# Patient Record
Sex: MALE | Race: BLACK OR AFRICAN AMERICAN | NOT HISPANIC OR LATINO | Employment: FULL TIME | URBAN - METROPOLITAN AREA
[De-identification: names, ages, dates, MRNs, and addresses within clinical notes are randomized per-mention and may not be internally consistent; named-entity substitution may affect disease eponyms.]

---

## 2019-09-25 ENCOUNTER — HOSPITAL ENCOUNTER (EMERGENCY)
Facility: HOSPITAL | Age: 34
Discharge: HOME | End: 2019-09-25
Admitting: EMERGENCY MEDICINE

## 2019-09-25 VITALS
DIASTOLIC BLOOD PRESSURE: 83 MMHG | SYSTOLIC BLOOD PRESSURE: 181 MMHG | HEART RATE: 78 BPM | OXYGEN SATURATION: 99 % | HEIGHT: 73 IN | BODY MASS INDEX: 41.46 KG/M2 | WEIGHT: 312.83 LBS | RESPIRATION RATE: 16 BRPM | TEMPERATURE: 99.3 F

## 2019-09-25 DIAGNOSIS — H66.92 LEFT OTITIS MEDIA, UNSPECIFIED OTITIS MEDIA TYPE: Primary | ICD-10-CM

## 2019-09-25 PROCEDURE — 99283 EMERGENCY DEPT VISIT LOW MDM: CPT

## 2019-09-25 RX ORDER — AMOXICILLIN 875 MG/1
875 TABLET, COATED ORAL ONCE
Status: COMPLETED | OUTPATIENT
Start: 2019-09-25 | End: 2019-09-25

## 2019-09-25 RX ORDER — AMOXICILLIN 875 MG/1
875 TABLET, COATED ORAL 2 TIMES DAILY
Qty: 14 TABLET | Refills: 0 | Status: SHIPPED | OUTPATIENT
Start: 2019-09-25

## 2019-09-25 RX ADMIN — AMOXICILLIN 875 MG: 875 TABLET, FILM COATED ORAL at 02:28

## 2019-09-25 NOTE — ED PROVIDER NOTES
Subjective   Patient has had pain and some drainage from the left ear for the past 3 days.  No loss of hearing has been no fever            Review of Systems   Constitutional: Negative for fever.   HENT: Positive for ear pain.        No past medical history on file.    No Known Allergies    No past surgical history on file.    No family history on file.             Objective   Physical Exam  34-year-old gentleman in no acute distress HEENT right tympanic membranes clear left is erythematous bulging appears to be intact but there is some drainage in the canal tympanic membrane is not retracted pharynx is clear neck is supple nontender no lymphadenopathy  Procedures           ED Course    He will be started on amoxicillin and given ENT for follow-up              MDM    Final diagnoses:   Left otitis media, unspecified otitis media type              Amado Porter, NP  09/25/19 0159

## 2023-03-13 ENCOUNTER — APPOINTMENT (OUTPATIENT)
Dept: CT IMAGING | Facility: HOSPITAL | Age: 38
End: 2023-03-13
Payer: MEDICAID

## 2023-03-13 ENCOUNTER — HOSPITAL ENCOUNTER (EMERGENCY)
Facility: HOSPITAL | Age: 38
Discharge: HOME | End: 2023-03-13
Admitting: EMERGENCY MEDICINE
Payer: MEDICAID

## 2023-03-13 VITALS
BODY MASS INDEX: 37.19 KG/M2 | RESPIRATION RATE: 12 BRPM | WEIGHT: 315 LBS | OXYGEN SATURATION: 99 % | TEMPERATURE: 98.1 F | HEART RATE: 82 BPM | SYSTOLIC BLOOD PRESSURE: 156 MMHG | HEIGHT: 77 IN | DIASTOLIC BLOOD PRESSURE: 95 MMHG

## 2023-03-13 DIAGNOSIS — S00.93XA CONTUSION OF HEAD, UNSPECIFIED PART OF HEAD, INITIAL ENCOUNTER: Primary | ICD-10-CM

## 2023-03-13 DIAGNOSIS — R51.9 NONINTRACTABLE HEADACHE, UNSPECIFIED CHRONICITY PATTERN, UNSPECIFIED HEADACHE TYPE: ICD-10-CM
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
## 2023-03-13 PROCEDURE — 96372 THER/PROPH/DIAG INJ SC/IM: CPT

## 2023-03-13 PROCEDURE — 25010000002 KETOROLAC TROMETHAMINE PER 15 MG: Performed by: PHYSICIAN ASSISTANT

## 2023-03-13 PROCEDURE — 99282 EMERGENCY DEPT VISIT SF MDM: CPT

## 2023-03-13 PROCEDURE — 70450 CT HEAD/BRAIN W/O DYE: CPT

## 2023-03-13 RX ORDER — NAPROXEN 500 MG/1
500 TABLET ORAL 2 TIMES DAILY PRN
Qty: 14 TABLET | Refills: 0 | Status: SHIPPED | OUTPATIENT
Start: 2023-03-13

## 2023-03-13 RX ORDER — KETOROLAC TROMETHAMINE 15 MG/ML
15 INJECTION, SOLUTION INTRAMUSCULAR; INTRAVENOUS ONCE
Status: COMPLETED | OUTPATIENT
Start: 2023-03-13 | End: 2023-03-13

## 2023-03-13 RX ORDER — METHOCARBAMOL 500 MG/1
500 TABLET, FILM COATED ORAL 3 TIMES DAILY
Qty: 15 TABLET | Refills: 0 | Status: SHIPPED | OUTPATIENT
Start: 2023-03-13

## 2023-03-13 RX ADMIN — KETOROLAC TROMETHAMINE 15 MG: 15 INJECTION, SOLUTION INTRAMUSCULAR; INTRAVENOUS at 19:17
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
## 2023-03-14 NOTE — DISCHARGE INSTRUCTIONS
Take naproxen as needed for headache or pain.  May take up to 2 times per day.  Do not mix with ibuprofen, Advil, Aleve Mobic or diclofenac.    Take Robaxin as needed for muscle stiffness or spasms.    Follow-up with primary care for recheck  Return to the ER for new or worsening symptoms

## 2024-01-22 PROCEDURE — 99283 EMERGENCY DEPT VISIT LOW MDM: CPT

## 2024-01-23 ENCOUNTER — HOSPITAL ENCOUNTER (EMERGENCY)
Facility: HOSPITAL | Age: 39
Discharge: HOME | End: 2024-01-23
Attending: EMERGENCY MEDICINE | Admitting: EMERGENCY MEDICINE
Payer: MEDICAID

## 2024-01-23 ENCOUNTER — APPOINTMENT (OUTPATIENT)
Dept: GENERAL RADIOLOGY | Facility: HOSPITAL | Age: 39
End: 2024-01-23
Payer: MEDICAID

## 2024-01-23 VITALS
OXYGEN SATURATION: 94 % | RESPIRATION RATE: 15 BRPM | BODY MASS INDEX: 37.19 KG/M2 | HEIGHT: 77 IN | DIASTOLIC BLOOD PRESSURE: 91 MMHG | HEART RATE: 76 BPM | TEMPERATURE: 99 F | WEIGHT: 315 LBS | SYSTOLIC BLOOD PRESSURE: 148 MMHG

## 2024-01-23 DIAGNOSIS — M25.562 ACUTE PAIN OF LEFT KNEE: Primary | ICD-10-CM

## 2024-01-23 LAB
ANION GAP SERPL CALCULATED.3IONS-SCNC: 10 MMOL/L (ref 5–15)
BASOPHILS # BLD AUTO: 0.1 10*3/MM3 (ref 0–0.2)
BASOPHILS NFR BLD AUTO: 1.2 % (ref 0–1.5)
BUN SERPL-MCNC: 8 MG/DL (ref 6–20)
BUN/CREAT SERPL: 6.2 (ref 7–25)
CALCIUM SPEC-SCNC: 8.9 MG/DL (ref 8.6–10.5)
CHLORIDE SERPL-SCNC: 105 MMOL/L (ref 98–107)
CO2 SERPL-SCNC: 26 MMOL/L (ref 22–29)
CREAT SERPL-MCNC: 1.29 MG/DL (ref 0.76–1.27)
CRP SERPL-MCNC: 1.86 MG/DL (ref 0–0.5)
DEPRECATED RDW RBC AUTO: 42 FL (ref 37–54)
EGFRCR SERPLBLD CKD-EPI 2021: 73.2 ML/MIN/1.73
EOSINOPHIL # BLD AUTO: 0 10*3/MM3 (ref 0–0.4)
EOSINOPHIL NFR BLD AUTO: 0.3 % (ref 0.3–6.2)
ERYTHROCYTE [DISTWIDTH] IN BLOOD BY AUTOMATED COUNT: 13.5 % (ref 12.3–15.4)
ERYTHROCYTE [SEDIMENTATION RATE] IN BLOOD: 15 MM/HR (ref 0–15)
GLUCOSE SERPL-MCNC: 93 MG/DL (ref 65–99)
HCT VFR BLD AUTO: 43.7 % (ref 37.5–51)
HGB BLD-MCNC: 14.6 G/DL (ref 13–17.7)
LYMPHOCYTES # BLD AUTO: 2.1 10*3/MM3 (ref 0.7–3.1)
LYMPHOCYTES NFR BLD AUTO: 19.5 % (ref 19.6–45.3)
MCH RBC QN AUTO: 28.7 PG (ref 26.6–33)
MCHC RBC AUTO-ENTMCNC: 33.5 G/DL (ref 31.5–35.7)
MCV RBC AUTO: 85.8 FL (ref 79–97)
MONOCYTES # BLD AUTO: 1.1 10*3/MM3 (ref 0.1–0.9)
MONOCYTES NFR BLD AUTO: 10.1 % (ref 5–12)
NEUTROPHILS NFR BLD AUTO: 68.9 % (ref 42.7–76)
NEUTROPHILS NFR BLD AUTO: 7.3 10*3/MM3 (ref 1.7–7)
NRBC BLD AUTO-RTO: 0.2 /100 WBC (ref 0–0.2)
PLATELET # BLD AUTO: 291 10*3/MM3 (ref 140–450)
PMV BLD AUTO: 8.1 FL (ref 6–12)
POTASSIUM SERPL-SCNC: 3.8 MMOL/L (ref 3.5–5.2)
RBC # BLD AUTO: 5.1 10*6/MM3 (ref 4.14–5.8)
SODIUM SERPL-SCNC: 141 MMOL/L (ref 136–145)
WBC NRBC COR # BLD AUTO: 10.6 10*3/MM3 (ref 3.4–10.8)

## 2024-01-23 PROCEDURE — 73562 X-RAY EXAM OF KNEE 3: CPT

## 2024-01-23 PROCEDURE — 85025 COMPLETE CBC W/AUTO DIFF WBC: CPT | Performed by: EMERGENCY MEDICINE

## 2024-01-23 PROCEDURE — 25010000002 KETOROLAC TROMETHAMINE PER 15 MG: Performed by: EMERGENCY MEDICINE

## 2024-01-23 PROCEDURE — 80048 BASIC METABOLIC PNL TOTAL CA: CPT | Performed by: EMERGENCY MEDICINE

## 2024-01-23 PROCEDURE — 96374 THER/PROPH/DIAG INJ IV PUSH: CPT

## 2024-01-23 PROCEDURE — 86140 C-REACTIVE PROTEIN: CPT | Performed by: EMERGENCY MEDICINE

## 2024-01-23 PROCEDURE — 85652 RBC SED RATE AUTOMATED: CPT | Performed by: EMERGENCY MEDICINE

## 2024-01-23 RX ORDER — METHYLPREDNISOLONE SODIUM SUCCINATE 125 MG/2ML
125 INJECTION, POWDER, LYOPHILIZED, FOR SOLUTION INTRAMUSCULAR; INTRAVENOUS ONCE
Status: DISCONTINUED | OUTPATIENT
Start: 2024-01-23 | End: 2024-01-23 | Stop reason: HOSPADM

## 2024-01-23 RX ORDER — PREDNISONE 20 MG/1
60 TABLET ORAL DAILY
Qty: 15 TABLET | Refills: 0 | Status: SHIPPED | OUTPATIENT
Start: 2024-01-23 | End: 2024-01-28

## 2024-01-23 RX ORDER — KETOROLAC TROMETHAMINE 30 MG/ML
30 INJECTION, SOLUTION INTRAMUSCULAR; INTRAVENOUS ONCE
Status: COMPLETED | OUTPATIENT
Start: 2024-01-23 | End: 2024-01-23

## 2024-01-23 RX ADMIN — KETOROLAC TROMETHAMINE 30 MG: 30 INJECTION INTRAMUSCULAR; INTRAVENOUS at 01:53

## 2024-01-23 NOTE — ED PROVIDER NOTES
Subjective   History of Present Illness  Pleasant 38-year-old male with a history of lupus that has not caused any arthritic issues in the past to his knowledge complains of gradual onset of left knee pain and swelling, moderate to severe, no known injury, no fevers.      Review of Systems   Musculoskeletal:         As per HPI       No past medical history on file.    No Known Allergies    No past surgical history on file.    No family history on file.    Social History     Socioeconomic History    Marital status: Single           Objective   Physical Exam  Constitutional:       Appearance: Normal appearance.   HENT:      Head: Normocephalic and atraumatic.   Cardiovascular:      Pulses: Normal pulses.   Musculoskeletal:      Comments: Left knee with mild effusion, pain with decreased flexion, grossly stable.  Patient is able to fully weight-bear with mild pain, no overlying erythema, minimal warmth   Skin:     General: Skin is warm and dry.      Capillary Refill: Capillary refill takes less than 2 seconds.   Neurological:      General: No focal deficit present.      Mental Status: He is alert and oriented to person, place, and time.   Psychiatric:         Mood and Affect: Mood normal.         Behavior: Behavior normal.         Procedures           ED Course                                             Medical Decision Making  38-year-old male with history of lupus with acute left knee pain.  Laboratory evaluation unremarkable with exception of mildly elevated CRP, felt to be related to inflammation rather than infection.  Patient is able to fully weight-bear with mild pain.  I did  the patient extensively if he develops any significant increased pain, inability to weight-bear, worsening knee swelling or fever to return immediately to rule out a septic knee.  At this time, inflammatory etiology perhaps lupus versus gout versus occult injury is favored.  Will place on trial of NSAIDs and steroids, follow-up  with PCP and orthopedist via referral.    Problems Addressed:  Acute pain of left knee: complicated acute illness or injury    Amount and/or Complexity of Data Reviewed  Labs: ordered.  Radiology: ordered.    Risk  Prescription drug management.        Final diagnoses:   Acute pain of left knee       ED Disposition  ED Disposition       ED Disposition   Discharge    Condition   Stable    Comment   --               PATIENT CONNECTION - [facility] [address]  [phone]  Schedule an appointment as soon as possible for a visit       Arthur Angulo II, MD  [address] [place] [address]  [phone]    Schedule an appointment as soon as possible for a visit            Medication List        New Prescriptions      predniSONE 20 MG tablet  Commonly known as: DELTASONE  Take 3 tablets by mouth Daily for 5 days.            Stop      naproxen 500 MG EC tablet  Commonly known as: EC NAPROSYN               Where to Get Your Medications        These medications were sent to [facility] #[id number] [facility], [place] - [address] [place] [address] [phone]  - [phone] [address] [place] [address]      Hours: 24-hours Phone: [phone]   predniSONE 20 MG tablet            Phil Paez MD  01/23/24 8320

## 2024-03-27 ENCOUNTER — ANESTHESIA (OUTPATIENT)
Dept: PERIOP | Facility: HOSPITAL | Age: 39
End: 2024-03-27
Payer: MEDICAID

## 2024-03-27 ENCOUNTER — APPOINTMENT (OUTPATIENT)
Dept: CT IMAGING | Facility: HOSPITAL | Age: 39
End: 2024-03-27
Payer: MEDICAID

## 2024-03-27 ENCOUNTER — HOSPITAL ENCOUNTER (EMERGENCY)
Facility: HOSPITAL | Age: 39
Discharge: OTHER INSTITUTION | End: 2024-03-27
Attending: EMERGENCY MEDICINE
Payer: MEDICAID

## 2024-03-27 ENCOUNTER — ANESTHESIA EVENT (OUTPATIENT)
Dept: PERIOP | Facility: HOSPITAL | Age: 39
End: 2024-03-27
Payer: MEDICAID

## 2024-03-27 VITALS
OXYGEN SATURATION: 95 % | SYSTOLIC BLOOD PRESSURE: 156 MMHG | WEIGHT: 315 LBS | DIASTOLIC BLOOD PRESSURE: 90 MMHG | RESPIRATION RATE: 25 BRPM | TEMPERATURE: 97 F | BODY MASS INDEX: 38.36 KG/M2 | HEIGHT: 76 IN | HEART RATE: 74 BPM

## 2024-03-27 DIAGNOSIS — I71.03 DISSECTION OF THORACOABDOMINAL AORTA: Primary | ICD-10-CM

## 2024-03-27 LAB
ABO GROUP BLD: NORMAL
ALBUMIN SERPL-MCNC: 4.2 G/DL (ref 3.5–5.2)
ALBUMIN/GLOB SERPL: 1.6 G/DL
ALP SERPL-CCNC: 89 U/L (ref 39–117)
ALT SERPL W P-5'-P-CCNC: 26 U/L (ref 1–41)
ANION GAP SERPL CALCULATED.3IONS-SCNC: 10 MMOL/L (ref 5–15)
APTT PPP: 33 SECONDS (ref 61–76.5)
AST SERPL-CCNC: 21 U/L (ref 1–40)
BASOPHILS # BLD AUTO: 0.04 10*3/MM3 (ref 0–0.2)
BASOPHILS NFR BLD AUTO: 0.3 % (ref 0–1.5)
BILIRUB SERPL-MCNC: 1.5 MG/DL (ref 0–1.2)
BLD GP AB SCN SERPL QL: NEGATIVE
BUN SERPL-MCNC: 5 MG/DL (ref 6–20)
BUN/CREAT SERPL: 4.3 (ref 7–25)
CALCIUM SPEC-SCNC: 8.8 MG/DL (ref 8.6–10.5)
CHLORIDE SERPL-SCNC: 99 MMOL/L (ref 98–107)
CO2 SERPL-SCNC: 27 MMOL/L (ref 22–29)
CREAT SERPL-MCNC: 1.15 MG/DL (ref 0.76–1.27)
DEPRECATED RDW RBC AUTO: 43.6 FL (ref 37–54)
EGFRCR SERPLBLD CKD-EPI 2021: 84.1 ML/MIN/1.73
EOSINOPHIL # BLD AUTO: 0.05 10*3/MM3 (ref 0–0.4)
EOSINOPHIL NFR BLD AUTO: 0.4 % (ref 0.3–6.2)
ERYTHROCYTE [DISTWIDTH] IN BLOOD BY AUTOMATED COUNT: 14.1 % (ref 12.3–15.4)
GLOBULIN UR ELPH-MCNC: 2.7 GM/DL
GLUCOSE SERPL-MCNC: 100 MG/DL (ref 65–99)
HCT VFR BLD AUTO: 37.4 % (ref 37.5–51)
HGB BLD-MCNC: 12.6 G/DL (ref 13–17.7)
HOLD SPECIMEN: NORMAL
IMM GRANULOCYTES # BLD AUTO: 0.04 10*3/MM3 (ref 0–0.05)
IMM GRANULOCYTES NFR BLD AUTO: 0.3 % (ref 0–0.5)
INR PPP: 1.06 (ref 0.93–1.1)
LIPASE SERPL-CCNC: 11 U/L (ref 13–60)
LYMPHOCYTES # BLD AUTO: 2.18 10*3/MM3 (ref 0.7–3.1)
LYMPHOCYTES NFR BLD AUTO: 18.5 % (ref 19.6–45.3)
MCH RBC QN AUTO: 28.5 PG (ref 26.6–33)
MCHC RBC AUTO-ENTMCNC: 33.7 G/DL (ref 31.5–35.7)
MCV RBC AUTO: 84.6 FL (ref 79–97)
MONOCYTES # BLD AUTO: 1.63 10*3/MM3 (ref 0.1–0.9)
MONOCYTES NFR BLD AUTO: 13.9 % (ref 5–12)
NEUTROPHILS NFR BLD AUTO: 66.6 % (ref 42.7–76)
NEUTROPHILS NFR BLD AUTO: 7.82 10*3/MM3 (ref 1.7–7)
NRBC BLD AUTO-RTO: 0 /100 WBC (ref 0–0.2)
PLATELET # BLD AUTO: 209 10*3/MM3 (ref 140–450)
PMV BLD AUTO: 9.7 FL (ref 6–12)
POTASSIUM SERPL-SCNC: 3.8 MMOL/L (ref 3.5–5.2)
PROT SERPL-MCNC: 6.9 G/DL (ref 6–8.5)
PROTHROMBIN TIME: 11.5 SECONDS (ref 9.6–11.7)
RBC # BLD AUTO: 4.42 10*6/MM3 (ref 4.14–5.8)
RH BLD: POSITIVE
SODIUM SERPL-SCNC: 136 MMOL/L (ref 136–145)
T&S EXPIRATION DATE: NORMAL
TROPONIN T SERPL HS-MCNC: 12 NG/L
WBC NRBC COR # BLD AUTO: 11.76 10*3/MM3 (ref 3.4–10.8)
WHOLE BLOOD HOLD COAG: NORMAL

## 2024-03-27 PROCEDURE — 25010000002 NICARDIPINE 2.5 MG/ML SOLUTION 10 ML VIAL: Performed by: ANESTHESIOLOGY

## 2024-03-27 PROCEDURE — B24BZZ4 ULTRASONOGRAPHY OF HEART WITH AORTA, TRANSESOPHAGEAL: ICD-10-PCS | Performed by: THORACIC SURGERY (CARDIOTHORACIC VASCULAR SURGERY)

## 2024-03-27 PROCEDURE — 86900 BLOOD TYPING SEROLOGIC ABO: CPT | Performed by: THORACIC SURGERY (CARDIOTHORACIC VASCULAR SURGERY)

## 2024-03-27 PROCEDURE — 25010000002 MORPHINE PER 10 MG: Performed by: EMERGENCY MEDICINE

## 2024-03-27 PROCEDURE — 25010000002 MIDAZOLAM PER 1 MG: Performed by: ANESTHESIOLOGY

## 2024-03-27 PROCEDURE — C1769 GUIDE WIRE: HCPCS | Performed by: THORACIC SURGERY (CARDIOTHORACIC VASCULAR SURGERY)

## 2024-03-27 PROCEDURE — C1713 ANCHOR/SCREW BN/BN,TIS/BN: HCPCS | Performed by: THORACIC SURGERY (CARDIOTHORACIC VASCULAR SURGERY)

## 2024-03-27 PROCEDURE — 96376 TX/PRO/DX INJ SAME DRUG ADON: CPT

## 2024-03-27 PROCEDURE — 25010000002 ONDANSETRON PER 1 MG: Performed by: EMERGENCY MEDICINE

## 2024-03-27 PROCEDURE — 02RX0JZ REPLACEMENT OF THORACIC AORTA, ASCENDING/ARCH WITH SYNTHETIC SUBSTITUTE, OPEN APPROACH: ICD-10-PCS | Performed by: THORACIC SURGERY (CARDIOTHORACIC VASCULAR SURGERY)

## 2024-03-27 PROCEDURE — 93318 ECHO TRANSESOPHAGEAL INTRAOP: CPT | Performed by: ANESTHESIOLOGY

## 2024-03-27 PROCEDURE — C1729 CATH, DRAINAGE: HCPCS | Performed by: THORACIC SURGERY (CARDIOTHORACIC VASCULAR SURGERY)

## 2024-03-27 PROCEDURE — 25010000002 NICARDIPINE 2.5 MG/ML SOLUTION 10 ML VIAL: Performed by: EMERGENCY MEDICINE

## 2024-03-27 PROCEDURE — 86850 RBC ANTIBODY SCREEN: CPT | Performed by: EMERGENCY MEDICINE

## 2024-03-27 PROCEDURE — 86901 BLOOD TYPING SEROLOGIC RH(D): CPT

## 2024-03-27 PROCEDURE — 80053 COMPREHEN METABOLIC PANEL: CPT | Performed by: EMERGENCY MEDICINE

## 2024-03-27 PROCEDURE — 86900 BLOOD TYPING SEROLOGIC ABO: CPT

## 2024-03-27 PROCEDURE — 25810000003 SODIUM CHLORIDE 0.9 % SOLUTION: Performed by: EMERGENCY MEDICINE

## 2024-03-27 PROCEDURE — 4A10X4G MONITORING OF CENTRAL NERVOUS ELECTRICAL ACTIVITY, INTRAOPERATIVE, EXTERNAL APPROACH: ICD-10-PCS | Performed by: THORACIC SURGERY (CARDIOTHORACIC VASCULAR SURGERY)

## 2024-03-27 PROCEDURE — 96375 TX/PRO/DX INJ NEW DRUG ADDON: CPT

## 2024-03-27 PROCEDURE — 5A1221Z PERFORMANCE OF CARDIAC OUTPUT, CONTINUOUS: ICD-10-PCS | Performed by: THORACIC SURGERY (CARDIOTHORACIC VASCULAR SURGERY)

## 2024-03-27 PROCEDURE — P9047 ALBUMIN (HUMAN), 25%, 50ML: HCPCS

## 2024-03-27 PROCEDURE — 96365 THER/PROPH/DIAG IV INF INIT: CPT

## 2024-03-27 PROCEDURE — 83690 ASSAY OF LIPASE: CPT | Performed by: EMERGENCY MEDICINE

## 2024-03-27 PROCEDURE — 96368 THER/DIAG CONCURRENT INF: CPT

## 2024-03-27 PROCEDURE — 02QF0ZZ REPAIR AORTIC VALVE, OPEN APPROACH: ICD-10-PCS | Performed by: THORACIC SURGERY (CARDIOTHORACIC VASCULAR SURGERY)

## 2024-03-27 PROCEDURE — 86901 BLOOD TYPING SEROLOGIC RH(D): CPT | Performed by: THORACIC SURGERY (CARDIOTHORACIC VASCULAR SURGERY)

## 2024-03-27 PROCEDURE — 85730 THROMBOPLASTIN TIME PARTIAL: CPT | Performed by: EMERGENCY MEDICINE

## 2024-03-27 PROCEDURE — 25810000003 SODIUM CHLORIDE 0.9 % SOLUTION 250 ML FLEX CONT: Performed by: ANESTHESIOLOGY

## 2024-03-27 PROCEDURE — 74176 CT ABD & PELVIS W/O CONTRAST: CPT

## 2024-03-27 PROCEDURE — 86920 COMPATIBILITY TEST SPIN: CPT

## 2024-03-27 PROCEDURE — 25010000002 HEPARIN (PORCINE) PER 1000 UNITS

## 2024-03-27 PROCEDURE — 99285 EMERGENCY DEPT VISIT HI MDM: CPT

## 2024-03-27 PROCEDURE — 74174 CTA ABD&PLVS W/CONTRAST: CPT

## 2024-03-27 PROCEDURE — 71275 CT ANGIOGRAPHY CHEST: CPT

## 2024-03-27 PROCEDURE — 25810000003 SODIUM CHLORIDE 0.9 % SOLUTION

## 2024-03-27 PROCEDURE — 25010000002 LABETALOL 5 MG/ML SOLUTION: Performed by: EMERGENCY MEDICINE

## 2024-03-27 PROCEDURE — 85025 COMPLETE CBC W/AUTO DIFF WBC: CPT | Performed by: EMERGENCY MEDICINE

## 2024-03-27 PROCEDURE — 25510000001 IOPAMIDOL PER 1 ML: Performed by: EMERGENCY MEDICINE

## 2024-03-27 PROCEDURE — 3E080GC INTRODUCTION OF OTHER THERAPEUTIC SUBSTANCE INTO HEART, OPEN APPROACH: ICD-10-PCS | Performed by: THORACIC SURGERY (CARDIOTHORACIC VASCULAR SURGERY)

## 2024-03-27 PROCEDURE — C1760 CLOSURE DEV, VASC: HCPCS | Performed by: THORACIC SURGERY (CARDIOTHORACIC VASCULAR SURGERY)

## 2024-03-27 PROCEDURE — 25810000003 SODIUM CHLORIDE 0.9 % SOLUTION 250 ML FLEX CONT: Performed by: EMERGENCY MEDICINE

## 2024-03-27 PROCEDURE — 25010000002 VANCOMYCIN 1 G RECONSTITUTED SOLUTION

## 2024-03-27 PROCEDURE — 86901 BLOOD TYPING SEROLOGIC RH(D): CPT | Performed by: EMERGENCY MEDICINE

## 2024-03-27 PROCEDURE — 84484 ASSAY OF TROPONIN QUANT: CPT | Performed by: EMERGENCY MEDICINE

## 2024-03-27 PROCEDURE — 86850 RBC ANTIBODY SCREEN: CPT | Performed by: THORACIC SURGERY (CARDIOTHORACIC VASCULAR SURGERY)

## 2024-03-27 PROCEDURE — 85610 PROTHROMBIN TIME: CPT | Performed by: EMERGENCY MEDICINE

## 2024-03-27 PROCEDURE — 86900 BLOOD TYPING SEROLOGIC ABO: CPT | Performed by: EMERGENCY MEDICINE

## 2024-03-27 PROCEDURE — 25010000002 ALBUMIN HUMAN 25% PER 50 ML

## 2024-03-27 PROCEDURE — C1768 GRAFT, VASCULAR: HCPCS | Performed by: THORACIC SURGERY (CARDIOTHORACIC VASCULAR SURGERY)

## 2024-03-27 PROCEDURE — 03JY0ZZ INSPECTION OF UPPER ARTERY, OPEN APPROACH: ICD-10-PCS | Performed by: THORACIC SURGERY (CARDIOTHORACIC VASCULAR SURGERY)

## 2024-03-27 PROCEDURE — 25010000002 ESMOLOL 2500 MG/250ML SOLUTION: Performed by: EMERGENCY MEDICINE

## 2024-03-27 DEVICE — SS SUTURE, 4 PER SLEEVE
Type: IMPLANTABLE DEVICE | Site: CHEST WALL | Status: FUNCTIONAL
Brand: MYO/WIRE II

## 2024-03-27 DEVICE — SS SUTURE, 3 PER SLEEVE
Type: IMPLANTABLE DEVICE | Site: CHEST WALL | Status: FUNCTIONAL
Brand: MYO/WIRE II

## 2024-03-27 DEVICE — HEMOST ABS SURGIFOAM SZ100 8X12 10MM: Type: IMPLANTABLE DEVICE | Site: CHEST | Status: FUNCTIONAL

## 2024-03-27 RX ORDER — ONDANSETRON 2 MG/ML
4 INJECTION INTRAMUSCULAR; INTRAVENOUS ONCE
Status: COMPLETED | OUTPATIENT
Start: 2024-03-27 | End: 2024-03-27

## 2024-03-27 RX ORDER — MIDAZOLAM HYDROCHLORIDE 1 MG/ML
INJECTION INTRAMUSCULAR; INTRAVENOUS AS NEEDED
Status: DISCONTINUED | OUTPATIENT
Start: 2024-03-27 | End: 2024-03-28 | Stop reason: SURG

## 2024-03-27 RX ORDER — ESMOLOL HYDROCHLORIDE 10 MG/ML
50-300 INJECTION, SOLUTION INTRAVENOUS
Status: DISCONTINUED | OUTPATIENT
Start: 2024-03-27 | End: 2024-03-28 | Stop reason: HOSPADM

## 2024-03-27 RX ORDER — SODIUM CHLORIDE 0.9 % (FLUSH) 0.9 %
10 SYRINGE (ML) INJECTION AS NEEDED
Status: DISCONTINUED | OUTPATIENT
Start: 2024-03-27 | End: 2024-03-28 | Stop reason: HOSPADM

## 2024-03-27 RX ORDER — LABETALOL HYDROCHLORIDE 5 MG/ML
10 INJECTION, SOLUTION INTRAVENOUS ONCE
Status: COMPLETED | OUTPATIENT
Start: 2024-03-27 | End: 2024-03-27

## 2024-03-27 RX ADMIN — NICARDIPINE HYDROCHLORIDE 10 MG/HR: 2.5 INJECTION, SOLUTION INTRAVENOUS at 22:31

## 2024-03-27 RX ADMIN — ESMOLOL HYDROCHLORIDE 125 MCG/KG/MIN: 10 INJECTION INTRAVENOUS at 22:42

## 2024-03-27 RX ADMIN — SODIUM CHLORIDE: 9 INJECTION, SOLUTION INTRAVENOUS at 23:40

## 2024-03-27 RX ADMIN — ESMOLOL HYDROCHLORIDE 25 MCG/KG/MIN: 10 INJECTION INTRAVENOUS at 22:08

## 2024-03-27 RX ADMIN — SODIUM CHLORIDE 500 ML: 9 INJECTION, SOLUTION INTRAVENOUS at 19:02

## 2024-03-27 RX ADMIN — IOPAMIDOL 100 ML: 755 INJECTION, SOLUTION INTRAVENOUS at 21:30

## 2024-03-27 RX ADMIN — ONDANSETRON 4 MG: 2 INJECTION INTRAMUSCULAR; INTRAVENOUS at 19:02

## 2024-03-27 RX ADMIN — Medication 10 ML: at 19:02

## 2024-03-27 RX ADMIN — MIDAZOLAM 2 MG: 1 INJECTION INTRAMUSCULAR; INTRAVENOUS at 23:45

## 2024-03-27 RX ADMIN — Medication 10 MG: at 21:40

## 2024-03-27 RX ADMIN — MORPHINE SULFATE 4 MG: 4 INJECTION, SOLUTION INTRAMUSCULAR; INTRAVENOUS at 19:02

## 2024-03-27 RX ADMIN — NICARDIPINE HYDROCHLORIDE 5 MG/HR: 2.5 INJECTION, SOLUTION INTRAVENOUS at 22:04

## 2024-03-27 RX ADMIN — ESMOLOL HYDROCHLORIDE 75 MCG/KG/MIN: 10 INJECTION INTRAVENOUS at 22:28

## 2024-03-27 RX ADMIN — NICARDIPINE HYDROCHLORIDE 10 MG/HR: 25 INJECTION, SOLUTION INTRAVENOUS at 23:41

## 2024-03-27 RX ADMIN — ONDANSETRON 4 MG: 2 INJECTION INTRAMUSCULAR; INTRAVENOUS at 22:03

## 2024-03-27 RX ADMIN — NICARDIPINE HYDROCHLORIDE 7.5 MG/HR: 2.5 INJECTION, SOLUTION INTRAVENOUS at 22:16

## 2024-03-27 RX ADMIN — MORPHINE SULFATE 4 MG: 4 INJECTION, SOLUTION INTRAMUSCULAR; INTRAVENOUS at 22:03

## 2024-03-27 NOTE — ED PROVIDER NOTES
"Subjective   History of Present Illness  38-year-old male describes an episode of vomiting 3 days ago after eating some chili and has had some epigastric pain and radiation to his back off and on since that time.  States he does seem to have some increased discomfort after eating over the last few days.  He reports no diarrhea.  States his last bowel movement was yesterday.  He reports no melena or hematochezia or hematemesis.  He has had no fevers or chills or any other unusual ingestions or any known ill contacts.  Review of Systems    No past medical history on file.  Reportedly negative and on no medications  No Known Allergies    No past surgical history on file.    No family history on file.    Social History     Socioeconomic History    Marital status: Single     Prior to Admission medications    Medication Sig Start Date End Date Taking? Authorizing Provider   amoxicillin (AMOXIL) 875 MG tablet Take 1 tablet by mouth 2 (Two) Times a Day. 9/25/19   Amdao Porter APRN   methocarbamol (ROBAXIN) 500 MG tablet Take 1 tablet by mouth 3 (Three) Times a Day. 3/13/23   Fatoumata Mckeon PA     /91   Pulse 58   Temp 97 °F (36.1 °C) (Temporal)   Resp 17   Ht 193 cm (76\")   Wt (!) 151 kg (332 lb 7.3 oz)   SpO2 95%   BMI 40.47 kg/m²         Objective   Physical Exam  General: Obese male no acute distress, awake alert and pleasant  Eyes:  sclera nonicteric  HEENT: Mucous membranes moist, no mucosal swelling  Neck: Supple, no nuchal rigidity, no JVD  Respirations: Respirations nonlabored, equal breath sounds bilaterally, clear lungs  Heart regular rate and rhythm, no murmurs rubs or gallops,   Abdomen soft mildly tender palpation in all 4 quadrants, no rebound or guarding, no Maldonado sign, nondistended, no hepatosplenomegaly, no hernia, no mass, normal bowel sounds, no CVA tenderness  Extremities no clubbing cyanosis or edema,   Neuro cranial nerves grossly intact, no focal limb deficits  Psych oriented, " pleasant affect  Skin no rash, brisk cap refill  Procedures           ED Course    Results for orders placed or performed during the hospital encounter of 03/27/24   Comprehensive Metabolic Panel    Specimen: Blood   Result Value Ref Range    Glucose 100 (H) 65 - 99 mg/dL    BUN 5 (L) 6 - 20 mg/dL    Creatinine 1.15 0.76 - 1.27 mg/dL    Sodium 136 136 - 145 mmol/L    Potassium 3.8 3.5 - 5.2 mmol/L    Chloride 99 98 - 107 mmol/L    CO2 27.0 22.0 - 29.0 mmol/L    Calcium 8.8 8.6 - 10.5 mg/dL    Total Protein 6.9 6.0 - 8.5 g/dL    Albumin 4.2 3.5 - 5.2 g/dL    ALT (SGPT) 26 1 - 41 U/L    AST (SGOT) 21 1 - 40 U/L    Alkaline Phosphatase 89 39 - 117 U/L    Total Bilirubin 1.5 (H) 0.0 - 1.2 mg/dL    Globulin 2.7 gm/dL    A/G Ratio 1.6 g/dL    BUN/Creatinine Ratio 4.3 (L) 7.0 - 25.0    Anion Gap 10.0 5.0 - 15.0 mmol/L    eGFR 84.1 >60.0 mL/min/1.73   Lipase    Specimen: Blood   Result Value Ref Range    Lipase 11 (L) 13 - 60 U/L   CBC Auto Differential    Specimen: Blood   Result Value Ref Range    WBC 11.76 (H) 3.40 - 10.80 10*3/mm3    RBC 4.42 4.14 - 5.80 10*6/mm3    Hemoglobin 12.6 (L) 13.0 - 17.7 g/dL    Hematocrit 37.4 (L) 37.5 - 51.0 %    MCV 84.6 79.0 - 97.0 fL    MCH 28.5 26.6 - 33.0 pg    MCHC 33.7 31.5 - 35.7 g/dL    RDW 14.1 12.3 - 15.4 %    RDW-SD 43.6 37.0 - 54.0 fl    MPV 9.7 6.0 - 12.0 fL    Platelets 209 140 - 450 10*3/mm3    Neutrophil % 66.6 42.7 - 76.0 %    Lymphocyte % 18.5 (L) 19.6 - 45.3 %    Monocyte % 13.9 (H) 5.0 - 12.0 %    Eosinophil % 0.4 0.3 - 6.2 %    Basophil % 0.3 0.0 - 1.5 %    Immature Grans % 0.3 0.0 - 0.5 %    Neutrophils, Absolute 7.82 (H) 1.70 - 7.00 10*3/mm3    Lymphocytes, Absolute 2.18 0.70 - 3.10 10*3/mm3    Monocytes, Absolute 1.63 (H) 0.10 - 0.90 10*3/mm3    Eosinophils, Absolute 0.05 0.00 - 0.40 10*3/mm3    Basophils, Absolute 0.04 0.00 - 0.20 10*3/mm3    Immature Grans, Absolute 0.04 0.00 - 0.05 10*3/mm3    nRBC 0.0 0.0 - 0.2 /100 WBC   Single High Sensitivity Troponin T     Specimen: Blood   Result Value Ref Range    HS Troponin T 12 <22 ng/L   Protime-INR    Specimen: Blood   Result Value Ref Range    Protime 11.5 9.6 - 11.7 Seconds    INR 1.06 0.93 - 1.10   aPTT    Specimen: Blood   Result Value Ref Range    PTT 33.0 (L) 61.0 - 76.5 seconds   Gold Top - SST   Result Value Ref Range    Extra Tube Hold for add-ons.    Light Blue Top   Result Value Ref Range    Extra Tube Hold for add-ons.      CT Angiogram Abdomen Pelvis    Result Date: 3/27/2024  Impression: Type A aortic dissection as described above extending down to the aortic bifurcation. No evidence of occlusion of the branches of the aorta or evidence of end organ hypoperfusion. Aneurysmal dilatation of the distal infrarenal abdominal aorta measuring 4.5 cm. Mild aneurysmal dilatation of the right common iliac artery measuring 1.8 cm. Moderate aneurysmal dilatation of the left common iliac artery measuring 2.3 cm. Findings discussed with Dr. Gresham on March 27, 2024 at 9:50 p.m. by telephone. Electronically Signed: Checo Ibarra MD  3/27/2024 9:50 PM EDT  Workstation ID: [id number]    CT Angiogram Chest    Result Date: 3/27/2024  Impression: Type A aortic dissection as described above extending down to the aortic bifurcation. No evidence of occlusion of the branches of the aorta or evidence of end organ hypoperfusion. Aneurysmal dilatation of the distal infrarenal abdominal aorta measuring 4.5 cm. Mild aneurysmal dilatation of the right common iliac artery measuring 1.8 cm. Moderate aneurysmal dilatation of the left common iliac artery measuring 2.3 cm. Findings discussed with Dr. Gresham on March 27, 2024 at 9:50 p.m. by telephone. Electronically Signed: Checo Ibarra MD  3/27/2024 9:50 PM EDT  Workstation ID: [id number]    CT Abdomen Pelvis Without Contrast    Result Date: 3/27/2024  Impression: Aneurysmal dilatation of the infrarenal abdominal aorta measuring 4.2 cm and aneurysmal dilatation of the bilateral common iliac  arteries measuring 2.2 cm on the right and 2.4 cm on the left. Extensive periaortic/retroperitoneal fat stranding is visualized. No para-aortic fluid or hematoma visualized. Cannot exclude acute aortic syndrome. Recommend correlation with CTA. Other etiologies such as early changes of retroperitoneal fibrosis or passive congestion are also considerations. Mild hepatomegaly. Slightly nodular liver contour. Correlate for cirrhosis. Findings discussed with Dr. Gresham on March 27, 2024 at 7:52 p.m. by telephone. Electronically Signed: Checo Ibarra MD  3/27/2024 7:52 PM EDT  Workstation ID: [id number]     ED Course as of 03/27/24 2211   Wed Mar 27, 2024   2133 CT surgery paged [SH]   5936 In discussion with Dr. Parkinson, patient was started on esmolol and Cardene drips with target of systolic blood pressure below 120. [SH]      ED Course User Index  [SH] Bobo Gresham MD                                             Medical Decision Making  Patient presents with abdominal pain and an episode of vomiting 3 days ago.  Differential diagnosis included gastritis, peptic ulcer disease, abdominal aortic aneurysm, cholecystitis, appendicitis, pancreatitis    Patient was found to have some aortic abnormality on the noncontrast scan this was followed up with a CTA of the aorta which shows type a dissection.  Patient was moderately hypertensive on presentation and was started on IV antihypertensives following the dissection diagnosis.  CT surgery consulted.  After review of the scans Dr. Parkinson requests patient be transferred to [facility] for surgery.  Patient was advised of findings and is agreeable plan of transfer for definitive management and surgery.  On reexamination.  Patient has normal pulses in all 4 extremities.  His pain is minimal.  He is having no chest pain or dyspnea.    Problems Addressed:  Dissection of thoracoabdominal aorta: complicated acute illness or injury    Amount and/or Complexity of Data  Reviewed  Labs: ordered. Decision-making details documented in ED Course.     Details: CBC shows borderline leukocytosis and mild anemia, high-sensitivity troponin normal, lipase essentially normal, comprehensive metabolic panel mild hyperbilirubinemia  Radiology: ordered and independent interpretation performed.     Details: My independent interpretation of CT gram aorta type a dissection    Risk  Prescription drug management.    Critical care time 40 minutes    Final diagnoses:   Dissection of thoracoabdominal aorta       ED Disposition  ED Disposition       ED Disposition   Transfer to Another Facility     Condition   --    Comment   --               No follow-up provider specified.       Medication List      No changes were made to your prescriptions during this visit.            Bobo Gresham MD  03/27/24 6806

## 2024-03-28 ENCOUNTER — APPOINTMENT (OUTPATIENT)
Dept: GENERAL RADIOLOGY | Facility: HOSPITAL | Age: 39
DRG: 219 | End: 2024-03-28
Payer: MEDICAID

## 2024-03-28 ENCOUNTER — HOSPITAL ENCOUNTER (INPATIENT)
Facility: HOSPITAL | Age: 39
LOS: 6 days | Discharge: HOME HEALTH | DRG: 219 | End: 2024-04-03
Attending: THORACIC SURGERY (CARDIOTHORACIC VASCULAR SURGERY) | Admitting: THORACIC SURGERY (CARDIOTHORACIC VASCULAR SURGERY)
Payer: MEDICAID

## 2024-03-28 DIAGNOSIS — I72.9 ANEURYSM: ICD-10-CM

## 2024-03-28 DIAGNOSIS — I71.9 ANEURYSM OF AORTA: ICD-10-CM

## 2024-03-28 DIAGNOSIS — Z86.79 S/P ASCENDING AORTIC ANEURYSM REPAIR: ICD-10-CM

## 2024-03-28 DIAGNOSIS — Z98.890 S/P AORTIC DISSECTION REPAIR: ICD-10-CM

## 2024-03-28 DIAGNOSIS — Z98.890 S/P ASCENDING AORTIC ANEURYSM REPAIR: ICD-10-CM

## 2024-03-28 DIAGNOSIS — I71.03 DISSECTION OF THORACOABDOMINAL AORTA: ICD-10-CM

## 2024-03-28 DIAGNOSIS — G47.33 OSA (OBSTRUCTIVE SLEEP APNEA): Primary | ICD-10-CM

## 2024-03-28 PROBLEM — I71.00 AORTIC DISSECTION: Status: ACTIVE | Noted: 2024-03-28

## 2024-03-28 LAB
ABO GROUP BLD: NORMAL
ACT BLD: 120 SECONDS (ref 82–152)
ACT BLD: 130 SECONDS (ref 82–152)
ACT BLD: 282 SECONDS (ref 82–152)
ACT BLD: 309 SECONDS (ref 82–152)
ACT BLD: 358 SECONDS (ref 82–152)
ACT BLD: 412 SECONDS (ref 82–152)
ALBUMIN SERPL-MCNC: 3.7 G/DL (ref 3.5–5.2)
ALBUMIN SERPL-MCNC: 3.7 G/DL (ref 3.5–5.2)
AMPHET+METHAMPHET UR QL: POSITIVE
ANION GAP SERPL CALCULATED.3IONS-SCNC: 10 MMOL/L (ref 5–15)
ANION GAP SERPL CALCULATED.3IONS-SCNC: 15.6 MMOL/L (ref 5–15)
ANION GAP SERPL CALCULATED.3IONS-SCNC: 8.5 MMOL/L (ref 5–15)
ANION GAP SERPL CALCULATED.3IONS-SCNC: 9.4 MMOL/L (ref 5–15)
APTT PPP: 28.2 SECONDS (ref 22.7–35.4)
APTT PPP: 28.5 SECONDS (ref 22.7–35.4)
APTT PPP: 30.1 SECONDS (ref 22.7–35.4)
ARTERIAL PATENCY WRIST A: ABNORMAL
ATMOSPHERIC PRESS: 753.8 MMHG
ATMOSPHERIC PRESS: 754.9 MMHG
ATMOSPHERIC PRESS: 754.9 MMHG
ATMOSPHERIC PRESS: 755.4 MMHG
BARBITURATES UR QL SCN: NEGATIVE
BASE EXCESS BLDA CALC-SCNC: -0.3 MMOL/L (ref 0–2)
BASE EXCESS BLDA CALC-SCNC: -1 MMOL/L (ref -5–5)
BASE EXCESS BLDA CALC-SCNC: -1.3 MMOL/L (ref 0–2)
BASE EXCESS BLDA CALC-SCNC: -5 MMOL/L (ref -5–5)
BASE EXCESS BLDA CALC-SCNC: 0 MMOL/L (ref -5–5)
BASE EXCESS BLDA CALC-SCNC: 0.9 MMOL/L (ref 0–2)
BASE EXCESS BLDA CALC-SCNC: 1 MMOL/L (ref -5–5)
BASE EXCESS BLDA CALC-SCNC: 3 MMOL/L (ref -5–5)
BASE EXCESS BLDV CALC-SCNC: 1.8 MMOL/L (ref -2–2)
BASOPHILS # BLD AUTO: 0.04 10*3/MM3 (ref 0–0.2)
BASOPHILS NFR BLD AUTO: 0.2 % (ref 0–1.5)
BDY SITE: ABNORMAL
BENZODIAZ UR QL SCN: POSITIVE
BH BB BLOOD EXPIRATION DATE: NORMAL
BH BB BLOOD TYPE BARCODE: 5100
BH BB BLOOD TYPE BARCODE: 5100
BH BB BLOOD TYPE BARCODE: 6200
BH BB BLOOD TYPE BARCODE: 7300
BH BB BLOOD TYPE BARCODE: 9500
BH BB BLOOD TYPE BARCODE: NORMAL
BH BB DISPENSE STATUS: NORMAL
BH BB PRODUCT CODE: NORMAL
BH BB UNIT NUMBER: NORMAL
BLD GP AB SCN SERPL QL: NEGATIVE
BUN SERPL-MCNC: 12 MG/DL (ref 6–20)
BUN SERPL-MCNC: 12 MG/DL (ref 6–20)
BUN SERPL-MCNC: 5 MG/DL (ref 6–20)
BUN SERPL-MCNC: 9 MG/DL (ref 6–20)
BUN/CREAT SERPL: 3.2 (ref 7–25)
BUN/CREAT SERPL: 5.9 (ref 7–25)
BUN/CREAT SERPL: 7.3 (ref 7–25)
BUN/CREAT SERPL: 7.6 (ref 7–25)
CA-I BLD-MCNC: 4.5 MG/DL (ref 4.6–5.4)
CA-I BLDA-SCNC: ABNORMAL MMOL/L
CA-I SERPL ISE-MCNC: 1.13 MMOL/L (ref 1.15–1.35)
CALCIUM SPEC-SCNC: 8.1 MG/DL (ref 8.6–10.5)
CALCIUM SPEC-SCNC: 8.3 MG/DL (ref 8.6–10.5)
CALCIUM SPEC-SCNC: 8.3 MG/DL (ref 8.6–10.5)
CALCIUM SPEC-SCNC: 8.8 MG/DL (ref 8.6–10.5)
CANNABINOIDS SERPL QL: POSITIVE
CHLORIDE SERPL-SCNC: 102 MMOL/L (ref 98–107)
CHLORIDE SERPL-SCNC: 105 MMOL/L (ref 98–107)
CHLORIDE SERPL-SCNC: 106 MMOL/L (ref 98–107)
CHLORIDE SERPL-SCNC: 106 MMOL/L (ref 98–107)
CO2 BLDA-SCNC: 24.2 MMOL/L (ref 23–27)
CO2 BLDA-SCNC: 25.4 MMOL/L (ref 23–27)
CO2 BLDA-SCNC: 26 MMOL/L (ref 24–29)
CO2 BLDA-SCNC: 27 MMOL/L (ref 24–29)
CO2 BLDA-SCNC: 27 MMOL/L (ref 24–29)
CO2 BLDA-SCNC: 27.3 MMOL/L (ref 23–27)
CO2 BLDA-SCNC: 27.7 MMOL/L (ref 23–27)
CO2 BLDA-SCNC: 28 MMOL/L (ref 24–29)
CO2 BLDA-SCNC: 29 MMOL/L (ref 24–29)
CO2 BLDA-SCNC: 30 MMOL/L (ref 24–29)
CO2 BLDA-SCNC: 31 MMOL/L (ref 24–29)
CO2 SERPL-SCNC: 21.4 MMOL/L (ref 22–29)
CO2 SERPL-SCNC: 23 MMOL/L (ref 22–29)
CO2 SERPL-SCNC: 23.6 MMOL/L (ref 22–29)
CO2 SERPL-SCNC: 27.5 MMOL/L (ref 22–29)
COCAINE UR QL: NEGATIVE
CREAT SERPL-MCNC: 1.52 MG/DL (ref 0.76–1.27)
CREAT SERPL-MCNC: 1.54 MG/DL (ref 0.76–1.27)
CREAT SERPL-MCNC: 1.57 MG/DL (ref 0.76–1.27)
CREAT SERPL-MCNC: 1.64 MG/DL (ref 0.76–1.27)
DEPRECATED RDW RBC AUTO: 40.7 FL (ref 37–54)
DEPRECATED RDW RBC AUTO: 41.5 FL (ref 37–54)
DEPRECATED RDW RBC AUTO: 42.5 FL (ref 37–54)
DEPRECATED RDW RBC AUTO: 43.2 FL (ref 37–54)
DEPRECATED RDW RBC AUTO: 44.1 FL (ref 37–54)
DEVICE COMMENT: ABNORMAL
EGFRCR SERPLBLD CKD-EPI 2021: 54.9 ML/MIN/1.73
EGFRCR SERPLBLD CKD-EPI 2021: 57.9 ML/MIN/1.73
EGFRCR SERPLBLD CKD-EPI 2021: 59.2 ML/MIN/1.73
EGFRCR SERPLBLD CKD-EPI 2021: 60.1 ML/MIN/1.73
EOSINOPHIL # BLD AUTO: 0.07 10*3/MM3 (ref 0–0.4)
EOSINOPHIL NFR BLD AUTO: 0.4 % (ref 0.3–6.2)
ERYTHROCYTE [DISTWIDTH] IN BLOOD BY AUTOMATED COUNT: 13.4 % (ref 12.3–15.4)
ERYTHROCYTE [DISTWIDTH] IN BLOOD BY AUTOMATED COUNT: 13.4 % (ref 12.3–15.4)
ERYTHROCYTE [DISTWIDTH] IN BLOOD BY AUTOMATED COUNT: 13.6 % (ref 12.3–15.4)
ERYTHROCYTE [DISTWIDTH] IN BLOOD BY AUTOMATED COUNT: 13.9 % (ref 12.3–15.4)
ERYTHROCYTE [DISTWIDTH] IN BLOOD BY AUTOMATED COUNT: 14 % (ref 12.3–15.4)
FENTANYL UR-MCNC: POSITIVE NG/ML
FIBRINOGEN PPP-MCNC: 419 MG/DL (ref 219–464)
FIBRINOGEN PPP-MCNC: 483 MG/DL (ref 219–464)
FIBRINOGEN PPP-MCNC: 602 MG/DL (ref 219–464)
GAS FLOW AIRWAY: 10 LPM
GLUCOSE BLDC GLUCOMTR-MCNC: 105 MG/DL (ref 70–130)
GLUCOSE BLDC GLUCOMTR-MCNC: 109 MG/DL (ref 70–130)
GLUCOSE BLDC GLUCOMTR-MCNC: 112 MG/DL (ref 70–130)
GLUCOSE BLDC GLUCOMTR-MCNC: 115 MG/DL (ref 70–130)
GLUCOSE BLDC GLUCOMTR-MCNC: 119 MG/DL (ref 70–130)
GLUCOSE BLDC GLUCOMTR-MCNC: 128 MG/DL (ref 70–130)
GLUCOSE BLDC GLUCOMTR-MCNC: 128 MG/DL (ref 70–130)
GLUCOSE BLDC GLUCOMTR-MCNC: 131 MG/DL (ref 70–130)
GLUCOSE BLDC GLUCOMTR-MCNC: 135 MG/DL (ref 70–130)
GLUCOSE BLDC GLUCOMTR-MCNC: 137 MG/DL (ref 70–130)
GLUCOSE BLDC GLUCOMTR-MCNC: 143 MG/DL (ref 70–130)
GLUCOSE BLDC GLUCOMTR-MCNC: 143 MG/DL (ref 70–130)
GLUCOSE BLDC GLUCOMTR-MCNC: 147 MG/DL (ref 70–130)
GLUCOSE BLDC GLUCOMTR-MCNC: 157 MG/DL (ref 70–130)
GLUCOSE BLDC GLUCOMTR-MCNC: 186 MG/DL (ref 70–130)
GLUCOSE BLDC GLUCOMTR-MCNC: 196 MG/DL (ref 70–130)
GLUCOSE BLDC GLUCOMTR-MCNC: 215 MG/DL (ref 70–130)
GLUCOSE BLDC GLUCOMTR-MCNC: 238 MG/DL (ref 70–130)
GLUCOSE BLDC GLUCOMTR-MCNC: 243 MG/DL (ref 70–130)
GLUCOSE BLDC GLUCOMTR-MCNC: 93 MG/DL (ref 70–130)
GLUCOSE BLDC GLUCOMTR-MCNC: 96 MG/DL (ref 70–130)
GLUCOSE BLDC GLUCOMTR-MCNC: 97 MG/DL (ref 70–130)
GLUCOSE SERPL-MCNC: 144 MG/DL (ref 65–99)
GLUCOSE SERPL-MCNC: 145 MG/DL (ref 65–99)
GLUCOSE SERPL-MCNC: 147 MG/DL (ref 65–99)
GLUCOSE SERPL-MCNC: 191 MG/DL (ref 65–99)
HCO3 BLDA-SCNC: 23.1 MMOL/L (ref 22–28)
HCO3 BLDA-SCNC: 24.1 MMOL/L (ref 22–26)
HCO3 BLDA-SCNC: 24.1 MMOL/L (ref 22–28)
HCO3 BLDA-SCNC: 25.5 MMOL/L (ref 22–26)
HCO3 BLDA-SCNC: 25.6 MMOL/L (ref 22–26)
HCO3 BLDA-SCNC: 26 MMOL/L (ref 22–28)
HCO3 BLDA-SCNC: 26.2 MMOL/L (ref 22–26)
HCO3 BLDA-SCNC: 27.2 MMOL/L (ref 22–26)
HCO3 BLDA-SCNC: 28.5 MMOL/L (ref 22–26)
HCO3 BLDA-SCNC: 29.5 MMOL/L (ref 22–26)
HCO3 BLDV-SCNC: 26.5 MMOL/L (ref 22–28)
HCT VFR BLD AUTO: 25.5 % (ref 37.5–51)
HCT VFR BLD AUTO: 26 % (ref 37.5–51)
HCT VFR BLD AUTO: 31.2 % (ref 37.5–51)
HCT VFR BLD AUTO: 35.4 % (ref 37.5–51)
HCT VFR BLD AUTO: 36 % (ref 37.5–51)
HCT VFR BLDA CALC: 30 % (ref 38–51)
HCT VFR BLDA CALC: 31 % (ref 38–51)
HCT VFR BLDA CALC: 33 % (ref 38–51)
HCT VFR BLDA CALC: 35 % (ref 38–51)
HCT VFR BLDA CALC: 38 % (ref 38–51)
HEMODILUTION: NO
HGB BLD-MCNC: 10.3 G/DL (ref 13–17.7)
HGB BLD-MCNC: 11.7 G/DL (ref 13–17.7)
HGB BLD-MCNC: 12.4 G/DL (ref 13–17.7)
HGB BLD-MCNC: 8.4 G/DL (ref 13–17.7)
HGB BLD-MCNC: 8.6 G/DL (ref 13–17.7)
HGB BLDA-MCNC: 10.2 G/DL (ref 12–17)
HGB BLDA-MCNC: 10.5 G/DL (ref 12–17)
HGB BLDA-MCNC: 11.2 G/DL (ref 12–17)
HGB BLDA-MCNC: 11.9 G/DL (ref 12–17)
HGB BLDA-MCNC: 12.9 G/DL (ref 12–17)
IMM GRANULOCYTES # BLD AUTO: 0.18 10*3/MM3 (ref 0–0.05)
IMM GRANULOCYTES NFR BLD AUTO: 0.9 % (ref 0–0.5)
INHALED O2 CONCENTRATION: 100 %
INHALED O2 CONCENTRATION: 40 %
INHALED O2 CONCENTRATION: 70 %
INR PPP: 1.41 (ref 0.9–1.1)
INR PPP: 1.41 (ref 0.9–1.1)
INR PPP: 1.52 (ref 0.9–1.1)
INR PPP: 1.6 (ref 0.8–1.2)
LYMPHOCYTES # BLD AUTO: 0.94 10*3/MM3 (ref 0.7–3.1)
LYMPHOCYTES NFR BLD AUTO: 4.8 % (ref 19.6–45.3)
Lab: ABNORMAL
MAGNESIUM SERPL-MCNC: 2.5 MG/DL (ref 1.6–2.6)
MAGNESIUM SERPL-MCNC: 2.9 MG/DL (ref 1.6–2.6)
MCH RBC QN AUTO: 27.6 PG (ref 26.6–33)
MCH RBC QN AUTO: 28.4 PG (ref 26.6–33)
MCH RBC QN AUTO: 28.4 PG (ref 26.6–33)
MCH RBC QN AUTO: 28.6 PG (ref 26.6–33)
MCH RBC QN AUTO: 28.9 PG (ref 26.6–33)
MCHC RBC AUTO-ENTMCNC: 32.9 G/DL (ref 31.5–35.7)
MCHC RBC AUTO-ENTMCNC: 33 G/DL (ref 31.5–35.7)
MCHC RBC AUTO-ENTMCNC: 33.1 G/DL (ref 31.5–35.7)
MCHC RBC AUTO-ENTMCNC: 33.1 G/DL (ref 31.5–35.7)
MCHC RBC AUTO-ENTMCNC: 34.4 G/DL (ref 31.5–35.7)
MCV RBC AUTO: 83.9 FL (ref 79–97)
MCV RBC AUTO: 83.9 FL (ref 79–97)
MCV RBC AUTO: 85.9 FL (ref 79–97)
MCV RBC AUTO: 86 FL (ref 79–97)
MCV RBC AUTO: 86.4 FL (ref 79–97)
METHADONE UR QL SCN: NEGATIVE
MODALITY: ABNORMAL
MONOCYTES # BLD AUTO: 0.81 10*3/MM3 (ref 0.1–0.9)
MONOCYTES NFR BLD AUTO: 4.1 % (ref 5–12)
NEUTROPHILS NFR BLD AUTO: 17.56 10*3/MM3 (ref 1.7–7)
NEUTROPHILS NFR BLD AUTO: 89.6 % (ref 42.7–76)
NOTIFIED WHO: ABNORMAL
NRBC BLD AUTO-RTO: 0 /100 WBC (ref 0–0.2)
O2 A-A PPRESDIFF RESPIRATORY: 0.1 MMHG
O2 A-A PPRESDIFF RESPIRATORY: 0.2 MMHG
OPIATES UR QL: POSITIVE
OXYCODONE UR QL SCN: NEGATIVE
PCO2 BLDA: 33.1 MM HG (ref 35–45)
PCO2 BLDA: 42.6 MM HG (ref 35–45)
PCO2 BLDA: 42.7 MM HG (ref 35–45)
PCO2 BLDA: 45.2 MM HG (ref 35–45)
PCO2 BLDA: 48.5 MM HG (ref 35–45)
PCO2 BLDA: 54.2 MM HG (ref 35–45)
PCO2 BLDA: 58.2 MM HG (ref 35–45)
PCO2 BLDA: 64.2 MM HG (ref 35–45)
PCO2 BLDA: 65 MM HG (ref 35–45)
PCO2 BLDA: 75.1 MM HG (ref 35–45)
PCO2 BLDV: 40.8 MM HG (ref 41–51)
PEEP RESPIRATORY: 8 CM[H2O]
PH BLDA: 7.22 PH UNITS (ref 7.35–7.6)
PH BLDA: 7.3 PH UNITS (ref 7.35–7.6)
PH BLDA: 7.36 PH UNITS (ref 7.35–7.45)
PH BLDA: 7.36 PH UNITS (ref 7.35–7.6)
PH BLDA: 7.38 PH UNITS (ref 7.35–7.6)
PH BLDA: 7.39 PH UNITS (ref 7.35–7.45)
PH BLDA: 7.39 PH UNITS (ref 7.35–7.6)
PH BLDA: 7.44 PH UNITS (ref 7.35–7.6)
PH BLDA: 7.45 PH UNITS (ref 7.35–7.45)
PH BLDA: 7.46 PH UNITS (ref 7.35–7.6)
PH BLDV: 7.42 PH UNITS (ref 7.31–7.41)
PHOSPHATE SERPL-MCNC: 0.7 MG/DL (ref 2.5–4.5)
PHOSPHATE SERPL-MCNC: 2.5 MG/DL (ref 2.5–4.5)
PHOSPHATE SERPL-MCNC: 2.8 MG/DL (ref 2.5–4.5)
PLATELET # BLD AUTO: 135 10*3/MM3 (ref 140–450)
PLATELET # BLD AUTO: 141 10*3/MM3 (ref 140–450)
PLATELET # BLD AUTO: 156 10*3/MM3 (ref 140–450)
PLATELET # BLD AUTO: 160 10*3/MM3 (ref 140–450)
PLATELET # BLD AUTO: 181 10*3/MM3 (ref 140–450)
PMV BLD AUTO: 10 FL (ref 6–12)
PMV BLD AUTO: 10 FL (ref 6–12)
PMV BLD AUTO: 9.6 FL (ref 6–12)
PMV BLD AUTO: 9.6 FL (ref 6–12)
PMV BLD AUTO: 9.8 FL (ref 6–12)
PO2 BLDA: 111 MMHG (ref 80–105)
PO2 BLDA: 159 MMHG (ref 80–105)
PO2 BLDA: 325 MMHG (ref 80–105)
PO2 BLDA: 358 MMHG (ref 80–105)
PO2 BLDA: 406 MMHG (ref 80–105)
PO2 BLDA: 53.4 MM HG (ref 80–100)
PO2 BLDA: 590 MMHG (ref 80–105)
PO2 BLDA: 73.9 MM HG (ref 80–100)
PO2 BLDA: 77 MMHG (ref 80–105)
PO2 BLDA: 85.5 MM HG (ref 80–100)
PO2 BLDV: 26.7 MM HG (ref 35–45)
POTASSIUM BLDA-SCNC: 3.3 MMOL/L (ref 3.5–4.9)
POTASSIUM BLDA-SCNC: 3.6 MMOL/L (ref 3.5–4.9)
POTASSIUM BLDA-SCNC: 4.1 MMOL/L (ref 3.5–4.9)
POTASSIUM BLDA-SCNC: 4.3 MMOL/L (ref 3.5–4.9)
POTASSIUM BLDA-SCNC: 4.6 MMOL/L (ref 3.5–4.9)
POTASSIUM BLDA-SCNC: 4.9 MMOL/L (ref 3.5–4.9)
POTASSIUM BLDA-SCNC: 4.9 MMOL/L (ref 3.5–4.9)
POTASSIUM SERPL-SCNC: 4.3 MMOL/L (ref 3.5–5.2)
POTASSIUM SERPL-SCNC: 4.5 MMOL/L (ref 3.5–5.2)
POTASSIUM SERPL-SCNC: 4.6 MMOL/L (ref 3.5–5.2)
POTASSIUM SERPL-SCNC: 4.6 MMOL/L (ref 3.5–5.2)
PROTHROMBIN TIME: 17.5 SECONDS (ref 11.7–14.2)
PROTHROMBIN TIME: 17.5 SECONDS (ref 11.7–14.2)
PROTHROMBIN TIME: 18.3 SECONDS (ref 12.8–15.2)
PROTHROMBIN TIME: 18.5 SECONDS (ref 11.7–14.2)
QT INTERVAL: 434 MS
QTC INTERVAL: 448 MS
RBC # BLD AUTO: 3.01 10*6/MM3 (ref 4.14–5.8)
RBC # BLD AUTO: 3.04 10*6/MM3 (ref 4.14–5.8)
RBC # BLD AUTO: 3.63 10*6/MM3 (ref 4.14–5.8)
RBC # BLD AUTO: 4.12 10*6/MM3 (ref 4.14–5.8)
RBC # BLD AUTO: 4.29 10*6/MM3 (ref 4.14–5.8)
READ BACK: YES
RH BLD: POSITIVE
SAO2 % BLDA: 100 % (ref 95–98)
SAO2 % BLDA: 93 % (ref 95–98)
SAO2 % BLDA: 98 % (ref 95–98)
SAO2 % BLDA: 99 % (ref 95–98)
SAO2 % BLDCOA: 89.2 % (ref 92–98.5)
SAO2 % BLDCOA: 94 % (ref 92–98.5)
SAO2 % BLDCOA: 96.3 % (ref 92–98.5)
SAO2 % BLDCOV: 50.7 % (ref 45–75)
SET MECH RESP RATE: 16
SODIUM SERPL-SCNC: 138 MMOL/L (ref 136–145)
SODIUM SERPL-SCNC: 138 MMOL/L (ref 136–145)
SODIUM SERPL-SCNC: 139 MMOL/L (ref 136–145)
SODIUM SERPL-SCNC: 143 MMOL/L (ref 136–145)
T&S EXPIRATION DATE: NORMAL
TOTAL RATE: 16 BREATHS/MINUTE
UNIT  ABO: NORMAL
UNIT  RH: NORMAL
VENTILATOR MODE: AC
VT ON VENT VENT: 800 ML
VT ON VENT VENT: 850 ML
VT ON VENT VENT: 850 ML
WBC NRBC COR # BLD AUTO: 13.05 10*3/MM3 (ref 3.4–10.8)
WBC NRBC COR # BLD AUTO: 16.77 10*3/MM3 (ref 3.4–10.8)
WBC NRBC COR # BLD AUTO: 17.92 10*3/MM3 (ref 3.4–10.8)
WBC NRBC COR # BLD AUTO: 19.6 10*3/MM3 (ref 3.4–10.8)
WBC NRBC COR # BLD AUTO: 19.63 10*3/MM3 (ref 3.4–10.8)

## 2024-03-28 PROCEDURE — 80069 RENAL FUNCTION PANEL: CPT | Performed by: NURSE PRACTITIONER

## 2024-03-28 PROCEDURE — 25010000002 FOSPHENYTOIN 500 MG PE/10ML SOLUTION 10 ML VIAL: Performed by: ANESTHESIOLOGY

## 2024-03-28 PROCEDURE — 63710000001 INSULIN REGULAR HUMAN PER 5 UNITS: Performed by: ANESTHESIOLOGY

## 2024-03-28 PROCEDURE — 71045 X-RAY EXAM CHEST 1 VIEW: CPT

## 2024-03-28 PROCEDURE — 94002 VENT MGMT INPAT INIT DAY: CPT

## 2024-03-28 PROCEDURE — 94003 VENT MGMT INPAT SUBQ DAY: CPT

## 2024-03-28 PROCEDURE — 25010000002 MAGNESIUM SULFATE 2 GM/50ML SOLUTION: Performed by: NURSE PRACTITIONER

## 2024-03-28 PROCEDURE — 85018 HEMOGLOBIN: CPT

## 2024-03-28 PROCEDURE — 80307 DRUG TEST PRSMV CHEM ANLYZR: CPT | Performed by: NURSE PRACTITIONER

## 2024-03-28 PROCEDURE — 25010000002 CEFAZOLIN PER 500 MG: Performed by: ANESTHESIOLOGY

## 2024-03-28 PROCEDURE — P9035 PLATELET PHERES LEUKOREDUCED: HCPCS

## 2024-03-28 PROCEDURE — 33866 AORTIC HEMIARCH GRAFT: CPT | Performed by: SPECIALIST/TECHNOLOGIST, OTHER

## 2024-03-28 PROCEDURE — P9012 CRYOPRECIPITATE EACH UNIT: HCPCS

## 2024-03-28 PROCEDURE — 33390 VALVULOPLASTY AORTIC VALVE: CPT | Performed by: THORACIC SURGERY (CARDIOTHORACIC VASCULAR SURGERY)

## 2024-03-28 PROCEDURE — 36430 TRANSFUSION BLD/BLD COMPNT: CPT

## 2024-03-28 PROCEDURE — 99223 1ST HOSP IP/OBS HIGH 75: CPT | Performed by: THORACIC SURGERY (CARDIOTHORACIC VASCULAR SURGERY)

## 2024-03-28 PROCEDURE — 82803 BLOOD GASES ANY COMBINATION: CPT

## 2024-03-28 PROCEDURE — 25010000002 CEFAZOLIN 3 G RECONSTITUTED SOLUTION 1 EACH VIAL: Performed by: NURSE PRACTITIONER

## 2024-03-28 PROCEDURE — P9059 PLASMA, FRZ BETWEEN 8-24HOUR: HCPCS

## 2024-03-28 PROCEDURE — 25010000002 PROPOFOL 10 MG/ML EMULSION: Performed by: ANESTHESIOLOGY

## 2024-03-28 PROCEDURE — 82330 ASSAY OF CALCIUM: CPT | Performed by: NURSE PRACTITIONER

## 2024-03-28 PROCEDURE — 85027 COMPLETE CBC AUTOMATED: CPT | Performed by: THORACIC SURGERY (CARDIOTHORACIC VASCULAR SURGERY)

## 2024-03-28 PROCEDURE — 82947 ASSAY GLUCOSE BLOOD QUANT: CPT

## 2024-03-28 PROCEDURE — 85610 PROTHROMBIN TIME: CPT | Performed by: THORACIC SURGERY (CARDIOTHORACIC VASCULAR SURGERY)

## 2024-03-28 PROCEDURE — P9100 PATHOGEN TEST FOR PLATELETS: HCPCS

## 2024-03-28 PROCEDURE — 25010000002 METHYLPREDNISOLONE PER 125 MG: Performed by: ANESTHESIOLOGY

## 2024-03-28 PROCEDURE — 25010000002 HEPARIN (PORCINE) PER 1000 UNITS: Performed by: ANESTHESIOLOGY

## 2024-03-28 PROCEDURE — 85730 THROMBOPLASTIN TIME PARTIAL: CPT | Performed by: THORACIC SURGERY (CARDIOTHORACIC VASCULAR SURGERY)

## 2024-03-28 PROCEDURE — 85027 COMPLETE CBC AUTOMATED: CPT | Performed by: NURSE PRACTITIONER

## 2024-03-28 PROCEDURE — 85384 FIBRINOGEN ACTIVITY: CPT | Performed by: NURSE PRACTITIONER

## 2024-03-28 PROCEDURE — 85384 FIBRINOGEN ACTIVITY: CPT | Performed by: THORACIC SURGERY (CARDIOTHORACIC VASCULAR SURGERY)

## 2024-03-28 PROCEDURE — 25010000002 NITROGLYCERIN 200 MCG/ML SOLUTION: Performed by: ANESTHESIOLOGY

## 2024-03-28 PROCEDURE — 25010000002 FENTANYL CITRATE (PF) 250 MCG/5ML SOLUTION: Performed by: ANESTHESIOLOGY

## 2024-03-28 PROCEDURE — 33390 VALVULOPLASTY AORTIC VALVE: CPT | Performed by: SPECIALIST/TECHNOLOGIST, OTHER

## 2024-03-28 PROCEDURE — 94799 UNLISTED PULMONARY SVC/PX: CPT

## 2024-03-28 PROCEDURE — 25010000002 METOCLOPRAMIDE PER 10 MG: Performed by: NURSE PRACTITIONER

## 2024-03-28 PROCEDURE — 25010000002 PROTAMINE SULFATE PER 10 MG: Performed by: ANESTHESIOLOGY

## 2024-03-28 PROCEDURE — 33866 AORTIC HEMIARCH GRAFT: CPT | Performed by: THORACIC SURGERY (CARDIOTHORACIC VASCULAR SURGERY)

## 2024-03-28 PROCEDURE — 86927 PLASMA FRESH FROZEN: CPT

## 2024-03-28 PROCEDURE — 93005 ELECTROCARDIOGRAM TRACING: CPT | Performed by: NURSE PRACTITIONER

## 2024-03-28 PROCEDURE — C1751 CATH, INF, PER/CENT/MIDLINE: HCPCS | Performed by: ANESTHESIOLOGY

## 2024-03-28 PROCEDURE — 0 PROTAMINE SULFATE PER 10 MG: Performed by: THORACIC SURGERY (CARDIOTHORACIC VASCULAR SURGERY)

## 2024-03-28 PROCEDURE — 88304 TISSUE EXAM BY PATHOLOGIST: CPT | Performed by: THORACIC SURGERY (CARDIOTHORACIC VASCULAR SURGERY)

## 2024-03-28 PROCEDURE — 25010000002 HEPARIN (PORCINE) PER 1000 UNITS: Performed by: THORACIC SURGERY (CARDIOTHORACIC VASCULAR SURGERY)

## 2024-03-28 PROCEDURE — 82803 BLOOD GASES ANY COMBINATION: CPT | Performed by: NURSE PRACTITIONER

## 2024-03-28 PROCEDURE — 93010 ELECTROCARDIOGRAM REPORT: CPT | Performed by: INTERNAL MEDICINE

## 2024-03-28 PROCEDURE — 85610 PROTHROMBIN TIME: CPT

## 2024-03-28 PROCEDURE — 83735 ASSAY OF MAGNESIUM: CPT | Performed by: NURSE PRACTITIONER

## 2024-03-28 PROCEDURE — 84100 ASSAY OF PHOSPHORUS: CPT | Performed by: THORACIC SURGERY (CARDIOTHORACIC VASCULAR SURGERY)

## 2024-03-28 PROCEDURE — 86900 BLOOD TYPING SEROLOGIC ABO: CPT

## 2024-03-28 PROCEDURE — 33858 AS-AORT GRF F/AORTIC DSJ: CPT | Performed by: SPECIALIST/TECHNOLOGIST, OTHER

## 2024-03-28 PROCEDURE — 85025 COMPLETE CBC W/AUTO DIFF WBC: CPT | Performed by: NURSE PRACTITIONER

## 2024-03-28 PROCEDURE — 85347 COAGULATION TIME ACTIVATED: CPT

## 2024-03-28 PROCEDURE — 85610 PROTHROMBIN TIME: CPT | Performed by: NURSE PRACTITIONER

## 2024-03-28 PROCEDURE — 80048 BASIC METABOLIC PNL TOTAL CA: CPT | Performed by: THORACIC SURGERY (CARDIOTHORACIC VASCULAR SURGERY)

## 2024-03-28 PROCEDURE — 86901 BLOOD TYPING SEROLOGIC RH(D): CPT

## 2024-03-28 PROCEDURE — 82948 REAGENT STRIP/BLOOD GLUCOSE: CPT

## 2024-03-28 PROCEDURE — 85014 HEMATOCRIT: CPT

## 2024-03-28 PROCEDURE — 25010000002 ESMOLOL 100 MG/10ML SOLUTION: Performed by: ANESTHESIOLOGY

## 2024-03-28 PROCEDURE — 25010000002 NICARDIPINE 2.5 MG/ML SOLUTION: Performed by: NURSE PRACTITIONER

## 2024-03-28 PROCEDURE — 25010000002 ACETAMINOPHEN 10 MG/ML SOLUTION: Performed by: NURSE PRACTITIONER

## 2024-03-28 PROCEDURE — 33858 AS-AORT GRF F/AORTIC DSJ: CPT | Performed by: THORACIC SURGERY (CARDIOTHORACIC VASCULAR SURGERY)

## 2024-03-28 PROCEDURE — 85730 THROMBOPLASTIN TIME PARTIAL: CPT | Performed by: NURSE PRACTITIONER

## 2024-03-28 PROCEDURE — 25810000003 SODIUM CHLORIDE 0.9 % SOLUTION: Performed by: NURSE PRACTITIONER

## 2024-03-28 DEVICE — INCISIONLINE PLEDGET SFT 133X25.5MM: Type: IMPLANTABLE DEVICE | Site: HEART | Status: FUNCTIONAL

## 2024-03-28 DEVICE — ABSORBABLE HEMOSTAT (OXIDIZED REGENERATED CELLULOSE)
Type: IMPLANTABLE DEVICE | Site: CHEST | Status: FUNCTIONAL
Brand: SURGICEL

## 2024-03-28 DEVICE — GELWEAVE GELATIN IMPREGNATED WOVEN VASCULAR PROSTHESIS  ANTE-FLO
Type: IMPLANTABLE DEVICE | Site: HEART | Status: FUNCTIONAL
Brand: GELWEAVE™

## 2024-03-28 DEVICE — ADHS SURG BIOGLUE PREF STD/TP 10ML: Type: IMPLANTABLE DEVICE | Site: CHEST WALL | Status: FUNCTIONAL

## 2024-03-28 DEVICE — TEMP PACING WIRE
Type: IMPLANTABLE DEVICE | Site: CHEST WALL | Status: FUNCTIONAL
Brand: MYO/WIRE

## 2024-03-28 RX ORDER — NITROGLYCERIN 20 MG/100ML
5-200 INJECTION INTRAVENOUS
Status: DISCONTINUED | OUTPATIENT
Start: 2024-03-28 | End: 2024-03-29

## 2024-03-28 RX ORDER — BISACODYL 5 MG/1
10 TABLET, DELAYED RELEASE ORAL DAILY PRN
Status: DISCONTINUED | OUTPATIENT
Start: 2024-03-28 | End: 2024-04-03 | Stop reason: HOSPADM

## 2024-03-28 RX ORDER — DEXTROSE MONOHYDRATE 25 G/50ML
10-50 INJECTION, SOLUTION INTRAVENOUS
Status: DISCONTINUED | OUTPATIENT
Start: 2024-03-28 | End: 2024-03-29

## 2024-03-28 RX ORDER — AMINOCAPROIC ACID 250 MG/ML
INJECTION, SOLUTION INTRAVENOUS AS NEEDED
Status: DISCONTINUED | OUTPATIENT
Start: 2024-03-28 | End: 2024-03-28 | Stop reason: SURG

## 2024-03-28 RX ORDER — MIDAZOLAM HYDROCHLORIDE 1 MG/ML
2 INJECTION INTRAMUSCULAR; INTRAVENOUS
Status: DISCONTINUED | OUTPATIENT
Start: 2024-03-28 | End: 2024-03-29

## 2024-03-28 RX ORDER — ACETAMINOPHEN 325 MG/1
650 TABLET ORAL EVERY 4 HOURS
Status: ACTIVE | OUTPATIENT
Start: 2024-03-28 | End: 2024-03-28

## 2024-03-28 RX ORDER — CHLORHEXIDINE GLUCONATE ORAL RINSE 1.2 MG/ML
15 SOLUTION DENTAL EVERY 12 HOURS
Status: DISCONTINUED | OUTPATIENT
Start: 2024-03-28 | End: 2024-04-03 | Stop reason: HOSPADM

## 2024-03-28 RX ORDER — IBUPROFEN 600 MG/1
1 TABLET ORAL
Status: DISCONTINUED | OUTPATIENT
Start: 2024-03-28 | End: 2024-03-29

## 2024-03-28 RX ORDER — OXYCODONE HYDROCHLORIDE 5 MG/1
10 TABLET ORAL EVERY 4 HOURS PRN
Status: DISCONTINUED | OUTPATIENT
Start: 2024-03-28 | End: 2024-04-03 | Stop reason: HOSPADM

## 2024-03-28 RX ORDER — HEPARIN SODIUM 1000 [USP'U]/ML
INJECTION, SOLUTION INTRAVENOUS; SUBCUTANEOUS AS NEEDED
Status: DISCONTINUED | OUTPATIENT
Start: 2024-03-28 | End: 2024-03-28 | Stop reason: SURG

## 2024-03-28 RX ORDER — PHENYLEPHRINE HCL IN 0.9% NACL 0.5 MG/5ML
.2-2 SYRINGE (ML) INTRAVENOUS CONTINUOUS PRN
Status: DISCONTINUED | OUTPATIENT
Start: 2024-03-28 | End: 2024-03-29

## 2024-03-28 RX ORDER — MORPHINE SULFATE 2 MG/ML
1 INJECTION, SOLUTION INTRAMUSCULAR; INTRAVENOUS EVERY 4 HOURS PRN
Status: DISCONTINUED | OUTPATIENT
Start: 2024-03-28 | End: 2024-04-03 | Stop reason: HOSPADM

## 2024-03-28 RX ORDER — ACETAMINOPHEN 10 MG/ML
1000 INJECTION, SOLUTION INTRAVENOUS EVERY 8 HOURS
Status: COMPLETED | OUTPATIENT
Start: 2024-03-28 | End: 2024-03-29

## 2024-03-28 RX ORDER — KETOTIFEN FUMARATE 0.35 MG/ML
1 SOLUTION/ DROPS OPHTHALMIC 2 TIMES DAILY
Status: DISCONTINUED | OUTPATIENT
Start: 2024-03-28 | End: 2024-03-29

## 2024-03-28 RX ORDER — PANTOPRAZOLE SODIUM 40 MG/10ML
40 INJECTION, POWDER, LYOPHILIZED, FOR SOLUTION INTRAVENOUS ONCE
Status: COMPLETED | OUTPATIENT
Start: 2024-03-28 | End: 2024-03-28

## 2024-03-28 RX ORDER — AMOXICILLIN 250 MG
2 CAPSULE ORAL NIGHTLY
Status: DISCONTINUED | OUTPATIENT
Start: 2024-03-29 | End: 2024-04-03 | Stop reason: HOSPADM

## 2024-03-28 RX ORDER — ONDANSETRON 2 MG/ML
4 INJECTION INTRAMUSCULAR; INTRAVENOUS EVERY 6 HOURS PRN
Status: DISCONTINUED | OUTPATIENT
Start: 2024-03-28 | End: 2024-04-03 | Stop reason: HOSPADM

## 2024-03-28 RX ORDER — METHYLPREDNISOLONE SODIUM SUCCINATE 125 MG/2ML
INJECTION, POWDER, LYOPHILIZED, FOR SOLUTION INTRAMUSCULAR; INTRAVENOUS AS NEEDED
Status: DISCONTINUED | OUTPATIENT
Start: 2024-03-28 | End: 2024-03-28 | Stop reason: SURG

## 2024-03-28 RX ORDER — MAGNESIUM SULFATE HEPTAHYDRATE 40 MG/ML
2 INJECTION, SOLUTION INTRAVENOUS EVERY 8 HOURS
Status: DISCONTINUED | OUTPATIENT
Start: 2024-03-28 | End: 2024-03-29

## 2024-03-28 RX ORDER — DEXMEDETOMIDINE HYDROCHLORIDE 4 UG/ML
.2-1.5 INJECTION, SOLUTION INTRAVENOUS
Status: DISCONTINUED | OUTPATIENT
Start: 2024-03-28 | End: 2024-03-29

## 2024-03-28 RX ORDER — PROTAMINE SULFATE 10 MG/ML
INJECTION, SOLUTION INTRAVENOUS AS NEEDED
Status: DISCONTINUED | OUTPATIENT
Start: 2024-03-28 | End: 2024-03-28 | Stop reason: SURG

## 2024-03-28 RX ORDER — ACETAMINOPHEN 650 MG/1
650 SUPPOSITORY RECTAL EVERY 4 HOURS
Status: ACTIVE | OUTPATIENT
Start: 2024-03-28 | End: 2024-03-28

## 2024-03-28 RX ORDER — MORPHINE SULFATE 2 MG/ML
4 INJECTION, SOLUTION INTRAMUSCULAR; INTRAVENOUS
Status: DISCONTINUED | OUTPATIENT
Start: 2024-03-28 | End: 2024-03-29

## 2024-03-28 RX ORDER — SODIUM CHLORIDE 9 MG/ML
INJECTION, SOLUTION INTRAVENOUS CONTINUOUS PRN
Status: DISCONTINUED | OUTPATIENT
Start: 2024-03-28 | End: 2024-03-28 | Stop reason: SURG

## 2024-03-28 RX ORDER — SUCCINYLCHOLINE/SOD CL,ISO/PF 200MG/10ML
SYRINGE (ML) INTRAVENOUS AS NEEDED
Status: DISCONTINUED | OUTPATIENT
Start: 2024-03-28 | End: 2024-03-28 | Stop reason: SURG

## 2024-03-28 RX ORDER — ALBUMIN, HUMAN INJ 5% 5 %
1500 SOLUTION INTRAVENOUS AS NEEDED
Status: DISCONTINUED | OUTPATIENT
Start: 2024-03-28 | End: 2024-03-29

## 2024-03-28 RX ORDER — MAGNESIUM HYDROXIDE 1200 MG/15ML
LIQUID ORAL AS NEEDED
Status: DISCONTINUED | OUTPATIENT
Start: 2024-03-28 | End: 2024-03-28 | Stop reason: HOSPADM

## 2024-03-28 RX ORDER — NALOXONE HCL 0.4 MG/ML
0.4 VIAL (ML) INJECTION
Status: DISCONTINUED | OUTPATIENT
Start: 2024-03-28 | End: 2024-04-03 | Stop reason: HOSPADM

## 2024-03-28 RX ORDER — NITROGLYCERIN 0.4 MG/1
0.4 TABLET SUBLINGUAL
Status: DISCONTINUED | OUTPATIENT
Start: 2024-03-28 | End: 2024-04-03 | Stop reason: HOSPADM

## 2024-03-28 RX ORDER — NITROGLYCERIN 20 MG/100ML
INJECTION INTRAVENOUS AS NEEDED
Status: DISCONTINUED | OUTPATIENT
Start: 2024-03-28 | End: 2024-03-28 | Stop reason: SURG

## 2024-03-28 RX ORDER — ATORVASTATIN CALCIUM 20 MG/1
40 TABLET, FILM COATED ORAL NIGHTLY
Status: DISCONTINUED | OUTPATIENT
Start: 2024-03-28 | End: 2024-04-03 | Stop reason: HOSPADM

## 2024-03-28 RX ORDER — MILRINONE LACTATE 0.2 MG/ML
.25-.375 INJECTION, SOLUTION INTRAVENOUS CONTINUOUS PRN
Status: DISCONTINUED | OUTPATIENT
Start: 2024-03-28 | End: 2024-03-29

## 2024-03-28 RX ORDER — METOCLOPRAMIDE HYDROCHLORIDE 5 MG/ML
10 INJECTION INTRAMUSCULAR; INTRAVENOUS EVERY 6 HOURS
Status: DISCONTINUED | OUTPATIENT
Start: 2024-03-28 | End: 2024-03-29

## 2024-03-28 RX ORDER — MEPERIDINE HYDROCHLORIDE 25 MG/ML
25 INJECTION INTRAMUSCULAR; INTRAVENOUS; SUBCUTANEOUS EVERY 4 HOURS PRN
Status: ACTIVE | OUTPATIENT
Start: 2024-03-28 | End: 2024-03-28

## 2024-03-28 RX ORDER — CYCLOBENZAPRINE HCL 10 MG
10 TABLET ORAL EVERY 8 HOURS PRN
Status: DISCONTINUED | OUTPATIENT
Start: 2024-03-29 | End: 2024-04-03 | Stop reason: HOSPADM

## 2024-03-28 RX ORDER — ENOXAPARIN SODIUM 100 MG/ML
40 INJECTION SUBCUTANEOUS EVERY 24 HOURS
Status: DISCONTINUED | OUTPATIENT
Start: 2024-03-29 | End: 2024-04-03 | Stop reason: HOSPADM

## 2024-03-28 RX ORDER — BISACODYL 10 MG
10 SUPPOSITORY, RECTAL RECTAL DAILY PRN
Status: DISCONTINUED | OUTPATIENT
Start: 2024-03-29 | End: 2024-04-03 | Stop reason: HOSPADM

## 2024-03-28 RX ORDER — ASPIRIN 81 MG/1
81 TABLET ORAL DAILY
Status: DISCONTINUED | OUTPATIENT
Start: 2024-03-29 | End: 2024-04-03 | Stop reason: HOSPADM

## 2024-03-28 RX ORDER — PROTAMINE SULFATE 10 MG/ML
INJECTION, SOLUTION INTRAVENOUS AS NEEDED
Status: DISCONTINUED | OUTPATIENT
Start: 2024-03-28 | End: 2024-03-28 | Stop reason: HOSPADM

## 2024-03-28 RX ORDER — SODIUM CHLORIDE 9 MG/ML
INJECTION, SOLUTION INTRAVENOUS CONTINUOUS PRN
Status: DISCONTINUED | OUTPATIENT
Start: 2024-03-27 | End: 2024-03-28 | Stop reason: SURG

## 2024-03-28 RX ORDER — FENTANYL CITRATE 50 UG/ML
INJECTION, SOLUTION INTRAMUSCULAR; INTRAVENOUS AS NEEDED
Status: DISCONTINUED | OUTPATIENT
Start: 2024-03-28 | End: 2024-03-28 | Stop reason: SURG

## 2024-03-28 RX ORDER — SODIUM CHLORIDE 9 MG/ML
30 INJECTION, SOLUTION INTRAVENOUS CONTINUOUS
Status: DISCONTINUED | OUTPATIENT
Start: 2024-03-28 | End: 2024-04-03 | Stop reason: HOSPADM

## 2024-03-28 RX ORDER — ALPRAZOLAM 0.25 MG/1
0.25 TABLET ORAL EVERY 8 HOURS PRN
Status: DISCONTINUED | OUTPATIENT
Start: 2024-03-28 | End: 2024-04-03 | Stop reason: HOSPADM

## 2024-03-28 RX ORDER — ACETAMINOPHEN 650 MG/1
650 SUPPOSITORY RECTAL EVERY 4 HOURS PRN
Status: DISCONTINUED | OUTPATIENT
Start: 2024-03-29 | End: 2024-04-03 | Stop reason: HOSPADM

## 2024-03-28 RX ORDER — NOREPINEPHRINE BITARTRATE 0.03 MG/ML
.02-.3 INJECTION, SOLUTION INTRAVENOUS CONTINUOUS PRN
Status: DISCONTINUED | OUTPATIENT
Start: 2024-03-28 | End: 2024-03-29

## 2024-03-28 RX ORDER — DOPAMINE HYDROCHLORIDE 160 MG/100ML
2-20 INJECTION, SOLUTION INTRAVENOUS CONTINUOUS PRN
Status: DISCONTINUED | OUTPATIENT
Start: 2024-03-28 | End: 2024-03-29

## 2024-03-28 RX ORDER — PROPOFOL 10 MG/ML
VIAL (ML) INTRAVENOUS AS NEEDED
Status: DISCONTINUED | OUTPATIENT
Start: 2024-03-28 | End: 2024-03-28 | Stop reason: SURG

## 2024-03-28 RX ORDER — ACETAMINOPHEN 325 MG/1
650 TABLET ORAL EVERY 4 HOURS PRN
Status: DISCONTINUED | OUTPATIENT
Start: 2024-03-29 | End: 2024-04-03 | Stop reason: HOSPADM

## 2024-03-28 RX ORDER — ACETAMINOPHEN 160 MG/5ML
650 SOLUTION ORAL EVERY 4 HOURS PRN
Status: DISCONTINUED | OUTPATIENT
Start: 2024-03-29 | End: 2024-04-03 | Stop reason: HOSPADM

## 2024-03-28 RX ORDER — POLYETHYLENE GLYCOL 3350 17 G/17G
17 POWDER, FOR SOLUTION ORAL DAILY PRN
Status: DISCONTINUED | OUTPATIENT
Start: 2024-03-28 | End: 2024-04-03 | Stop reason: HOSPADM

## 2024-03-28 RX ORDER — ESMOLOL HYDROCHLORIDE 10 MG/ML
INJECTION INTRAVENOUS AS NEEDED
Status: DISCONTINUED | OUTPATIENT
Start: 2024-03-28 | End: 2024-03-28 | Stop reason: SURG

## 2024-03-28 RX ORDER — LIDOCAINE HYDROCHLORIDE 20 MG/ML
INJECTION, SOLUTION INFILTRATION; PERINEURAL AS NEEDED
Status: DISCONTINUED | OUTPATIENT
Start: 2024-03-28 | End: 2024-03-28 | Stop reason: SURG

## 2024-03-28 RX ORDER — HYDROCODONE BITARTRATE AND ACETAMINOPHEN 5; 325 MG/1; MG/1
2 TABLET ORAL EVERY 4 HOURS PRN
Status: DISCONTINUED | OUTPATIENT
Start: 2024-03-28 | End: 2024-04-03 | Stop reason: HOSPADM

## 2024-03-28 RX ORDER — ACETAMINOPHEN 160 MG/5ML
650 SOLUTION ORAL EVERY 4 HOURS
Status: ACTIVE | OUTPATIENT
Start: 2024-03-28 | End: 2024-03-28

## 2024-03-28 RX ORDER — CEFAZOLIN SODIUM 500 MG/2.2ML
INJECTION, POWDER, FOR SOLUTION INTRAMUSCULAR; INTRAVENOUS AS NEEDED
Status: DISCONTINUED | OUTPATIENT
Start: 2024-03-28 | End: 2024-03-28 | Stop reason: SURG

## 2024-03-28 RX ORDER — NICOTINE POLACRILEX 4 MG
15 LOZENGE BUCCAL
Status: DISCONTINUED | OUTPATIENT
Start: 2024-03-28 | End: 2024-03-29

## 2024-03-28 RX ORDER — PANTOPRAZOLE SODIUM 40 MG/1
40 TABLET, DELAYED RELEASE ORAL EVERY MORNING
Status: DISCONTINUED | OUTPATIENT
Start: 2024-03-29 | End: 2024-04-03 | Stop reason: HOSPADM

## 2024-03-28 RX ORDER — ROCURONIUM BROMIDE 10 MG/ML
INJECTION, SOLUTION INTRAVENOUS AS NEEDED
Status: DISCONTINUED | OUTPATIENT
Start: 2024-03-28 | End: 2024-03-28 | Stop reason: SURG

## 2024-03-28 RX ADMIN — NICARDIPINE HYDROCHLORIDE 10 MG/HR: 25 INJECTION, SOLUTION INTRAVENOUS at 08:30

## 2024-03-28 RX ADMIN — PROTAMINE SULFATE 50 MG: 10 INJECTION, SOLUTION INTRAVENOUS at 05:10

## 2024-03-28 RX ADMIN — LIDOCAINE HYDROCHLORIDE 60 MG: 20 INJECTION, SOLUTION INFILTRATION; PERINEURAL at 00:03

## 2024-03-28 RX ADMIN — ROCURONIUM BROMIDE 20 MG: 10 INJECTION, SOLUTION INTRAVENOUS at 02:43

## 2024-03-28 RX ADMIN — PROPOFOL 200 MG: 10 INJECTION, EMULSION INTRAVENOUS at 00:03

## 2024-03-28 RX ADMIN — NICARDIPINE HYDROCHLORIDE 3 MG/HR: 25 INJECTION, SOLUTION INTRAVENOUS at 21:28

## 2024-03-28 RX ADMIN — ATORVASTATIN CALCIUM 40 MG: 20 TABLET, FILM COATED ORAL at 20:30

## 2024-03-28 RX ADMIN — ROCURONIUM BROMIDE 50 MG: 10 INJECTION, SOLUTION INTRAVENOUS at 00:20

## 2024-03-28 RX ADMIN — SODIUM CHLORIDE: 9 INJECTION, SOLUTION INTRAVENOUS at 00:48

## 2024-03-28 RX ADMIN — CEFAZOLIN 3 G: 225 INJECTION, POWDER, FOR SOLUTION INTRAMUSCULAR; INTRAVENOUS at 00:48

## 2024-03-28 RX ADMIN — MUPIROCIN 1 APPLICATION: 20 OINTMENT TOPICAL at 20:30

## 2024-03-28 RX ADMIN — PROPOFOL 40 MCG/KG/MIN: 10 INJECTION, EMULSION INTRAVENOUS at 02:03

## 2024-03-28 RX ADMIN — ROCURONIUM BROMIDE 50 MG: 10 INJECTION, SOLUTION INTRAVENOUS at 06:34

## 2024-03-28 RX ADMIN — PROPOFOL 100 MG: 10 INJECTION, EMULSION INTRAVENOUS at 02:56

## 2024-03-28 RX ADMIN — METOCLOPRAMIDE 10 MG: 5 INJECTION, SOLUTION INTRAMUSCULAR; INTRAVENOUS at 10:49

## 2024-03-28 RX ADMIN — FENTANYL CITRATE 200 MCG: 50 INJECTION, SOLUTION INTRAMUSCULAR; INTRAVENOUS at 00:30

## 2024-03-28 RX ADMIN — SODIUM CHLORIDE 3000 MG: 900 INJECTION INTRAVENOUS at 20:31

## 2024-03-28 RX ADMIN — NITROGLYCERIN 100 MCG: 20 INJECTION INTRAVENOUS at 05:34

## 2024-03-28 RX ADMIN — OXYCODONE HYDROCHLORIDE 10 MG: 5 TABLET ORAL at 21:40

## 2024-03-28 RX ADMIN — SODIUM CHLORIDE 30 ML/HR: 9 INJECTION, SOLUTION INTRAVENOUS at 10:41

## 2024-03-28 RX ADMIN — SODIUM CHLORIDE 3000 MG: 900 INJECTION INTRAVENOUS at 12:44

## 2024-03-28 RX ADMIN — METHYLPREDNISOLONE SODIUM SUCCINATE 250 MG: 125 INJECTION, POWDER, FOR SOLUTION INTRAMUSCULAR; INTRAVENOUS at 01:22

## 2024-03-28 RX ADMIN — ACETAMINOPHEN 1000 MG: 1000 INJECTION INTRAVENOUS at 16:44

## 2024-03-28 RX ADMIN — ESMOLOL HYDROCHLORIDE 50 MG: 100 INJECTION, SOLUTION INTRAVENOUS at 00:02

## 2024-03-28 RX ADMIN — ACETAMINOPHEN 1000 MG: 1000 INJECTION INTRAVENOUS at 09:24

## 2024-03-28 RX ADMIN — CEFAZOLIN 3 G: 225 INJECTION, POWDER, FOR SOLUTION INTRAMUSCULAR; INTRAVENOUS at 04:48

## 2024-03-28 RX ADMIN — AMINOCAPROIC ACID 10 G: 250 INJECTION, SOLUTION INTRAVENOUS at 05:30

## 2024-03-28 RX ADMIN — ESMOLOL HYDROCHLORIDE 50 MG: 100 INJECTION, SOLUTION INTRAVENOUS at 00:33

## 2024-03-28 RX ADMIN — KETOTIFEN FUMARATE 1 DROP: 0.25 SOLUTION OPHTHALMIC at 21:41

## 2024-03-28 RX ADMIN — ROCURONIUM BROMIDE 50 MG: 10 INJECTION, SOLUTION INTRAVENOUS at 00:53

## 2024-03-28 RX ADMIN — FENTANYL CITRATE 250 MCG: 50 INJECTION, SOLUTION INTRAMUSCULAR; INTRAVENOUS at 02:04

## 2024-03-28 RX ADMIN — METOCLOPRAMIDE 10 MG: 5 INJECTION, SOLUTION INTRAMUSCULAR; INTRAVENOUS at 16:45

## 2024-03-28 RX ADMIN — 0.12% CHLORHEXIDINE GLUCONATE 15 ML: 1.2 RINSE ORAL at 20:30

## 2024-03-28 RX ADMIN — SODIUM CHLORIDE 500 MG PE: 900 INJECTION INTRAVENOUS at 01:06

## 2024-03-28 RX ADMIN — PANTOPRAZOLE SODIUM 40 MG: 40 INJECTION, POWDER, FOR SOLUTION INTRAVENOUS at 10:42

## 2024-03-28 RX ADMIN — HEPARIN SODIUM 20000 UNITS: 1000 INJECTION, SOLUTION INTRAVENOUS; SUBCUTANEOUS at 01:52

## 2024-03-28 RX ADMIN — FENTANYL CITRATE 250 MCG: 50 INJECTION, SOLUTION INTRAMUSCULAR; INTRAVENOUS at 00:56

## 2024-03-28 RX ADMIN — NITROGLYCERIN 100 MCG: 20 INJECTION INTRAVENOUS at 03:50

## 2024-03-28 RX ADMIN — HEPARIN SODIUM 5000 UNITS: 1000 INJECTION, SOLUTION INTRAVENOUS; SUBCUTANEOUS at 01:33

## 2024-03-28 RX ADMIN — HEPARIN SODIUM 5000 UNITS: 1000 INJECTION, SOLUTION INTRAVENOUS; SUBCUTANEOUS at 01:48

## 2024-03-28 RX ADMIN — NITROGLYCERIN 100 MCG: 20 INJECTION INTRAVENOUS at 03:57

## 2024-03-28 RX ADMIN — SODIUM CHLORIDE 0.5 MCG/KG/HR: 900 INJECTION INTRAVENOUS at 00:46

## 2024-03-28 RX ADMIN — FENTANYL CITRATE 50 MCG: 50 INJECTION, SOLUTION INTRAMUSCULAR; INTRAVENOUS at 00:03

## 2024-03-28 RX ADMIN — FENTANYL CITRATE 250 MCG: 50 INJECTION, SOLUTION INTRAMUSCULAR; INTRAVENOUS at 01:07

## 2024-03-28 RX ADMIN — SODIUM CHLORIDE 3.6 UNITS/HR: 900 INJECTION INTRAVENOUS at 04:06

## 2024-03-28 RX ADMIN — ROCURONIUM BROMIDE 30 MG: 10 INJECTION, SOLUTION INTRAVENOUS at 04:18

## 2024-03-28 RX ADMIN — SODIUM CHLORIDE: 9 INJECTION, SOLUTION INTRAVENOUS at 00:46

## 2024-03-28 RX ADMIN — Medication 160 MG: at 00:04

## 2024-03-28 RX ADMIN — PROTAMINE SULFATE 400 MG: 10 INJECTION, SOLUTION INTRAVENOUS at 04:54

## 2024-03-28 RX ADMIN — FENTANYL CITRATE 250 MCG: 50 INJECTION, SOLUTION INTRAMUSCULAR; INTRAVENOUS at 03:45

## 2024-03-28 RX ADMIN — MAGNESIUM SULFATE HEPTAHYDRATE 2 G: 40 INJECTION, SOLUTION INTRAVENOUS at 16:44

## 2024-03-28 NOTE — ANESTHESIA PROCEDURE NOTES
Airway  Date/Time: 3/28/2024 12:07 AM  Difficult airway    General Information and Staff    Patient location during procedure: OR  Anesthesiologist: Kai Porter MD    Indications and Patient Condition    Preoxygenated: yes  Mask difficulty assessment: 2 - vent by mask + OA or adjuvant +/- NMBA    Final Airway Details  Final airway type: endotracheal airway      Successful airway: ETT  Cuffed: yes   Successful intubation technique: video laryngoscopy  Facilitating devices/methods: intubating stylet  Endotracheal tube insertion site: oral  Blade: CMAC  Blade size: D  ETT size (mm): 8.0  Cormack-Lehane Classification: grade I - full view of glottis  Placement verified by: capnometry   Measured from: teeth  ETT/EBT  to teeth (cm): 25  Number of attempts at approach: 1  Assessment: lips, teeth, and gum same as pre-op

## 2024-03-28 NOTE — PLAN OF CARE
Goal Outcome Evaluation:  Plan of Care Reviewed With: patient        Progress: improving  Outcome Evaluation: Patient currently on 7L HFNC with respirations 13-15 min, sats mid 90's, all other VSS as well. Chest tube output minimal with 10 or less an hr.  Patient with no complaints of pain. Care continued.

## 2024-03-28 NOTE — CONSULTS
CONSULT NOTE    Patient Identification:  Tj Christopher  38 y.o.  male  1985  [id number]            Requesting physician: Cardiothoracic surgery    Reason for Consultation: Acute respiratory failure    CC:     History of Present Illness:  38-year-old -American male presented to the ER at [place] [facility] with complaints of vomiting and epigastric pain radiating to his back.  CTA performed showed type a aortic dissection extending down to the aortic bifurcation.  He was emergently transferred to [facility] for surgery.  Patient underwent type a dissection repair by Dr. Parkinson earlier today.  He was in open-heart recovery when he self extubated and apparently had 500 cc of bloody drainage from his chest tube.  At the time of my evaluation he is in no acute respite distress currently requiring 15 L high flow sats fluctuating between 92 to 97%.  Patient with no previous history of chronic lung disease such as asthma or COPD.  He is awake and able to manage secretions well at this time.  No known history of CHRISTIANO as such.  He is on multiple postop drips      Review of Systems  As above rest is negative  Past Medical History:  No past medical history on file.    Past Surgical History:  No past surgical history on file.     Home Meds:  Medications Prior to Admission   Medication Sig Dispense Refill Last Dose    amoxicillin (AMOXIL) 875 MG tablet Take 1 tablet by mouth 2 (Two) Times a Day. 14 tablet 0     methocarbamol (ROBAXIN) 500 MG tablet Take 1 tablet by mouth 3 (Three) Times a Day. 15 tablet 0        Allergies:  No Known Allergies    Social History:   Social History     Socioeconomic History    Marital status: Single       Family History:  No family history on file.    Physical Exam:  /55   Pulse 57   Temp 96.3 °F (35.7 °C)   SpO2 95%  There is no height or weight on file to calculate BMI. 95%    Physical Exam  Patient is examined using the personal protective equipment as per guidelines from  infection control for this particular patient as enacted.  Hand hygiene was performed before and after patient interaction.  Well-developed individual self extubated no acute respite distress at this time no use accessory muscles.  Eyes normal conjunctivae and pupils reactive to light  ENT Mallampati between 3 and 4 normal nasal exam  Neck midline trachea no thyromegaly  Chest diminished breath sounds bilaterally with multiple chest tubes and mediastinal tubes in place  CVS regular rate and rhythm no lower extremity edema  Abdomen soft nontender no hepatosplenomegaly  CNS intact normal sensory exam  Skin no rashes no nodules  Psych oriented to time place and person normal memory  Musculoskeletal no cyanosis no clubbing normal range of motion        LABS:  Lab Results   Component Value Date    CALCIUM 8.3 (L) 03/28/2024    PHOS 0.7 (C) 03/28/2024     Results from last 7 days   Lab Units 03/28/24  1132 03/28/24  0712 03/28/24  0541 03/28/24  0047 03/27/24  1901   MAGNESIUM mg/dL 2.5 2.9*  --   --   --    SODIUM mmol/L 139 138  --   --  136   POTASSIUM mmol/L 4.5 4.6  --   --  3.8   CHLORIDE mmol/L 106 102  --   --  99   CO2 mmol/L 23.0 27.5  --   --  27.0   BUN mg/dL 12 9  --   --  5*   CREATININE mg/dL 1.64* 1.52*  --   --  1.15   GLUCOSE mg/dL 144* 191*  --   --  100*   CALCIUM mg/dL 8.3* 8.1*  --   --  8.8   WBC 10*3/mm3 13.05* 17.92* 19.60*  19.63*  --  11.76*   HEMOGLOBIN g/dL 11.7* 10.3* 8.4*  8.6*  --  12.6*   HEMOGLOBIN, POC   --   --   --    < >  --    PLATELETS 10*3/mm3 160 156 141  135*  --  209   ALT (SGPT) U/L  --   --   --   --  26   AST (SGOT) U/L  --   --   --   --  21    < > = values in this interval not displayed.     Lab Results   Component Value Date    TROPONINT 12 03/27/2024     Results from last 7 days   Lab Units 03/27/24  1901   HSTROP T ng/L 12             Results from last 7 days   Lab Units 03/28/24  1046 03/28/24  0735 03/28/24  0505 03/28/24  0441 03/28/24  0402 03/28/24  0255  "03/28/24  0240 03/28/24  0220   PH, ARTERIAL pH units  --  7.393 7.3550 7.3020* 7.2180* 7.4550 7.4370 7.3880   PCO2, ARTERIAL mm Hg  --  42.7  --   --   --   --   --   --    PO2 ART mm Hg  --  85.5  --   --   --   --   --   --    O2 SATURATION ART %  --  96.3  --   --   --   --   --   --    MODALITY  Adult Vent Adult Vent  --   --   --   --   --   --          Results from last 7 days   Lab Units 03/28/24  0712 03/28/24  0541 03/28/24  0508 03/27/24  1901   INR  1.41* 1.52* 1.6* 1.06         No results found for: \"TSH\"  Estimated Creatinine Clearance: 97.7 mL/min (A) (by C-G formula based on SCr of 1.64 mg/dL (H)).         Imaging: I personally visualized the images of scans/x-rays performed within last 3 days.      Assessment:  Aortic dissection  Status post type a dissection repair postoperative day 0  Postop respiratory failure  Acute renal insufficiency  High output chest tube drainage  Postop anemia      Recommendations:  At this time we have a young male with status post type a aortic dissection repair self extubated currently requiring high flow oxygen.  Patient currently awake alert with no use of accessory muscles.  His sats actually have been fluctuating anywhere between 92 to 95%.  Do not feel he needs to be reintubated at this time.  Recommend weaning down oxygen maintain sats of 90%  Aggressive pulmonary toilet  May use heated high flow if needed to maintain sats above 90%  Patient currently able to handle secretions  Postop diuretics per cardiothoracic surgery  ICU core measures  Hemoglobin and coags are being monitored at this time for hide chest tube output per cardiothoracic surgery  Discussed plan of care in detail with the nursing staff    Critical care 35 minutes          Reinier Rodriguez MD  3/28/2024  13:34 EDT      Much of this encounter note is an electronic transcription/translation of spoken language to printed text using Dragon Software.  "

## 2024-03-28 NOTE — ANESTHESIA PROCEDURE NOTES
Diagnostic IntraOp Perez    Procedure Performed: Diagnostic IntraOp Perez       Start Time:  3/28/2024 1:00 AM       End Time:   3/28/2024 2:38 AM      General Procedure Information  Diagnostic Indications for Echo:  assessment of ascending aorta  Physician Requesting Echo: Sukhdeep Parkinson MD  Intubated  Bite block placed  Heart visualized  Probe Insertion:  Easy  Probe Type:  Multiplane  Modalities:  2D only, color flow mapping, continuous wave Doppler and pulse wave Doppler    Echocardiographic and Doppler Measurements    Ventricles    Left Ventricle:  Cavity size normal.  Global Function normal.  Ejection Fraction 60%.          Valves    Aortic Valve:  Annulus normal.  Stenosis not present.  Regurgitation absent.  Leaflets normal.  Leaflet motions normal.      Mitral Valve:  Annulus normal.  Stenosis not present.  Regurgitation trace.      Tricuspid Valve:  Annulus normal.  Stenosis not present.  Regurgitation trace.        Aorta    Ascending Aorta:  Dissection present.    Aortic Arch:  Size normal.  Dissection present.    Descending Aorta:  Size normal.  Dissection present.        Atria      Left Atrium:  Left atrial appendage normal.                  Anesthesia Information      Echocardiogram Comments:       Diagnosis: aortic dissection         Goal Outcome Evaluation: Patient has been very pleasant. Compliant with care. Patient remains on room air, saturations remaining above 90%. Patient currently resting in bed. Will continue with plan of care.

## 2024-03-28 NOTE — ANESTHESIA PROCEDURE NOTES
Central Line      Patient reassessed immediately prior to procedure    Patient location during procedure: OR  Start time: 3/28/2024 12:20 AM  Stop Time:3/28/2024 12:25 AM  Staff  Anesthesiologist: Kai Porter MD  Preanesthetic Checklist  Completed: patient identified, risks and benefits discussed, surgical consent, pre-op evaluation and timeout performed  Central Line Prep  Sterile Tech:cap, gloves, gown, mask and sterile barriers  Prep: chloraprep  Patient monitoring: blood pressure monitoring, continuous pulse oximetry and EKG  Central Line Procedure  Laterality:right  Location:internal jugular  Catheter Type:Hatfield-Lio  Assessment  Post procedure:biopatch applied, line sutured and occlusive dressing applied  Assessement:blood return through all ports and free fluid flow  Patient Tolerance:patient tolerated the procedure well with no apparent complications

## 2024-03-28 NOTE — ANESTHESIA PROCEDURE NOTES
Arterial Line      Patient location during procedure: OR  Start time: 3/28/2024 12:30 AM  Stop Time:3/28/2024 12:35 AM       Performed By   Anesthesiologist: Kai Porter MD   Preanesthetic Checklist  Completed: patient identified, risks and benefits discussed, surgical consent, pre-op evaluation and timeout performed  Arterial Line Prep    Sterile Tech: cap, gloves and mask  Prep: ChloraPrep  Patient monitoring: blood pressure monitoring, continuous pulse oximetry and EKG  Arterial Line Procedure   Laterality:left  Location:  radial artery  Catheter size: 20 G   Guidance: ultrasound guided  PROCEDURE NOTE/ULTRASOUND INTERPRETATION.  Using ultrasound guidance the potential vascular sites for insertion of the catheter were visualized to determine the patency of the vessel to be used for vascular access.  After selecting the appropriate site for insertion, the needle was visualized under ultrasound being inserted into the radial artery, followed by ultrasound confirmation of wire and catheter placement. There were no abnormalities seen on ultrasound; an image was taken; and the patient tolerated the procedure with no complications.   Number of attempts: 1  Successful placement: yes Images: still images not obtained  Post Assessment   Dressing Type: occlusive dressing applied, secured with tape and wrist guard applied.   Complications no   Patient Tolerance: patient tolerated the procedure well with no apparent complications  Additional Notes  Ultrasound Interpretation:  Using ultrasound guidance the potential vascular sites for insertion of the catheter were visualized to determine the patency of the vessel to be used for vascular access.  After selecting the appropriate site for insertion, the needle was visualized under ultrasound being inserted into the vessel, followed by ultrasound confirmation of wire and catheter placement.  There were no abnormalities seen on ultrasound; an image was taken/ and the  patient tolerated the procedure with no complications.

## 2024-03-28 NOTE — NURSING NOTE
Patient became agitated upon awakening.  Patient self extubated despite RN and additional staff trying to calm and redirect patient. NP Delmy to bedside , Pulmonary consulted. Patient placed on high flow NC 15 L, respirations 18, sats 95%. Care continued.

## 2024-03-28 NOTE — ED NOTES
Attempted to call report to [facility], they were unable to take report that this time

## 2024-03-28 NOTE — SIGNIFICANT NOTE
03/28/24 1430   OTHER   Discipline physical therapist   Rehab Time/Intention   Session Not Performed   (Patient on ventilator throughout the day and not appropriate. When checked on this afternoon, patient had self extubated. PT will plan to hold and follow up tomorrow.)   Recommendation   PT - Next Appointment 03/29/24

## 2024-03-28 NOTE — ANESTHESIA PROCEDURE NOTES
Arterial Line      Patient reassessed immediately prior to procedure    Start time: 3/28/2024 12:00 AM  Stop Time:3/28/2024 12:02 AM       Performed By   Anesthesiologist: Kai Porter MD   Preanesthetic Checklist  Completed: patient identified, risks and benefits discussed, surgical consent, pre-op evaluation and timeout performed  Arterial Line Prep    Sterile Tech: cap, gloves and mask  Prep: alcohol swabs and ChloraPrep  Patient monitoring: blood pressure monitoring, continuous pulse oximetry and EKG  Arterial Line Procedure   Laterality:right  Location:  radial artery  Catheter size: 20 G   Guidance: ultrasound guided  PROCEDURE NOTE/ULTRASOUND INTERPRETATION.  Using ultrasound guidance the potential vascular sites for insertion of the catheter were visualized to determine the patency of the vessel to be used for vascular access.  After selecting the appropriate site for insertion, the needle was visualized under ultrasound being inserted into the radial artery, followed by ultrasound confirmation of wire and catheter placement. There were no abnormalities seen on ultrasound; an image was taken; and the patient tolerated the procedure with no complications.   Number of attempts: 1  Successful placement: yes Images: still images not obtained  Post Assessment   Dressing Type: occlusive dressing applied, secured with tape and wrist guard applied.   Complications no   Patient Tolerance: patient tolerated the procedure well with no apparent complications  Additional Notes  Ultrasound Interpretation:  Using ultrasound guidance the potential vascular sites for insertion of the catheter were visualized to determine the patency of the vessel to be used for vascular access.  After selecting the appropriate site for insertion, the needle was visualized under ultrasound being inserted into the vessel, followed by ultrasound confirmation of wire and catheter placement.  There were no abnormalities seen on  ultrasound; an image was taken/ and the patient tolerated the procedure with no complications.

## 2024-03-28 NOTE — H&P
Name: Tj Christopher ADMIT: 3/28/2024   : 1985  PCP: Provider, No Known    MRN: [id number] LOS: 0 days   AGE/SEX: 38 y.o. male  ROOM: [facility]/CV[facility]     Chief Complaint: Aortic dissection      Subjective     History of Present Illness  Patient is a 38 y.o. male with no medical history who presented to the ER at [facility] with complaints of vomiting and epigastric pain with radiation to his back, that started approximately three days prior. CTA was performed and showed a Type A aortic dissection extending down to the aortic bifurcation. He was emergently transferred to [facility] for surgery.  CT scan also showed possible malperfusion with extension of the dissection to both common femoral arteries and below.  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  He admitted to the use of methamphetamines and of having hypertension not well-controlled.  He has no family history of aneurysms, dissections or connective tissue disorders.  There was a question of lupus in the record but never documented full workup.    No past medical history on file.  No past surgical history on file.  No family history on file.     Medications Prior to Admission   Medication Sig Dispense Refill Last Dose    amoxicillin (AMOXIL) 875 MG tablet Take 1 tablet by mouth 2 (Two) Times a Day. 14 tablet 0     methocarbamol (ROBAXIN) 500 MG tablet Take 1 tablet by mouth 3 (Three) Times a Day. 15 tablet 0      Allergies:  Patient has no known allergies.    Review of Systems   Cardiovascular:  Positive for chest pain.   Gastrointestinal:  Positive for vomiting.   Musculoskeletal:  Positive for back pain.        Objective    Vital Signs  Temp:  [97 °F (36.1 °C)] 97 °F (36.1 °C)  Heart Rate:  [58-82] 74  Resp:  [16-25] 25  BP: (145-177)/(76-91) 156/90  SpO2:  [90 %-99 %] 95 %  on   ;   Device (Oxygen Therapy): room air  There is no height or weight on file to calculate BMI.    Physical  Exam  Constitutional:       Appearance: He is obese.   HENT:      Head: Normocephalic and atraumatic.      Nose: No congestion or rhinorrhea.   Cardiovascular:      Rate and Rhythm: Normal rate and regular rhythm.      Pulses: Normal pulses.      Heart sounds: Normal heart sounds.   Pulmonary:      Effort: Pulmonary effort is normal.      Breath sounds: Normal breath sounds.   Abdominal:      General: Bowel sounds are normal.      Palpations: Abdomen is soft.   Musculoskeletal:      Right lower leg: No edema.      Left lower leg: No edema.   Skin:     General: Skin is warm and dry.       Results Review:  I reviewed the patient's new clinical results.    Assessment & Plan  - Type A aortic dissection  - hypertension    Patient take emergently to the operating room with Dr. Parkinson for dissection repair    MAYRA Masterson  03/28/24  07:03 EDT  Addendum  Patient was seen and examined by me, agree with the findings above.  I have reviewed the CTA myself and interpreted the results and discussed the case with the ER team at [facility] and recommended the transferred for further evaluation and treatment and possible surgery emergently.  The CT scan was concerning for type a acute aortic dissection versus type B acute aortic dissection with retrograde extension versus intramural hematoma.  There was no murmur therefore the concern about aortic valve replacement was less.  I discussed with the patient my recommendation of emergent surgery to prolong survival and prevent rupture with death with a high risk in the first 72 hours.  I quoted an operative mortality of over 15 to 20% given the malperfusion and risk complications as high as 30 to 40%.  He verbalized understanding he wants to proceed with surgery.  Sukhdeep Parkinson MD

## 2024-03-28 NOTE — ANESTHESIA PROCEDURE NOTES
Central Line      Patient reassessed immediately prior to procedure    Patient location during procedure: OR  Start time: 3/28/2024 12:15 AM  Stop Time:3/28/2024 12:20 AM  Indications: vascular access and central pressure monitoring  Staff  Anesthesiologist: Kai Porter MD  Preanesthetic Checklist  Completed: patient identified and risks and benefits discussed  Central Line Prep  Sterile Tech:cap, gloves, gown, mask and sterile barriers  Prep: chloraprep  Patient monitoring: blood pressure monitoring, continuous pulse oximetry and EKG  Central Line Procedure  Laterality:right  Location:internal jugular  Catheter Type:double lumen and MAC  Catheter Size:9 Fr  Guidance:ultrasound guided  PROCEDURE NOTE/ULTRASOUND INTERPRETATION.  Using ultrasound guidance the potential vascular sites for insertion of the catheter were visualized to determine the patency of the vessel to be used for vascular access.  After selecting the appropriate site for insertion, the needle was visualized under ultrasound being inserted into the internal jugular vein, followed by ultrasound confirmation of wire and catheter placement. There were no abnormalities seen on ultrasound; an image was taken; and the patient tolerated the procedure with no complications. Images: still images not obtained  Assessment  Post procedure:biopatch applied, line sutured, occlusive dressing applied and secured with tape  Assessement:blood return through all ports and Richardson Test  Complications:no  Patient Tolerance:patient tolerated the procedure well with no apparent complications  Additional Notes  Ultrasound Interpretation:  Using ultrasound guidance the potential vascular sites for insertion of the catheter were visualized to determine the patency of the vessel to be used for vascular access.  After selecting the appropriate site for insertion, the needle was visualized under ultrasound being inserted into the vessel, followed by ultrasound  confirmation of wire and catheter placement.  There were no abnormalities seen on ultrasound; an image was taken/ and the patient tolerated the procedure with no complications.

## 2024-03-28 NOTE — ED NOTES
"Physicians Statement of Medical Necessity for  Ambulance Transportation    GENERAL INFORMATION     Name: Tj Christopher  YOB: 1985  Medicare #: N/A  Transport Date: 3/27/2024 (Valid for round trips this date, or for scheduled repetitive trips for 60 days from the date signed below.)  Origin: [facility] ER #[id number]  Destination: [facility] ICU  Is the Patient's stay covered under Medicare Part A (PPS/DRG?)No   Closest appropriate facility? Yes  If this a hosp-hosp transfer? Yes, describe services needed at 2nd facility not available at 1st facility VASCULAR SURGERY  Is this a hospice patient? No    MEDICAL NECESSITY QUESTIONAIRE    Ambulance Transportation is medically necessary only if other means of transportation are contraindicated or would be potentially harmful to the patient.  To meet this requirement, the patient must be either \"bed confined\" or suffer from a condition such that transport by means other than an ambulance is contraindicated by the patient's condition.  The following questions must be answered by the healthcare professional signing below for this form to be valid:     1) Describe the MEDICAL CONDITION (physical and/or mental) of this patient AT THE TIME OF AMBULANCE TRANSPORT that requires the patient to be transported in an ambulance, and why transport by other means is contraindicated by the patient's condition: DISSECTING AORTIC ANEURYSM NEEDING SURGICAL INTERVENTION AND HIGHER LEVEL OF CARE  No past medical history on file.   No past surgical history on file.   2) Is this patient \"bed confined\" as defined below?Yes   To be \"bed confined\" the patient must satisfy all three of the following criteria:  (1) unable to get up from bed without assistance; AND (2) unable to ambulate;  AND (3) unable to sit in a chair or wheelchair.  3) Can this patient safely be transported by car or wheelchair van (I.e., may safely sit during transport, without an attendant or monitoring?)No   4. In " addition to completing questions 1-3 above, please check any of the following conditions that apply*:          *Note: supporting documentation for any boxes checked must be maintained in the patient's medical records IV meds/fluids required, Medical attendant required, Unable to tolerate seated position for time needed to transport, Hemodynamic monitoring required en route, and Cardiac monitoring required en route      SIGNATURE OF PHYSICIAN OR OTHER AUTHORIZED HEALTHCARE PROFESSIONAL    I certify that the above information is true and correct based on my evaluation of this patient, and represent that the patient requires transport by ambulance and that other forms of transport are contraindicated.  I understand that this information will be used by the Centers for Medicare and Medicaid Services (CMS) to support the determiniation of medical necessity for ambulance services, and I represent that I have personal knowledge of the patient's condition at the time of transport.    NW   If this box is checked, I also certify that the patient is physically or mentally incapable of signing the ambulance service's claim form and that the institution with which I am affiliated has furnished care, services or assistance to the patient.  My signature below is made on behalf of the patient pursuant to 42 .36(b)(4). In accordance with 42 .37, the specific reason(s) that the patient is physically or mentally incapable of signing the claim for is as follows: N/A    Signature of Physician or Healthcare Professional Neelam Glez RN Date/Time:   3/27/2024 22:11     (For Scheduled repetitive transport, this form is not valid for transports performed more than 60 days after this date).                                                                                                                                            --------------------------------------------------------------------------------------------  Printed  Name and Credentials of Physician or Authorized Healthcare Professional     *Form must be signed by patient's attending physician for scheduled, repetitive transports,.  For non-repetitive ambulance transports, if unable to obtain the signature of the attending physician, any of the following may sign (please select below):     Physician  Clinical Nurse Specialist X Registered Nurse     Physician Assistant  Discharge Planner  Licensed Practical Nurse     Nurse Practitioner

## 2024-03-28 NOTE — ANESTHESIA PREPROCEDURE EVALUATION
Anesthesia Evaluation     NPO Solid Status: Waived due to emergency  NPO Liquid Status: Waived due to emergency           Airway   Mallampati: III  TM distance: >3 FB  Neck ROM: full  Possible difficult intubation  Dental    (+) implants    Pulmonary - normal exam   Cardiovascular - normal exam      ROS comment: Aortic dissection    Neuro/Psych  GI/Hepatic/Renal/Endo    (+) morbid obesity    Musculoskeletal     Abdominal    Substance History      OB/GYN          Other                    Anesthesia Plan    ASA 4 - emergent     general, Jovana, CVL and PAC       Anesthetic plan, risks, benefits, and alternatives have been provided, discussed and informed consent has been obtained with: patient.    CODE STATUS:

## 2024-03-29 ENCOUNTER — APPOINTMENT (OUTPATIENT)
Dept: GENERAL RADIOLOGY | Facility: HOSPITAL | Age: 39
DRG: 219 | End: 2024-03-29
Payer: MEDICAID

## 2024-03-29 LAB
ALBUMIN SERPL-MCNC: 3.4 G/DL (ref 3.5–5.2)
ANION GAP SERPL CALCULATED.3IONS-SCNC: 12 MMOL/L (ref 5–15)
BASOPHILS # BLD AUTO: 0.02 10*3/MM3 (ref 0–0.2)
BASOPHILS NFR BLD AUTO: 0.1 % (ref 0–1.5)
BH BB BLOOD EXPIRATION DATE: NORMAL
BH BB BLOOD EXPIRATION DATE: NORMAL
BH BB BLOOD TYPE BARCODE: 5100
BH BB BLOOD TYPE BARCODE: 5100
BH BB DISPENSE STATUS: NORMAL
BH BB DISPENSE STATUS: NORMAL
BH BB PRODUCT CODE: NORMAL
BH BB PRODUCT CODE: NORMAL
BH BB UNIT NUMBER: NORMAL
BH BB UNIT NUMBER: NORMAL
BUN SERPL-MCNC: 13 MG/DL (ref 6–20)
BUN/CREAT SERPL: 9.4 (ref 7–25)
CA-I BLD-MCNC: 4.8 MG/DL (ref 4.6–5.4)
CA-I SERPL ISE-MCNC: 1.19 MMOL/L (ref 1.15–1.35)
CALCIUM SPEC-SCNC: 8 MG/DL (ref 8.6–10.5)
CHLORIDE SERPL-SCNC: 113 MMOL/L (ref 98–107)
CO2 SERPL-SCNC: 24 MMOL/L (ref 22–29)
CREAT SERPL-MCNC: 1.39 MG/DL (ref 0.76–1.27)
CROSSMATCH INTERPRETATION: NORMAL
CROSSMATCH INTERPRETATION: NORMAL
DEPRECATED RDW RBC AUTO: 41.3 FL (ref 37–54)
EGFRCR SERPLBLD CKD-EPI 2021: 67 ML/MIN/1.73
EOSINOPHIL # BLD AUTO: 0 10*3/MM3 (ref 0–0.4)
EOSINOPHIL NFR BLD AUTO: 0 % (ref 0.3–6.2)
ERYTHROCYTE [DISTWIDTH] IN BLOOD BY AUTOMATED COUNT: 13.6 % (ref 12.3–15.4)
GLUCOSE BLDC GLUCOMTR-MCNC: 132 MG/DL (ref 70–130)
GLUCOSE BLDC GLUCOMTR-MCNC: 138 MG/DL (ref 70–130)
GLUCOSE BLDC GLUCOMTR-MCNC: 142 MG/DL (ref 70–130)
GLUCOSE BLDC GLUCOMTR-MCNC: 147 MG/DL (ref 70–130)
GLUCOSE BLDC GLUCOMTR-MCNC: 180 MG/DL (ref 70–130)
GLUCOSE SERPL-MCNC: 129 MG/DL (ref 65–99)
HCT VFR BLD AUTO: 32.9 % (ref 37.5–51)
HGB BLD-MCNC: 10.8 G/DL (ref 13–17.7)
IMM GRANULOCYTES # BLD AUTO: 0.13 10*3/MM3 (ref 0–0.05)
IMM GRANULOCYTES NFR BLD AUTO: 0.6 % (ref 0–0.5)
INR PPP: 1.28 (ref 0.9–1.1)
LAB AP CASE REPORT: NORMAL
LYMPHOCYTES # BLD AUTO: 0.86 10*3/MM3 (ref 0.7–3.1)
LYMPHOCYTES NFR BLD AUTO: 4 % (ref 19.6–45.3)
MAGNESIUM SERPL-MCNC: 2.4 MG/DL (ref 1.6–2.6)
MCH RBC QN AUTO: 27.3 PG (ref 26.6–33)
MCHC RBC AUTO-ENTMCNC: 32.8 G/DL (ref 31.5–35.7)
MCV RBC AUTO: 83.3 FL (ref 79–97)
MONOCYTES # BLD AUTO: 1.55 10*3/MM3 (ref 0.1–0.9)
MONOCYTES NFR BLD AUTO: 7.1 % (ref 5–12)
NEUTROPHILS NFR BLD AUTO: 19.15 10*3/MM3 (ref 1.7–7)
NEUTROPHILS NFR BLD AUTO: 88.2 % (ref 42.7–76)
NRBC BLD AUTO-RTO: 0 /100 WBC (ref 0–0.2)
PATH REPORT.FINAL DX SPEC: NORMAL
PATH REPORT.GROSS SPEC: NORMAL
PHOSPHATE SERPL-MCNC: 4.3 MG/DL (ref 2.5–4.5)
PLATELET # BLD AUTO: 211 10*3/MM3 (ref 140–450)
PMV BLD AUTO: 10.7 FL (ref 6–12)
POTASSIUM SERPL-SCNC: 4.5 MMOL/L (ref 3.5–5.2)
PROTHROMBIN TIME: 16.2 SECONDS (ref 11.7–14.2)
QT INTERVAL: 425 MS
QTC INTERVAL: 439 MS
RBC # BLD AUTO: 3.95 10*6/MM3 (ref 4.14–5.8)
SODIUM SERPL-SCNC: 149 MMOL/L (ref 136–145)
UNIT  ABO: NORMAL
UNIT  ABO: NORMAL
UNIT  RH: NORMAL
UNIT  RH: NORMAL
WBC NRBC COR # BLD AUTO: 21.71 10*3/MM3 (ref 3.4–10.8)

## 2024-03-29 PROCEDURE — 93010 ELECTROCARDIOGRAM REPORT: CPT | Performed by: INTERNAL MEDICINE

## 2024-03-29 PROCEDURE — 25810000003 SODIUM CHLORIDE 0.9 % SOLUTION: Performed by: NURSE PRACTITIONER

## 2024-03-29 PROCEDURE — 25010000002 CEFAZOLIN 3 G RECONSTITUTED SOLUTION 1 EACH VIAL: Performed by: NURSE PRACTITIONER

## 2024-03-29 PROCEDURE — 99222 1ST HOSP IP/OBS MODERATE 55: CPT | Performed by: INTERNAL MEDICINE

## 2024-03-29 PROCEDURE — 97530 THERAPEUTIC ACTIVITIES: CPT

## 2024-03-29 PROCEDURE — 83735 ASSAY OF MAGNESIUM: CPT | Performed by: NURSE PRACTITIONER

## 2024-03-29 PROCEDURE — 25010000002 ENOXAPARIN PER 10 MG: Performed by: NURSE PRACTITIONER

## 2024-03-29 PROCEDURE — 82330 ASSAY OF CALCIUM: CPT | Performed by: NURSE PRACTITIONER

## 2024-03-29 PROCEDURE — 25010000002 NICARDIPINE 2.5 MG/ML SOLUTION: Performed by: NURSE PRACTITIONER

## 2024-03-29 PROCEDURE — 82948 REAGENT STRIP/BLOOD GLUCOSE: CPT

## 2024-03-29 PROCEDURE — 99024 POSTOP FOLLOW-UP VISIT: CPT | Performed by: NURSE PRACTITIONER

## 2024-03-29 PROCEDURE — 97162 PT EVAL MOD COMPLEX 30 MIN: CPT

## 2024-03-29 PROCEDURE — 25010000002 FUROSEMIDE PER 20 MG: Performed by: THORACIC SURGERY (CARDIOTHORACIC VASCULAR SURGERY)

## 2024-03-29 PROCEDURE — 25010000002 HYDRALAZINE PER 20 MG: Performed by: NURSE PRACTITIONER

## 2024-03-29 PROCEDURE — 71045 X-RAY EXAM CHEST 1 VIEW: CPT

## 2024-03-29 PROCEDURE — 93005 ELECTROCARDIOGRAM TRACING: CPT | Performed by: NURSE PRACTITIONER

## 2024-03-29 PROCEDURE — 25010000002 MAGNESIUM SULFATE 2 GM/50ML SOLUTION: Performed by: NURSE PRACTITIONER

## 2024-03-29 PROCEDURE — 25010000002 METOCLOPRAMIDE PER 10 MG: Performed by: NURSE PRACTITIONER

## 2024-03-29 PROCEDURE — 80069 RENAL FUNCTION PANEL: CPT | Performed by: NURSE PRACTITIONER

## 2024-03-29 PROCEDURE — 85610 PROTHROMBIN TIME: CPT | Performed by: NURSE PRACTITIONER

## 2024-03-29 PROCEDURE — 63710000001 INSULIN LISPRO (HUMAN) PER 5 UNITS: Performed by: NURSE PRACTITIONER

## 2024-03-29 PROCEDURE — 85025 COMPLETE CBC W/AUTO DIFF WBC: CPT | Performed by: NURSE PRACTITIONER

## 2024-03-29 PROCEDURE — 25010000002 ACETAMINOPHEN 10 MG/ML SOLUTION: Performed by: NURSE PRACTITIONER

## 2024-03-29 RX ORDER — POLYMYXIN B SULFATE AND TRIMETHOPRIM 1; 10000 MG/ML; [USP'U]/ML
1 SOLUTION OPHTHALMIC EVERY 6 HOURS SCHEDULED
Status: DISCONTINUED | OUTPATIENT
Start: 2024-03-29 | End: 2024-04-03 | Stop reason: HOSPADM

## 2024-03-29 RX ORDER — GABAPENTIN 300 MG/1
300 CAPSULE ORAL EVERY 12 HOURS SCHEDULED
Status: DISCONTINUED | OUTPATIENT
Start: 2024-03-29 | End: 2024-04-03 | Stop reason: HOSPADM

## 2024-03-29 RX ORDER — CARVEDILOL 12.5 MG/1
12.5 TABLET ORAL EVERY 12 HOURS
Status: DISCONTINUED | OUTPATIENT
Start: 2024-03-29 | End: 2024-03-30

## 2024-03-29 RX ORDER — CARVEDILOL 6.25 MG/1
6.25 TABLET ORAL EVERY 12 HOURS
Status: DISCONTINUED | OUTPATIENT
Start: 2024-03-29 | End: 2024-03-29

## 2024-03-29 RX ORDER — AMLODIPINE BESYLATE 5 MG/1
5 TABLET ORAL
Status: DISCONTINUED | OUTPATIENT
Start: 2024-03-29 | End: 2024-04-01

## 2024-03-29 RX ORDER — GUAIFENESIN 600 MG/1
1200 TABLET, EXTENDED RELEASE ORAL EVERY 12 HOURS SCHEDULED
Status: DISCONTINUED | OUTPATIENT
Start: 2024-03-29 | End: 2024-04-03 | Stop reason: HOSPADM

## 2024-03-29 RX ORDER — DEXTROSE MONOHYDRATE 25 G/50ML
25 INJECTION, SOLUTION INTRAVENOUS
Status: DISCONTINUED | OUTPATIENT
Start: 2024-03-29 | End: 2024-04-01

## 2024-03-29 RX ORDER — LABETALOL HYDROCHLORIDE 5 MG/ML
10 INJECTION, SOLUTION INTRAVENOUS
Status: DISCONTINUED | OUTPATIENT
Start: 2024-03-29 | End: 2024-04-03 | Stop reason: HOSPADM

## 2024-03-29 RX ORDER — FUROSEMIDE 10 MG/ML
20 INJECTION INTRAMUSCULAR; INTRAVENOUS ONCE
Status: COMPLETED | OUTPATIENT
Start: 2024-03-29 | End: 2024-03-29

## 2024-03-29 RX ORDER — NICOTINE POLACRILEX 4 MG
15 LOZENGE BUCCAL
Status: DISCONTINUED | OUTPATIENT
Start: 2024-03-29 | End: 2024-04-01

## 2024-03-29 RX ORDER — HYDRALAZINE HYDROCHLORIDE 20 MG/ML
10 INJECTION INTRAMUSCULAR; INTRAVENOUS EVERY 6 HOURS PRN
Status: DISCONTINUED | OUTPATIENT
Start: 2024-03-29 | End: 2024-04-03 | Stop reason: HOSPADM

## 2024-03-29 RX ORDER — INSULIN LISPRO 100 [IU]/ML
2-9 INJECTION, SOLUTION INTRAVENOUS; SUBCUTANEOUS
Status: DISCONTINUED | OUTPATIENT
Start: 2024-03-29 | End: 2024-04-01

## 2024-03-29 RX ORDER — HYDRALAZINE HYDROCHLORIDE 20 MG/ML
20 INJECTION INTRAMUSCULAR; INTRAVENOUS EVERY 6 HOURS PRN
Status: DISCONTINUED | OUTPATIENT
Start: 2024-03-29 | End: 2024-03-29

## 2024-03-29 RX ORDER — IBUPROFEN 600 MG/1
1 TABLET ORAL
Status: DISCONTINUED | OUTPATIENT
Start: 2024-03-29 | End: 2024-04-01

## 2024-03-29 RX ADMIN — GABAPENTIN 300 MG: 300 CAPSULE ORAL at 08:00

## 2024-03-29 RX ADMIN — METOCLOPRAMIDE 10 MG: 5 INJECTION, SOLUTION INTRAMUSCULAR; INTRAVENOUS at 04:19

## 2024-03-29 RX ADMIN — NICARDIPINE HYDROCHLORIDE 8 MG/HR: 25 INJECTION, SOLUTION INTRAVENOUS at 01:37

## 2024-03-29 RX ADMIN — ATORVASTATIN CALCIUM 40 MG: 20 TABLET, FILM COATED ORAL at 20:02

## 2024-03-29 RX ADMIN — INSULIN LISPRO 2 UNITS: 100 INJECTION, SOLUTION INTRAVENOUS; SUBCUTANEOUS at 08:00

## 2024-03-29 RX ADMIN — OXYCODONE HYDROCHLORIDE 10 MG: 5 TABLET ORAL at 11:01

## 2024-03-29 RX ADMIN — POLYMYXIN B SULFATE AND TRIMETHOPRIM 1 DROP: 10000; 1 SOLUTION OPHTHALMIC at 17:47

## 2024-03-29 RX ADMIN — OXYCODONE HYDROCHLORIDE 10 MG: 5 TABLET ORAL at 15:30

## 2024-03-29 RX ADMIN — MUPIROCIN 1 APPLICATION: 20 OINTMENT TOPICAL at 08:00

## 2024-03-29 RX ADMIN — CYCLOBENZAPRINE 10 MG: 10 TABLET, FILM COATED ORAL at 10:09

## 2024-03-29 RX ADMIN — AMLODIPINE BESYLATE 5 MG: 5 TABLET ORAL at 15:30

## 2024-03-29 RX ADMIN — NICARDIPINE HYDROCHLORIDE 9 MG/HR: 25 INJECTION, SOLUTION INTRAVENOUS at 04:54

## 2024-03-29 RX ADMIN — ASPIRIN 81 MG: 81 TABLET, COATED ORAL at 08:00

## 2024-03-29 RX ADMIN — CARVEDILOL 6.25 MG: 6.25 TABLET, FILM COATED ORAL at 07:59

## 2024-03-29 RX ADMIN — OXYCODONE HYDROCHLORIDE 10 MG: 5 TABLET ORAL at 02:44

## 2024-03-29 RX ADMIN — GUAIFENESIN 1200 MG: 600 TABLET, EXTENDED RELEASE ORAL at 08:00

## 2024-03-29 RX ADMIN — SODIUM CHLORIDE 3000 MG: 900 INJECTION INTRAVENOUS at 20:02

## 2024-03-29 RX ADMIN — NICARDIPINE HYDROCHLORIDE 12 MG/HR: 25 INJECTION, SOLUTION INTRAVENOUS at 06:52

## 2024-03-29 RX ADMIN — PANTOPRAZOLE SODIUM 40 MG: 40 TABLET, DELAYED RELEASE ORAL at 06:38

## 2024-03-29 RX ADMIN — DOCUSATE SODIUM 50MG AND SENNOSIDES 8.6MG 2 TABLET: 8.6; 5 TABLET, FILM COATED ORAL at 20:02

## 2024-03-29 RX ADMIN — FUROSEMIDE 20 MG: 10 INJECTION, SOLUTION INTRAMUSCULAR; INTRAVENOUS at 10:09

## 2024-03-29 RX ADMIN — CARVEDILOL 12.5 MG: 12.5 TABLET, FILM COATED ORAL at 20:02

## 2024-03-29 RX ADMIN — GABAPENTIN 300 MG: 300 CAPSULE ORAL at 20:02

## 2024-03-29 RX ADMIN — ACETAMINOPHEN 1000 MG: 1000 INJECTION INTRAVENOUS at 00:08

## 2024-03-29 RX ADMIN — 0.12% CHLORHEXIDINE GLUCONATE 15 ML: 1.2 RINSE ORAL at 08:00

## 2024-03-29 RX ADMIN — POLYMYXIN B SULFATE AND TRIMETHOPRIM 1 DROP: 10000; 1 SOLUTION OPHTHALMIC at 12:22

## 2024-03-29 RX ADMIN — GUAIFENESIN 1200 MG: 600 TABLET, EXTENDED RELEASE ORAL at 20:02

## 2024-03-29 RX ADMIN — MUPIROCIN 1 APPLICATION: 20 OINTMENT TOPICAL at 20:03

## 2024-03-29 RX ADMIN — HYDROCODONE BITARTRATE AND ACETAMINOPHEN 2 TABLET: 5; 325 TABLET ORAL at 20:02

## 2024-03-29 RX ADMIN — SODIUM CHLORIDE 3000 MG: 900 INJECTION INTRAVENOUS at 12:22

## 2024-03-29 RX ADMIN — SODIUM CHLORIDE 3000 MG: 900 INJECTION INTRAVENOUS at 03:59

## 2024-03-29 RX ADMIN — POLYMYXIN B SULFATE AND TRIMETHOPRIM 1 DROP: 10000; 1 SOLUTION OPHTHALMIC at 23:51

## 2024-03-29 RX ADMIN — OXYCODONE HYDROCHLORIDE 10 MG: 5 TABLET ORAL at 06:41

## 2024-03-29 RX ADMIN — MAGNESIUM SULFATE HEPTAHYDRATE 2 G: 40 INJECTION, SOLUTION INTRAVENOUS at 00:08

## 2024-03-29 RX ADMIN — 0.12% CHLORHEXIDINE GLUCONATE 15 ML: 1.2 RINSE ORAL at 20:03

## 2024-03-29 RX ADMIN — HYDRALAZINE HYDROCHLORIDE 20 MG: 20 INJECTION INTRAMUSCULAR; INTRAVENOUS at 09:19

## 2024-03-29 RX ADMIN — KETOTIFEN FUMARATE 1 DROP: 0.25 SOLUTION OPHTHALMIC at 08:05

## 2024-03-29 RX ADMIN — ENOXAPARIN SODIUM 40 MG: 100 INJECTION SUBCUTANEOUS at 17:47

## 2024-03-29 NOTE — CONSULTS
Date of Consultation: 24    Referral Provider: Dr. Parkinson    Reason for Consultation: Aortic dissection, hypertension management.    Encounter Provider: Saman Ruiz MD    Group of Service: [facility]     Patient Name: Tj Christopher    :1985    Chief complaint: Chest pain.    History of Present Illness:      This is a very pleasant 38-year-old male with a history of hypertension.  He presented to the ER at [place] with chest pain, epigastric pain, and radiation into his back.  He was also vomiting.  This had evidently started approximately 3 days prior.  A CT angiogram was performed, and showed a type A dissection extending from the ascending aorta throughout the arch and into the descending thoracic aorta into the aortic bifurcation.  There was also aneurysmal dilatation of the distal infrarenal abdominal aorta up to 4.5 cm and moderate aneurysmal dilatation of the left common iliac artery at 2.3 cm.    He was transferred to [facility] and underwent emergent surgery with Dr. Parkinson on 3/28/2024.  He had repair of the ascending aorta and hemiarch, as well as aortic valve resuspension and root repair.  He is currently in sinus rhythm.  He is having postoperative pain, but otherwise is doing well.  His blood pressure has remained elevated on Cardene, although this was evidently weaned off earlier today.    History reviewed. No pertinent past medical history.      Past Surgical History:   Procedure Laterality Date    ASCENDING ARCH/HEMIARCH REPLACEMENT N/A 3/27/2024    Procedure: WILLIAM, STERNOTOMY, REPAIR OF A  TYPE A AORTIC DISSECTION, HEMIARCH REPAIR, DEEP HYPOTHERMIC CIRCULATORY ARREST, ANTEGRADE SELECTIVE CEREBRAL PERFUSION, AORTIC VALVE RESUSPENTION/ROOT REPAIR, PRP;  Surgeon: Sukhdeep Parkinson MD;  Location: [facility];  Service: Cardiothoracic;  Laterality: N/A;         No Known Allergies      No current facility-administered medications on file prior to encounter.      Current Outpatient Medications on File Prior to Encounter   Medication Sig Dispense Refill    amoxicillin (AMOXIL) 875 MG tablet Take 1 tablet by mouth 2 (Two) Times a Day. 14 tablet 0    methocarbamol (ROBAXIN) 500 MG tablet Take 1 tablet by mouth 3 (Three) Times a Day. 15 tablet 0         Social History     Socioeconomic History    Marital status: Single         History reviewed. No pertinent family history.    REVIEW OF SYSTEMS:   Pertinent positives are noted in the HPI above.  Otherwise, all other systems were reviewed, and are negative.     Objective:     Vitals:    03/29/24 1200 03/29/24 1300 03/29/24 1400 03/29/24 1430   BP: 135/75 142/78 122/73 133/75   BP Location: Right arm      Patient Position: Sitting      Pulse: 67 74 70 70   Resp: 18      Temp: 99.5 °F (37.5 °C)      TempSrc: Bladder      SpO2: 96% 94% 91% 90%   Weight:         Body mass index is 40.66 kg/m².  Flowsheet Rows      Flowsheet Row First Filed Value   Admission Height --   Admission Weight 151 kg (334 lb) Documented at 03/29/2024 0600             General:    No acute distress, alert and oriented x4, pleasant                   Head:    Normocephalic, atraumatic.   Eyes:          Conjunctivae and sclerae normal, no icterus.   Throat:   No oral lesions, no thrush, oral mucosa moist.    Neck:   Supple, trachea midline.   Lungs:     Clear to auscultation bilaterally     Heart:    Regular rhythm and normal rate.  II/VI SM LUSB.   Abdomen:     Soft, non-tender, non-distended, positive bowel sounds.    Extremities:   No clubbing, cyanosis, or edema.     Pulses:   Pulses palpable and equal bilaterally.    Skin:   No bleeding or rash.   Neuro:   Non-focal.  Moves all extremities well.    Psychiatric:   Normal mood and affect.         Lab Review:                Results from last 7 days   Lab Units 03/29/24  0147   SODIUM mmol/L 149*   POTASSIUM mmol/L 4.5   CHLORIDE mmol/L 113*   CO2 mmol/L 24.0   BUN mg/dL 13   CREATININE mg/dL 1.39*   GLUCOSE  mg/dL 129*   CALCIUM mg/dL 8.0*     Results from last 7 days   Lab Units 03/27/24  1901   HSTROP T ng/L 12     Results from last 7 days   Lab Units 03/29/24  0147   WBC 10*3/mm3 21.71*   HEMOGLOBIN g/dL 10.8*   HEMATOCRIT % 32.9*   PLATELETS 10*3/mm3 211     Results from last 7 days   Lab Units 03/29/24  0147 03/28/24  1323 03/28/24  0712 03/28/24  0541   INR  1.28* 1.41* 1.41* 1.52*   APTT seconds  --  30.1 28.2 28.5         Results from last 7 days   Lab Units 03/29/24  0147   MAGNESIUM mg/dL 2.4           EKG (reviewed by me personally): Normal sinus rhythm, early repolarization.      Assessment:   1.  Type A aortic dissection extending from the ascending aorta throughout the arch and into the descending thoracic aorta into the aortic bifurcation  2.  Aneurysmal dilatation of the distal infrarenal abdominal aorta up to 4.5 cm and moderate aneurysmal dilatation of the left common iliac artery at 2.3 cm.  3.  Status post emergent ascending aorta and hemiarch repair, as well as aortic root repair and aortic valve resuspension by Dr. Parkinson on 3/28/2024  4.  Hypertension  5.  Leukocytosis, likely reactive  6.  Expected postoperative anemia    Plan:       Again, he had a fairly significant dissection as noted above.  He also had aneurysmal dilatation of the distal infrarenal abdominal aorta and left common iliac artery.  This suggests that he may have a connective tissue disorder, and he may benefit from genetic testing as an outpatient.    However, in the meantime, he is going to need good blood pressure control.  The Cardene was weaned off by the time I saw him this afternoon.  However, he may have to have this again periodically if his blood pressure spikes.  I increased his carvedilol to 12.5 mg twice daily.  We will see if his heart rate can tolerate this.  Obviously, I would like to push his beta-blocker as high as possible.  Continue amlodipine 5 mg/day for now.  He does have as needed hydralazine ordered,  although I am going to decrease this to 10 mg from 20 in order to avoid excessive tachycardia.  I will also write for as needed labetalol.    As an outpatient, I would recommend an echocardiogram to reevaluate the aortic valve after resuspension.    Cardiology will continue to follow.    Thank you very much for this consult.    Alejandro Ruiz MD

## 2024-03-29 NOTE — PROGRESS NOTES
LOS: 1 day   Patient Care Team:  Provider, No Known as PCP - General    Chief Complaint: post op    Subjective:  Symptoms:  He reports chest pain.  No shortness of breath or cough.    Diet:  Poor intake.  No nausea or vomiting.    Activity level: Impaired due to pain.    Pain:  He complains of pain that is moderate.  Pain is partially controlled.      Vital Signs  Temp:  [95 °F (35 °C)-100.4 °F (38 °C)] 99.5 °F (37.5 °C)  Heart Rate:  [51-87] 77  Resp:  [13-20] 18  BP: (110-158)/(49-73) 128/73  FiO2 (%):  [70 %] 70 %  Body mass index is 40.66 kg/m².    Intake/Output Summary (Last 24 hours) at 3/29/2024 0721  Last data filed at 3/29/2024 0645  Gross per 24 hour   Intake 3211.3 ml   Output 3870 ml   Net -658.7 ml     No intake/output data recorded.    Chest tube drainage last 8 hours: 60/60        03/29/24  0600   Weight: (!) 151 kg (334 lb)       Objective:  General Appearance:  Comfortable and in no acute distress.    Vital signs: (most recent): Blood pressure 128/73, pulse 77, temperature 99.5 °F (37.5 °C), resp. rate 18, weight (!) 151 kg (334 lb), SpO2 97%.  Vital signs are normal.  No fever.    Output: Producing urine and no stool output.    Lungs:  Normal effort and normal respiratory rate.  There are decreased breath sounds.    Heart: Normal rate.  Regular rhythm.    Abdomen: Abdomen is soft.  Bowel sounds are normal.  Hypoactive bowel sounds.     Extremities: There is no dependent edema.    Pulses: Distal pulses are intact.    Neurological: Patient is alert and oriented to person, place and time.    Skin:  Warm and dry.  (Sternal dressing clean,dry, and intact)          Results Review:        WBC WBC   Date Value Ref Range Status   03/29/2024 21.71 (H) 3.40 - 10.80 10*3/mm3 Final   03/28/2024 16.77 (H) 3.40 - 10.80 10*3/mm3 Final   03/28/2024 13.05 (H) 3.40 - 10.80 10*3/mm3 Final   03/28/2024 17.92 (H) 3.40 - 10.80 10*3/mm3 Final   03/28/2024 19.63 (H) 3.40 - 10.80 10*3/mm3 Final   03/28/2024 19.60 (H) 3.40  - 10.80 10*3/mm3 Final   03/27/2024 11.76 (H) 3.40 - 10.80 10*3/mm3 Final      HGB Hemoglobin   Date Value Ref Range Status   03/29/2024 10.8 (L) 13.0 - 17.7 g/dL Final   03/28/2024 12.4 (L) 13.0 - 17.7 g/dL Final   03/28/2024 11.7 (L) 13.0 - 17.7 g/dL Final   03/28/2024 10.3 (L) 13.0 - 17.7 g/dL Final   03/28/2024 8.6 (L) 13.0 - 17.7 g/dL Final   03/28/2024 8.4 (L) 13.0 - 17.7 g/dL Final   03/28/2024 10.5 (L) 12.0 - 17.0 g/dL Final   03/28/2024 11.9 (L) 12.0 - 17.0 g/dL Final   03/28/2024 11.2 (L) 12.0 - 17.0 g/dL Final   03/28/2024 10.5 (L) 12.0 - 17.0 g/dL Final   03/28/2024 10.2 (L) 12.0 - 17.0 g/dL Final   03/28/2024 10.5 (L) 12.0 - 17.0 g/dL Final   03/28/2024 12.9 12.0 - 17.0 g/dL Final   03/27/2024 12.6 (L) 13.0 - 17.7 g/dL Final      HCT Hematocrit   Date Value Ref Range Status   03/29/2024 32.9 (L) 37.5 - 51.0 % Final   03/28/2024 36.0 (L) 37.5 - 51.0 % Final   03/28/2024 35.4 (L) 37.5 - 51.0 % Final   03/28/2024 31.2 (L) 37.5 - 51.0 % Final   03/28/2024 26.0 (L) 37.5 - 51.0 % Final   03/28/2024 25.5 (L) 37.5 - 51.0 % Final   03/28/2024 31 (L) 38 - 51 % Final   03/28/2024 35 (L) 38 - 51 % Final   03/28/2024 33 (L) 38 - 51 % Final   03/28/2024 31 (L) 38 - 51 % Final   03/28/2024 30 (L) 38 - 51 % Final   03/28/2024 31 (L) 38 - 51 % Final   03/28/2024 38 38 - 51 % Final   03/27/2024 37.4 (L) 37.5 - 51.0 % Final      Platelets Platelets   Date Value Ref Range Status   03/29/2024 211 140 - 450 10*3/mm3 Final   03/28/2024 181 140 - 450 10*3/mm3 Final   03/28/2024 160 140 - 450 10*3/mm3 Final   03/28/2024 156 140 - 450 10*3/mm3 Final   03/28/2024 135 (L) 140 - 450 10*3/mm3 Final   03/28/2024 141 140 - 450 10*3/mm3 Final   03/27/2024 209 140 - 450 10*3/mm3 Final        PT/INR:    Protime   Date Value Ref Range Status   03/29/2024 16.2 (H) 11.7 - 14.2 Seconds Final   03/28/2024 17.5 (H) 11.7 - 14.2 Seconds Final   03/28/2024 17.5 (H) 11.7 - 14.2 Seconds Final   03/28/2024 18.5 (H) 11.7 - 14.2 Seconds Final    03/28/2024 18.3 (H) 12.8 - 15.2 seconds Final     Comment:     Serial Number: [id number]:  [id number]   03/27/2024 11.5 9.6 - 11.7 Seconds Final   /  INR   Date Value Ref Range Status   03/29/2024 1.28 (H) 0.90 - 1.10 Final   03/28/2024 1.41 (H) 0.90 - 1.10 Final   03/28/2024 1.41 (H) 0.90 - 1.10 Final   03/28/2024 1.52 (H) 0.90 - 1.10 Final   03/28/2024 1.6 (H) 0.8 - 1.2 Final   03/27/2024 1.06 0.93 - 1.10 Final       Sodium Sodium   Date Value Ref Range Status   03/29/2024 149 (H) 136 - 145 mmol/L Final   03/28/2024 138 136 - 145 mmol/L Final   03/28/2024 139 136 - 145 mmol/L Final   03/28/2024 143 136 - 145 mmol/L Final   03/28/2024 138 136 - 145 mmol/L Final   03/27/2024 136 136 - 145 mmol/L Final      Potassium Potassium   Date Value Ref Range Status   03/29/2024 4.5 3.5 - 5.2 mmol/L Final   03/28/2024 4.3 3.5 - 5.2 mmol/L Final   03/28/2024 4.5 3.5 - 5.2 mmol/L Final   03/28/2024 4.6 3.5 - 5.2 mmol/L Final   03/28/2024 4.6 3.5 - 5.2 mmol/L Final   03/27/2024 3.8 3.5 - 5.2 mmol/L Final      Chloride Chloride   Date Value Ref Range Status   03/29/2024 113 (H) 98 - 107 mmol/L Final   03/28/2024 105 98 - 107 mmol/L Final   03/28/2024 106 98 - 107 mmol/L Final   03/28/2024 106 98 - 107 mmol/L Final   03/28/2024 102 98 - 107 mmol/L Final   03/27/2024 99 98 - 107 mmol/L Final      Bicarbonate CO2   Date Value Ref Range Status   03/29/2024 24.0 22.0 - 29.0 mmol/L Final   03/28/2024 23.6 22.0 - 29.0 mmol/L Final   03/28/2024 23.0 22.0 - 29.0 mmol/L Final   03/28/2024 21.4 (L) 22.0 - 29.0 mmol/L Final   03/28/2024 27.5 22.0 - 29.0 mmol/L Final   03/27/2024 27.0 22.0 - 29.0 mmol/L Final      BUN BUN   Date Value Ref Range Status   03/29/2024 13 6 - 20 mg/dL Final   03/28/2024 5 (L) 6 - 20 mg/dL Final   03/28/2024 12 6 - 20 mg/dL Final   03/28/2024 12 6 - 20 mg/dL Final   03/28/2024 9 6 - 20 mg/dL Final   03/27/2024 5 (L) 6 - 20 mg/dL Final      Creatinine Creatinine   Date Value Ref Range Status   03/29/2024 1.39 (H)  0.76 - 1.27 mg/dL Final   03/28/2024 1.54 (H) 0.76 - 1.27 mg/dL Final   03/28/2024 1.64 (H) 0.76 - 1.27 mg/dL Final   03/28/2024 1.57 (H) 0.76 - 1.27 mg/dL Final   03/28/2024 1.52 (H) 0.76 - 1.27 mg/dL Final   03/27/2024 1.15 0.76 - 1.27 mg/dL Final      Calcium Calcium   Date Value Ref Range Status   03/29/2024 8.0 (L) 8.6 - 10.5 mg/dL Final   03/28/2024 8.3 (L) 8.6 - 10.5 mg/dL Final   03/28/2024 8.3 (L) 8.6 - 10.5 mg/dL Final   03/28/2024 8.8 8.6 - 10.5 mg/dL Final   03/28/2024 8.1 (L) 8.6 - 10.5 mg/dL Final   03/27/2024 8.8 8.6 - 10.5 mg/dL Final      Magnesium Magnesium   Date Value Ref Range Status   03/29/2024 2.4 1.6 - 2.6 mg/dL Final   03/28/2024 2.5 1.6 - 2.6 mg/dL Final   03/28/2024 2.9 (H) 1.6 - 2.6 mg/dL Final          aspirin, 81 mg, Oral, Daily  atorvastatin, 40 mg, Oral, Nightly  carvedilol, 6.25 mg, Oral, Q12H  ceFAZolin, 3,000 mg, Intravenous, Q8H  chlorhexidine, 15 mL, Mouth/Throat, Q12H  enoxaparin, 40 mg, Subcutaneous, Q24H  gabapentin, 300 mg, Oral, Q12H  Ketotifen Fumarate, 1 drop, Left Eye, BID  magnesium sulfate, 2 g, Intravenous, Q8H  metoclopramide, 10 mg, Intravenous, Q6H  mupirocin, , Each Nare, BID  pantoprazole, 40 mg, Oral, QAM  senna-docusate sodium, 2 tablet, Oral, Nightly      clevidipine, 2-32 mg/hr  dexmedetomidine, 0.2-1.5 mcg/kg/hr, Last Rate: Stopped (03/28/24 1320)  DOPamine, 2-20 mcg/kg/min  EPINEPHrine, 0.02-0.2 mcg/kg/min  insulin, 0-100 Units/hr, Last Rate: 0.3 Units/hr (03/28/24 2346)  milrinone, 0.25-0.375 mcg/kg/min  niCARdipine, 5-15 mg/hr, Last Rate: 12 mg/hr (03/29/24 0652)  nitroglycerin, 5-200 mcg/min  norepinephrine, 0.02-0.3 mcg/kg/min  phenylephrine, 0.2-2 mcg/kg/min  propofol, 5-50 mcg/kg/min, Last Rate: Stopped (03/28/24 0900)  sodium chloride, 30 mL/hr, Last Rate: 30 mL/hr (03/28/24 1041)        Assessment & Plan  - Type A aortic dissection s/p dissection repair POD#1 Pagni  - hypertension  - leukocytosis--reactive/intaoperative steroid administration  -  post op anemia--expected acute blood loss    Looks good this morning, sitting up in the chair. Pain is an issue, will add gabapentin  Complains of eye irritation, eye drops added  Self extubated yesterday. Oxygen requirements improving, now on 6L [place]. Continue to wean as able  BP remains elevated. Continued on Cardene. Add Coreg and PRN hydralazine and attempt to wean gtt as able. A.line is dampened, okay to d/c   , 3L of UOP overnight. Creatinine trending down. Hold diuretics at this time  Mobilize/encourage pulmonary toilet--add mucinex/flutter  Evaluate possible transfer to stepdown later today  Continue routine care    Delmy Thapa, MAYRA  03/29/24  07:21 EDT

## 2024-03-29 NOTE — THERAPY EVALUATION
Patient Name: Tj Christopher  : 1985    MRN: [id number]                              Today's Date: 3/29/2024       Admit Date: 3/28/2024    Visit Dx:     ICD-10-CM ICD-9-CM   1. CHRISTIANO (obstructive sleep apnea)  G47.33 327.23   2. Aneurysm  I72.9 442.9   3. Aneurysm of aorta  I71.9 441.9     Patient Active Problem List   Diagnosis    Aortic dissection     History reviewed. No pertinent past medical history.  Past Surgical History:   Procedure Laterality Date    ASCENDING ARCH/HEMIARCH REPLACEMENT N/A 3/27/2024    Procedure: WILLIAM, STERNOTOMY, REPAIR OF A  TYPE A AORTIC DISSECTION, HEMIARCH REPAIR, DEEP HYPOTHERMIC CIRCULATORY ARREST, ANTEGRADE SELECTIVE CEREBRAL PERFUSION, AORTIC VALVE RESUSPENTION/ROOT REPAIR, PRP;  Surgeon: Sukhdeep Parkinson MD;  Location: [facility];  Service: Cardiothoracic;  Laterality: N/A;      General Information       Row Name 24 0858          Physical Therapy Time and Intention    Document Type evaluation (P)   -     Mode of Treatment individual therapy;physical therapy (P)   -       Row Name 24 0858          General Information    Patient Profile Reviewed yes (P)   -     Prior Level of Function independent:;ADL's;all household mobility;gait (P)   -     Existing Precautions/Restrictions fall;cardiac;oxygen therapy device and L/min (P)   -     Barriers to Rehab medically complex (P)   -       Row Name 24 0858          Living Environment    People in Home other (see comments) (P)   aunt  -       Row Name 24 0858          Home Main Entrance    Number of Stairs, Main Entrance two (P)   -     Stair Railings, Main Entrance none (P)   -       Row Name 24 0858          Stairs Within Home, Primary    Number of Stairs, Within Home, Primary six (P)   -     Stairs Comment, Within Home, Primary Pt states that he lives in a two-story house with 2 ANGELIQUE. Pt states that there are 6 steps within house going from first level down to basement. Pt states that  his bedroom is down in basement. (P)   -       Row Name 03/29/24 0858          Cognition    Orientation Status (Cognition) oriented x 4 (P)   -       Row Name 03/29/24 0858          Safety Issues, Functional Mobility    Safety Issues Affecting Function (Mobility) safety precaution awareness;safety precautions follow-through/compliance;sequencing abilities;problem-solving (P)   -     Impairments Affecting Function (Mobility) balance;endurance/activity tolerance;postural/trunk control;strength (P)   -     Comment, Safety Issues/Impairments (Mobility) Nonskid socks donned. TS needed for line management of CT, pacer wires, and lines present this date. (P)   -               User Key  (r) = Recorded By, (t) = Taken By, (c) = Cosigned By      Initials Name Provider Type    Agustin Malik, PT Student PT Student                   Mobility       Row Name 03/29/24 0918          Bed Mobility    Comment, (Bed Mobility) NT. Pt UIC. (P)   -       Row Name 03/29/24 0918          Bed-Chair Transfer    Bed-Chair Gamaliel (Transfers) not tested (P)   -     Comment, (Bed-Chair Transfer) NT this date. Pt UIC. (P)   -       Row Name 03/29/24 0918          Sit-Stand Transfer    Sit-Stand Gamaliel (Transfers) verbal cues;nonverbal cues (demo/gesture);contact guard;2 person assist (P)   -     Assistive Device (Sit-Stand Transfers) other (see comments) (P)   HHA x 2  -     Comment, (Sit-Stand Transfer) No LOB or veering noted. (P)   -       Row Name 03/29/24 0918          Gait/Stairs (Locomotion)    Gamaliel Level (Gait) verbal cues;nonverbal cues (demo/gesture);contact guard;2 person assist (P)   -     Assistive Device (Gait) other (see comments) (P)   HHA  -     Patient was able to Ambulate yes (P)   -     Distance in Feet (Gait) 40 (P)   -     Deviations/Abnormal Patterns (Gait) base of support, narrow;stride length decreased;maryanne decreased (P)   -     Gamaliel Level (Stairs) not  tested (P)   -     Comment, (Gait/Stairs) No LOB or veering noted. However, pt mildly unsteady. Pt's balance improved with HHA. (P)   -               User Key  (r) = Recorded By, (t) = Taken By, (c) = Cosigned By      Initials Name Provider Type     Agustin Huerta, PT Student PT Student                   Obj/Interventions       Row Name 03/29/24 0920          Range of Motion Comprehensive    General Range of Motion no range of motion deficits identified (P)   -     Comment, General Range of Motion B LE WNL (P)   -       Row Name 03/29/24 0920          Strength Comprehensive (MMT)    Comment, General Manual Muscle Testing (MMT) Assessment B LE 4/5 MMT (P)   -       Row Name 03/29/24 0920          Motor Skills    Therapeutic Exercise -- (P)   cardiac protocol x 10, 1 STS  -       Row Name 03/29/24 0920          Balance    Balance Assessment sitting static balance;sit to stand dynamic balance;standing static balance;standing dynamic balance (P)   -     Static Sitting Balance verbal cues;non-verbal cues (demo/gesture);standby assist (P)   -     Position, Sitting Balance unsupported;sitting in chair (P)   -     Sit to Stand Dynamic Balance verbal cues;non-verbal cues (demo/gesture);contact guard;2-person assist (P)   -     Static Standing Balance verbal cues;non-verbal cues (demo/gesture);contact guard;2-person assist (P)   -     Dynamic Standing Balance verbal cues;non-verbal cues (demo/gesture);contact guard;2-person assist (P)   -     Position/Device Used, Standing Balance supported;other (see comments) (P)   HHA x 2  -MH     Balance Interventions sitting;standing;sit to stand;supported;static;dynamic (P)   -     Comment, Balance No LOB or veering noted this date. Pt mildly unsteady with standing balance activities. (P)   -               User Key  (r) = Recorded By, (t) = Taken By, (c) = Cosigned By      Initials Name Provider Type     Agustin Huerta, PT Student PT Student                    Goals/Plan       Row Name 03/29/24 0938          Bed Mobility Goal 1 (PT)    Activity/Assistive Device (Bed Mobility Goal 1, PT) sit to supine (P)   -     Marin Level/Cues Needed (Bed Mobility Goal 1, PT) modified independence (P)   -     Time Frame (Bed Mobility Goal 1, PT) long term goal (LTG);2 weeks (P)   -       Row Name 03/29/24 0938          Transfer Goal 1 (PT)    Activity/Assistive Device (Transfer Goal 1, PT) sit-to-stand/stand-to-sit (P)   -     Marin Level/Cues Needed (Transfer Goal 1, PT) modified independence (P)   -     Time Frame (Transfer Goal 1, PT) long term goal (LTG);2 weeks (P)   -       Row Name 03/29/24 0938          Gait Training Goal 1 (PT)    Activity/Assistive Device (Gait Training Goal 1, PT) gait (walking locomotion) (P)   -     Marin Level (Gait Training Goal 1, PT) standby assist;modified independence (P)   -     Distance (Gait Training Goal 1, PT) 250 ft (P)   -     Time Frame (Gait Training Goal 1, PT) 2 weeks;long term goal (LTG) (P)   -               User Key  (r) = Recorded By, (t) = Taken By, (c) = Cosigned By      Initials Name Provider Type     Agustin Huerta, PT Student PT Student                   Clinical Impression       Row Name 03/29/24 0921          Pain    Pretreatment Pain Rating 5/10 (P)   -     Posttreatment Pain Rating 5/10 (P)   -     Pain Location - chest (P)   -     Pre/Posttreatment Pain Comment Pt states that he is in a 5/10 pain around chest area. (P)   -     Pain Intervention(s) Repositioned;Nursing Notified;Rest (P)   -       Row Name 03/29/24 0921          Plan of Care Review    Plan of Care Reviewed With patient (P)   -     Progress improving (P)   -     Outcome Evaluation Pt agreeable to PT session this date. Pt was transported from ER at [facility] d/t vomiting and epigastric pain with radiation to his back. Follow-up reveals: Type A aortic dissection extending down to the aortic  bifurcation. Pt is now s/p dissection repair POD#1. Pt was independent at baseline with I/ADLs and did not use any DME prior to admission. Pt lives with his aunt in a two-story house with 2 ANGELIQUE. Pt states that there are 6 steps within the house. TS suggested to manage CTs, pacer wires, and lines attached this date. Pt sat statically on EOB with SBA while performing cardiac protocol exercises. Pt then performed STS transfer with CGA x 2 and HHA. Pt was able to ambulate around 40 ft this date with CGA x 2 and HHA, required mod VCs with mod VCS when navigating turns. Pt had no LOB or veering, however was moderately unsteady. Pt's balance improved with HHA. PT will continue to follow along as pt progresses. Pt was left [facility] with call light in reach and nsg present upon end of session. Vitals stayed WNL during the entirety of session. (P)   -       Row Name 03/29/24 0921          Therapy Assessment/Plan (PT)    Rehab Potential (PT) good, to achieve stated therapy goals (P)   -     Criteria for Skilled Interventions Met (PT) yes (P)   -     Therapy Frequency (PT) daily (P)   -       Row Name 03/29/24 0921          Vital Signs    Pre Systolic BP Rehab 139 (P)   -MH     Pre Treatment Diastolic BP 76 (P)   -     Pretreatment Heart Rate (beats/min) 76 (P)   -MH     Pre SpO2 (%) 95 (P)   -     O2 Delivery Pre Treatment hi-flow (P)   6 L  -MH     O2 Delivery Intra Treatment hi-flow (P)   6 L  -MH     O2 Delivery Post Treatment hi-flow (P)   6 L  -MH     Pre Patient Position Sitting (P)   -MH     Intra Patient Position Standing (P)   -MH     Post Patient Position Sitting (P)   -     Rest Breaks  1 (P)   -       Row Name 03/29/24 0921          Positioning and Restraints    Pre-Treatment Position sitting in chair/recliner (P)   -MH     Post Treatment Position chair (P)   -MH     In Chair notified nsg;call light within reach;sitting;encouraged to call for assist;with nsg;exit alarm on;LUE elevated;RUE elevated (P)    -               User Key  (r) = Recorded By, (t) = Taken By, (c) = Cosigned By      Initials Name Provider Type     Agustin Huerta, PT Student PT Student                   Outcome Measures       Row Name 03/29/24 0938          How much help from another person do you currently need...    Turning from your back to your side while in flat bed without using bedrails? 3 (P)   -MH     Moving from lying on back to sitting on the side of a flat bed without bedrails? 3 (P)   -MH     Moving to and from a bed to a chair (including a wheelchair)? 3 (P)   -MH     Standing up from a chair using your arms (e.g., wheelchair, bedside chair)? 3 (P)   -MH     Climbing 3-5 steps with a railing? 2 (P)   -MH     To walk in hospital room? 3 (P)   -     AM-PAC 6 Clicks Score (PT) 17 (P)   -     Highest Level of Mobility Goal 5 --> Static standing (P)   -       Row Name 03/29/24 0938          Functional Assessment    Outcome Measure Options AM-PAC 6 Clicks Basic Mobility (PT) (P)   -               User Key  (r) = Recorded By, (t) = Taken By, (c) = Cosigned By      Initials Name Provider Type    Agustin Malik, PT Student PT Student                                 Physical Therapy Education       Title: PT OT SLP Therapies (Done)       Topic: Physical Therapy (Done)       Point: Mobility training (Done)       Learning Progress Summary             Patient Acceptance, E,TB, VU by  at 3/29/2024 0939                         Point: Home exercise program (Done)       Learning Progress Summary             Patient Acceptance, E,TB, VU by  at 3/29/2024 0939                         Point: Body mechanics (Done)       Learning Progress Summary             Patient Acceptance, E,TB, VU by  at 3/29/2024 0939                         Point: Precautions (Done)       Learning Progress Summary             Patient Acceptance, E,TB, VU by  at 3/29/2024 0939                                         User Key       Initials Effective  Dates Name Provider Type Discipline     01/24/24 -  Agustin Huerta, PT Student PT Student PT                  PT Recommendation and Plan     Plan of Care Reviewed With: (P) patient  Progress: (P) improving  Outcome Evaluation: (P) Pt agreeable to PT session this date. Pt was transported from ER at [facility] d/t vomiting and epigastric pain with radiation to his back. Follow-up reveals: Type A aortic dissection extending down to the aortic bifurcation. Pt is now s/p dissection repair POD#1. Pt was independent at baseline with I/ADLs and did not use any DME prior to admission. Pt lives with his aunt in a two-story house with 2 ANGELIQUE. Pt states that there are 6 steps within the house. TS suggested to manage CTs, pacer wires, and lines attached this date. Pt sat statically on EOB with SBA while performing cardiac protocol exercises. Pt then performed STS transfer with CGA x 2 and HHA. Pt was able to ambulate around 40 ft this date with CGA x 2 and HHA, required mod VCs with mod VCS when navigating turns. Pt had no LOB or veering, however was moderately unsteady. Pt's balance improved with HHA. PT will continue to follow along as pt progresses. Pt was left [facility] with call light in reach and nsg present upon end of session. Vitals stayed WNL during the entirety of session.     Time Calculation:         PT Charges       Row Name 03/29/24 0939             Time Calculation    Start Time 0809 (P)   -      Stop Time 0827 (P)   -      Time Calculation (min) 18 min (P)   -      PT Received On 03/29/24 (P)   -      PT - Next Appointment 03/30/24 (P)   -      PT Goal Re-Cert Due Date 04/12/24 (P)   -         Time Calculation- PT    Total Timed Code Minutes- PT 18 minute(s) (P)   -                User Key  (r) = Recorded By, (t) = Taken By, (c) = Cosigned By      Initials Name Provider Type     Agustin Huerta, PT Student PT Student                  Therapy Charges for Today       Code Description Service Date  Service Provider Modifiers Qty    [id number]  PT EVAL MOD COMPLEXITY 3 3/29/2024 Agustin Huerta, PT Student GP 1    [id number] HC PT THERAPEUTIC ACT EA 15 MIN 3/29/2024 Agustin Huerta, PT Student GP 1    [id number]  PT THER SUPP EA 15 MIN 3/29/2024 Agustin Huerta, PT Student GP 1            PT G-Codes  Outcome Measure Options: (P) AM-PAC 6 Clicks Basic Mobility (PT)  AM-PAC 6 Clicks Score (PT): (P) 17  PT Discharge Summary  Anticipated Discharge Disposition (PT): (P) home with assist    Agustin Huerta, PT Student  3/29/2024

## 2024-03-29 NOTE — PLAN OF CARE
Goal Outcome Evaluation:  Plan of Care Reviewed With: (P) patient        Progress: (P) improving  Outcome Evaluation: (P) Pt agreeable to PT session this date. Pt was transported from ER at [facility] d/t vomiting and epigastric pain with radiation to his back. Follow-up reveals: Type A aortic dissection extending down to the aortic bifurcation. Pt is now s/p dissection repair POD#1. Pt was independent at baseline with I/ADLs and did not use any DME prior to admission. Pt lives with his aunt in a two-story house with 2 ANGELIQUE. Pt states that there are 6 steps within the house. TS suggested to manage CTs, pacer wires, and lines attached this date. Pt sat statically on EOB with SBA while performing cardiac protocol exercises. Pt then performed STS transfer with CGA x 2 and HHA. Pt was able to ambulate around 40 ft this date with CGA x 2 and HHA, required mod VCs with mod VCS when navigating turns. Pt had no LOB or veering, however was moderately unsteady. Pt's balance improved with HHA. PT will continue to follow along as pt progresses. Pt was left [facility] with call light in reach and nsg present upon end of session. Vitals stayed WNL during the entirety of session.      Anticipated Discharge Disposition (PT): (P) home with assist

## 2024-03-29 NOTE — OP NOTE
Operative Note    Date of Dictation: 03/29/24    Date of Procedure: 03/28/2024    [facility]    Preoperative diagnosis:  1.  Type A acute aortic dissection  2.  New onset of chest pain  3.  Acute aortic syndrome  4.  Hypertensive crisis  5.  Methamphetamine use  6.  Obesity with BMI of 40.6 kg/m2  7.  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  Emergent ascending aortic dissection repair with a 26 mm Gelwave Dacron interposition graft (CPT code 88541)  2.  Proximal aortic arch repair with the same graft in a hemiarch anastomotic configuration and BioGlue reconstruction of the aortic arch layers (CPT code 89889)  3.  Aortic root reconstruction with BioGlue and commissural resuspension (CPT code    )  4.  Right infraclavicular subclavian artery exploration possible cannulation  5.  Left common femoral artery cutdown with attempted cannulation that was aborted due to malperfusion (CPT code     )  6.  Deep hypothermic circulatory arrest and antegrade selective cerebral perfusion  7.  Comprehensive neuro monitoring    Surgeon: Sukhdeep Parkinson MD     Assistants: Assistant: Charmaine No CSA; Estefania Jade PA was responsible for performing the following activities: Retraction, Suction, Irrigation, Suturing, Closing, Placing Dressing, and all aspects of the complex cardiac case  and their skilled assistance was necessary for the success of this case.    Anesthesia: General endotracheal anesthesia and WILLIAM    Findings:  Proximal aortic hematoma into the aortic arch and descending aorta, bovine arch branch configuration, malperfusion of both lower extremities with inability to cannulate the true lumen of the left common femoral artery, attempted exposure of the right subclavian artery difficult due to large muscle mass and location of the artery.  No  proximal aortic tear suggesting either a type B aortic dissection with retrograde extension versus intramural hematoma    Estimated Blood Loss: Approximately 600 cc, most of it recovered with a Cell Saver device and cardiotomy suckers and was retransfused to the patient    STS Data:  The patient was explained the risks and benefits and alternatives of surgery and agreed to proceed.  Antibiotics were given within the STS required window.  The patient came to the room hypertensive with normal neurologic status and Cardene and beta-blockers on board        Description of the procedure:     The patient was placed supine on the operative table. Anesthesia was given and lines placed. The patient was prepped and draped using the usual sterile technique. A median sternotomy was performed with a scalpel and the layers carried down to the sternum using the electrocautery. The sternum was split in the midline using a vertical oscillating saw. Hemostasis was achieved.  There was minimal amount of pericardial effusion and there was hematoma in the proximal thoracic aorta suggesting dissection.  A sternal retractor was placed and anterior mediastinal was exposed the pericardium was opened and the edges were tacked to the wound.  A 4 cm transverse incision was performed over the left common femoral vessels and all the layers carried down to the fascia Noemi using electrocautery.  The common femoral artery was exposed and it was weakly pulsatile.  A 5-0 Kennard-Yuval pursestring was placed in the area believed to be the true lumen. 300 units of IV heparin was given to maintain the ACT over 400.  Cannulation sutures were placed in the right atrium for the venous drainage cannula and the retrograde cardioplegia cannula. Cardiopulmonary bypass was started after placing a cannula in the left common femoral artery.  Unfortunately, the wire was located in the false lumen and after manipulation of the wire we decided that it was not feasible  and no bypass was commenced.  Therefore, I attempted a right axillary cannulation by performing a 5 cm incision below the midportion of the clavicle.  Muscle layers were transected and there were unusually thick and then I realized that the neurovascular bundle was very deep and very difficult.  I change the strategy to direct cannulation in the proximal ascending aorta in an area believed to be the true lumen as assessed by visualization and the CT scan.  I placed a 4-0 Prolene pursestrings on the left lateral sides of the aorta and using a needle and the Seldinger technique I placed a wire into the true lumen under WILLIAM guidance.  A 20 Mongolian cannula was placed in the true lumen of the aorta and then cardiotomy bypass was commenced. systemic cooling was started to a lowest nasopharyngeal temperature of 21.5 °C and bladder temp of 25 °C.  Once the EEG was flat and the decided temperature was achieved, the patient was placed in steep Trendelenburg and circulatory arrest was instituted.  I transected the aorta and gave direct cardioplegia into the right and left coronary arteries and then switched to retrograde.  Doses were given every 15 minutes thereafter.  I transected the aorta at the base of the aortic arch right at the takeoff of the bovine trunk.  There was dissection and half of the circumference of the arch and I placed a thin layer of BioGlue within the layers and place bulldog clamps to have additions between the walls.  After 2 minutes I removed the bulldogs and trim the undersurface of the aortic arch to be able to complete a hemiarch anastomotic configuration with 26 mm dacron graft.  Of note, the aortic cannula was removed when I transected the aorta.  I placed perfusion catheters in the innominate artery and the left carotid with good saturations bilaterally in the brain and flat EEG during the circulatory arrest.  I trimmed the graft to configuration the undersurface of the arch.  I tightened the  Vesseloops around the arch vessels to secure the perfusion catheters.  I completed anastomosis between the tailored distal graft and the arch cuff using a 4-0 Prolene continuous sutures and a thin strip of Kemar felt for buttressing.  I removed the perfusion catheters and then I intussuscepted the rest of the graft and tensed the suture line and tied down.  The graft had a side graft of 8 mm which was connected to the arterial perfusing circuit.  I resumed cardiopulmonary bypass through the sidebranch graft and filled the graft to remove air and debris.  Then I placed a clamp distally and restart cold perfusion for 5 minutes and then progressive systemic rewarming.  I placed a few repair sutures in the graft and in the areas where the aortic arch tissue was so weak.  Once hemostasis was achieved, I focus on the aortic root.  There was dissection below the sinotubular junction and one third of the aortic root at the level of the right coronary into the noncoronary sinus.  I removed clot from the false lumen and a placed a thin layer of BioGlue as well as resuspension of the commissures with a 4-0 Prolene sutures.  There was no AI in the WILLIAM but the sinotubular junction was somewhat effaced.  The reconstruction of the root and resuspension of the commissures allow preservation of the valve.  I measured the graft to the root cuff and then completed the proximal anastomosis using a 4-0 Prolene continuous suture and a thin layer of Kemar felt for buttressing.  I tensed the suture line and tied down.  I placed an antegrade cardioplegia cannula and vent in the midportion of the aortic graft, gave the 1 dose of cardioplegia in an antegrade and retrograde fashion and then removed the graft clamp with steep suction on the graft vent.  More time was given for rewarming and then I placed more sutures in leaking areas.    The left pleural space was suctioned and the lungs ventilated. The heart was paced till regular atrial  rhythm resumed. I allowed the heart to eject  and I de-aired the left heart chambers under WILLIAM guidance and once hemodynamics were acceptable, then the CPB was discontinued and the venous and cardioplegia cannulas removed. The matching dose of protamine was given and the aortic cannula removed as well.  Blood products were given in the form of platelets, FFP and cryoprecipitate to reverse the hypothermia related coagulopathy.  AV temporary wires and 3 mediastinal chest tubes were placed and the wound sprayed with platelet rich plasma.  The perioperative WILLIAM showed the aortic valve without prolapse and good anatomy of the aortic root and no AI.  It was good contractility of LV and RV. The sternum was closed with single and double wires and soft tissue in layers of reabsorbable material. The wounds were covered with sterile dressings.  At the end of the case, pedal pulses were faint but palpable which was a big difference from the barely palpable femoral pulses.    Specimen removed: Proximal thoracic aorta tissue    CPB time: 171 minutes    Aortic clamp time: 77 minutes    Lowest body temperature: 21.5 °C nasopharyngeal    Circulatory arrest: 41 minutes with 18 minutes of ischemic time       Complications:  none           Disposition: Cardiovascular recovery room           Condition: Critical but stable.    Sukhdeep Parkinson M.D.

## 2024-03-29 NOTE — PROGRESS NOTES
[facility]         Dr Reinier Rodriguez   LOS: 1 day   Patient Care Team:  Provider, No Known as PCP - General    Chief Complaint: Postop respiratory failure status post aortic dissection with repair    Interval History: Sitting up in a chair on 2 L oxygen nasal cannula.  Overall feels better.    REVIEW OF SYSTEMS:   Postop chest pain    Ventilator/Non-Invasive Ventilation Settings (From admission, onward)      None              Vital Signs  Temp:  [95 °F (35 °C)-100.4 °F (38 °C)] 99.3 °F (37.4 °C)  Heart Rate:  [51-87] 70  Resp:  [13-20] 18  BP: (110-158)/(55-76) 126/71    Intake/Output Summary (Last 24 hours) at 3/29/2024 1018  Last data filed at 3/29/2024 0900  Gross per 24 hour   Intake 3941.3 ml   Output 4045 ml   Net -103.7 ml     Flowsheet Rows      Flowsheet Row First Filed Value   Admission Height --   Admission Weight 151 kg (334 lb) Documented at 03/29/2024 0600            Physical Exam:  Patient is examined using the personal protective equipment as per guidelines from infection control for this particular patient as enacted.  Hand hygiene was performed before and after patient interaction.   General Appearance:    Alert, cooperative, in no acute distress.  Following simple commands  ENT Mallampati between 3 and 4 no nasal congestion  Neck midline trachea, no thyromegaly   Lungs:      diminished breath sounds bilaterally.  Multiple chest tubes in place    Heart:    Regular rhythm and normal rate, normal S1 and S2, no            murmur, no gallop, no rub, no click   Chest Wall:    No abnormalities observed   Abdomen:     Normal bowel sounds, no masses, no organomegaly, soft        nontender, nondistended, no guarding, no rebound                tenderness   Extremities:   Moves all extremities well, no edema, no cyanosis, no             redness  CNS no focal neurological deficits normal sensory exam  Skin no rashes no nodules  Musculoskeletal no cyanosis no clubbing normal range of motion      Results Review:        Results from last 7 days   Lab Units 03/29/24  0147 03/28/24  1423 03/28/24  1132   SODIUM mmol/L 149* 138 143  139   POTASSIUM mmol/L 4.5 4.3 4.6  4.5   CHLORIDE mmol/L 113* 105 106  106   CO2 mmol/L 24.0 23.6 21.4*  23.0   BUN mg/dL 13 5* 12  12   CREATININE mg/dL 1.39* 1.54* 1.57*  1.64*   GLUCOSE mg/dL 129* 147* 145*  144*   CALCIUM mg/dL 8.0* 8.3* 8.8  8.3*     Results from last 7 days   Lab Units 03/27/24  1901   HSTROP T ng/L 12     Results from last 7 days   Lab Units 03/29/24  0147 03/28/24  1323 03/28/24  1132   WBC 10*3/mm3 21.71* 16.77* 13.05*   HEMOGLOBIN g/dL 10.8* 12.4* 11.7*   HEMATOCRIT % 32.9* 36.0* 35.4*   PLATELETS 10*3/mm3 211 181 160     Results from last 7 days   Lab Units 03/29/24  0147 03/28/24  1323 03/28/24  0712 03/28/24  0541   INR  1.28* 1.41* 1.41* 1.52*   APTT seconds  --  30.1 28.2 28.5         Results from last 7 days   Lab Units 03/29/24  0147   MAGNESIUM mg/dL 2.4         Results from last 7 days   Lab Units 03/28/24  1416   PH, ARTERIAL pH units 7.361   PO2 ART mm Hg 73.9*   PCO2, ARTERIAL mm Hg 42.6   HCO3 ART mmol/L 24.1       I reviewed the patient's new clinical results.  I personally viewed and interpreted the patient's chest x-ray.        Medication Review:   aspirin, 81 mg, Oral, Daily  atorvastatin, 40 mg, Oral, Nightly  carvedilol, 6.25 mg, Oral, Q12H  ceFAZolin, 3,000 mg, Intravenous, Q8H  chlorhexidine, 15 mL, Mouth/Throat, Q12H  enoxaparin, 40 mg, Subcutaneous, Q24H  gabapentin, 300 mg, Oral, Q12H  guaiFENesin, 1,200 mg, Oral, Q12H  insulin lispro, 2-9 Units, Subcutaneous, 4x Daily AC & at Bedtime  mupirocin, , Each Nare, BID  pantoprazole, 40 mg, Oral, QAM  senna-docusate sodium, 2 tablet, Oral, Nightly  trimethoprim-polymyxin b, 1 drop, Left Eye, Q6H        clevidipine, 2-32 mg/hr  DOPamine, 2-20 mcg/kg/min  EPINEPHrine, 0.02-0.2 mcg/kg/min  milrinone, 0.25-0.375 mcg/kg/min  niCARdipine, 5-15 mg/hr, Last Rate: Stopped (03/29/24  0930)  nitroglycerin, 5-200 mcg/min  norepinephrine, 0.02-0.3 mcg/kg/min  phenylephrine, 0.2-2 mcg/kg/min  propofol, 5-50 mcg/kg/min, Last Rate: Stopped (03/28/24 0900)  sodium chloride, 30 mL/hr, Last Rate: 30 mL/hr (03/28/24 1041)        ASSESSMENT:   Aortic dissection  Status post type a dissection repair postoperative day 0  Postop respiratory failure  Acute renal insufficiency  High output chest tube drainage  Postop anemia    PLAN:  Respiratory status stable and improving on nasal cannula oxygen continue to wean as tolerated  Hopefully should be able to come off oxygen wean as tolerated  Mobilize ambulate  Outpatient sleep study  Nothing further to add we will sign off      Reinier Rodriguez MD  03/29/24  10:18 EDT

## 2024-03-30 ENCOUNTER — APPOINTMENT (OUTPATIENT)
Dept: GENERAL RADIOLOGY | Facility: HOSPITAL | Age: 39
DRG: 219 | End: 2024-03-30
Payer: MEDICAID

## 2024-03-30 LAB
ANION GAP SERPL CALCULATED.3IONS-SCNC: 7.5 MMOL/L (ref 5–15)
BUN SERPL-MCNC: 15 MG/DL (ref 6–20)
BUN/CREAT SERPL: 14.2 (ref 7–25)
CALCIUM SPEC-SCNC: 8.3 MG/DL (ref 8.6–10.5)
CHLORIDE SERPL-SCNC: 105 MMOL/L (ref 98–107)
CO2 SERPL-SCNC: 24.5 MMOL/L (ref 22–29)
CREAT SERPL-MCNC: 1.06 MG/DL (ref 0.76–1.27)
DEPRECATED RDW RBC AUTO: 42.9 FL (ref 37–54)
EGFRCR SERPLBLD CKD-EPI 2021: 92.7 ML/MIN/1.73
ERYTHROCYTE [DISTWIDTH] IN BLOOD BY AUTOMATED COUNT: 13.9 % (ref 12.3–15.4)
GLUCOSE BLDC GLUCOMTR-MCNC: 100 MG/DL (ref 70–130)
GLUCOSE BLDC GLUCOMTR-MCNC: 101 MG/DL (ref 70–130)
GLUCOSE BLDC GLUCOMTR-MCNC: 121 MG/DL (ref 70–130)
GLUCOSE BLDC GLUCOMTR-MCNC: 124 MG/DL (ref 70–130)
GLUCOSE SERPL-MCNC: 101 MG/DL (ref 65–99)
HCT VFR BLD AUTO: 31.8 % (ref 37.5–51)
HGB BLD-MCNC: 10.7 G/DL (ref 13–17.7)
MCH RBC QN AUTO: 28.5 PG (ref 26.6–33)
MCHC RBC AUTO-ENTMCNC: 33.6 G/DL (ref 31.5–35.7)
MCV RBC AUTO: 84.6 FL (ref 79–97)
PLATELET # BLD AUTO: 236 10*3/MM3 (ref 140–450)
PMV BLD AUTO: 10.2 FL (ref 6–12)
POTASSIUM SERPL-SCNC: 4.2 MMOL/L (ref 3.5–5.2)
QT INTERVAL: 410 MS
QTC INTERVAL: 440 MS
RBC # BLD AUTO: 3.76 10*6/MM3 (ref 4.14–5.8)
SODIUM SERPL-SCNC: 137 MMOL/L (ref 136–145)
WBC NRBC COR # BLD AUTO: 20.74 10*3/MM3 (ref 3.4–10.8)

## 2024-03-30 PROCEDURE — 97530 THERAPEUTIC ACTIVITIES: CPT

## 2024-03-30 PROCEDURE — 71045 X-RAY EXAM CHEST 1 VIEW: CPT

## 2024-03-30 PROCEDURE — 93005 ELECTROCARDIOGRAM TRACING: CPT | Performed by: NURSE PRACTITIONER

## 2024-03-30 PROCEDURE — 25010000002 HYDRALAZINE PER 20 MG: Performed by: INTERNAL MEDICINE

## 2024-03-30 PROCEDURE — 99232 SBSQ HOSP IP/OBS MODERATE 35: CPT | Performed by: INTERNAL MEDICINE

## 2024-03-30 PROCEDURE — 25010000002 ENOXAPARIN PER 10 MG: Performed by: NURSE PRACTITIONER

## 2024-03-30 PROCEDURE — 99024 POSTOP FOLLOW-UP VISIT: CPT | Performed by: PHYSICIAN ASSISTANT

## 2024-03-30 PROCEDURE — 85027 COMPLETE CBC AUTOMATED: CPT | Performed by: NURSE PRACTITIONER

## 2024-03-30 PROCEDURE — 93010 ELECTROCARDIOGRAM REPORT: CPT | Performed by: INTERNAL MEDICINE

## 2024-03-30 PROCEDURE — 80048 BASIC METABOLIC PNL TOTAL CA: CPT | Performed by: NURSE PRACTITIONER

## 2024-03-30 PROCEDURE — 82948 REAGENT STRIP/BLOOD GLUCOSE: CPT

## 2024-03-30 RX ORDER — POTASSIUM CHLORIDE 750 MG/1
20 TABLET, FILM COATED, EXTENDED RELEASE ORAL DAILY
Status: DISCONTINUED | OUTPATIENT
Start: 2024-03-30 | End: 2024-04-03 | Stop reason: HOSPADM

## 2024-03-30 RX ORDER — POVIDONE-IODINE 10 MG/ML
1 SOLUTION TOPICAL 2 TIMES DAILY
Status: DISCONTINUED | OUTPATIENT
Start: 2024-03-31 | End: 2024-04-03 | Stop reason: HOSPADM

## 2024-03-30 RX ORDER — CARVEDILOL 25 MG/1
25 TABLET ORAL EVERY 12 HOURS
Status: DISCONTINUED | OUTPATIENT
Start: 2024-03-30 | End: 2024-03-31

## 2024-03-30 RX ORDER — FUROSEMIDE 40 MG/1
40 TABLET ORAL DAILY
Status: DISCONTINUED | OUTPATIENT
Start: 2024-03-30 | End: 2024-04-03 | Stop reason: HOSPADM

## 2024-03-30 RX ADMIN — MUPIROCIN 1 APPLICATION: 20 OINTMENT TOPICAL at 20:21

## 2024-03-30 RX ADMIN — DOCUSATE SODIUM 50MG AND SENNOSIDES 8.6MG 2 TABLET: 8.6; 5 TABLET, FILM COATED ORAL at 20:21

## 2024-03-30 RX ADMIN — POLYMYXIN B SULFATE AND TRIMETHOPRIM 1 DROP: 10000; 1 SOLUTION OPHTHALMIC at 06:23

## 2024-03-30 RX ADMIN — HYDROCODONE BITARTRATE AND ACETAMINOPHEN 2 TABLET: 5; 325 TABLET ORAL at 11:28

## 2024-03-30 RX ADMIN — PANTOPRAZOLE SODIUM 40 MG: 40 TABLET, DELAYED RELEASE ORAL at 06:23

## 2024-03-30 RX ADMIN — CARVEDILOL 12.5 MG: 12.5 TABLET, FILM COATED ORAL at 09:03

## 2024-03-30 RX ADMIN — GUAIFENESIN 1200 MG: 600 TABLET, EXTENDED RELEASE ORAL at 09:03

## 2024-03-30 RX ADMIN — 0.12% CHLORHEXIDINE GLUCONATE 15 ML: 1.2 RINSE ORAL at 20:21

## 2024-03-30 RX ADMIN — 0.12% CHLORHEXIDINE GLUCONATE 15 ML: 1.2 RINSE ORAL at 09:03

## 2024-03-30 RX ADMIN — GABAPENTIN 300 MG: 300 CAPSULE ORAL at 09:03

## 2024-03-30 RX ADMIN — FUROSEMIDE 40 MG: 40 TABLET ORAL at 11:27

## 2024-03-30 RX ADMIN — MUPIROCIN 1 APPLICATION: 20 OINTMENT TOPICAL at 09:03

## 2024-03-30 RX ADMIN — AMLODIPINE BESYLATE 5 MG: 5 TABLET ORAL at 09:03

## 2024-03-30 RX ADMIN — ASPIRIN 81 MG: 81 TABLET, COATED ORAL at 09:03

## 2024-03-30 RX ADMIN — HYDROCODONE BITARTRATE AND ACETAMINOPHEN 2 TABLET: 5; 325 TABLET ORAL at 17:22

## 2024-03-30 RX ADMIN — GUAIFENESIN 1200 MG: 600 TABLET, EXTENDED RELEASE ORAL at 20:22

## 2024-03-30 RX ADMIN — HYDROCODONE BITARTRATE AND ACETAMINOPHEN 2 TABLET: 5; 325 TABLET ORAL at 20:37

## 2024-03-30 RX ADMIN — POLYMYXIN B SULFATE AND TRIMETHOPRIM 1 DROP: 10000; 1 SOLUTION OPHTHALMIC at 11:30

## 2024-03-30 RX ADMIN — POTASSIUM CHLORIDE 20 MEQ: 750 TABLET, EXTENDED RELEASE ORAL at 11:28

## 2024-03-30 RX ADMIN — HYDRALAZINE HYDROCHLORIDE 10 MG: 20 INJECTION INTRAMUSCULAR; INTRAVENOUS at 00:01

## 2024-03-30 RX ADMIN — ENOXAPARIN SODIUM 40 MG: 100 INJECTION SUBCUTANEOUS at 17:22

## 2024-03-30 RX ADMIN — GABAPENTIN 300 MG: 300 CAPSULE ORAL at 20:20

## 2024-03-30 RX ADMIN — HYDROCODONE BITARTRATE AND ACETAMINOPHEN 2 TABLET: 5; 325 TABLET ORAL at 06:31

## 2024-03-30 RX ADMIN — ATORVASTATIN CALCIUM 40 MG: 20 TABLET, FILM COATED ORAL at 20:20

## 2024-03-30 RX ADMIN — CARVEDILOL 25 MG: 25 TABLET, FILM COATED ORAL at 20:20

## 2024-03-30 RX ADMIN — HYDROCODONE BITARTRATE AND ACETAMINOPHEN 2 TABLET: 5; 325 TABLET ORAL at 01:43

## 2024-03-30 RX ADMIN — LABETALOL HYDROCHLORIDE 10 MG: 5 INJECTION, SOLUTION INTRAVENOUS at 02:56

## 2024-03-30 NOTE — PLAN OF CARE
Goal Outcome Evaluation:  Plan of Care Reviewed With: patient           Outcome Evaluation: Upon entering room, pt. sitting up in chair, awake/alert, and agreeable to work with P.T. this date despite c/o fatigue and pain.  Pt. able to ambulate 50 feet, CGA x 2, with no use of AD or HHA.  Pt. requires CGA x 1 for bed mobility and Min. assist x 1 for sit <-> stand transfers.  Cardiac ther. ex. program x 10 reps completed for general strengthening/stretching. Verbal/tactile cues given for posture correction during ambulation.  Will continue to progress functional mobility as tolerated.

## 2024-03-30 NOTE — PLAN OF CARE
Goal Outcome Evaluation:  Plan of Care Reviewed With: patient        Progress: improving  Outcome Evaluation: POD2. NSR on monitor, room air, goal to keep sbp <140. C/o pain treated per mar. IJ, ahuja and mediastinal tubes x2  discontinued today. Single mediastinal chest tube remains in place with minimal output. Epicardial wires at backup AAI. Ambulation/ IS use encouraged. No further complants at this time.

## 2024-03-30 NOTE — THERAPY TREATMENT NOTE
Patient Name: Tj Christopher  : 1985    MRN: [id number]                              Today's Date: 3/30/2024       Admit Date: 3/28/2024    Visit Dx:     ICD-10-CM ICD-9-CM   1. CHRISTIANO (obstructive sleep apnea)  G47.33 327.23   2. Aneurysm  I72.9 442.9   3. Aneurysm of aorta  I71.9 441.9     Patient Active Problem List   Diagnosis    Aortic dissection     History reviewed. No pertinent past medical history.  Past Surgical History:   Procedure Laterality Date    ASCENDING ARCH/HEMIARCH REPLACEMENT N/A 3/27/2024    Procedure: WILLIAM, STERNOTOMY, REPAIR OF A  TYPE A AORTIC DISSECTION, HEMIARCH REPAIR, DEEP HYPOTHERMIC CIRCULATORY ARREST, ANTEGRADE SELECTIVE CEREBRAL PERFUSION, AORTIC VALVE RESUSPENTION/ROOT REPAIR, PRP;  Surgeon: Sukhdeep Parkinson MD;  Location: [facility];  Service: Cardiothoracic;  Laterality: N/A;      General Information       Row Name 24 1123          Physical Therapy Time and Intention    Document Type therapy note (daily note)  -MS     Mode of Treatment physical therapy;individual therapy  -MS       Row Name 24 1123          General Information    Patient Profile Reviewed yes  -MS     Existing Precautions/Restrictions fall;cardiac;sternal   -MS     Barriers to Rehab none identified  -MS       Row Name 24 1123          Cognition    Orientation Status (Cognition) oriented x 3  -MS       Row Name 24 1123          Safety Issues, Functional Mobility    Comment, Safety Issues/Impairments (Mobility) --  -MS               User Key  (r) = Recorded By, (t) = Taken By, (c) = Cosigned By      Initials Name Provider Type    George Knowles PT Physical Therapist                   Mobility       Row Name 24 1123          Bed Mobility    Bed Mobility supine-sit;sit-supine  -MS     Sit-Supine [place]net (Bed Mobility) contact guard  -MS       Row Name 24 1123          Sit-Stand Transfer    Sit-Stand [place]net (Transfers) minimum assist (75% patient effort)  -MS        Row Name 03/30/24 1123          Gait/Stairs (Locomotion)    [place] Level (Gait) contact guard;2 person assist  -MS     Distance in Feet (Gait) 50  -MS     Deviations/Abnormal Patterns (Gait) maryanne decreased  -MS     Bilateral Gait Deviations forward flexed posture  -MS     Comment, (Gait/Stairs) Verbal/tactile cues given for posture correction.  -MS               User Key  (r) = Recorded By, (t) = Taken By, (c) = Cosigned By      Initials Name Provider Type    George Knowles, PT Physical Therapist                   Obj/Interventions       Row Name 03/30/24 1124          Motor Skills    Therapeutic Exercise --  Cardiac ther. ex. program x 10 reps completed  -MS               User Key  (r) = Recorded By, (t) = Taken By, (c) = Cosigned By      Initials Name Provider Type    George Knowles, PT Physical Therapist                   Goals/Plan    No documentation.                  Clinical Impression       Row Name 03/30/24 1124          Pain    Pretreatment Pain Rating 5/10  -MS     Posttreatment Pain Rating 5/10  -MS     Pain Location incisional  -MS     Pain Location - chest  -MS     Pain Intervention(s) Medication (See MAR);Nursing Notified;Repositioned  -MS       Row Name 03/30/24 1124          Positioning and Restraints    Pre-Treatment Position sitting in chair/recliner  -MS     Post Treatment Position bed  -MS     In Bed notified nsg;supine;call light within reach;encouraged to call for assist;exit alarm on;with family/caregiver  All lines/tubes intact.  -MS               User Key  (r) = Recorded By, (t) = Taken By, (c) = Cosigned By      Initials Name Provider Type    George Knowles, PT Physical Therapist                   Outcome Measures       Row Name 03/30/24 1125 03/30/24 0903       How much help from another person do you currently need...    Turning from your back to your side while in flat bed without using bedrails? 3  -MS 3  -CB    Moving from lying on back to sitting on  the side of a flat bed without bedrails? 3  -MS 3  -CB    Moving to and from a bed to a chair (including a wheelchair)? 3  -MS 3  -CB    Standing up from a chair using your arms (e.g., wheelchair, bedside chair)? 3  -MS 3  -CB    Climbing 3-5 steps with a railing? 3  -MS 2  -CB    To walk in hospital room? 3  -MS 2  -CB    AM-PAC 6 Clicks Score (PT) 18  -MS 16  -CB    Highest Level of Mobility Goal 6 --> Walk 10 steps or more  -MS 5 --> Static standing  -CB      Row Name 03/30/24 1125          Functional Assessment    Outcome Measure Options AM-PAC 6 Clicks Basic Mobility (PT)  -MS               User Key  (r) = Recorded By, (t) = Taken By, (c) = Cosigned By      Initials Name Provider Type    MS George Dang, PT Physical Therapist    Milena Clement, RN Registered Nurse                                 Physical Therapy Education       Title: PT OT SLP Therapies (Done)       Topic: Physical Therapy (Done)       Point: Mobility training (Done)       Learning Progress Summary             Patient Acceptance, E,D, VU,NR by MS at 3/30/2024 1126    Acceptance, E,TB, VU by  at 3/29/2024 0939                         Point: Home exercise program (Done)       Learning Progress Summary             Patient Acceptance, E,D, VU,NR by MS at 3/30/2024 1126    Acceptance, E,TB, VU by  at 3/29/2024 0939                         Point: Body mechanics (Done)       Learning Progress Summary             Patient Acceptance, E,D, VU,NR by MS at 3/30/2024 1126    Acceptance, E,TB, VU by  at 3/29/2024 0939                         Point: Precautions (Done)       Learning Progress Summary             Patient Acceptance, E,D, VU,NR by MS at 3/30/2024 1126    Acceptance, E,TB, VU by  at 3/29/2024 0939                                         User Key       Initials Effective Dates Name Provider Type Discipline    MS 06/16/21 -  George Dang, PT Physical Therapist PT     01/24/24 -  Agustin Huerta PT Student PT Student PT                   PT Recommendation and Plan     Plan of Care Reviewed With: patient  Outcome Evaluation: Upon entering room, pt. sitting up in chair, awake/alert, and agreeable to work with P.T. this date despite c/o fatigue and pain.  Pt. able to ambulate 50 feet, CGA x 2, with no use of AD or HHA.  Pt. requires CGA x 1 for bed mobility and Min. assist x 1 for sit <-> stand transfers.  Cardiac ther. ex. program x 10 reps completed for general strengthening/stretching. Verbal/tactile cues given for posture correction during ambulation.  Will continue to progress functional mobility as tolerated.     Time Calculation:         PT Charges       Row Name 03/30/24 1128             Time Calculation    Start Time 0850  -MS      Stop Time 0905  -MS      Time Calculation (min) 15 min  -MS      PT Received On 03/30/24  -MS      PT - Next Appointment 03/31/24  -MS         Time Calculation- PT    Total Timed Code Minutes- PT 14 minute(s)  -MS                User Key  (r) = Recorded By, (t) = Taken By, (c) = Cosigned By      Initials Name Provider Type    George Knowles, PT Physical Therapist                  Therapy Charges for Today       Code Description Service Date Service Provider Modifiers Qty    75769612937  PT THERAPEUTIC ACT EA 15 MIN 3/30/2024 George Dang, PT GP 1    [id number] HC PT THER SUPP EA 15 MIN 3/30/2024 George Dang, PT GP 1            PT G-Codes  Outcome Measure Options: AM-PAC 6 Clicks Basic Mobility (PT)  AM-PAC 6 Clicks Score (PT): 18       George Dang PT  3/30/2024

## 2024-03-30 NOTE — PROGRESS NOTES
LOS: 2 days   Patient Care Team:  Provider, No Known as PCP - General    Chief Complaint:   Post-op follow-up, s/p emergent Ao dissection repair    Subjective  Sitting up in chair. Pain controlled. No new complaints.     Vital Signs  Temp:  [98.3 °F (36.8 °C)-98.6 °F (37 °C)] 98.3 °F (36.8 °C)  Heart Rate:  [67-79] 78  Resp:  [16] 16  BP: (122-155)/(69-81) 143/81      03/29/24  0600 03/30/24  0500   Weight: (!) 151 kg (334 lb) (!) 149 kg (327 lb 6.1 oz)     Body mass index is 39.85 kg/m².    Intake/Output Summary (Last 24 hours) at 3/30/2024 1210  Last data filed at 3/30/2024 0903  Gross per 24 hour   Intake 515 ml   Output 1380 ml   Net -865 ml     I/O this shift:  In: -   Out: 30 [Chest Tube:30]    Chest tube drainage last 8 hours: 20/70    Objective:  Vital signs: (most recent): Blood pressure 143/81, pulse 78, temperature 98.3 °F (36.8 °C), temperature source Oral, resp. rate 16, weight (!) 149 kg (327 lb 6.1 oz), SpO2 95%.                Physical Exam:   General Appearance: awake and alert, no acute distress   Lungs: respirations regular, respirations unlabored, and diminished bases   Heart: regular rhythm & normal rate and normal S1, S2   Abdomen: soft or nontender, + bowel sounds    Skin: sternal incision clean, dry, intact   Neuro: alert and oriented, no focal deficits.     Results Review:      WBC WBC   Date Value Ref Range Status   03/30/2024 20.74 (H) 3.40 - 10.80 10*3/mm3 Final   03/29/2024 21.71 (H) 3.40 - 10.80 10*3/mm3 Final   03/28/2024 16.77 (H) 3.40 - 10.80 10*3/mm3 Final   03/28/2024 13.05 (H) 3.40 - 10.80 10*3/mm3 Final   03/28/2024 17.92 (H) 3.40 - 10.80 10*3/mm3 Final   03/28/2024 19.63 (H) 3.40 - 10.80 10*3/mm3 Final   03/28/2024 19.60 (H) 3.40 - 10.80 10*3/mm3 Final   03/27/2024 11.76 (H) 3.40 - 10.80 10*3/mm3 Final      HGB Hemoglobin   Date Value Ref Range Status   03/30/2024 10.7 (L) 13.0 - 17.7 g/dL Final   03/29/2024 10.8 (L) 13.0 - 17.7 g/dL Final   03/28/2024 12.4 (L) 13.0 - 17.7 g/dL  Final   03/28/2024 11.7 (L) 13.0 - 17.7 g/dL Final   03/28/2024 10.3 (L) 13.0 - 17.7 g/dL Final   03/28/2024 8.6 (L) 13.0 - 17.7 g/dL Final   03/28/2024 8.4 (L) 13.0 - 17.7 g/dL Final   03/28/2024 10.5 (L) 12.0 - 17.0 g/dL Final   03/28/2024 11.9 (L) 12.0 - 17.0 g/dL Final   03/28/2024 11.2 (L) 12.0 - 17.0 g/dL Final   03/28/2024 10.5 (L) 12.0 - 17.0 g/dL Final   03/28/2024 10.2 (L) 12.0 - 17.0 g/dL Final   03/28/2024 10.5 (L) 12.0 - 17.0 g/dL Final   03/28/2024 12.9 12.0 - 17.0 g/dL Final   03/27/2024 12.6 (L) 13.0 - 17.7 g/dL Final      HCT Hematocrit   Date Value Ref Range Status   03/30/2024 31.8 (L) 37.5 - 51.0 % Final   03/29/2024 32.9 (L) 37.5 - 51.0 % Final   03/28/2024 36.0 (L) 37.5 - 51.0 % Final   03/28/2024 35.4 (L) 37.5 - 51.0 % Final   03/28/2024 31.2 (L) 37.5 - 51.0 % Final   03/28/2024 26.0 (L) 37.5 - 51.0 % Final   03/28/2024 25.5 (L) 37.5 - 51.0 % Final   03/28/2024 31 (L) 38 - 51 % Final   03/28/2024 35 (L) 38 - 51 % Final   03/28/2024 33 (L) 38 - 51 % Final   03/28/2024 31 (L) 38 - 51 % Final   03/28/2024 30 (L) 38 - 51 % Final   03/28/2024 31 (L) 38 - 51 % Final   03/28/2024 38 38 - 51 % Final   03/27/2024 37.4 (L) 37.5 - 51.0 % Final      Platelets Platelets   Date Value Ref Range Status   03/30/2024 236 140 - 450 10*3/mm3 Final   03/29/2024 211 140 - 450 10*3/mm3 Final   03/28/2024 181 140 - 450 10*3/mm3 Final   03/28/2024 160 140 - 450 10*3/mm3 Final   03/28/2024 156 140 - 450 10*3/mm3 Final   03/28/2024 135 (L) 140 - 450 10*3/mm3 Final   03/28/2024 141 140 - 450 10*3/mm3 Final   03/27/2024 209 140 - 450 10*3/mm3 Final        PT/INR:    Protime   Date Value Ref Range Status   03/29/2024 16.2 (H) 11.7 - 14.2 Seconds Final   03/28/2024 17.5 (H) 11.7 - 14.2 Seconds Final   03/28/2024 17.5 (H) 11.7 - 14.2 Seconds Final   03/28/2024 18.5 (H) 11.7 - 14.2 Seconds Final   03/28/2024 18.3 (H) 12.8 - 15.2 seconds Final     Comment:     Serial Number: [id number]:  [id number]   03/27/2024 11.5 9.6 - 11.7  Seconds Final   /  INR   Date Value Ref Range Status   03/29/2024 1.28 (H) 0.90 - 1.10 Final   03/28/2024 1.41 (H) 0.90 - 1.10 Final   03/28/2024 1.41 (H) 0.90 - 1.10 Final   03/28/2024 1.52 (H) 0.90 - 1.10 Final   03/28/2024 1.6 (H) 0.8 - 1.2 Final   03/27/2024 1.06 0.93 - 1.10 Final       Sodium Sodium   Date Value Ref Range Status   03/30/2024 137 136 - 145 mmol/L Final   03/29/2024 149 (H) 136 - 145 mmol/L Final   03/28/2024 138 136 - 145 mmol/L Final   03/28/2024 139 136 - 145 mmol/L Final   03/28/2024 143 136 - 145 mmol/L Final   03/28/2024 138 136 - 145 mmol/L Final   03/27/2024 136 136 - 145 mmol/L Final      Potassium Potassium   Date Value Ref Range Status   03/30/2024 4.2 3.5 - 5.2 mmol/L Final   03/29/2024 4.5 3.5 - 5.2 mmol/L Final   03/28/2024 4.3 3.5 - 5.2 mmol/L Final   03/28/2024 4.5 3.5 - 5.2 mmol/L Final   03/28/2024 4.6 3.5 - 5.2 mmol/L Final   03/28/2024 4.6 3.5 - 5.2 mmol/L Final   03/27/2024 3.8 3.5 - 5.2 mmol/L Final      Chloride Chloride   Date Value Ref Range Status   03/30/2024 105 98 - 107 mmol/L Final   03/29/2024 113 (H) 98 - 107 mmol/L Final   03/28/2024 105 98 - 107 mmol/L Final   03/28/2024 106 98 - 107 mmol/L Final   03/28/2024 106 98 - 107 mmol/L Final   03/28/2024 102 98 - 107 mmol/L Final   03/27/2024 99 98 - 107 mmol/L Final      Bicarbonate CO2   Date Value Ref Range Status   03/30/2024 24.5 22.0 - 29.0 mmol/L Final   03/29/2024 24.0 22.0 - 29.0 mmol/L Final   03/28/2024 23.6 22.0 - 29.0 mmol/L Final   03/28/2024 23.0 22.0 - 29.0 mmol/L Final   03/28/2024 21.4 (L) 22.0 - 29.0 mmol/L Final   03/28/2024 27.5 22.0 - 29.0 mmol/L Final   03/27/2024 27.0 22.0 - 29.0 mmol/L Final      BUN BUN   Date Value Ref Range Status   03/30/2024 15 6 - 20 mg/dL Final   03/29/2024 13 6 - 20 mg/dL Final   03/28/2024 5 (L) 6 - 20 mg/dL Final   03/28/2024 12 6 - 20 mg/dL Final   03/28/2024 12 6 - 20 mg/dL Final   03/28/2024 9 6 - 20 mg/dL Final   03/27/2024 5 (L) 6 - 20 mg/dL Final      Creatinine  Creatinine   Date Value Ref Range Status   03/30/2024 1.06 0.76 - 1.27 mg/dL Final   03/29/2024 1.39 (H) 0.76 - 1.27 mg/dL Final   03/28/2024 1.54 (H) 0.76 - 1.27 mg/dL Final   03/28/2024 1.64 (H) 0.76 - 1.27 mg/dL Final   03/28/2024 1.57 (H) 0.76 - 1.27 mg/dL Final   03/28/2024 1.52 (H) 0.76 - 1.27 mg/dL Final   03/27/2024 1.15 0.76 - 1.27 mg/dL Final      Calcium Calcium   Date Value Ref Range Status   03/30/2024 8.3 (L) 8.6 - 10.5 mg/dL Final   03/29/2024 8.0 (L) 8.6 - 10.5 mg/dL Final   03/28/2024 8.3 (L) 8.6 - 10.5 mg/dL Final   03/28/2024 8.3 (L) 8.6 - 10.5 mg/dL Final   03/28/2024 8.8 8.6 - 10.5 mg/dL Final   03/28/2024 8.1 (L) 8.6 - 10.5 mg/dL Final   03/27/2024 8.8 8.6 - 10.5 mg/dL Final      Magnesium Magnesium   Date Value Ref Range Status   03/29/2024 2.4 1.6 - 2.6 mg/dL Final   03/28/2024 2.5 1.6 - 2.6 mg/dL Final   03/28/2024 2.9 (H) 1.6 - 2.6 mg/dL Final        amLODIPine, 5 mg, Oral, Q24H  aspirin, 81 mg, Oral, Daily  atorvastatin, 40 mg, Oral, Nightly  carvedilol, 12.5 mg, Oral, Q12H  chlorhexidine, 15 mL, Mouth/Throat, Q12H  enoxaparin, 40 mg, Subcutaneous, Q24H  furosemide, 40 mg, Oral, Daily  gabapentin, 300 mg, Oral, Q12H  guaiFENesin, 1,200 mg, Oral, Q12H  insulin lispro, 2-9 Units, Subcutaneous, 4x Daily AC & at Bedtime  mupirocin, , Each Nare, BID  pantoprazole, 40 mg, Oral, QAM  potassium chloride, 20 mEq, Oral, Daily  senna-docusate sodium, 2 tablet, Oral, Nightly  trimethoprim-polymyxin b, 1 drop, Left Eye, Q6H      sodium chloride, 30 mL/hr, Last Rate: 30 mL/hr (03/28/24 1041)          Aortic dissection      Assessment & Plan    - Type A aortic dissection s/p dissection repair POD#2 (Iggy)  - hypertension-- titrate PO meds  - leukocytosis--reactive/intaoperative steroid administration, afebrile  - post op anemia--expected acute blood loss, stable  - amphetamine use-- encourage cessation     Patient seen and discussed with Dr. Parkinson.  Continue routine care.  D/c central line, ahuja, and 2  mediastinal CT. Keep soft right mediastinal CT.  Start po diuretics with K supplement.  Pain sternal incision and groin incision with betadine BID.  Mobilize and encourage pulm toilet.    Ramiro Carmen PA-C  03/30/24  12:10 EDT

## 2024-03-30 NOTE — PROGRESS NOTES
[facility] Follow Up    Chief Complaint: Follow up aortic dissection status post repair    Interval History: Reports pain is currently improving after receiving pain medications.  Denies any shortness of breath.    Objective:     Objective:  Temp:  [98.3 °F (36.8 °C)-99.5 °F (37.5 °C)] 98.3 °F (36.8 °C)  Heart Rate:  [67-79] 79  Resp:  [16-18] 16  BP: (119-155)/(65-79) 152/69     Intake/Output Summary (Last 24 hours) at 3/30/2024 0802  Last data filed at 3/30/2024 0619  Gross per 24 hour   Intake 515 ml   Output 2860 ml   Net -2345 ml     Body mass index is 39.85 kg/m².      03/29/24  0600 03/30/24  0500   Weight: (!) 151 kg (334 lb) (!) 149 kg (327 lb 6.1 oz)     Weight change: -3 kg (-6 lb 9.8 oz)      Physical Exam:   General : Alert, cooperative, in no acute distress.  Neuro: Alert,cooperative and oriented.  Lungs: CTAB. Normal respiratory effort and rate.  CV: Regular rate and rhythm, normal S1 and S2, no murmurs, gallops or rubs.  ABD: Soft, nontender, nondistended. Positive bowel sounds.  Extr: No edema or cyanosis, moves all extremities.    Lab Review:   Results from last 7 days   Lab Units 03/30/24  0320 03/29/24  0147 03/28/24  0712 03/27/24  1901   SODIUM mmol/L 137 149*   < > 136   POTASSIUM mmol/L 4.2 4.5   < > 3.8   CHLORIDE mmol/L 105 113*   < > 99   CO2 mmol/L 24.5 24.0   < > 27.0   BUN mg/dL 15 13   < > 5*   CREATININE mg/dL 1.06 1.39*   < > 1.15   GLUCOSE mg/dL 101* 129*   < > 100*   CALCIUM mg/dL 8.3* 8.0*   < > 8.8   AST (SGOT) U/L  --   --   --  21   ALT (SGPT) U/L  --   --   --  26    < > = values in this interval not displayed.     Results from last 7 days   Lab Units 03/27/24  1901   HSTROP T ng/L 12     Results from last 7 days   Lab Units 03/30/24  0320 03/29/24  0147   WBC 10*3/mm3 20.74* 21.71*   HEMOGLOBIN g/dL 10.7* 10.8*   HEMATOCRIT % 31.8* 32.9*   PLATELETS 10*3/mm3 236 211     Results from last 7 days   Lab Units 03/29/24  0147 03/28/24  1323 03/28/24  0712  "  INR  1.28* 1.41* 1.41*   APTT seconds  --  30.1 28.2     Results from last 7 days   Lab Units 03/29/24  0147 03/28/24  1132   MAGNESIUM mg/dL 2.4 2.5           Invalid input(s): \"LDLCALC\"          I reviewed the patient's new clinical results.  I personally viewed and interpreted the patient's EKG  Current Medications:   Scheduled Meds:amLODIPine, 5 mg, Oral, Q24H  aspirin, 81 mg, Oral, Daily  atorvastatin, 40 mg, Oral, Nightly  carvedilol, 12.5 mg, Oral, Q12H  chlorhexidine, 15 mL, Mouth/Throat, Q12H  enoxaparin, 40 mg, Subcutaneous, Q24H  gabapentin, 300 mg, Oral, Q12H  guaiFENesin, 1,200 mg, Oral, Q12H  insulin lispro, 2-9 Units, Subcutaneous, 4x Daily AC & at Bedtime  mupirocin, , Each Nare, BID  pantoprazole, 40 mg, Oral, QAM  senna-docusate sodium, 2 tablet, Oral, Nightly  trimethoprim-polymyxin b, 1 drop, Left Eye, Q6H      Continuous Infusions:sodium chloride, 30 mL/hr, Last Rate: 30 mL/hr (03/28/24 1041)        Allergies:  No Known Allergies    Assessment/Plan:     1.  Type A aortic dissection.  Extending from the ascending aorta to the abdominal aortic bifurcation and into the innominate and left carotid arteries.  Now status post emergent dissection repair with dacryon interposition graft, proximal aortic arch repair, aortic root reconstruction with, commissural resuspension on 3/28/2024.  2.  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    4.  Hypertension.  Noncompliant with treatment as an outpatient.  Blood pressure control overall improved although mildly elevated this morning.  5.  Leukocytosis.  Likely reactive.  Appears to be stable.  6.  Expected postoperative anemia.  Hemoglobin stable.  7.  Methamphetamine use.  Reported use prior to admission.    -Increase carvedilol if blood pressures remain mildly elevated today.    Paula Lanza MD  03/30/24  08:02 EDT  "

## 2024-03-31 ENCOUNTER — APPOINTMENT (OUTPATIENT)
Dept: GENERAL RADIOLOGY | Facility: HOSPITAL | Age: 39
DRG: 219 | End: 2024-03-31
Payer: MEDICAID

## 2024-03-31 LAB
ANION GAP SERPL CALCULATED.3IONS-SCNC: 9.9 MMOL/L (ref 5–15)
BH BB BLOOD EXPIRATION DATE: NORMAL
BH BB BLOOD TYPE BARCODE: 5100
BH BB DISPENSE STATUS: NORMAL
BH BB PRODUCT CODE: NORMAL
BH BB UNIT NUMBER: NORMAL
BUN SERPL-MCNC: 11 MG/DL (ref 6–20)
BUN/CREAT SERPL: 11.3 (ref 7–25)
CALCIUM SPEC-SCNC: 8.2 MG/DL (ref 8.6–10.5)
CHLORIDE SERPL-SCNC: 101 MMOL/L (ref 98–107)
CO2 SERPL-SCNC: 24.1 MMOL/L (ref 22–29)
CREAT SERPL-MCNC: 0.97 MG/DL (ref 0.76–1.27)
CROSSMATCH INTERPRETATION: NORMAL
DEPRECATED RDW RBC AUTO: 43.2 FL (ref 37–54)
EGFRCR SERPLBLD CKD-EPI 2021: 103.1 ML/MIN/1.73
ERYTHROCYTE [DISTWIDTH] IN BLOOD BY AUTOMATED COUNT: 13.9 % (ref 12.3–15.4)
GLUCOSE BLDC GLUCOMTR-MCNC: 105 MG/DL (ref 70–130)
GLUCOSE BLDC GLUCOMTR-MCNC: 122 MG/DL (ref 70–130)
GLUCOSE BLDC GLUCOMTR-MCNC: 140 MG/DL (ref 70–130)
GLUCOSE BLDC GLUCOMTR-MCNC: 143 MG/DL (ref 70–130)
GLUCOSE SERPL-MCNC: 115 MG/DL (ref 65–99)
HCT VFR BLD AUTO: 34 % (ref 37.5–51)
HGB BLD-MCNC: 11.3 G/DL (ref 13–17.7)
MCH RBC QN AUTO: 28 PG (ref 26.6–33)
MCHC RBC AUTO-ENTMCNC: 33.2 G/DL (ref 31.5–35.7)
MCV RBC AUTO: 84.4 FL (ref 79–97)
PLATELET # BLD AUTO: 284 10*3/MM3 (ref 140–450)
PMV BLD AUTO: 9.8 FL (ref 6–12)
POTASSIUM SERPL-SCNC: 3.9 MMOL/L (ref 3.5–5.2)
POTASSIUM SERPL-SCNC: 4.2 MMOL/L (ref 3.5–5.2)
RBC # BLD AUTO: 4.03 10*6/MM3 (ref 4.14–5.8)
SODIUM SERPL-SCNC: 135 MMOL/L (ref 136–145)
UNIT  ABO: NORMAL
UNIT  RH: NORMAL
WBC NRBC COR # BLD AUTO: 15.52 10*3/MM3 (ref 3.4–10.8)

## 2024-03-31 PROCEDURE — 97530 THERAPEUTIC ACTIVITIES: CPT

## 2024-03-31 PROCEDURE — 99232 SBSQ HOSP IP/OBS MODERATE 35: CPT

## 2024-03-31 PROCEDURE — 85027 COMPLETE CBC AUTOMATED: CPT | Performed by: NURSE PRACTITIONER

## 2024-03-31 PROCEDURE — 25010000002 ENOXAPARIN PER 10 MG: Performed by: NURSE PRACTITIONER

## 2024-03-31 PROCEDURE — 71045 X-RAY EXAM CHEST 1 VIEW: CPT

## 2024-03-31 PROCEDURE — 82948 REAGENT STRIP/BLOOD GLUCOSE: CPT

## 2024-03-31 PROCEDURE — 80048 BASIC METABOLIC PNL TOTAL CA: CPT | Performed by: NURSE PRACTITIONER

## 2024-03-31 PROCEDURE — 71046 X-RAY EXAM CHEST 2 VIEWS: CPT

## 2024-03-31 PROCEDURE — 84132 ASSAY OF SERUM POTASSIUM: CPT | Performed by: THORACIC SURGERY (CARDIOTHORACIC VASCULAR SURGERY)

## 2024-03-31 PROCEDURE — 99024 POSTOP FOLLOW-UP VISIT: CPT | Performed by: PHYSICIAN ASSISTANT

## 2024-03-31 RX ORDER — POTASSIUM CHLORIDE 750 MG/1
20 TABLET, FILM COATED, EXTENDED RELEASE ORAL ONCE
Status: COMPLETED | OUTPATIENT
Start: 2024-03-31 | End: 2024-03-31

## 2024-03-31 RX ADMIN — GABAPENTIN 300 MG: 300 CAPSULE ORAL at 19:49

## 2024-03-31 RX ADMIN — HYDROCODONE BITARTRATE AND ACETAMINOPHEN 2 TABLET: 5; 325 TABLET ORAL at 08:48

## 2024-03-31 RX ADMIN — POTASSIUM CHLORIDE 20 MEQ: 750 TABLET, EXTENDED RELEASE ORAL at 05:29

## 2024-03-31 RX ADMIN — MUPIROCIN 1 APPLICATION: 20 OINTMENT TOPICAL at 08:48

## 2024-03-31 RX ADMIN — GUAIFENESIN 1200 MG: 600 TABLET, EXTENDED RELEASE ORAL at 08:49

## 2024-03-31 RX ADMIN — GUAIFENESIN 1200 MG: 600 TABLET, EXTENDED RELEASE ORAL at 19:52

## 2024-03-31 RX ADMIN — MUPIROCIN 1 APPLICATION: 20 OINTMENT TOPICAL at 19:49

## 2024-03-31 RX ADMIN — POVIDONE-IODINE 1 EACH: 10 SOLUTION TOPICAL at 15:41

## 2024-03-31 RX ADMIN — FUROSEMIDE 40 MG: 40 TABLET ORAL at 08:49

## 2024-03-31 RX ADMIN — PANTOPRAZOLE SODIUM 40 MG: 40 TABLET, DELAYED RELEASE ORAL at 05:30

## 2024-03-31 RX ADMIN — CARVEDILOL 37.5 MG: 25 TABLET, FILM COATED ORAL at 19:53

## 2024-03-31 RX ADMIN — ATORVASTATIN CALCIUM 40 MG: 20 TABLET, FILM COATED ORAL at 19:52

## 2024-03-31 RX ADMIN — CYCLOBENZAPRINE 10 MG: 10 TABLET, FILM COATED ORAL at 11:56

## 2024-03-31 RX ADMIN — ENOXAPARIN SODIUM 40 MG: 100 INJECTION SUBCUTANEOUS at 19:47

## 2024-03-31 RX ADMIN — HYDROCODONE BITARTRATE AND ACETAMINOPHEN 2 TABLET: 5; 325 TABLET ORAL at 01:47

## 2024-03-31 RX ADMIN — DOCUSATE SODIUM 50MG AND SENNOSIDES 8.6MG 2 TABLET: 8.6; 5 TABLET, FILM COATED ORAL at 19:51

## 2024-03-31 RX ADMIN — OXYCODONE HYDROCHLORIDE 10 MG: 5 TABLET ORAL at 20:11

## 2024-03-31 RX ADMIN — POVIDONE-IODINE 1 EACH: 10 SOLUTION TOPICAL at 21:00

## 2024-03-31 RX ADMIN — GABAPENTIN 300 MG: 300 CAPSULE ORAL at 08:49

## 2024-03-31 RX ADMIN — AMLODIPINE BESYLATE 5 MG: 5 TABLET ORAL at 08:49

## 2024-03-31 RX ADMIN — ASPIRIN 81 MG: 81 TABLET, COATED ORAL at 08:49

## 2024-03-31 RX ADMIN — POTASSIUM CHLORIDE 20 MEQ: 750 TABLET, EXTENDED RELEASE ORAL at 08:48

## 2024-03-31 RX ADMIN — 0.12% CHLORHEXIDINE GLUCONATE 15 ML: 1.2 RINSE ORAL at 08:48

## 2024-03-31 RX ADMIN — OXYCODONE HYDROCHLORIDE 10 MG: 5 TABLET ORAL at 15:41

## 2024-03-31 RX ADMIN — CARVEDILOL 37.5 MG: 25 TABLET, FILM COATED ORAL at 08:49

## 2024-03-31 RX ADMIN — 0.12% CHLORHEXIDINE GLUCONATE 15 ML: 1.2 RINSE ORAL at 19:53

## 2024-03-31 NOTE — PLAN OF CARE
Goal Outcome Evaluation:  Plan of Care Reviewed With: patient        Progress: improving  Outcome Evaluation: NSR on monitor, room air, goal to keep sbp <140, coreg increased. C/o pain treated per mar. Epicardial wires and chest tube discontinued. Ambulation/ IS use encouraged. No further complants at this time. Home in 1-2 days.

## 2024-03-31 NOTE — PROGRESS NOTES
LOS: 3 days   Patient Care Team:  Provider, No Known as PCP - General    Chief Complaint: follow up type A aortic dissection, hypertension    Interval History: Patient denies chest pain or discomfort, palpitations, or shortness of breath. He was up in the chair with family at bedside.     Vital Signs:  Temp:  [98.7 °F (37.1 °C)-99.3 °F (37.4 °C)] 98.7 °F (37.1 °C)  Heart Rate:  [71-78] 72  Resp:  [16] 16  BP: (110-171)/(59-92) 110/72    Intake/Output Summary (Last 24 hours) at 3/31/2024 1319  Last data filed at 3/31/2024 0913  Gross per 24 hour   Intake --   Output 1880 ml   Net -1880 ml      Physical Exam  Vitals reviewed.   Constitutional:       General: He is not in acute distress.  HENT:      Head: Normocephalic.      Nose: Nose normal.   Eyes:      Extraocular Movements: Extraocular movements intact.      Pupils: Pupils are equal, round, and reactive to light.   Cardiovascular:      Rate and Rhythm: Normal rate and regular rhythm.      Pulses: Normal pulses.      Heart sounds: Normal heart sounds. Heart sounds not distant. No murmur heard.     No friction rub. No gallop. No S3 or S4 sounds.   Pulmonary:      Effort: Pulmonary effort is normal.      Breath sounds: Normal breath sounds.   Abdominal:      General: Abdomen is flat. Bowel sounds are normal.      Palpations: Abdomen is soft.      Tenderness: There is no abdominal tenderness.   Skin:     General: Skin is warm and dry.   Neurological:      General: No focal deficit present.      Mental Status: He is alert and oriented to person, place, and time. Mental status is at baseline.   Psychiatric:         Mood and Affect: Mood normal.         Behavior: Behavior normal.       Results Review:    Results from last 7 days   Lab Units 03/31/24  1104 03/31/24  0329   SODIUM mmol/L  --  135*   POTASSIUM mmol/L 4.2 3.9   CHLORIDE mmol/L  --  101   CO2 mmol/L  --  24.1   BUN mg/dL  --  11   CREATININE mg/dL  --  0.97   GLUCOSE mg/dL  --  115*   CALCIUM mg/dL  --  8.2*      Results from last 7 days   Lab Units 03/27/24  1901   HSTROP T ng/L 12     Results from last 7 days   Lab Units 03/31/24  0329   WBC 10*3/mm3 15.52*   HEMOGLOBIN g/dL 11.3*   HEMATOCRIT % 34.0*   PLATELETS 10*3/mm3 284     Results from last 7 days   Lab Units 03/29/24  0147 03/28/24  1323 03/28/24  0712 03/28/24  0541   INR  1.28* 1.41* 1.41* 1.52*   APTT seconds  --  30.1 28.2 28.5         Results from last 7 days   Lab Units 03/29/24  0147   MAGNESIUM mg/dL 2.4           I reviewed the patient's new clinical results.        Assessment & Plan:  Type A aortic dissection  Extending from the ascending aorta to the abdominal aortic bifurcation and into the innominate and left carotid arteries.   Now status post emergent dissection repair with dacryon interposition graft, proximal aortic arch repair, aortic root reconstruction with, commissural resuspension on 3/28/2024.   Infrarenal abdominal aortic and left common iliac artery aneurysm  Hypertension  Noncompliant with outpatient treatment.   BP better this morning after increase in carvedilol   Leukocytosis  Expected postoperative anemia  Methamphetamine use   Reported use prior to admission     Beth Hurst, MAYRA  03/31/24  13:19 EDT

## 2024-03-31 NOTE — THERAPY TREATMENT NOTE
Patient Name: Tj Christopher  : 1985    MRN: [id number]                              Today's Date: 3/31/2024       Admit Date: 3/28/2024    Visit Dx:     ICD-10-CM ICD-9-CM   1. CHRISTIANO (obstructive sleep apnea)  G47.33 327.23   2. Aneurysm  I72.9 442.9   3. Aneurysm of aorta  I71.9 441.9     Patient Active Problem List   Diagnosis    Aortic dissection     History reviewed. No pertinent past medical history.  Past Surgical History:   Procedure Laterality Date    ASCENDING ARCH/HEMIARCH REPLACEMENT N/A 3/27/2024    Procedure: WILLIAM, STERNOTOMY, REPAIR OF A  TYPE A AORTIC DISSECTION, HEMIARCH REPAIR, DEEP HYPOTHERMIC CIRCULATORY ARREST, ANTEGRADE SELECTIVE CEREBRAL PERFUSION, AORTIC VALVE RESUSPENTION/ROOT REPAIR, PRP;  Surgeon: Sukhdeep Parkinson MD;  Location: [facility];  Service: Cardiothoracic;  Laterality: N/A;      General Information       Row Name 24 1002          Physical Therapy Time and Intention    Document Type therapy note (daily note)  -     Mode of Treatment physical therapy  -       Row Name 24 1002          General Information    Existing Precautions/Restrictions fall;sternal;cardiac  -PH       Row Name 24 1002          Cognition    Orientation Status (Cognition) oriented x 3  -PH       Row Name 24 1002          Safety Issues, Functional Mobility    Impairments Affecting Function (Mobility) endurance/activity tolerance;strength  -     Comment, Safety Issues/Impairments (Mobility) non skid socks donned for safety  -               User Key  (r) = Recorded By, (t) = Taken By, (c) = Cosigned By      Initials Name Provider Type    PH Nikki Reeder PTA Physical Therapist Assistant                   Mobility       Row Name 24 1002          Bed Mobility    Comment, (Bed Mobility) NT - pt was exiting BR w/ RN - SBA and was [facility] at end of session  -       Row Name 24 1002          Bed-Chair Transfer    Bed-Chair [place] (Transfers) not tested  -        Row Name 03/31/24 1002          Sit-Stand Transfer    Sit-Stand [place] (Transfers) not tested  -PH       Row Name 03/31/24 1002          Gait/Stairs (Locomotion)    [place] Level (Gait) standby assist;contact guard  -PH     Assistive Device (Gait) other (see comments)  no AD  -PH     Distance in Feet (Gait) 150  -PH     Deviations/Abnormal Patterns (Gait) maryanne decreased  -PH     Bilateral Gait Deviations forward flexed posture  -PH     [place] Level (Stairs) not tested  -PH     Comment, (Gait/Stairs) cues for postural correction; slow w/ no overt LOB  -PH               User Key  (r) = Recorded By, (t) = Taken By, (c) = Cosigned By      Initials Name Provider Type    PH Nikki Reeder PTA Physical Therapist Assistant                   Obj/Interventions       Row Name 03/31/24 1004          Motor Skills    Therapeutic Exercise other (see comments)  cardiac program x 15 reps each  -PH       Row Name 03/31/24 1004          Balance    Balance Assessment sitting static balance;standing static balance  -PH     Static Sitting Balance standby assist  -PH     Static Standing Balance standby assist  -PH     Position/Device Used, Standing Balance other (see comments)  no AD  -PH     Comment, Balance no LOB  -PH               User Key  (r) = Recorded By, (t) = Taken By, (c) = Cosigned By      Initials Name Provider Type     Nikki Reeder PTA Physical Therapist Assistant                   Goals/Plan    No documentation.                  Clinical Impression       Row Name 03/31/24 1005          Pain    Pretreatment Pain Rating 8/10  -PH     Posttreatment Pain Rating 8/10  -PH     Pain Location - chest  -PH     Pain Intervention(s) Medication (See MAR);Ambulation/increased activity;Repositioned  -PH       Row Name 03/31/24 1005          Plan of Care Review    Plan of Care Reviewed With patient  -PH     Progress improving  -PH     Outcome Evaluation Pt was seen by Pt this AM for tx. Pt  was exiting BR w/ RN at beg of session w/ noted SBA. Pt amb 150' around nsg unit w/ SBA and no AD. Pt was slow w/ no overt LOB. Pt performed cardiac program x 15 reps and was [facility] at end of session.  -PH       Row Name 03/31/24 1005          Vital Signs    O2 Delivery Pre Treatment room air  -PH     O2 Delivery Intra Treatment room air  -PH     O2 Delivery Post Treatment room air  -PH       Row Name 03/31/24 1005          Positioning and Restraints    Pre-Treatment Position standing in room  -PH     Post Treatment Position chair  -PH     In Chair reclined;call light within reach;encouraged to call for assist;exit alarm on  -PH               User Key  (r) = Recorded By, (t) = Taken By, (c) = Cosigned By      Initials Name Provider Type    Nikki Finn PTA Physical Therapist Assistant                   Outcome Measures       Row Name 03/31/24 1006 03/31/24 0849       How much help from another person do you currently need...    Turning from your back to your side while in flat bed without using bedrails? 3  -PH 3  -CB    Moving from lying on back to sitting on the side of a flat bed without bedrails? 3  -PH 3  -CB    Moving to and from a bed to a chair (including a wheelchair)? 3  -PH 3  -CB    Standing up from a chair using your arms (e.g., wheelchair, bedside chair)? 3  -PH 3  -CB    Climbing 3-5 steps with a railing? 3  -PH 3  -CB    To walk in hospital room? 3  -PH 3  -CB    AM-PAC 6 Clicks Score (PT) 18  -PH 18  -CB    Highest Level of Mobility Goal 6 --> Walk 10 steps or more  -PH 6 --> Walk 10 steps or more  -CB      Row Name 03/31/24 1006          Functional Assessment    Outcome Measure Options AM-PAC 6 Clicks Basic Mobility (PT)  -PH               User Key  (r) = Recorded By, (t) = Taken By, (c) = Cosigned By      Initials Name Provider Type    Nikki Finn PTA Physical Therapist Assistant    Milena Clement, RN Registered Nurse                                 Physical Therapy  Education       Title: PT OT SLP Therapies (Done)       Topic: Physical Therapy (Done)       Point: Mobility training (Done)       Learning Progress Summary             Patient Acceptance, E,TB,D, VU by  at 3/31/2024 1007    Acceptance, E,D, VU,NR by MS at 3/30/2024 1126    Acceptance, E,TB, VU by  at 3/29/2024 0939                         Point: Home exercise program (Done)       Learning Progress Summary             Patient Acceptance, E,TB,D, VU by  at 3/31/2024 1007    Acceptance, E,D, VU,NR by MS at 3/30/2024 1126    Acceptance, E,TB, VU by  at 3/29/2024 0939                         Point: Body mechanics (Done)       Learning Progress Summary             Patient Acceptance, E,TB,D, VU by  at 3/31/2024 1007    Acceptance, E,D, VU,NR by MS at 3/30/2024 1126    Acceptance, E,TB, VU by  at 3/29/2024 0939                         Point: Precautions (Done)       Learning Progress Summary             Patient Acceptance, E,TB,D, VU by  at 3/31/2024 1007    Acceptance, E,D, VU,NR by MS at 3/30/2024 1126    Acceptance, E,TB, VU by  at 3/29/2024 0939                                         User Key       Initials Effective Dates Name Provider Type Discipline    MS 06/16/21 -  George Dang, PT Physical Therapist PT     06/16/21 -  Nikki Reeder PTA Physical Therapist Assistant PT     01/24/24 -  Agustin Huerta PT Student PT Student PT                  PT Recommendation and Plan     Plan of Care Reviewed With: patient  Progress: improving  Outcome Evaluation: Pt was seen by Pt this AM for tx. Pt was exiting BR w/ RN at beg of session w/ noted SBA. Pt amb 150' around nsg unit w/ SBA and no AD. Pt was slow w/ no overt LOB. Pt performed cardiac program x 15 reps and was [facility] at end of session.     Time Calculation:         PT Charges       Row Name 03/31/24 1007             Time Calculation    Start Time 0901  -      Stop Time 0914  -      Time Calculation (min) 13 min  -      PT  Received On 03/31/24  -PH      PT - Next Appointment 04/01/24  -PH         Timed Charges    71829 - PT Therapeutic Exercise Minutes 5  -PH      89043 - PT Therapeutic Activity Minutes 8  -PH         Total Minutes    Timed Charges Total Minutes 13  -PH       Total Minutes 13  -PH                User Key  (r) = Recorded By, (t) = Taken By, (c) = Cosigned By      Initials Name Provider Type    PH Nikki Reeder PTA Physical Therapist Assistant                  Therapy Charges for Today       Code Description Service Date Service Provider Modifiers Qty    [id number]  PT THERAPEUTIC ACT EA 15 MIN 3/31/2024 Nikki Reeder PTA GP 1            PT G-Codes  Outcome Measure Options: AM-PAC 6 Clicks Basic Mobility (PT)  AM-PAC 6 Clicks Score (PT): 18  PT Discharge Summary  Anticipated Discharge Disposition (PT): home with assist    Nikki Reeder PTA  3/31/2024

## 2024-03-31 NOTE — PROGRESS NOTES
LOS: 3 days   Patient Care Team:  Provider, No Known as PCP - General    Chief Complaint:   Post-op follow-up, s/p aortic dissection repair    Subjective  Sitting up in chair.  Feels better.  No new complaints.    Vital Signs  Temp:  [98.7 °F (37.1 °C)-99.3 °F (37.4 °C)] 98.7 °F (37.1 °C)  Heart Rate:  [71-78] 75  Resp:  [16] 16  BP: (127-171)/(59-92) 156/92      03/29/24  0600 03/30/24  0500 03/31/24  0500   Weight: (!) 151 kg (334 lb) (!) 149 kg (327 lb 6.1 oz) (!) 149 kg (328 lb 7.8 oz)     Body mass index is 39.98 kg/m².    Intake/Output Summary (Last 24 hours) at 3/31/2024 0924  Last data filed at 3/31/2024 0913  Gross per 24 hour   Intake --   Output 1880 ml   Net -1880 ml     I/O this shift:  In: -   Out: 1000 [Urine:1000]    Chest tube drainage: 80/24hr    Objective:  Vital signs: (most recent): Blood pressure 156/92, pulse 75, temperature 98.7 °F (37.1 °C), temperature source Oral, resp. rate 16, weight (!) 149 kg (328 lb 7.8 oz), SpO2 97%.                Physical Exam:   General Appearance: awake and alert, no acute distress   Lungs: respirations regular, respirations unlabored, and coarse throughout   Heart: regular rhythm & normal rate, normal S1, S2, and no murmur   Abdomen: soft or nontender, + bowel sounds    Skin: sternal incision clean, dry, intact   Neuro: alert and oriented, no focal deficits.     Results Review:      WBC WBC   Date Value Ref Range Status   03/31/2024 15.52 (H) 3.40 - 10.80 10*3/mm3 Final   03/30/2024 20.74 (H) 3.40 - 10.80 10*3/mm3 Final   03/29/2024 21.71 (H) 3.40 - 10.80 10*3/mm3 Final   03/28/2024 16.77 (H) 3.40 - 10.80 10*3/mm3 Final   03/28/2024 13.05 (H) 3.40 - 10.80 10*3/mm3 Final      HGB Hemoglobin   Date Value Ref Range Status   03/31/2024 11.3 (L) 13.0 - 17.7 g/dL Final   03/30/2024 10.7 (L) 13.0 - 17.7 g/dL Final   03/29/2024 10.8 (L) 13.0 - 17.7 g/dL Final   03/28/2024 12.4 (L) 13.0 - 17.7 g/dL Final   03/28/2024 11.7 (L) 13.0 - 17.7 g/dL Final      HCT Hematocrit    Date Value Ref Range Status   03/31/2024 34.0 (L) 37.5 - 51.0 % Final   03/30/2024 31.8 (L) 37.5 - 51.0 % Final   03/29/2024 32.9 (L) 37.5 - 51.0 % Final   03/28/2024 36.0 (L) 37.5 - 51.0 % Final   03/28/2024 35.4 (L) 37.5 - 51.0 % Final      Platelets Platelets   Date Value Ref Range Status   03/31/2024 284 140 - 450 10*3/mm3 Final   03/30/2024 236 140 - 450 10*3/mm3 Final   03/29/2024 211 140 - 450 10*3/mm3 Final   03/28/2024 181 140 - 450 10*3/mm3 Final   03/28/2024 160 140 - 450 10*3/mm3 Final        PT/INR:    Protime   Date Value Ref Range Status   03/29/2024 16.2 (H) 11.7 - 14.2 Seconds Final   03/28/2024 17.5 (H) 11.7 - 14.2 Seconds Final   /  INR   Date Value Ref Range Status   03/29/2024 1.28 (H) 0.90 - 1.10 Final   03/28/2024 1.41 (H) 0.90 - 1.10 Final       Sodium Sodium   Date Value Ref Range Status   03/31/2024 135 (L) 136 - 145 mmol/L Final   03/30/2024 137 136 - 145 mmol/L Final   03/29/2024 149 (H) 136 - 145 mmol/L Final   03/28/2024 138 136 - 145 mmol/L Final   03/28/2024 139 136 - 145 mmol/L Final   03/28/2024 143 136 - 145 mmol/L Final      Potassium Potassium   Date Value Ref Range Status   03/31/2024 3.9 3.5 - 5.2 mmol/L Final   03/30/2024 4.2 3.5 - 5.2 mmol/L Final   03/29/2024 4.5 3.5 - 5.2 mmol/L Final   03/28/2024 4.3 3.5 - 5.2 mmol/L Final   03/28/2024 4.5 3.5 - 5.2 mmol/L Final   03/28/2024 4.6 3.5 - 5.2 mmol/L Final      Chloride Chloride   Date Value Ref Range Status   03/31/2024 101 98 - 107 mmol/L Final   03/30/2024 105 98 - 107 mmol/L Final   03/29/2024 113 (H) 98 - 107 mmol/L Final   03/28/2024 105 98 - 107 mmol/L Final   03/28/2024 106 98 - 107 mmol/L Final   03/28/2024 106 98 - 107 mmol/L Final      Bicarbonate CO2   Date Value Ref Range Status   03/31/2024 24.1 22.0 - 29.0 mmol/L Final   03/30/2024 24.5 22.0 - 29.0 mmol/L Final   03/29/2024 24.0 22.0 - 29.0 mmol/L Final   03/28/2024 23.6 22.0 - 29.0 mmol/L Final   03/28/2024 23.0 22.0 - 29.0 mmol/L Final   03/28/2024 21.4 (L)  22.0 - 29.0 mmol/L Final      BUN BUN   Date Value Ref Range Status   03/31/2024 11 6 - 20 mg/dL Final   03/30/2024 15 6 - 20 mg/dL Final   03/29/2024 13 6 - 20 mg/dL Final   03/28/2024 5 (L) 6 - 20 mg/dL Final   03/28/2024 12 6 - 20 mg/dL Final   03/28/2024 12 6 - 20 mg/dL Final      Creatinine Creatinine   Date Value Ref Range Status   03/31/2024 0.97 0.76 - 1.27 mg/dL Final   03/30/2024 1.06 0.76 - 1.27 mg/dL Final   03/29/2024 1.39 (H) 0.76 - 1.27 mg/dL Final   03/28/2024 1.54 (H) 0.76 - 1.27 mg/dL Final   03/28/2024 1.64 (H) 0.76 - 1.27 mg/dL Final   03/28/2024 1.57 (H) 0.76 - 1.27 mg/dL Final      Calcium Calcium   Date Value Ref Range Status   03/31/2024 8.2 (L) 8.6 - 10.5 mg/dL Final   03/30/2024 8.3 (L) 8.6 - 10.5 mg/dL Final   03/29/2024 8.0 (L) 8.6 - 10.5 mg/dL Final   03/28/2024 8.3 (L) 8.6 - 10.5 mg/dL Final   03/28/2024 8.3 (L) 8.6 - 10.5 mg/dL Final   03/28/2024 8.8 8.6 - 10.5 mg/dL Final      Magnesium Magnesium   Date Value Ref Range Status   03/29/2024 2.4 1.6 - 2.6 mg/dL Final   03/28/2024 2.5 1.6 - 2.6 mg/dL Final        amLODIPine, 5 mg, Oral, Q24H  aspirin, 81 mg, Oral, Daily  atorvastatin, 40 mg, Oral, Nightly  carvedilol, 37.5 mg, Oral, Q12H  chlorhexidine, 15 mL, Mouth/Throat, Q12H  enoxaparin, 40 mg, Subcutaneous, Q24H  furosemide, 40 mg, Oral, Daily  gabapentin, 300 mg, Oral, Q12H  guaiFENesin, 1,200 mg, Oral, Q12H  insulin lispro, 2-9 Units, Subcutaneous, 4x Daily AC & at Bedtime  mupirocin, , Each Nare, BID  pantoprazole, 40 mg, Oral, QAM  potassium chloride, 20 mEq, Oral, Daily  povidone-iodine, 1 Application, Topical, BID  senna-docusate sodium, 2 tablet, Oral, Nightly  trimethoprim-polymyxin b, 1 drop, Left Eye, Q6H      sodium chloride, 30 mL/hr, Last Rate: 30 mL/hr (03/28/24 1041)          Aortic dissection      Assessment & Plan    - Type A aortic dissection s/p dissection repair POD#3 (Iggy)  - hypertension-- titrate PO meds  - leukocytosis--reactive/intaoperative steroid  administration, afebrile  - post op anemia--expected acute blood loss, stable  - amphetamine use-- encourage cessation  - leukocytosis-- reactive, intra-op steroids, trending down, afebrile       Continue routine care.  D/c epicardial wires and remaining chest tube.  Titrate meds for BP/HR control.  Continue diuresis.  Pain sternal incision and groin incision with betadine BID.  Mobilize and encourage pulm toilet.    Ramiro Carmen PA-C  03/31/24  09:24 EDT

## 2024-03-31 NOTE — PLAN OF CARE
Goal Outcome Evaluation:  Plan of Care Reviewed With: patient        Progress: improving  Outcome Evaluation: Pt was seen by Pt this AM for tx. Pt was exiting BR w/ RN at beg of session w/ noted SBA. Pt amb 150' around nsg unit w/ SBA and no AD. Pt was slow w/ no overt LOB. Pt performed cardiac program x 15 reps and was [facility] at end of session.      Anticipated Discharge Disposition (PT): home with assist

## 2024-04-01 ENCOUNTER — HOME HEALTH ADMISSION (OUTPATIENT)
Dept: HOME HEALTH SERVICES | Facility: HOME HEALTHCARE | Age: 39
End: 2024-04-01

## 2024-04-01 LAB
ANION GAP SERPL CALCULATED.3IONS-SCNC: 9 MMOL/L (ref 5–15)
BUN SERPL-MCNC: 11 MG/DL (ref 6–20)
BUN/CREAT SERPL: 11.2 (ref 7–25)
CALCIUM SPEC-SCNC: 8.6 MG/DL (ref 8.6–10.5)
CHLORIDE SERPL-SCNC: 102 MMOL/L (ref 98–107)
CO2 SERPL-SCNC: 23 MMOL/L (ref 22–29)
CREAT SERPL-MCNC: 0.98 MG/DL (ref 0.76–1.27)
DEPRECATED RDW RBC AUTO: 43.6 FL (ref 37–54)
EGFRCR SERPLBLD CKD-EPI 2021: 101.9 ML/MIN/1.73
ERYTHROCYTE [DISTWIDTH] IN BLOOD BY AUTOMATED COUNT: 14 % (ref 12.3–15.4)
GLUCOSE BLDC GLUCOMTR-MCNC: 113 MG/DL (ref 70–130)
GLUCOSE BLDC GLUCOMTR-MCNC: 121 MG/DL (ref 70–130)
GLUCOSE BLDC GLUCOMTR-MCNC: 142 MG/DL (ref 70–130)
GLUCOSE SERPL-MCNC: 133 MG/DL (ref 65–99)
HCT VFR BLD AUTO: 34.4 % (ref 37.5–51)
HGB BLD-MCNC: 11.3 G/DL (ref 13–17.7)
MCH RBC QN AUTO: 27.9 PG (ref 26.6–33)
MCHC RBC AUTO-ENTMCNC: 32.8 G/DL (ref 31.5–35.7)
MCV RBC AUTO: 84.9 FL (ref 79–97)
PLATELET # BLD AUTO: 350 10*3/MM3 (ref 140–450)
PMV BLD AUTO: 9.7 FL (ref 6–12)
POTASSIUM SERPL-SCNC: 4 MMOL/L (ref 3.5–5.2)
RBC # BLD AUTO: 4.05 10*6/MM3 (ref 4.14–5.8)
SODIUM SERPL-SCNC: 134 MMOL/L (ref 136–145)
WBC NRBC COR # BLD AUTO: 14.59 10*3/MM3 (ref 3.4–10.8)

## 2024-04-01 PROCEDURE — 85027 COMPLETE CBC AUTOMATED: CPT | Performed by: NURSE PRACTITIONER

## 2024-04-01 PROCEDURE — 80048 BASIC METABOLIC PNL TOTAL CA: CPT | Performed by: NURSE PRACTITIONER

## 2024-04-01 PROCEDURE — 90791 PSYCH DIAGNOSTIC EVALUATION: CPT | Performed by: SOCIAL WORKER

## 2024-04-01 PROCEDURE — 82948 REAGENT STRIP/BLOOD GLUCOSE: CPT

## 2024-04-01 PROCEDURE — 99024 POSTOP FOLLOW-UP VISIT: CPT | Performed by: NURSE PRACTITIONER

## 2024-04-01 PROCEDURE — 25010000002 ENOXAPARIN PER 10 MG: Performed by: NURSE PRACTITIONER

## 2024-04-01 PROCEDURE — 99232 SBSQ HOSP IP/OBS MODERATE 35: CPT | Performed by: NURSE PRACTITIONER

## 2024-04-01 PROCEDURE — 97530 THERAPEUTIC ACTIVITIES: CPT

## 2024-04-01 RX ORDER — NIFEDIPINE 60 MG/1
60 TABLET, EXTENDED RELEASE ORAL
Status: DISCONTINUED | OUTPATIENT
Start: 2024-04-01 | End: 2024-04-02

## 2024-04-01 RX ORDER — AMLODIPINE BESYLATE 5 MG/1
5 TABLET ORAL ONCE
Qty: 1 TABLET | Refills: 0 | Status: DISCONTINUED | OUTPATIENT
Start: 2024-04-01 | End: 2024-04-01

## 2024-04-01 RX ORDER — AMLODIPINE BESYLATE 10 MG/1
10 TABLET ORAL
Status: DISCONTINUED | OUTPATIENT
Start: 2024-04-02 | End: 2024-04-01

## 2024-04-01 RX ADMIN — POVIDONE-IODINE 1 EACH: 10 SOLUTION TOPICAL at 08:16

## 2024-04-01 RX ADMIN — 0.12% CHLORHEXIDINE GLUCONATE 15 ML: 1.2 RINSE ORAL at 20:27

## 2024-04-01 RX ADMIN — AMLODIPINE BESYLATE 5 MG: 5 TABLET ORAL at 08:16

## 2024-04-01 RX ADMIN — HYDROCODONE BITARTRATE AND ACETAMINOPHEN 2 TABLET: 5; 325 TABLET ORAL at 04:13

## 2024-04-01 RX ADMIN — HYDROCODONE BITARTRATE AND ACETAMINOPHEN 2 TABLET: 5; 325 TABLET ORAL at 23:44

## 2024-04-01 RX ADMIN — ENOXAPARIN SODIUM 40 MG: 100 INJECTION SUBCUTANEOUS at 17:32

## 2024-04-01 RX ADMIN — DOCUSATE SODIUM 50MG AND SENNOSIDES 8.6MG 2 TABLET: 8.6; 5 TABLET, FILM COATED ORAL at 20:27

## 2024-04-01 RX ADMIN — MUPIROCIN 1 APPLICATION: 20 OINTMENT TOPICAL at 20:27

## 2024-04-01 RX ADMIN — FUROSEMIDE 40 MG: 40 TABLET ORAL at 08:16

## 2024-04-01 RX ADMIN — NIFEDIPINE 60 MG: 60 TABLET, FILM COATED, EXTENDED RELEASE ORAL at 14:49

## 2024-04-01 RX ADMIN — GUAIFENESIN 1200 MG: 600 TABLET, EXTENDED RELEASE ORAL at 08:16

## 2024-04-01 RX ADMIN — PANTOPRAZOLE SODIUM 40 MG: 40 TABLET, DELAYED RELEASE ORAL at 06:34

## 2024-04-01 RX ADMIN — GABAPENTIN 300 MG: 300 CAPSULE ORAL at 20:27

## 2024-04-01 RX ADMIN — ASPIRIN 81 MG: 81 TABLET, COATED ORAL at 08:16

## 2024-04-01 RX ADMIN — GUAIFENESIN 1200 MG: 600 TABLET, EXTENDED RELEASE ORAL at 20:26

## 2024-04-01 RX ADMIN — ATORVASTATIN CALCIUM 40 MG: 20 TABLET, FILM COATED ORAL at 20:27

## 2024-04-01 RX ADMIN — POTASSIUM CHLORIDE 20 MEQ: 750 TABLET, EXTENDED RELEASE ORAL at 08:16

## 2024-04-01 RX ADMIN — MUPIROCIN 1 APPLICATION: 20 OINTMENT TOPICAL at 08:16

## 2024-04-01 RX ADMIN — CARVEDILOL 37.5 MG: 25 TABLET, FILM COATED ORAL at 23:37

## 2024-04-01 RX ADMIN — GABAPENTIN 300 MG: 300 CAPSULE ORAL at 08:17

## 2024-04-01 RX ADMIN — CARVEDILOL 37.5 MG: 25 TABLET, FILM COATED ORAL at 08:17

## 2024-04-01 RX ADMIN — HYDROCODONE BITARTRATE AND ACETAMINOPHEN 2 TABLET: 5; 325 TABLET ORAL at 08:34

## 2024-04-01 RX ADMIN — POVIDONE-IODINE 1 EACH: 10 SOLUTION TOPICAL at 21:05

## 2024-04-01 NOTE — DISCHARGE PLACEMENT REQUEST
"Portia Philip (38 y.o. Male)       Date of Birth   1985    Social Security Number       Address   [facility] [address] [place] [address]    Home Phone   [phone]    MRN   [id number]       Worship   None    Marital Status   Single                            Admission Date   3/28/24    Admission Type   Urgent    Admitting Provider   Sukhdeep Parkinson MD    Attending Provider   Sukhdeep Parkinson MD    Department, Room/Bed   [facility] CARDIOVASC UNIT, 2208/1       Discharge Date       Discharge Disposition       Discharge Destination                                 Attending Provider: Sukhdeep Parkinson MD    Allergies: No Known Allergies    Isolation: None   Infection: None   Code Status: CPR    Ht: 193 cm (76\")   Wt: 149 kg (328 lb 7.8 oz)    Admission Cmt: None   Principal Problem: Aortic dissection [I71.00]                   Active Insurance as of 3/28/2024       Primary Coverage       Payor Plan Insurance Group Employer/Plan Group    MEDICAID PENDING MEDICAID PENDING        Payor Plan Address Payor Plan Phone Number Payor Plan Fax Number Effective Dates       3/28/2024 - 4/28/2024      Subscriber Name Subscriber Birth Date Member ID       PORTIA PHILIP 1985 [id number]                     Emergency Contacts        (Rel.) Home Phone Work Phone Mobile Phone    Erin Law (Friend) [phone] -- [phone]                "

## 2024-04-01 NOTE — PROGRESS NOTES
Hospital Follow Up    LOS:  LOS: 4 days   Patient Name: Tj Christopher  Age/Sex: 38 y.o. male  : 1985  MRN: [id number]    Day of Service: 24   Length of Stay: 4  Encounter Provider: MAYRA Sagastume  Place of Service: [facility] CARDIOLOGY  Patient Care Team:  Provider, No Known as PCP - General    Subjective:     Chief Complaint: follow up aortic dissection    Interval History: No reported chest pain or shortness of breath. Concerned about his elevated BP.    Objective:     Objective:  Temp:  [98.1 °F (36.7 °C)-100.3 °F (37.9 °C)] 98.2 °F (36.8 °C)  Heart Rate:  [67-82] 67  Resp:  [17-18] 18  BP: (103-155)/(57-97) 154/78     Intake/Output Summary (Last 24 hours) at 2024 0944  Last data filed at 2024 0908  Gross per 24 hour   Intake 240 ml   Output --   Net 240 ml     Body mass index is 39.98 kg/m².      24  0600 24  0500 24  0500   Weight: (!) 151 kg (334 lb) (!) 149 kg (327 lb 6.1 oz) (!) 149 kg (328 lb 7.8 oz)     Weight change:     Physical Exam:   General Appearance:    Awake alert and oriented in no acute distress.   Color:  Skin:  Neuro:  HEENT:    Lungs:     Pink  Warm and dry  No focal, motor or sensory deficits  Neck supple, pupils equal, round and reactive. No JVD, No Bruit  Clear to auscultation,respirations regular, even and                  unlabored    Heart:    Regular rate and rhythm, S1 and S2, no murmur, no gallop, no rub. No edema, DP/PT pulses are 2+   Chest Wall:    Midsternal chest incision clean, dry, open to air   Abdomen:     Normal bowel sounds, no masses, no organomegaly, soft        non-tender, non-distended, no guarding, no ascites noted   Extremities:   Moves all extremities well, no edema, no cyanosis, no redness       Lab Review:   Results from last 7 days   Lab Units 24  0411 24  1104 24  0329 24  0712 24  1901   SODIUM mmol/L 134*  --  135*   < > 136   POTASSIUM mmol/L 4.0 4.2 3.9  "  < > 3.8   CHLORIDE mmol/L 102  --  101   < > 99   CO2 mmol/L 23.0  --  24.1   < > 27.0   BUN mg/dL 11  --  11   < > 5*   CREATININE mg/dL 0.98  --  0.97   < > 1.15   GLUCOSE mg/dL 133*  --  115*   < > 100*   CALCIUM mg/dL 8.6  --  8.2*   < > 8.8   AST (SGOT) U/L  --   --   --   --  21   ALT (SGPT) U/L  --   --   --   --  26    < > = values in this interval not displayed.     Results from last 7 days   Lab Units 03/27/24  1901   HSTROP T ng/L 12     Results from last 7 days   Lab Units 04/01/24  0411 03/31/24  0329   WBC 10*3/mm3 14.59* 15.52*   HEMOGLOBIN g/dL 11.3* 11.3*   HEMATOCRIT % 34.4* 34.0*   PLATELETS 10*3/mm3 350 284     Results from last 7 days   Lab Units 03/29/24  0147 03/28/24  1323 03/28/24  0712   INR  1.28* 1.41* 1.41*   APTT seconds  --  30.1 28.2     Results from last 7 days   Lab Units 03/29/24  0147 03/28/24  1132   MAGNESIUM mg/dL 2.4 2.5           Invalid input(s): \"LDLCALC\"          I reviewed the patient's new clinical results.  I personally viewed and interpreted the patient's EKG  Current Medications:   Scheduled Meds:amLODIPine, 5 mg, Oral, Q24H  aspirin, 81 mg, Oral, Daily  atorvastatin, 40 mg, Oral, Nightly  carvedilol, 37.5 mg, Oral, Q12H  chlorhexidine, 15 mL, Mouth/Throat, Q12H  enoxaparin, 40 mg, Subcutaneous, Q24H  furosemide, 40 mg, Oral, Daily  gabapentin, 300 mg, Oral, Q12H  guaiFENesin, 1,200 mg, Oral, Q12H  insulin lispro, 2-9 Units, Subcutaneous, 4x Daily AC & at Bedtime  mupirocin, , Each Nare, BID  pantoprazole, 40 mg, Oral, QAM  potassium chloride, 20 mEq, Oral, Daily  povidone-iodine, 1 Application, Topical, BID  senna-docusate sodium, 2 tablet, Oral, Nightly  trimethoprim-polymyxin b, 1 drop, Left Eye, Q6H      Continuous Infusions:sodium chloride, 30 mL/hr, Last Rate: 30 mL/hr (03/28/24 1041)        Allergies:  No Known Allergies    Assessment:       Aortic dissection        Plan:   Type A aortic dissection: Extending from the ascending aorta to the abdominal aortic " bifurcation and into the innominate and left carotid arteries. Now status post emergent dissection repair with dacryon interposition graft, proximal aortic arch repair, aortic root reconstruction with, commissural resuspension on 3/28/2024.   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   Leukocytosis  Expected postoperative anemia  Methamphetamine use: Reported use prior to admission       -BP elevated. Amlodipine increased.  -Continue current cardiac management  MAYRA Sagastume  04/01/24  09:44 EDT  Electronically signed by MAYRA Sagastume, 04/01/24, 9:44 AM EDT.

## 2024-04-01 NOTE — DISCHARGE PLACEMENT REQUEST
"Portia Philip (38 y.o. Male)       Date of Birth   1985    Social Security Number       Address   [facility] [address] [place] [address]    Home Phone   [phone]    MRN   [id number]       Hinduism   None    Marital Status   Single                            Admission Date   3/28/24    Admission Type   Urgent    Admitting Provider   Sukhdeep Parkinson MD    Attending Provider   Sukhdeep Parkinson MD    Department, Room/Bed   [facility] CARDIOVASC UNIT, 2208/1       Discharge Date       Discharge Disposition       Discharge Destination                                 Attending Provider: Sukhdeep Parkinson MD    Allergies: No Known Allergies    Isolation: None   Infection: None   Code Status: CPR    Ht: 193 cm (76\")   Wt: 149 kg (328 lb 7.8 oz)    Admission Cmt: None   Principal Problem: Aortic dissection [I71.00]                   Active Insurance as of 3/28/2024       Primary Coverage       Payor Plan Insurance Group Employer/Plan Group    MEDICAID PENDING MEDICAID PENDING        Payor Plan Address Payor Plan Phone Number Payor Plan Fax Number Effective Dates       3/28/2024 - 4/28/2024      Subscriber Name Subscriber Birth Date Member ID       PORTIA PHILIP 1985 [id number]                     Emergency Contacts        (Rel.) Home Phone Work Phone Mobile Phone    Matilde Liang (Aunt) (Relative) [phone] -- --    Amberly Mendoza (Friend) [phone] -- --                "

## 2024-04-01 NOTE — CONSULTS
Met with patient to discuss the benefits of cardiac rehab. Provided phase II information along with the contact information for cardiac rehab at [facility]. Pt requested for referral to be sent.Explained if receiving home health would not be able to attend cardiac rehab until finished with home health.

## 2024-04-01 NOTE — CASE MANAGEMENT/SOCIAL WORK
Discharge Planning Assessment  [facility]     Patient Name: Tj Christopher  MRN: [id number]  Today's Date: 4/1/2024    Admit Date: 3/28/2024    Plan: Home w/ assist of aunt.  NO HH could be secured.   Discharge Needs Assessment       Row Name 04/01/24 1607       Living Environment    People in Home other relative(s)  AUNT    Name(s) of People in Home AUNT, JIMMY KELLY    Current Living Arrangements home    Potentially Unsafe Housing Conditions none    In the past 12 months has the electric, gas, oil, or water company threatened to shut off services in your home? No    Primary Care Provided by self    Provides Primary Care For no one    Family Caregiver if Needed other relative(s)    Family Caregiver Names AUNT JIMMY    Quality of Family Relationships helpful;involved;supportive    Able to Return to Prior Arrangements yes       Resource/Environmental Concerns    Resource/Environmental Concerns none    Transportation Concerns none       Transportation Needs    In the past 12 months, has lack of transportation kept you from medical appointments or from getting medications? no    In the past 12 months, has lack of transportation kept you from meetings, work, or from getting things needed for daily living? No       Food Insecurity    Within the past 12 months, you worried that your food would run out before you got the money to buy more. Never true    Within the past 12 months, the food you bought just didn't last and you didn't have money to get more. Never true       Transition Planning    Patient/Family Anticipates Transition to home with family    Patient/Family Anticipated Services at Transition home health care    Transportation Anticipated family or friend will provide       Discharge Needs Assessment    Readmission Within the Last 30 Days no previous admission in last 30 days    Equipment Currently Used at Home none    Concerns to be Addressed discharge planning    Anticipated Changes Related to Illness  none    Equipment Needed After Discharge none    Patient's Choice of Community Agency(s) CCP trying to secure a [place] Home health agency that will accept [place] Pending.  [facility] declined due to lack of staffing this week due to spring break.                   Discharge Plan       Row Name 04/01/24 1610       Plan    Plan Home w/ assist of aunt.  NO HH could be secured.    Plan Comments CCP spoke with patient at bedside.  CCP role explained and discharge planning discussed.  Face sheet verified.  Patient stated he is IADL's and currently unemployed.  Patient lives with his Aunt Matilde Liang ([phone]) in a two-level home with three to five steps to enter.  Patient has no current PCP but reports his aunt is trying to secure him one.  PCP pharmacy confirmed as, [facility] in [place], [place].  Patient denies use of past home health or going to a sub-acute rehab.  Patient uses no DME.  Patient plans to return home with assistance from his aunt.  CCP has sent mass home health referrals and all declined either due to low staffing ([facility]) or not in network with [place] Medicaid.  APRN notfied that no HH obtained. Pt reports his aunt has a BP wrist cuff monitor and a scale that he can use at home.  CCP will continue to follow…….Gail TUCKER /YOANA.                  Continued Care and Services - Admitted Since 3/28/2024       Home Medical Care Patient indicates having no preference.      Service Provider Request Status Selected Services Address Phone Fax Patient Preferred    [facility] Declined  Inadequate staffing N/A [address] [address] [phone] --    [facility] Declined  Inadequate staffing N/A [address] [facility] [place] [address] [phone] --    [facility] Declined  Out of network N/A [address] [id number] [phone] --     [facility] Declined  Out of network N/A [address], [facility] [address] [phone] --    [facility] Declined  Insurance Denial N/A [address], [address], [facility] [address] [phone] --                  Expected Discharge Date and Time       Expected Discharge Date Expected Discharge Time    Apr 3, 2024            Demographic Summary       Row Name 04/01/24 1606       General Information    Admission Type inpatient    Arrived From [facility] Emergency Department.    Referral Source admission list;physician    Reason for Consult discharge planning    Preferred Language English       Contact Information    Permission Granted to Share Info With family/designee                   Functional Status       Row Name 04/01/24 1607       Functional Status    Current Activity Tolerance moderate       Assessment of Health Literacy    How often do you have someone help you read hospital materials? Never    Health Literacy Good       Functional Status, IADL    Medications independent    Meal Preparation independent    Housekeeping independent    Laundry independent    Shopping independent       Mental Status    General Appearance WDL WDL       Mental Status Summary    Recent Changes in Mental Status/Cognitive Functioning no changes       Employment/    Employment Status unemployed                   Psychosocial       Row Name 04/01/24 1620       Behavior WDL    Behavior WDL WDL       Emotion Mood WDL    Emotion/Mood/Affect WDL WDL       Speech WDL    Speech WDL WDL       Coping/Stress    Major Change/Loss/Stressor none    Sources of Support other family members       Developmental Stage (Colt's)    Developmental Stage Stage 7 (35-65 years/Middle Adulthood) Generativity vs. Stagnation       Violence Risk    Feels Like Hurting Others no                   Abuse/Neglect    No documentation.                   Legal    No documentation.                  Substance Abuse       Row Name 04/01/24 1621       Substance Use    Substance Use Status never used       AUDIT-C (Alcohol Use Disorders ID Test)    Q1: How often do you have a drink containing alcohol? Never    Q2: How many drinks containing alcohol do you have on a typical day when you are drinking? None    Q3: How often do you have six or more drinks on one occasion? Never    Audit-C Score 0       Family Member Substance Use (#4)    Family History of Substance Use none    Previous Substance Use Treatment none                   Patient Forms    No documentation.                     Gail Mann RN

## 2024-04-01 NOTE — PROGRESS NOTES
LOS: 4 days   Patient Care Team:  Provider, No Known as PCP - General    Chief Complaint:   Post-op follow-up, s/p aortic dissection repair    Subjective  Sitting up in chair.  Feels better.  No new complaints.    Vital Signs  Temp:  [98.1 °F (36.7 °C)-100.3 °F (37.9 °C)] 98.2 °F (36.8 °C)  Heart Rate:  [67-82] 67  Resp:  [17-18] 18  BP: (103-156)/(57-97) 103/57      03/29/24  0600 03/30/24  0500 03/31/24  0500   Weight: (!) 151 kg (334 lb) (!) 149 kg (327 lb 6.1 oz) (!) 149 kg (328 lb 7.8 oz)     Body mass index is 39.98 kg/m².    Intake/Output Summary (Last 24 hours) at 4/1/2024 0807  Last data filed at 3/31/2024 0913  Gross per 24 hour   Intake --   Output 1000 ml   Net -1000 ml     No intake/output data recorded.    Chest tube drainage:     Objective:  Vital signs: (most recent): Blood pressure 103/57, pulse 67, temperature 98.2 °F (36.8 °C), temperature source Oral, resp. rate 18, weight (!) 149 kg (328 lb 7.8 oz), SpO2 100%.                Physical Exam:   General Appearance: awake and alert, no acute distress   Lungs: respirations regular, respirations unlabored, and coarse throughout   Heart: regular rhythm & normal rate, normal S1, S2, and no murmur   Abdomen: soft or nontender, + bowel sounds    Skin: sternal incision clean, dry, intact   Neuro: alert and oriented, no focal deficits.     Results Review:      WBC WBC   Date Value Ref Range Status   04/01/2024 14.59 (H) 3.40 - 10.80 10*3/mm3 Final   03/31/2024 15.52 (H) 3.40 - 10.80 10*3/mm3 Final   03/30/2024 20.74 (H) 3.40 - 10.80 10*3/mm3 Final      HGB Hemoglobin   Date Value Ref Range Status   04/01/2024 11.3 (L) 13.0 - 17.7 g/dL Final   03/31/2024 11.3 (L) 13.0 - 17.7 g/dL Final   03/30/2024 10.7 (L) 13.0 - 17.7 g/dL Final      HCT Hematocrit   Date Value Ref Range Status   04/01/2024 34.4 (L) 37.5 - 51.0 % Final   03/31/2024 34.0 (L) 37.5 - 51.0 % Final   03/30/2024 31.8 (L) 37.5 - 51.0 % Final      Platelets Platelets   Date Value Ref Range Status  "  04/01/2024 350 140 - 450 10*3/mm3 Final   03/31/2024 284 140 - 450 10*3/mm3 Final   03/30/2024 236 140 - 450 10*3/mm3 Final        PT/INR:    No results found for: \"PROTIME\"  /  No results found for: \"INR\"      Sodium Sodium   Date Value Ref Range Status   04/01/2024 134 (L) 136 - 145 mmol/L Final   03/31/2024 135 (L) 136 - 145 mmol/L Final   03/30/2024 137 136 - 145 mmol/L Final      Potassium Potassium   Date Value Ref Range Status   04/01/2024 4.0 3.5 - 5.2 mmol/L Final   03/31/2024 4.2 3.5 - 5.2 mmol/L Final   03/31/2024 3.9 3.5 - 5.2 mmol/L Final   03/30/2024 4.2 3.5 - 5.2 mmol/L Final      Chloride Chloride   Date Value Ref Range Status   04/01/2024 102 98 - 107 mmol/L Final   03/31/2024 101 98 - 107 mmol/L Final   03/30/2024 105 98 - 107 mmol/L Final      Bicarbonate CO2   Date Value Ref Range Status   04/01/2024 23.0 22.0 - 29.0 mmol/L Final   03/31/2024 24.1 22.0 - 29.0 mmol/L Final   03/30/2024 24.5 22.0 - 29.0 mmol/L Final      BUN BUN   Date Value Ref Range Status   04/01/2024 11 6 - 20 mg/dL Final   03/31/2024 11 6 - 20 mg/dL Final   03/30/2024 15 6 - 20 mg/dL Final      Creatinine Creatinine   Date Value Ref Range Status   04/01/2024 0.98 0.76 - 1.27 mg/dL Final   03/31/2024 0.97 0.76 - 1.27 mg/dL Final   03/30/2024 1.06 0.76 - 1.27 mg/dL Final      Calcium Calcium   Date Value Ref Range Status   04/01/2024 8.6 8.6 - 10.5 mg/dL Final   03/31/2024 8.2 (L) 8.6 - 10.5 mg/dL Final   03/30/2024 8.3 (L) 8.6 - 10.5 mg/dL Final      Magnesium No results found for: \"MG\"       amLODIPine, 5 mg, Oral, Q24H  aspirin, 81 mg, Oral, Daily  atorvastatin, 40 mg, Oral, Nightly  carvedilol, 37.5 mg, Oral, Q12H  chlorhexidine, 15 mL, Mouth/Throat, Q12H  enoxaparin, 40 mg, Subcutaneous, Q24H  furosemide, 40 mg, Oral, Daily  gabapentin, 300 mg, Oral, Q12H  guaiFENesin, 1,200 mg, Oral, Q12H  insulin lispro, 2-9 Units, Subcutaneous, 4x Daily AC & at Bedtime  mupirocin, , Each Nare, BID  pantoprazole, 40 mg, Oral, " QAM  potassium chloride, 20 mEq, Oral, Daily  povidone-iodine, 1 Application, Topical, BID  senna-docusate sodium, 2 tablet, Oral, Nightly  trimethoprim-polymyxin b, 1 drop, Left Eye, Q6H      sodium chloride, 30 mL/hr, Last Rate: 30 mL/hr (03/28/24 1041)          Aortic dissection      Assessment & Plan    - Type A aortic dissection s/p dissection repair POD#4 (Pagni)  - hypertension-- titrate PO meds  - leukocytosis--reactive/intaoperative steroid administration, afebrile  - post op anemia--expected acute blood loss, stable  - amphetamine use-- encourage cessation  - leukocytosis-- reactive, intra-op steroids, trending down, afebrile       Continue routine care.  This morning his blood pressure is in the 150s-- will increase norvasc  Titrate meds for BP/HR control.  Continue diuresis.  Pain sternal incision and groin incision with betadine BID.  Mobilize and encourage pulm toilet.    MAYRA Goldberg  04/01/24  08:07 EDT

## 2024-04-01 NOTE — THERAPY TREATMENT NOTE
Patient Name: Tj Christopher  : 1985    MRN: [id number]                              Today's Date: 2024       Admit Date: 3/28/2024    Visit Dx:     ICD-10-CM ICD-9-CM   1. CHRISTIANO (obstructive sleep apnea)  G47.33 327.23   2. Aneurysm  I72.9 442.9   3. Aneurysm of aorta  I71.9 441.9   4. Dissection of thoracoabdominal aorta  I71.03 441.03     Patient Active Problem List   Diagnosis    Aortic dissection     History reviewed. No pertinent past medical history.  Past Surgical History:   Procedure Laterality Date    ASCENDING ARCH/HEMIARCH REPLACEMENT N/A 3/27/2024    Procedure: WILLIAM, STERNOTOMY, REPAIR OF A  TYPE A AORTIC DISSECTION, HEMIARCH REPAIR, DEEP HYPOTHERMIC CIRCULATORY ARREST, ANTEGRADE SELECTIVE CEREBRAL PERFUSION, AORTIC VALVE RESUSPENTION/ROOT REPAIR, PRP;  Surgeon: Sukhdeep Parkinson MD;  [place] [facility]  Service: Cardiothoracic;  Laterality: N/A;      General Information       Row Name 24 1422          Physical Therapy Time and Intention    Document Type therapy note (daily note)  -MS (r) MH (t) MS (c)     Mode of Treatment physical therapy  -MS (r) MH (t) MS (c)       Row Name 24 1422          General Information    Patient Profile Reviewed yes  -MS (r) MH (t) MS (c)     Existing Precautions/Restrictions fall;sternal;cardiac  -MS (r) MH (t) MS (c)       Row Name 24 1422          Cognition    Orientation Status (Cognition) oriented x 4  -MS (r) MH (t) MS (c)       Row Name 24 1422          Safety Issues, Functional Mobility    Safety Issues Affecting Function (Mobility) safety precaution awareness;safety precautions follow-through/compliance  -MS (r) MH (t) MS (c)     Impairments Affecting Function (Mobility) endurance/activity tolerance;strength  -MS (r) MH (t) MS (c)     Comment, Safety Issues/Impairments (Mobility) non skid socks donned for safety.  -MS (r) MH (t) MS (c)               User Key  (r) = Recorded By, (t) = Taken By, (c) = Cosigned By      Initials Name  Provider Type    MS NavasNavya, PT Physical Therapist    Agustin Malik, PT Student PT Student                   Mobility       Row Name 04/01/24 1422          Bed Mobility    Supine-Sit [place] (Bed Mobility) not tested  -MS (r) MH (t) MS (c)     Sit-Supine [place] (Bed Mobility) not tested  -MS (r) MH (t) MS (c)     Comment, (Bed Mobility) NT. Pt was sitting on edge of bed upon arrival independently.  -MS (r) MH (t) MS (c)       Row Name 04/01/24 1422          Bed-Chair Transfer    Bed-Chair [place] (Transfers) not tested  -MS (r) MH (t) MS (c)     Comment, (Bed-Chair Transfer) NT this date.  -MS (r) MH (t) MS (c)       Row Name 04/01/24 1422          Sit-Stand Transfer    Sit-Stand [place] (Transfers) verbal cues;nonverbal cues (demo/gesture);supervision  -MS (r) MH (t) MS (c)     Comment, (Sit-Stand Transfer) No AD used. No LOB or veering noted.  -MS (r) MH (t) MS (c)       Row Name 04/01/24 1422          Gait/Stairs (Locomotion)    [place] Level (Gait) verbal cues;nonverbal cues (demo/gesture);standby assist  -MS (r) MH (t) MS (c)     Patient was able to Ambulate yes  -MS (r) MH (t) MS (c)     Distance in Feet (Gait) 400  -MS (r) MH (t) MS (c)     Deviations/Abnormal Patterns (Gait) maryanne decreased  -MS (r) MH (t) MS (c)     [place] Level (Stairs) not tested  -MS (r) MH (t) MS (c)     Comment, (Gait/Stairs) No LOB or veering noted. Vitals stayed WNL.  -MS (r) MH (t) MS (c)               User Key  (r) = Recorded By, (t) = Taken By, (c) = Cosigned By      Initials Name Provider Type    MS NavasNavya, PT Physical Therapist    Agustin Malik, PT Student PT Student                   Obj/Interventions       Row Name 04/01/24 1423          Motor Skills    Therapeutic Exercise --  cardiac protocol x 10, 1 STS  -MS (r) MH (t) MS (c)       Row Name 04/01/24 1423          Balance    Balance Assessment sitting static balance;sit to stand dynamic balance;standing  static balance;standing dynamic balance  -MS (r) MH (t) MS (c)     Static Sitting Balance verbal cues;non-verbal cues (demo/gesture);independent  -MS (r) MH (t) MS (c)     Position, Sitting Balance unsupported;sitting edge of bed  -MS (r) MH (t) MS (c)     Sit to Stand Dynamic Balance verbal cues;non-verbal cues (demo/gesture);supervision  -MS (r) MH (t) MS (c)     Static Standing Balance verbal cues;non-verbal cues (demo/gesture);standby assist  -MS (r) MH (t) MS (c)     Dynamic Standing Balance verbal cues;non-verbal cues (demo/gesture);standby assist  -MS (r) MH (t) MS (c)     Balance Interventions sitting;standing;sit to stand;static;dynamic  -MS (r) MH (t) MS (c)     Comment, Balance no LOB  -MS (r) MH (t) MS (c)               User Key  (r) = Recorded By, (t) = Taken By, (c) = Cosigned By      Initials Name Provider Type    MS PaynesNavya, PT Physical Therapist     Agustin Huerta, PT Student PT Student                   Goals/Plan    No documentation.                  Clinical Impression       Row Name 04/01/24 142          Pain    Pretreatment Pain Rating 9/10  -MS (r) MH (t) MS (c)     Posttreatment Pain Rating 9/10  -MS (r) MH (t) MS (c)     Pain Location - chest  -MS (r) MH (t) MS (c)     Pre/Posttreatment Pain Comment Pt states that his is in a 9/10 pain around chest incision area.  -MS (r) MH (t) MS (c)     Pain Intervention(s) Repositioned;Nursing Notified;Rest  -MS (r) MH (t) MS (c)       Row Name 04/01/24 1425          Plan of Care Review    Plan of Care Reviewed With patient  -MS (r) MH (t) MS (c)     Progress improving  -MS (r) MH (t) MS (c)     Outcome Evaluation Pt agreeable to PT session this date. Pt was sitting EOB independently upon arrival. Pt performed cardiac protocol exercises with supervision and was able to perform transfers and ambulate with SBA this date. Pt was able to ambulate around 400 ft with SBA, no LOB or veering noted. No further acute therapy needs at this times,  PT will sign off. Pt was left sitting on EOB with call light in reach. Vitals stayed WNL during the entirety of session. PT educated pt about importance of continuing to walk 3 times a day with nsg and to perform cardiac protocol exercises daily for prevention of blood clots.  -MS (r) MH (t) MS (c)       Row Name 04/01/24 1424          Vital Signs    Pretreatment Heart Rate (beats/min) 80  -MS (r) MH (t) MS (c)     Pre SpO2 (%) 97  -MS (r) MH (t) MS (c)     O2 Delivery Pre Treatment room air  -MS (r) MH (t) MS (c)     O2 Delivery Intra Treatment room air  -MS (r) MH (t) MS (c)     O2 Delivery Post Treatment room air  -MS (r) MH (t) MS (c)     Pre Patient Position Sitting  -MS (r) MH (t) MS (c)     Intra Patient Position Standing  -MS (r) MH (t) MS (c)     Post Patient Position Sitting  -MS (r) MH (t) MS (c)     Rest Breaks  1  -MS (r) MH (t) MS (c)       Row Name 04/01/24 1424          Positioning and Restraints    Pre-Treatment Position in bed  -MS (r) MH (t) MS (c)     Post Treatment Position bed  -MS (r) MH (t) MS (c)     In Bed notified nsg;sitting;encouraged to call for assist  -MS (r) MH (t) MS (c)               User Key  (r) = Recorded By, (t) = Taken By, (c) = Cosigned By      Initials Name Provider Type    Navya Cortés, PT Physical Therapist    Agustin Malik, PT Student PT Student                   Outcome Measures       Row Name 04/01/24 1429 04/01/24 0816       How much help from another person do you currently need...    Turning from your back to your side while in flat bed without using bedrails? 4  -MS (r) MH (t) MS (c) 3  -CB    Moving from lying on back to sitting on the side of a flat bed without bedrails? 4  -MS (r) MH (t) MS (c) 3  -CB    Moving to and from a bed to a chair (including a wheelchair)? 4  -MS (r) MH (t) MS (c) 3  -CB    Standing up from a chair using your arms (e.g., wheelchair, bedside chair)? 4  -MS (r) MH (t) MS (c) 3  -CB    Climbing 3-5 steps with a railing? 4   -MS (r) MH (t) MS (c) 3  -CB    To walk in hospital room? 4  -MS (r) MH (t) MS (c) 3  -CB    AM-PAC 6 Clicks Score (PT) 24  -MS (r) MH (t) 18  -CB    Highest Level of Mobility Goal 8 --> Walked 250 feet or more  -MS (r) MH (t) 6 --> Walk 10 steps or more  -CB      Row Name 04/01/24 1429          Functional Assessment    Outcome Measure Options AM-PAC 6 Clicks Basic Mobility (PT)  -MS (r) MH (t) MS (c)               User Key  (r) = Recorded By, (t) = Taken By, (c) = Cosigned By      Initials Name Provider Type    MS EloiseNavya, PT Physical Therapist    Milena Clement, RN Registered Nurse     Agustin Huerta, PT Student PT Student                                 Physical Therapy Education       Title: PT OT SLP Therapies (Done)       Topic: Physical Therapy (Done)       Point: Mobility training (Done)       Learning Progress Summary             Patient Acceptance, E,TB, VU by  at 4/1/2024 1430    Acceptance, E,TB,D, VU by  at 3/31/2024 1007    Acceptance, E,D, VU,NR by MS at 3/30/2024 1126    Acceptance, E,TB, VU by  at 3/29/2024 0939                         Point: Home exercise program (Done)       Learning Progress Summary             Patient Acceptance, E,TB, VU by  at 4/1/2024 1430    Acceptance, E,TB,D, VU by  at 3/31/2024 1007    Acceptance, E,D, VU,NR by MS at 3/30/2024 1126    Acceptance, E,TB, VU by  at 3/29/2024 0939                         Point: Body mechanics (Done)       Learning Progress Summary             Patient Acceptance, E,TB, VU by  at 4/1/2024 1430    Acceptance, E,TB,D, VU by  at 3/31/2024 1007    Acceptance, E,D, VU,NR by MS at 3/30/2024 1126    Acceptance, E,TB, VU by  at 3/29/2024 0939                         Point: Precautions (Done)       Learning Progress Summary             Patient Acceptance, E,TB, VU by  at 4/1/2024 1430    Acceptance, E,TB,D, VU by PH at 3/31/2024 1007    Acceptance, E,D, VU,NR by MS at 3/30/2024 1126    Acceptance, E,TB, VU by  at  3/29/2024 0939                                         User Key       Initials Effective Dates Name Provider Type Discipline    MS 06/16/21 -  George Dang, PT Physical Therapist PT     06/16/21 -  Nikki Reeder PTA Physical Therapist Assistant PT     01/24/24 -  Agustin Huerta PT Student PT Student PT                  PT Recommendation and Plan     Plan of Care Reviewed With: patient  Progress: improving  Outcome Evaluation: Pt agreeable to PT session this date. Pt was sitting EOB independently upon arrival. Pt performed cardiac protocol exercises with supervision and was able to perform transfers and ambulate with SBA this date. Pt was able to ambulate around 400 ft with SBA, no LOB or veering noted. No further acute therapy needs at this times, PT will sign off. Pt was left sitting on EOB with call light in reach. Vitals stayed WNL during the entirety of session. PT educated pt about importance of continuing to walk 3 times a day with nsg and to perform cardiac protocol exercises daily for prevention of blood clots.     Time Calculation:         PT Charges       Row Name 04/01/24 1430             Time Calculation    Start Time 1321  -MS (r) MH (t) MS (c)      Stop Time 1339  -MS (r) MH (t) MS (c)      Time Calculation (min) 18 min  -MS (r) MH (t)      PT Received On 04/01/24  -MS (r) MH (t) MS (c)      PT - Next Appointment 04/01/24  -MS (r) MH (t) MS (c)         Time Calculation- PT    Total Timed Code Minutes- PT 18 minute(s)  -MS (r) MH (t) MS (c)         Timed Charges    56294 - PT Therapeutic Exercise Minutes 8  -MS (r) MH (t) MS (c)      73636 - PT Therapeutic Activity Minutes 10  -MS (r) MH (t) MS (c)         Total Minutes    Timed Charges Total Minutes 18  -MS (r) MH (t)       Total Minutes 18  -MS (r) MH (t)                User Key  (r) = Recorded By, (t) = Taken By, (c) = Cosigned By      Initials Name Provider Type    Navya Cortés, PT Physical Therapist     Bradley  Agustin, PT Student PT Student                  Therapy Charges for Today       Code Description Service Date Service Provider Modifiers Qty    36494192143 HC PT THERAPEUTIC ACT EA 15 MIN 4/1/2024 Agustin Huerta, PT Student GP 1            PT G-Codes  Outcome Measure Options: AM-PAC 6 Clicks Basic Mobility (PT)  AM-PAC 6 Clicks Score (PT): 24  PT Discharge Summary  Anticipated Discharge Disposition (PT): home with assist    Agustin Huerta, PT Student  4/1/2024

## 2024-04-01 NOTE — CONSULTS
"[facility] evaluated 38-year-old male for drug use.  Patient was transferred from [facility] for an emergent aortic dissection repair.  Patient denies any SI and denies any depression.  Patient denies any history of psychiatric issues including no suicide attempts in the past.  Patient states he has anxiety that is \"off and on\".  Patient was informed of the positive meth amphetamine drug screen and he was genuinely surprised and denied any deliberate use of meth.  Patient stated \"I promise you I do not use those type of drugs\".  Patient did state that he does smoke marijuana, not daily but fairly often.  Patient is wondering if possibly his THC was laced with meth.  Patient denies any other drug use.  Patient denies any past substance abuse treatment.  Patient denies any past psychiatric care or treatment.  Patient states he has possible sleep apnea which he has an appointment to get evaluated.  Patient states his appetite is okay.    Patient lives in a house with his aunt.  Patient is not currently working.  Patient states his Aunt,the mother of his 2 children as well as other family are his support system.  Patient states he has 2 children that are being raised by their mother.  Patient denied any need for outpatient counseling for support.  Access will not follow.  Please let us know if something changes.  "

## 2024-04-01 NOTE — PLAN OF CARE
Goal Outcome Evaluation:  Plan of Care Reviewed With: patient        Progress: improving  Outcome Evaluation: NSR on monitor, room air, goal to keep sbp <140, medications increased. Minimal c/o pain today. Ambulation/ IS use encouraged. No further complants at this time. Access consult placed for positive drug use PTA. Home in 1-2 days.

## 2024-04-01 NOTE — PLAN OF CARE
Goal Outcome Evaluation:  Plan of Care Reviewed With: patient        Progress: improving  Outcome Evaluation: Pt agreeable to PT session this date. Pt was sitting EOB independently upon arrival. Pt performed cardiac protocol exercises with supervision and was able to perform transfers and ambulate with SBA this date. Pt was able to ambulate around 400 ft with SBA, no LOB or veering noted. No further acute therapy needs at this times, PT will sign off. Pt was left sitting on EOB with call light in reach. Vitals stayed WNL during the entirety of session. PT educated pt about importance of continuing to walk 3 times a day with nsg and to perform cardiac protocol exercises daily for prevention of blood clots.      Anticipated Discharge Disposition (PT): home with assist

## 2024-04-02 LAB
ANION GAP SERPL CALCULATED.3IONS-SCNC: 12.4 MMOL/L (ref 5–15)
BUN SERPL-MCNC: 10 MG/DL (ref 6–20)
BUN/CREAT SERPL: 8.8 (ref 7–25)
CALCIUM SPEC-SCNC: 8.5 MG/DL (ref 8.6–10.5)
CHLORIDE SERPL-SCNC: 100 MMOL/L (ref 98–107)
CO2 SERPL-SCNC: 22.6 MMOL/L (ref 22–29)
CREAT SERPL-MCNC: 1.13 MG/DL (ref 0.76–1.27)
EGFRCR SERPLBLD CKD-EPI 2021: 85.9 ML/MIN/1.73
GLUCOSE BLDC GLUCOMTR-MCNC: 94 MG/DL (ref 70–130)
GLUCOSE SERPL-MCNC: 157 MG/DL (ref 65–99)
POTASSIUM SERPL-SCNC: 3.7 MMOL/L (ref 3.5–5.2)
POTASSIUM SERPL-SCNC: 4.2 MMOL/L (ref 3.5–5.2)
SODIUM SERPL-SCNC: 135 MMOL/L (ref 136–145)

## 2024-04-02 PROCEDURE — 82948 REAGENT STRIP/BLOOD GLUCOSE: CPT

## 2024-04-02 PROCEDURE — 99232 SBSQ HOSP IP/OBS MODERATE 35: CPT | Performed by: STUDENT IN AN ORGANIZED HEALTH CARE EDUCATION/TRAINING PROGRAM

## 2024-04-02 PROCEDURE — 99024 POSTOP FOLLOW-UP VISIT: CPT | Performed by: NURSE PRACTITIONER

## 2024-04-02 PROCEDURE — 25010000002 ENOXAPARIN PER 10 MG: Performed by: NURSE PRACTITIONER

## 2024-04-02 PROCEDURE — 80048 BASIC METABOLIC PNL TOTAL CA: CPT | Performed by: NURSE PRACTITIONER

## 2024-04-02 PROCEDURE — 84132 ASSAY OF SERUM POTASSIUM: CPT | Performed by: INTERNAL MEDICINE

## 2024-04-02 PROCEDURE — 25010000002 HYDRALAZINE PER 20 MG: Performed by: INTERNAL MEDICINE

## 2024-04-02 RX ORDER — POTASSIUM CHLORIDE 750 MG/1
20 TABLET, FILM COATED, EXTENDED RELEASE ORAL ONCE
Status: COMPLETED | OUTPATIENT
Start: 2024-04-02 | End: 2024-04-02

## 2024-04-02 RX ORDER — CARVEDILOL 25 MG/1
50 TABLET ORAL EVERY 12 HOURS
Status: DISCONTINUED | OUTPATIENT
Start: 2024-04-02 | End: 2024-04-03 | Stop reason: HOSPADM

## 2024-04-02 RX ORDER — NIFEDIPINE 30 MG/1
30 TABLET, EXTENDED RELEASE ORAL ONCE
Qty: 1 TABLET | Refills: 0 | Status: COMPLETED | OUTPATIENT
Start: 2024-04-02 | End: 2024-04-02

## 2024-04-02 RX ADMIN — HYDROCODONE BITARTRATE AND ACETAMINOPHEN 2 TABLET: 5; 325 TABLET ORAL at 23:17

## 2024-04-02 RX ADMIN — CYCLOBENZAPRINE 10 MG: 10 TABLET, FILM COATED ORAL at 20:56

## 2024-04-02 RX ADMIN — MUPIROCIN: 20 OINTMENT TOPICAL at 23:18

## 2024-04-02 RX ADMIN — NIFEDIPINE 30 MG: 30 TABLET, FILM COATED, EXTENDED RELEASE ORAL at 12:04

## 2024-04-02 RX ADMIN — NIFEDIPINE 60 MG: 60 TABLET, FILM COATED, EXTENDED RELEASE ORAL at 08:25

## 2024-04-02 RX ADMIN — HYDRALAZINE HYDROCHLORIDE 10 MG: 20 INJECTION INTRAMUSCULAR; INTRAVENOUS at 20:55

## 2024-04-02 RX ADMIN — 0.12% CHLORHEXIDINE GLUCONATE 15 ML: 1.2 RINSE ORAL at 08:25

## 2024-04-02 RX ADMIN — POTASSIUM CHLORIDE 20 MEQ: 750 TABLET, EXTENDED RELEASE ORAL at 08:26

## 2024-04-02 RX ADMIN — CARVEDILOL 37.5 MG: 25 TABLET, FILM COATED ORAL at 08:24

## 2024-04-02 RX ADMIN — MUPIROCIN 1 APPLICATION: 20 OINTMENT TOPICAL at 08:25

## 2024-04-02 RX ADMIN — GUAIFENESIN 1200 MG: 600 TABLET, EXTENDED RELEASE ORAL at 08:25

## 2024-04-02 RX ADMIN — ASPIRIN 81 MG: 81 TABLET, COATED ORAL at 08:24

## 2024-04-02 RX ADMIN — POVIDONE-IODINE 1 EACH: 10 SOLUTION TOPICAL at 08:24

## 2024-04-02 RX ADMIN — GUAIFENESIN 1200 MG: 600 TABLET, EXTENDED RELEASE ORAL at 20:44

## 2024-04-02 RX ADMIN — ENOXAPARIN SODIUM 40 MG: 100 INJECTION SUBCUTANEOUS at 17:19

## 2024-04-02 RX ADMIN — GABAPENTIN 300 MG: 300 CAPSULE ORAL at 08:26

## 2024-04-02 RX ADMIN — GABAPENTIN 300 MG: 300 CAPSULE ORAL at 20:43

## 2024-04-02 RX ADMIN — PANTOPRAZOLE SODIUM 40 MG: 40 TABLET, DELAYED RELEASE ORAL at 08:26

## 2024-04-02 RX ADMIN — 0.12% CHLORHEXIDINE GLUCONATE 15 ML: 1.2 RINSE ORAL at 20:43

## 2024-04-02 RX ADMIN — HYDROCODONE BITARTRATE AND ACETAMINOPHEN 2 TABLET: 5; 325 TABLET ORAL at 15:18

## 2024-04-02 RX ADMIN — ATORVASTATIN CALCIUM 40 MG: 20 TABLET, FILM COATED ORAL at 20:44

## 2024-04-02 RX ADMIN — FUROSEMIDE 40 MG: 40 TABLET ORAL at 08:25

## 2024-04-02 RX ADMIN — CARVEDILOL 50 MG: 25 TABLET, FILM COATED ORAL at 20:44

## 2024-04-02 NOTE — PLAN OF CARE
Problem: Adult Inpatient Plan of Care  Goal: Plan of Care Review  Outcome: Ongoing, Progressing  Flowsheets  Taken 4/2/2024 0658 by Miraim Soto RN  Progress: improving  Taken 4/1/2024 1634 by Milena Hodgson RN  Plan of Care Reviewed With: patient     Problem: Adult Inpatient Plan of Care  Goal: Absence of Hospital-Acquired Illness or Injury  Intervention: Identify and Manage Fall Risk  Recent Flowsheet Documentation  Taken 4/2/2024 0400 by Miriam Soto RN  Safety Promotion/Fall Prevention:   activity supervised   safety round/check completed  Taken 4/1/2024 2000 by Miriam Soto RN  Safety Promotion/Fall Prevention:   activity supervised   safety round/check completed     Problem: Adult Inpatient Plan of Care  Goal: Absence of Hospital-Acquired Illness or Injury  Intervention: Prevent Infection  Recent Flowsheet Documentation  Taken 4/2/2024 0400 by Miriam Soto RN  Infection Prevention:   environmental surveillance performed   equipment surfaces disinfected   hand hygiene promoted   personal protective equipment utilized   rest/sleep promoted   single patient room provided   visitors restricted/screened  Taken 4/1/2024 2000 by Miriam Soto RN  Infection Prevention:   environmental surveillance performed   equipment surfaces disinfected   hand hygiene promoted   rest/sleep promoted   visitors restricted/screened   single patient room provided   personal protective equipment utilized   Goal Outcome Evaluation:           Progress: improving

## 2024-04-02 NOTE — PROGRESS NOTES
" LOS: 5 days   Patient Care Team:  Provider, No Known as PCP - General    Chief Complaint:   Post-op follow-up, s/p aortic dissection repair    Subjective  Sitting up in chair.  Feels better.  No new complaints.    Vital Signs  Temp:  [98.6 °F (37 °C)-99.5 °F (37.5 °C)] 98.6 °F (37 °C)  Heart Rate:  [66-79] 72  Resp:  [18] 18  BP: (116-154)/(58-84) 142/58      03/30/24  0500 03/31/24  0500 04/02/24  0657   Weight: (!) 149 kg (327 lb 6.1 oz) (!) 149 kg (328 lb 7.8 oz) (!) 148 kg (326 lb 8 oz)     Body mass index is 39.74 kg/m².    Intake/Output Summary (Last 24 hours) at 4/2/2024 0858  Last data filed at 4/1/2024 0908  Gross per 24 hour   Intake 240 ml   Output --   Net 240 ml     No intake/output data recorded.    Chest tube drainage:     Objective:  Vital signs: (most recent): Blood pressure 142/58, pulse 72, temperature 98.6 °F (37 °C), temperature source Oral, resp. rate 18, weight (!) 148 kg (326 lb 8 oz), SpO2 94%.                Physical Exam:   General Appearance: awake and alert, no acute distress   Lungs: respirations regular, respirations unlabored, and coarse throughout   Heart: regular rhythm & normal rate, normal S1, S2, and no murmur   Abdomen: soft or nontender, + bowel sounds    Skin: sternal incision clean, dry, intact   Neuro: alert and oriented, no focal deficits.     Results Review:      WBC WBC   Date Value Ref Range Status   04/01/2024 14.59 (H) 3.40 - 10.80 10*3/mm3 Final   03/31/2024 15.52 (H) 3.40 - 10.80 10*3/mm3 Final      HGB Hemoglobin   Date Value Ref Range Status   04/01/2024 11.3 (L) 13.0 - 17.7 g/dL Final   03/31/2024 11.3 (L) 13.0 - 17.7 g/dL Final      HCT Hematocrit   Date Value Ref Range Status   04/01/2024 34.4 (L) 37.5 - 51.0 % Final   03/31/2024 34.0 (L) 37.5 - 51.0 % Final      Platelets Platelets   Date Value Ref Range Status   04/01/2024 350 140 - 450 10*3/mm3 Final   03/31/2024 284 140 - 450 10*3/mm3 Final        PT/INR:    No results found for: \"PROTIME\"  /  No results " "found for: \"INR\"      Sodium Sodium   Date Value Ref Range Status   04/02/2024 135 (L) 136 - 145 mmol/L Final   04/01/2024 134 (L) 136 - 145 mmol/L Final   03/31/2024 135 (L) 136 - 145 mmol/L Final      Potassium Potassium   Date Value Ref Range Status   04/02/2024 3.7 3.5 - 5.2 mmol/L Final   04/01/2024 4.0 3.5 - 5.2 mmol/L Final   03/31/2024 4.2 3.5 - 5.2 mmol/L Final   03/31/2024 3.9 3.5 - 5.2 mmol/L Final      Chloride Chloride   Date Value Ref Range Status   04/02/2024 100 98 - 107 mmol/L Final   04/01/2024 102 98 - 107 mmol/L Final   03/31/2024 101 98 - 107 mmol/L Final      Bicarbonate CO2   Date Value Ref Range Status   04/02/2024 22.6 22.0 - 29.0 mmol/L Final   04/01/2024 23.0 22.0 - 29.0 mmol/L Final   03/31/2024 24.1 22.0 - 29.0 mmol/L Final      BUN BUN   Date Value Ref Range Status   04/02/2024 10 6 - 20 mg/dL Final   04/01/2024 11 6 - 20 mg/dL Final   03/31/2024 11 6 - 20 mg/dL Final      Creatinine Creatinine   Date Value Ref Range Status   04/02/2024 1.13 0.76 - 1.27 mg/dL Final   04/01/2024 0.98 0.76 - 1.27 mg/dL Final   03/31/2024 0.97 0.76 - 1.27 mg/dL Final      Calcium Calcium   Date Value Ref Range Status   04/02/2024 8.5 (L) 8.6 - 10.5 mg/dL Final   04/01/2024 8.6 8.6 - 10.5 mg/dL Final   03/31/2024 8.2 (L) 8.6 - 10.5 mg/dL Final      Magnesium No results found for: \"MG\"       aspirin, 81 mg, Oral, Daily  atorvastatin, 40 mg, Oral, Nightly  carvedilol, 37.5 mg, Oral, Q12H  chlorhexidine, 15 mL, Mouth/Throat, Q12H  enoxaparin, 40 mg, Subcutaneous, Q24H  furosemide, 40 mg, Oral, Daily  gabapentin, 300 mg, Oral, Q12H  guaiFENesin, 1,200 mg, Oral, Q12H  mupirocin, , Each Nare, BID  [START ON 4/3/2024] NIFEdipine XL, 90 mg, Oral, Q24H  pantoprazole, 40 mg, Oral, QAM  potassium chloride, 20 mEq, Oral, Daily  povidone-iodine, 1 Application, Topical, BID  senna-docusate sodium, 2 tablet, Oral, Nightly  trimethoprim-polymyxin b, 1 drop, Left Eye, Q6H      sodium chloride, 30 mL/hr, Last Rate: 30 mL/hr " (03/28/24 1041)          Aortic dissection      Assessment & Plan    - Type A aortic dissection s/p dissection repair POD#5 (Pagni)  - hypertension-- titrate PO meds  - leukocytosis--reactive/intaoperative steroid administration, afebrile  - post op anemia--expected acute blood loss, stable  - amphetamine use-- encourage cessation  - leukocytosis-- reactive, intra-op steroids, trending down, afebrile       Continue routine care.  This morning his blood pressure is in the 140s-- changed to procardia yesterday-- will increase this morning.  Coreg increased. continue to titrate meds for BP/HR control.  Pain sternal incision and groin incision with betadine BID.  Mobilize and encourage pulm toilet.    MAYRA Goldberg  04/02/24  08:58 EDT

## 2024-04-02 NOTE — PROGRESS NOTES
LOS: 5 days   Patient Care Team:  Provider, No Known as PCP - General    Chief Complaint:  Following post aortic dissection    Interval History:   No complaints today.  Eager to be discharged.  BP decently controlled.    Objective   Vital Signs  Temp:  [98.6 °F (37 °C)-99.5 °F (37.5 °C)] 98.6 °F (37 °C)  Heart Rate:  [66-79] 72  Resp:  [18] 18  BP: (116-154)/(58-84) 142/58  No intake or output data in the 24 hours ending 04/02/24 0915    Comfortable NAD, resting in bed  PERRL, conjunctivae clear  Neck supple, no JVD or thyromegaly appreciated  S1/S2 RRR, no m/r/g, median sternotomy well-healing  Lungs CTA B, normal effort  Abdomen S/NT/ND (+) BS, no HSM appreciated  Extremities warm, no clubbing, cyanosis, trace lower extremity edema  Moves all 4 EXTR without gross deficits no visible or palpable skin lesions  A/Ox4, mood and affect appropriate    Results Review:      Results from last 7 days   Lab Units 04/02/24  0300 04/01/24  0411 03/31/24  1104 03/31/24  0329   SODIUM mmol/L 135* 134*  --  135*   POTASSIUM mmol/L 3.7 4.0 4.2 3.9   CHLORIDE mmol/L 100 102  --  101   CO2 mmol/L 22.6 23.0  --  24.1   BUN mg/dL 10 11  --  11   CREATININE mg/dL 1.13 0.98  --  0.97   GLUCOSE mg/dL 157* 133*  --  115*   CALCIUM mg/dL 8.5* 8.6  --  8.2*     Results from last 7 days   Lab Units 03/27/24  1901   HSTROP T ng/L 12     Results from last 7 days   Lab Units 04/01/24  0411 03/31/24  0329 03/30/24  0320   WBC 10*3/mm3 14.59* 15.52* 20.74*   HEMOGLOBIN g/dL 11.3* 11.3* 10.7*   HEMATOCRIT % 34.4* 34.0* 31.8*   PLATELETS 10*3/mm3 350 284 236     Results from last 7 days   Lab Units 03/29/24  0147 03/28/24  1323 03/28/24  0712 03/28/24  0541   INR  1.28* 1.41* 1.41* 1.52*   APTT seconds  --  30.1 28.2 28.5         Results from last 7 days   Lab Units 03/29/24  0147   MAGNESIUM mg/dL 2.4           I reviewed the patient's new clinical results.  I personally viewed and interpreted the patient's EKG/Telemetry data        Medication  Review:   aspirin, 81 mg, Oral, Daily  atorvastatin, 40 mg, Oral, Nightly  carvedilol, 37.5 mg, Oral, Q12H  chlorhexidine, 15 mL, Mouth/Throat, Q12H  enoxaparin, 40 mg, Subcutaneous, Q24H  furosemide, 40 mg, Oral, Daily  gabapentin, 300 mg, Oral, Q12H  guaiFENesin, 1,200 mg, Oral, Q12H  mupirocin, , Each Nare, BID  [START ON 4/3/2024] NIFEdipine XL, 90 mg, Oral, Q24H  pantoprazole, 40 mg, Oral, QAM  potassium chloride, 20 mEq, Oral, Daily  povidone-iodine, 1 Application, Topical, BID  senna-docusate sodium, 2 tablet, Oral, Nightly  trimethoprim-polymyxin b, 1 drop, Left Eye, Q6H        sodium chloride, 30 mL/hr, Last Rate: 30 mL/hr (03/28/24 1041)        Assessment & Plan       Aortic dissection    Aortic dissection, type a  Infrarenal abdominal aortic and left common iliac artery aneurysms  Hypertension  Leukocytosis and anemia, postoperative  Methamphetamine use    Blood pressure does still remain mildly elevated on nifedipine 90, Lasix 40, carvedilol 37.5 twice daily    He has a large BSA.  I think he will tolerate carvedilol 50 twice daily well, will increase.    He has an appointment next week with cardiology locally to follow him given his hypertension.    He feels well, I do not think he understands the gravity of how significant his illness was.Importance of medication compliance and possible sleep apnea assessment stressed with patient.    Davis Reilly MD  04/02/24  09:15 EDT      Part of this note may be an electronic transcription/translation of spoken language to printed text using the Dragon Dictation System.

## 2024-04-03 ENCOUNTER — TRANSCRIBE ORDERS (OUTPATIENT)
Dept: HOME HEALTH SERVICES | Facility: HOME HEALTHCARE | Age: 39
End: 2024-04-03
Payer: MEDICAID

## 2024-04-03 ENCOUNTER — DOCUMENTATION (OUTPATIENT)
Dept: HOME HEALTH SERVICES | Facility: HOME HEALTHCARE | Age: 39
End: 2024-04-03

## 2024-04-03 ENCOUNTER — READMISSION MANAGEMENT (OUTPATIENT)
Dept: CALL CENTER | Facility: HOSPITAL | Age: 39
End: 2024-04-03
Payer: MEDICAID

## 2024-04-03 ENCOUNTER — HOME HEALTH ADMISSION (OUTPATIENT)
Dept: HOME HEALTH SERVICES | Facility: HOME HEALTHCARE | Age: 39
End: 2024-04-03
Payer: MEDICAID

## 2024-04-03 VITALS
BODY MASS INDEX: 39.22 KG/M2 | RESPIRATION RATE: 18 BRPM | OXYGEN SATURATION: 99 % | HEART RATE: 72 BPM | SYSTOLIC BLOOD PRESSURE: 115 MMHG | TEMPERATURE: 98.5 F | WEIGHT: 315 LBS | DIASTOLIC BLOOD PRESSURE: 66 MMHG

## 2024-04-03 DIAGNOSIS — I15.9 SECONDARY HYPERTENSION: ICD-10-CM

## 2024-04-03 DIAGNOSIS — I71.00 DISSECTION OF AORTA, UNSPECIFIED PORTION OF AORTA: Primary | ICD-10-CM

## 2024-04-03 LAB
ANION GAP SERPL CALCULATED.3IONS-SCNC: 12 MMOL/L (ref 5–15)
BUN SERPL-MCNC: 12 MG/DL (ref 6–20)
BUN/CREAT SERPL: 9.9 (ref 7–25)
CALCIUM SPEC-SCNC: 8.4 MG/DL (ref 8.6–10.5)
CHLORIDE SERPL-SCNC: 101 MMOL/L (ref 98–107)
CHOLEST SERPL-MCNC: 123 MG/DL (ref 0–200)
CO2 SERPL-SCNC: 23 MMOL/L (ref 22–29)
CREAT SERPL-MCNC: 1.21 MG/DL (ref 0.76–1.27)
EGFRCR SERPLBLD CKD-EPI 2021: 79.1 ML/MIN/1.73
GLUCOSE SERPL-MCNC: 114 MG/DL (ref 65–99)
HDLC SERPL-MCNC: 29 MG/DL (ref 40–60)
LDLC SERPL CALC-MCNC: 75 MG/DL (ref 0–100)
LDLC/HDLC SERPL: 2.56 {RATIO}
POTASSIUM SERPL-SCNC: 4.1 MMOL/L (ref 3.5–5.2)
SODIUM SERPL-SCNC: 136 MMOL/L (ref 136–145)
TRIGL SERPL-MCNC: 99 MG/DL (ref 0–150)
VLDLC SERPL-MCNC: 19 MG/DL (ref 5–40)

## 2024-04-03 PROCEDURE — 80048 BASIC METABOLIC PNL TOTAL CA: CPT | Performed by: NURSE PRACTITIONER

## 2024-04-03 PROCEDURE — 80061 LIPID PANEL: CPT | Performed by: NURSE PRACTITIONER

## 2024-04-03 PROCEDURE — 99232 SBSQ HOSP IP/OBS MODERATE 35: CPT | Performed by: INTERNAL MEDICINE

## 2024-04-03 RX ORDER — CYCLOBENZAPRINE HCL 10 MG
10 TABLET ORAL EVERY 8 HOURS PRN
Qty: 90 TABLET | Refills: 0 | Status: SHIPPED | OUTPATIENT
Start: 2024-04-03

## 2024-04-03 RX ORDER — ASPIRIN 81 MG/1
81 TABLET ORAL DAILY
Qty: 30 TABLET | Refills: 2 | Status: SHIPPED | OUTPATIENT
Start: 2024-04-04 | End: 2024-07-03

## 2024-04-03 RX ORDER — FUROSEMIDE 40 MG/1
40 TABLET ORAL DAILY
Qty: 30 TABLET | Refills: 1 | Status: SHIPPED | OUTPATIENT
Start: 2024-04-04 | End: 2024-06-03

## 2024-04-03 RX ORDER — GABAPENTIN 300 MG/1
300 CAPSULE ORAL EVERY 12 HOURS SCHEDULED
Qty: 28 CAPSULE | Refills: 0 | Status: SHIPPED | OUTPATIENT
Start: 2024-04-03 | End: 2024-04-17

## 2024-04-03 RX ORDER — NIFEDIPINE 60 MG/1
120 TABLET, EXTENDED RELEASE ORAL
Status: DISCONTINUED | OUTPATIENT
Start: 2024-04-03 | End: 2024-04-03 | Stop reason: HOSPADM

## 2024-04-03 RX ORDER — HYDROCODONE BITARTRATE AND ACETAMINOPHEN 5; 325 MG/1; MG/1
1 TABLET ORAL EVERY 4 HOURS PRN
Qty: 42 TABLET | Refills: 0 | Status: SHIPPED | OUTPATIENT
Start: 2024-04-03 | End: 2024-04-10

## 2024-04-03 RX ADMIN — GUAIFENESIN 1200 MG: 600 TABLET, EXTENDED RELEASE ORAL at 08:05

## 2024-04-03 RX ADMIN — PANTOPRAZOLE SODIUM 40 MG: 40 TABLET, DELAYED RELEASE ORAL at 06:29

## 2024-04-03 RX ADMIN — FUROSEMIDE 40 MG: 40 TABLET ORAL at 08:06

## 2024-04-03 RX ADMIN — CARVEDILOL 50 MG: 25 TABLET, FILM COATED ORAL at 08:06

## 2024-04-03 RX ADMIN — HYDROCODONE BITARTRATE AND ACETAMINOPHEN 2 TABLET: 5; 325 TABLET ORAL at 06:29

## 2024-04-03 RX ADMIN — GABAPENTIN 300 MG: 300 CAPSULE ORAL at 08:06

## 2024-04-03 RX ADMIN — POVIDONE-IODINE 1 EACH: 10 SOLUTION TOPICAL at 08:05

## 2024-04-03 RX ADMIN — ASPIRIN 81 MG: 81 TABLET, COATED ORAL at 08:05

## 2024-04-03 RX ADMIN — POTASSIUM CHLORIDE 20 MEQ: 750 TABLET, EXTENDED RELEASE ORAL at 08:06

## 2024-04-03 NOTE — PLAN OF CARE
Goal Outcome Evaluation:         Patient pain controlled by pain med, hydralazine I/v given for blood pressure, SR on the monitor, pt on room air,  pt refused bed alarm, chest incision open to air, dry and intact, educate to call for assist, cont to monitor.

## 2024-04-03 NOTE — PROGRESS NOTES
[facility] Clinical Pharmacy Services: Patient Counseling Consult    Tj Christopher has been counseled on the following medications: aspirin, cyclobenzaprine, furosemide, gabapentin, Norco, and nifedipine CC. Counseling points included the following:    Explained indication of each medication, and patient's need for these medications.  Went over dosing and frequency of each medication.  Discussed any special administration, storage, or monitoring instructions with medications.  Discussed all important drug interactions, including over-the-counter medications and supplements.  Explained possible side effects for each medication.  Instructed the patient not to begin or discontinue any medications without informing his/her physician/pharmacist.  Talked about keeping a week-long blood pressure journal to bring to her next appointment with the nurse practitioner. Spoke to the importance of taking blood pressure the same time every day.     Patient expressed understanding and had no further questions.      Bhumika Alejo, Pharm.D., [place] [facility]   Clinical Pharmacist  Phone Extension #[phone]

## 2024-04-03 NOTE — CASE MANAGEMENT/SOCIAL WORK
Continued Stay Note  [facility]     Patient Name: Tj Christopher  MRN: [id number]  Today's Date: 4/3/2024    Admit Date: 3/28/2024    Plan: Home w/ Aunt assist.  Anglican OMER Nj will not be able to do start of care until Saturday 4/6/24.   Discharge Plan       Row Name 04/03/24 0944       Plan    Plan Home w/ Aunt assist.  Anglican OMER Nj will not be able to do start of care until Saturday 4/6/24.    Plan Comments Per Sammie/[facility], they can do start of care on Saturday, 4/6.  APRN notified..............Gail TUCKER/MARGI                   Discharge Codes    No documentation.                 Expected Discharge Date and Time       Expected Discharge Date Expected Discharge Time    Apr 3, 2024               Gail Mann RN

## 2024-04-03 NOTE — CASE MANAGEMENT/SOCIAL WORK
Case Management Discharge Note      Final Note: Pt unable to afford his discharge medications totalling $103.00.  [facility] received approval from Zee/GENEVA Mgr to have meds billed to [facility] indigent fund.  Christian OMER Nj will do start of care this Saturday, 4/6/24..............Gail TUCKER/MARGI         Selected Continued Care - Admitted Since 3/28/2024       Destination    No services have been selected for the patient.                Durable Medical Equipment    No services have been selected for the patient.                Dialysis/Infusion    No services have been selected for the patient.                Home Medical Care Coordination complete. Patient indicates having no preference.      Service Provider Selected Services Address Phone Fax Patient Preferred    [facility] [address] [place] [address] [phone] --              Therapy    No services have been selected for the patient.                Community Resources    No services have been selected for the patient.                Community & DME    No services have been selected for the patient.                         Final Discharge Disposition Code: 01 - home or self-care

## 2024-04-03 NOTE — PROGRESS NOTES
Patient is discharging today . Spoke to patient and he is agreeable to home health and has no current home health . Face sheet information is correct and secondary contact is the friend on face sheet Amberly Mendoza- [phone]. Will transcribe home health orders for SN per verbal order [facility]/ Dr Parkinson and ok for start of care Saturday 4/6/24. Thank you !

## 2024-04-03 NOTE — PROGRESS NOTES
LOS: 6 days   Patient Care Team:  Provider, No Known as PCP - General    Chief Complaint: Follow-up type A aortic dissection, hypertension.    Interval History: Doing well.  He was sitting up and eating breakfast.  He was without complaints.  His blood pressure still goes up occasionally, but is much better in general.  No chest pain or shortness of breath.    Vital Signs:  Temp:  [98 °F (36.7 °C)-99 °F (37.2 °C)] 98.8 °F (37.1 °C)  Heart Rate:  [65-72] 72  Resp:  [18] 18  BP: (113-143)/(64-84) 113/66    Intake/Output Summary (Last 24 hours) at 4/3/2024 0918  Last data filed at 4/3/2024 0400  Gross per 24 hour   Intake 1400 ml   Output --   Net 1400 ml       Physical Exam:   General Appearance:    No acute distress, alert and oriented x4   Lungs:     Clear to auscultation bilaterally     Heart:    Regular rhythm and normal rate.  I/VI SM RUSB,   Abdomen:     Soft, nontender, nondistended.    Extremities:   No clubbing, cyanosis, or edema.     Results Review:    Results from last 7 days   Lab Units 04/03/24  0303   SODIUM mmol/L 136   POTASSIUM mmol/L 4.1   CHLORIDE mmol/L 101   CO2 mmol/L 23.0   BUN mg/dL 12   CREATININE mg/dL 1.21   GLUCOSE mg/dL 114*   CALCIUM mg/dL 8.4*     Results from last 7 days   Lab Units 03/27/24  1901   HSTROP T ng/L 12     Results from last 7 days   Lab Units 04/01/24  0411   WBC 10*3/mm3 14.59*   HEMOGLOBIN g/dL 11.3*   HEMATOCRIT % 34.4*   PLATELETS 10*3/mm3 350     Results from last 7 days   Lab Units 03/29/24  0147 03/28/24  1323 03/28/24  0712 03/28/24  0541   INR  1.28* 1.41* 1.41* 1.52*   APTT seconds  --  30.1 28.2 28.5     Results from last 7 days   Lab Units 04/03/24  0303   CHOLESTEROL mg/dL 123     Results from last 7 days   Lab Units 03/29/24  0147   MAGNESIUM mg/dL 2.4     Results from last 7 days   Lab Units 04/03/24  0303   CHOLESTEROL mg/dL 123   TRIGLYCERIDES mg/dL 99   HDL CHOL mg/dL 29*   LDL CHOL mg/dL 75       I reviewed the patient's new clinical results.         Assessment:  1.  Type A aortic dissection extending from the ascending aorta throughout the arch and into the descending thoracic aorta into the aortic bifurcation  2.  Aneurysmal dilatation of the distal infrarenal abdominal aorta up to 4.5 cm and moderate aneurysmal dilatation of the left common iliac artery at 2.3 cm.  3.  Status post emergent ascending aorta and hemiarch repair, as well as aortic root repair and aortic valve resuspension by Dr. Parkinson on 3/28/2024  4.  Hypertension  5.  Leukocytosis, likely reactive  6.  Expected postoperative anemia  7.  Methamphetamine use    Plan:  -Blood pressure goes up occasionally, mainly in the evening.  However, much better controlled in general.  Continue Coreg 50 mg twice daily, nifedipine  mg/day, and Lasix 40 mg/day.    -Agree with sleep apnea assessment as an outpatient.    -Emphasized the importance of taking all medications when he goes home.    -Okay to discharge today per cardiology.  He already has follow-up with Dr. Carvajal in cardiology in [place].    Saman Ruiz MD  04/03/24  09:18 EDT

## 2024-04-03 NOTE — DISCHARGE SUMMARY
Date of Admission:   Date of Discharge:  4/3/2024    Discharge Diagnosis:   - Type A aortic dissection s/p dissection repair (Iggy)  - hypertension-- titrate PO meds  - leukocytosis--reactive/intaoperative steroid administration, afebrile  - post op anemia--expected acute blood loss, stable  - amphetamine use-- encourage cessation  - leukocytosis-- reactive, intra-op steroids, trending down, afebrile    Presenting Problem/History of Present Illness  Aortic dissection [I71.00]     Hospital Course  Patient is a 38 y.o. male presented with aortic dissection on 328, he underwent emergent ascending aortic dissection repair with a 26 mm Gelwave Dacron interposition graft, proximal aortic arch repair with the same graft in a hemiarch anastomotic configuration and BioGlue reconstruction of the aortic arch layers, aortic root reconstruction with BioGlue and commissural resuspension, right infraclavicular subclavian artery exploration possible cannulation, left common femoral artery cutdown with attempted cannulation that was aborted due to malperfusion, deep hypothermic circulatory arrest and antegrade selective cerebral perfusion, comprehensive neuro monitoring by Dr. Parkinson (see op report for full details.  Post operatively he did well.  Weaned from ventilator on the day of surgery. Tolerated medication therapy.  Blood pressure medications titrated for tight blood pressure control.  Weaned from supplemental oxygen.  All lines tubes and wires removed without issue.  Adequate PO intake.  Urinating and defecating normally.  Met PT goals.  On post operative day 6 he was deemed ready for discharge home with home health.  Follow up appointments as below.  Patient was provided with appropriate discharge education. He was instructed to call office with any questions or concerns.          Procedures Performed  Procedure(s):  WILLIAM, STERNOTOMY, REPAIR OF A  TYPE A AORTIC DISSECTION, HEMIARCH REPAIR, DEEP HYPOTHERMIC CIRCULATORY  ARREST, ANTEGRADE SELECTIVE CEREBRAL PERFUSION, AORTIC VALVE RESUSPENTION/ROOT REPAIR, PRP       Consults:   Consults       Date and Time Order Name Status Description    3/29/2024  1:48 PM Inpatient Cardiology Consult Completed     3/28/2024  1:17 PM Inpatient Pulmonology Consult Completed             Pertinent Test Results:    Lab Results   Component Value Date    WBC 14.59 (H) 04/01/2024    HGB 11.3 (L) 04/01/2024    HCT 34.4 (L) 04/01/2024    MCV 84.9 04/01/2024     04/01/2024      Lab Results   Component Value Date    GLUCOSE 114 (H) 04/03/2024    CALCIUM 8.4 (L) 04/03/2024     04/03/2024    K 4.1 04/03/2024    CO2 23.0 04/03/2024     04/03/2024    BUN 12 04/03/2024    CREATININE 1.21 04/03/2024    BCR 9.9 04/03/2024    ANIONGAP 12.0 04/03/2024     Lab Results   Component Value Date    INR 1.28 (H) 03/29/2024    PROTIME 16.2 (H) 03/29/2024         Condition on Discharge:  good    Vital Signs  Temp:  [98 °F (36.7 °C)-99 °F (37.2 °C)] 98.8 °F (37.1 °C)  Heart Rate:  [65-72] 72  Resp:  [18] 18  BP: (113-143)/(64-84) 113/66      Discharge Disposition  Home-Health Care c    Discharge Medications     Discharge Medications        New Medications        Instructions Start Date   aspirin 81 MG EC tablet   81 mg, Oral, Daily   Start Date: April 4, 2024     cyclobenzaprine 10 MG tablet  Commonly known as: FLEXERIL   10 mg, Oral, Every 8 Hours PRN      furosemide 40 MG tablet  Commonly known as: LASIX   40 mg, Oral, Daily   Start Date: April 4, 2024     gabapentin 300 MG capsule  Commonly known as: NEURONTIN   300 mg, Oral, Every 12 Hours Scheduled      HYDROcodone-acetaminophen 5-325 MG per tablet  Commonly known as: NORCO   1 tablet, Oral, Every 4 Hours PRN      NIFEdipine CC 60 MG 24 hr tablet  Commonly known as: ADALAT CC   120 mg, Oral, Every 24 Hours Scheduled             Stop These Medications      amoxicillin 875 MG tablet  Commonly known as: AMOXIL     methocarbamol 500 MG tablet  Commonly  known as: ROBAXIN              Discharge Diet:     Activity at Discharge:   1. No driving for 2 weeks and off narcotic pain medications.  2. Shower daily. Clean incisions with warm water and antibacterial soap only. Do not put any lotion or ointments on incisions.  3. Ambulate for 10 minutes at least 3 times a day.  4. No heavy lifting > 10lbs until seen in office.   5. Take all medications as prescribed.     Follow-up Appointments  Future Appointments   Date Time Provider Department Center   4/8/2024  3:00 PM Charlie Carvajal MD [facility]   4/25/2024  1:00 PM Gaye Haynes APRN [facility]   4/25/2024  [facility]     Additional Instructions for the Follow-ups that You Need to Schedule       Ambulatory Referral to Cardiac Rehab   As directed      Ambulatory Referral to Home Health   As directed      Face to Face Visit Date: 4/3/2024   Follow-up provider for Plan of Care?: I will be treating the patient on an ongoing basis.  Please send me the Plan of Care for signature.   Follow-up provider: SAE PEMBERTON [[id number]]   Reason/Clinical Findings: post op open heart   Describe mobility limitations that make leaving home difficult: weakness   Nursing/Therapeutic Services Requested: Skilled Nursing   Skilled nursing orders: Post CABG care   Frequency: 1 Week 1        Call MD With Problems / Concerns   As directed      Instructions:  Call office at [phone] for any drainage, increased redness, or fever over 100.5    Order Comments: Instructions:  Call office at [phone] for any drainage, increased redness, or fever over 100.5         Discharge Follow-up with PCP   As directed       Currently Documented PCP:    Provider, No Known    PCP Phone Number:    None     Follow Up Details: in 1 week        Discharge Follow-up with Specialty: Cardiologist APRN/PA; 1 Week   As directed      Specialty: Cardiologist APRN/PA   Follow Up: 1 Week   Follow Up Details:  bring all prescription bottles to appointment, call for appointment        Discharge Follow-up with Specified Provider: Cardiologist; 1 Month   As directed      To: Cardiologist   Follow Up: 1 Month   Follow Up Details: call for appointment, bring all medication bottles to appointment        Discharge Follow-up with Specified Provider: Gaye Mcdonough CT surgery APRN   As directed      To: Gaye Mcdonough CT surgery APRN   Follow Up Details: 4-6 weeks, bring all current medications to appointment                Test Results Pending at Discharge       MAYRA Goldberg  04/03/24  09:05 EDT

## 2024-04-04 NOTE — OUTREACH NOTE
Prep Survey      Flowsheet Row Responses   [facility] facility patient discharged from? [place]   Is LACE score < 7 ? No   Eligibility Readm Mgmt   Discharge diagnosis WILLIAM, Sternotomy, Repair of aortic dissection   Does the patient have one of the following disease processes/diagnoses(primary or secondary)? General Surgery   Does the patient have Home health ordered? Yes   What is the Home health agency?  [facility]   Is there a DME ordered? No   Prep survey completed? Yes            LUIS HENDERSON - Registered Nurse

## 2024-04-06 NOTE — PROGRESS NOTES
Encounter Date:04/09/2024        Patient ID: Tj Christopher is a 38 y.o. male.      Chief Complaint:      History of Present Illness  38 years old pleasant man with history of hypertension, obesity who initially presented to the ER with crushing chest pain radiating to his back and was diagnosed with type a aortic dissection for which she was transferred to [facility] for emergent surgery.  On 3/28/2024 he underwent emergent aortic dissection repair with 26 mm dacron graft, proximal aortic arch repair, aortic root reconstruction.  He was started on Coreg and nifedipine for blood pressure control.  He was recommended to undergo screening for sleep apnea and was also counseled against the use of methamphetamine.  He is currently on aspirin, Lasix, nifedipine.  I am not sure why he is no longer on Coreg.  He denies any chest pain or shortness of breath.  He has not started cardiac rehab.  He requests refills of his medications.  The following portions of the patient's history were reviewed and updated as appropriate: allergies, current medications, past family history, past medical history, past social history, past surgical history, and problem list.    Review of Systems   Constitutional: Negative for malaise/fatigue.   Cardiovascular:  Positive for chest pain and palpitations. Negative for dyspnea on exertion and leg swelling.   Respiratory:  Negative for cough and shortness of breath.    Gastrointestinal:  Negative for abdominal pain, nausea and vomiting.   Neurological:  Positive for numbness. Negative for dizziness, focal weakness, headaches, light-headedness and loss of balance.   All other systems reviewed and are negative.        Current Outpatient Medications:     aspirin 81 MG EC tablet, Take 1 tablet by mouth Daily. Indications: Disease involving Lipid Deposits in the Arteries, Disp: 90 tablet, Rfl: 3    cyclobenzaprine (FLEXERIL) 10 MG tablet, Take 1 tablet by mouth Every 8 (Eight) Hours As Needed for  Muscle Spasms. Indications: Muscle Spasm, Disp: 90 tablet, Rfl: 3    furosemide (LASIX) 40 MG tablet, Take 1 tablet by mouth Daily. Indications: High Blood Pressure Disorder, Disp: 90 tablet, Rfl: 3    gabapentin (NEURONTIN) 300 MG capsule, Take 1 capsule by mouth Every 12 (Twelve) Hours. Indications: Neuropathic Pain, Disp: 90 capsule, Rfl: 0    HYDROcodone-acetaminophen (NORCO) 5-325 MG per tablet, Take 1 tablet by mouth Every 4 (Four) Hours As Needed for Moderate Pain for up to 7 days. Indications: Pain, Disp: 42 tablet, Rfl: 0    NIFEdipine CC (ADALAT CC) 60 MG 24 hr tablet, Take 2 tablets by mouth Daily. Indications: High Blood Pressure Disorder, Disp: 180 tablet, Rfl: 3    carvedilol (COREG) 6.25 MG tablet, Take 1 tablet by mouth 2 (Two) Times a Day., Disp: 180 tablet, Rfl: 3    Current outpatient and discharge medications have been reconciled for the patient.  Reviewed by: Charlie Carvajal MD       No Known Allergies    Family History   Problem Relation Age of Onset    Heart disease Maternal Grandfather     Heart disease Paternal Grandmother        Past Surgical History:   Procedure Laterality Date    ASCENDING ARCH/HEMIARCH REPLACEMENT N/A 3/27/2024    Procedure: WILLIAM, STERNOTOMY, REPAIR OF A  TYPE A AORTIC DISSECTION, HEMIARCH REPAIR, DEEP HYPOTHERMIC CIRCULATORY ARREST, ANTEGRADE SELECTIVE CEREBRAL PERFUSION, AORTIC VALVE RESUSPENTION/ROOT REPAIR, PRP;  Surgeon: Sukhdeep Parkinson MD;  Location: [facility];  Service: Cardiothoracic;  Laterality: N/A;       Past Medical History:   Diagnosis Date    Aneurysm     Hypertension        Family History   Problem Relation Age of Onset    Heart disease Maternal Grandfather     Heart disease Paternal Grandmother        Social History     Socioeconomic History    Marital status: Single   Tobacco Use    Smoking status: Former     Types: Cigarettes     Passive exposure: Past    Smokeless tobacco: Never   Vaping Use    Vaping status: Never Used   Substance and Sexual Activity  "   Alcohol use: Not Currently    Drug use: Yes     Types: Amphetamines    Sexual activity: Defer               Objective:       Physical Exam    /78 (BP Location: Left arm, Patient Position: Sitting, Cuff Size: Large Adult)   Pulse 86   Ht 193 cm (76\")   Wt (!) 142 kg (313 lb)   SpO2 98%   BMI 38.10 kg/m²   The patient is alert, oriented and in no distress.    Vital signs as noted above.    Head and neck revealed no carotid bruits or jugular venous distension.  No thyromegaly or lymphadenopathy is present.    Lungs clear.  No wheezing.  Breath sounds are normal bilaterally.    Heart normal first and second heart sounds.  No murmur..  No pericardial rub is present.  No gallop is present.    Abdomen soft and nontender.  No organomegaly is present.    Extremities revealed good peripheral pulses without any pedal edema.    Skin warm and dry.    Musculoskeletal system is grossly normal.    CNS grossly normal.           Diagnosis Plan   1. Dissection of thoracoabdominal aorta  carvedilol (COREG) 6.25 MG tablet    Ambulatory Referral to Cardiac Rehab      2. Primary hypertension        3. Class 2 obesity due to excess calories without serious comorbidity with body mass index (BMI) of 39.0 to 39.9 in adult        4. Methamphetamine use        5. S/P aortic dissection repair  HYDROcodone-acetaminophen (NORCO) 5-325 MG per tablet    gabapentin (NEURONTIN) 300 MG capsule      LAB RESULTS (LAST 7 DAYS)    CBC          BMP  Results from last 7 days   Lab Units 04/03/24  0303   SODIUM mmol/L 136   POTASSIUM mmol/L 4.1   CHLORIDE mmol/L 101   CO2 mmol/L 23.0   BUN mg/dL 12   CREATININE mg/dL 1.21   GLUCOSE mg/dL 114*       CMP   Results from last 7 days   Lab Units 04/03/24  0303   SODIUM mmol/L 136   POTASSIUM mmol/L 4.1   CHLORIDE mmol/L 101   CO2 mmol/L 23.0   BUN mg/dL 12   CREATININE mg/dL 1.21   GLUCOSE mg/dL 114*         BNP        TROPONIN        CoAg        Creatinine Clearance  Estimated Creatinine Clearance: " 128.9 mL/min (by C-G formula based on SCr of 1.21 mg/dL).    ABG        Radiology  No radiology results for the last day    EKG  Procedures    Stress test      Echocardiogram  Results for orders placed in visit on 03/27/24    Diagnostic IntraOp Perez    Narrative  Diagnostic IntraOp Perez    Procedure Performed: Diagnostic IntraOp Perez  Start Time:  3/28/2024 1:00 AM  End Time:   3/28/2024 2:38 AM      General Procedure Information  Diagnostic Indications for Echo:  assessment of ascending aorta  Physician Requesting Echo: Sukhdeep Parkinson MD  Intubated  Bite block placed  Heart visualized  Probe Insertion:  Easy  Probe Type:  Multiplane  Modalities:  2D only, color flow mapping, continuous wave Doppler and pulse wave Doppler    Echocardiographic and Doppler Measurements    Ventricles    Left Ventricle:  Cavity size normal.  Global Function normal.  Ejection Fraction 60%.        Valves    Aortic Valve:  Annulus normal.  Stenosis not present.  Regurgitation absent.  Leaflets normal.  Leaflet motions normal.    Mitral Valve:  Annulus normal.  Stenosis not present.  Regurgitation trace.    Tricuspid Valve:  Annulus normal.  Stenosis not present.  Regurgitation trace.      Aorta    Ascending Aorta:  Dissection present.  Aortic Arch:  Size normal.  Dissection present.  Descending Aorta:  Size normal.  Dissection present.      Atria      Left Atrium:  Left atrial appendage normal.                  Anesthesia Information      Echocardiogram Comments:  Diagnosis: aortic dissection      Cardiac catheterization  No results found for this or any previous visit.          Assessment and Plan       Diagnoses and all orders for this visit:    1. Dissection of thoracoabdominal aorta (Primary)  On 3/28/2024 he underwent emergent aortic dissection repair with 26 mm dacron graft, proximal aortic arch repair, aortic root reconstruction.  I will start him on a beta-blocker today.  I will make a referral to cardiac rehab today.  For pain  control I have refilled his gabapentin and Norco    2. Primary hypertension  He is currently on nifedipine  I will start Coreg  Continue Lasix    3. Class 2 obesity due to excess calories without serious comorbidity with body mass index (BMI) of 39.0 to 39.9 in adult  Discussed lifestyle modifications.  BMI is 38.  He weighs 313 pounds.  Screening and treatment for sleep apnea recommended.    4. Methamphetamine use  He denies ever using methamphetamines    5. Ex-smoker  He no longer smokes and quit after surgery.    6. S/P aortic dissection repair  -     HYDROcodone-acetaminophen (NORCO) 5-325 MG per tablet; Take 1 tablet by mouth Every 4 (Four) Hours As Needed for Moderate Pain for up to 7 days. Indications: Pain  Dispense: 42 tablet; Refill: 0  -     gabapentin (NEURONTIN) 300 MG capsule; Take 1 capsule by mouth Every 12 (Twelve) Hours. Indications: Neuropathic Pain  Dispense: 90 capsule; Refill: 0    Other orders  -     furosemide (LASIX) 40 MG tablet; Take 1 tablet by mouth Daily. Indications: High Blood Pressure Disorder  Dispense: 90 tablet; Refill: 3  -     cyclobenzaprine (FLEXERIL) 10 MG tablet; Take 1 tablet by mouth Every 8 (Eight) Hours As Needed for Muscle Spasms. Indications: Muscle Spasm  Dispense: 90 tablet; Refill: 3  -     aspirin 81 MG EC tablet; Take 1 tablet by mouth Daily. Indications: Disease involving Lipid Deposits in the Arteries  Dispense: 90 tablet; Refill: 3  -     NIFEdipine CC (ADALAT CC) 60 MG 24 hr tablet; Take 2 tablets by mouth Daily. Indications: High Blood Pressure Disorder  Dispense: 180 tablet; Refill: 3

## 2024-04-07 ENCOUNTER — HOME CARE VISIT (OUTPATIENT)
Dept: HOME HEALTH SERVICES | Facility: HOME HEALTHCARE | Age: 39
End: 2024-04-07

## 2024-04-08 ENCOUNTER — HOME CARE VISIT (OUTPATIENT)
Dept: HOME HEALTH SERVICES | Facility: HOME HEALTHCARE | Age: 39
End: 2024-04-08
Payer: MEDICAID

## 2024-04-08 VITALS
OXYGEN SATURATION: 97 % | SYSTOLIC BLOOD PRESSURE: 158 MMHG | TEMPERATURE: 97.8 F | DIASTOLIC BLOOD PRESSURE: 78 MMHG | RESPIRATION RATE: 17 BRPM | HEART RATE: 90 BPM

## 2024-04-08 PROCEDURE — G0299 HHS/HOSPICE OF RN EA 15 MIN: HCPCS

## 2024-04-08 NOTE — Clinical Note
Patient is agreeable to RPM setup, but he wants to leave the home to visit his children who live in [place] each day after about 300 pm and will need intermittent monitoring set up. Please ensure he has an extra large cuff as his arms measure 43 cm

## 2024-04-09 ENCOUNTER — READMISSION MANAGEMENT (OUTPATIENT)
Dept: CALL CENTER | Facility: HOSPITAL | Age: 39
End: 2024-04-09
Payer: MEDICAID

## 2024-04-09 ENCOUNTER — OFFICE VISIT (OUTPATIENT)
Dept: CARDIOLOGY | Facility: CLINIC | Age: 39
End: 2024-04-09
Payer: MEDICAID

## 2024-04-09 VITALS
OXYGEN SATURATION: 98 % | WEIGHT: 313 LBS | SYSTOLIC BLOOD PRESSURE: 138 MMHG | HEART RATE: 86 BPM | BODY MASS INDEX: 38.11 KG/M2 | HEIGHT: 76 IN | DIASTOLIC BLOOD PRESSURE: 78 MMHG

## 2024-04-09 DIAGNOSIS — I10 PRIMARY HYPERTENSION: ICD-10-CM

## 2024-04-09 DIAGNOSIS — I71.03 DISSECTION OF THORACOABDOMINAL AORTA: Primary | ICD-10-CM

## 2024-04-09 DIAGNOSIS — Z98.890 S/P AORTIC DISSECTION REPAIR: ICD-10-CM

## 2024-04-09 DIAGNOSIS — F15.10 METHAMPHETAMINE USE: ICD-10-CM

## 2024-04-09 DIAGNOSIS — E66.09 CLASS 2 OBESITY DUE TO EXCESS CALORIES WITHOUT SERIOUS COMORBIDITY WITH BODY MASS INDEX (BMI) OF 39.0 TO 39.9 IN ADULT: ICD-10-CM

## 2024-04-09 PROCEDURE — 1160F RVW MEDS BY RX/DR IN RCRD: CPT | Performed by: INTERNAL MEDICINE

## 2024-04-09 PROCEDURE — 1159F MED LIST DOCD IN RCRD: CPT | Performed by: INTERNAL MEDICINE

## 2024-04-09 PROCEDURE — 99214 OFFICE O/P EST MOD 30 MIN: CPT | Performed by: INTERNAL MEDICINE

## 2024-04-09 RX ORDER — CARVEDILOL 6.25 MG/1
6.25 TABLET ORAL 2 TIMES DAILY
Qty: 180 TABLET | Refills: 3 | Status: SHIPPED | OUTPATIENT
Start: 2024-04-09

## 2024-04-09 RX ORDER — GABAPENTIN 300 MG/1
300 CAPSULE ORAL EVERY 12 HOURS SCHEDULED
Qty: 90 CAPSULE | Refills: 0 | Status: SHIPPED | OUTPATIENT
Start: 2024-04-09

## 2024-04-09 RX ORDER — CYCLOBENZAPRINE HCL 10 MG
10 TABLET ORAL EVERY 8 HOURS PRN
Qty: 90 TABLET | Refills: 3 | Status: SHIPPED | OUTPATIENT
Start: 2024-04-09

## 2024-04-09 RX ORDER — ASPIRIN 81 MG/1
81 TABLET ORAL DAILY
Qty: 90 TABLET | Refills: 3 | Status: SHIPPED | OUTPATIENT
Start: 2024-04-09

## 2024-04-09 RX ORDER — HYDROCODONE BITARTRATE AND ACETAMINOPHEN 5; 325 MG/1; MG/1
1 TABLET ORAL EVERY 4 HOURS PRN
Qty: 42 TABLET | Refills: 0 | Status: SHIPPED | OUTPATIENT
Start: 2024-04-09 | End: 2024-04-16

## 2024-04-09 RX ORDER — FUROSEMIDE 40 MG/1
40 TABLET ORAL DAILY
Qty: 90 TABLET | Refills: 3 | Status: SHIPPED | OUTPATIENT
Start: 2024-04-09

## 2024-04-09 NOTE — OUTREACH NOTE
General Surgery Week 1 Survey      Flowsheet Row Responses   [facility] facility patient discharged from? [place]   Does the patient have one of the following disease processes/diagnoses(primary or secondary)? General Surgery   Week 1 attempt successful? No   Unsuccessful attempts Attempt 1            Nohemy Angelo Registered Nurse

## 2024-04-09 NOTE — HOME HEALTH
"SOC Note:  Patient presented to the ER on 3/28 with abdominal pain and an episode of vomiting 3 days prior. He was diagnosed with gastritis, peptic ulcer disease, abdominal aortic aneurysm. Patient was found to have some aortic abnormality on the noncontrast scan this was followed up with a CTA of the aorta which showed type a dissection.  Patient was moderately hypertensive on presentation and was started on IV antihypertensives following the dissection diagnosis.  CT surgery consulted.  After review of the scans Dr. Parkinson requests patient be transferred to [facility] for surgery where a Dissection of thoracoabdominal aorta was performed.    At todays visit patient was not home on SN arrival even though SN had spoke with patient twice about scheduled visit timeframe. SN waited 15 minutes and patient arrived at home. Stressed to patient need for him to be home for scheduled visits and to be routinely using the RPM equiptment that was to be sent out to his home. Patient is in agreement but does state that he leaves his home to see his children and requests intermittent monitoring with the RPM.     Home Health ordered for: SN and Remote patient monitoring    Reason for Hosp/Primary Dx/Co-morbidities: Type A aortic dissection s/p dissection repair (Iggy), hypertension-- titrate PO meds,  leukocytosis--reactive/intaoperative steroid administration, post op anemia--expected acute blood loss, amphetamine use-- encourage cessation, leukocytosis    Focus of Care: Encounter for surgical aftercare following surgery on the circulatory system     Patient's goal(s): \"To get this blood pressure down\"    Current Functional status/mobility/DME: ambulatory without DME    HB status/Living Arrangements: Lives in a private home with is aunt and visits his children daily in another home    Skin Integrity/wound status: surgical chest and right subclavicular incision    Code Status: full code    Fall Risk/Safety concerns: " Patient is moderate risk of falls    Educated on Emergency Plan, steps to take prior to going to the ER and when to Call Home Health First:  Reviewed in admission packet     Medication issues/Concerns: Reviewed all in home medications and compaired to discharge orders    Additional Problems/Concerns: Hypertension    SDOH Barriers (i.e. caregiver concerns, social isolation, transportation, food insecurity, environment, income etc.)/Need for MSW: None found    Plan for next visit: cardiopulmonary assessment, assess incisions, medication review and teaching, assess for new medications from cardiologist office visit on 4/9.2024, assess for proper use of RPM

## 2024-04-11 ENCOUNTER — HOME CARE VISIT (OUTPATIENT)
Dept: HOME HEALTH SERVICES | Facility: HOME HEALTHCARE | Age: 39
End: 2024-04-11
Payer: MEDICAID

## 2024-04-11 NOTE — ANESTHESIA POSTPROCEDURE EVALUATION
Patient: Tj Christopher    Procedure Summary       Date: 03/27/24 Room / Location: [address] CARDIOVASCULAR OPERATING ROOM    Anesthesia Start: 2341 Anesthesia Stop: 03/28/24 0701    Procedure: WILLIAM, STERNOTOMY, REPAIR OF A  TYPE A AORTIC DISSECTION, HEMIARCH REPAIR, DEEP HYPOTHERMIC CIRCULATORY ARREST, ANTEGRADE SELECTIVE CEREBRAL PERFUSION, AORTIC VALVE RESUSPENTION/ROOT REPAIR, PRP (Chest) Diagnosis:     Surgeons: Sukhdeep Parkinson MD Provider: Kai Porter MD    Anesthesia Type: general, Jovana, CVL, PAC ASA Status: 4 - Emergent            Anesthesia Type: general, Jovana, CVL, PAC    Vitals  Vitals Value Taken Time   /78 04/09/24 1630   Temp 36.6 °C (97.8 °F) 04/08/24 1406   Pulse 86 04/09/24 1630   Resp 17 04/08/24 1406   SpO2 98 % 04/09/24 1630           Post Anesthesia Care and Evaluation    Patient participation: complete - patient cannot participate  Level of consciousness: obtunded/minimal responses  Pain management: adequate    Airway patency: patent  Anesthetic complications: No anesthetic complications  PONV Status: none  Cardiovascular status: acceptable  Respiratory status: acceptable, ETT, intubated and ventilator  Hydration status: acceptable    Comments: /66 (BP Location: Right arm, Patient Position: Sitting)   Pulse 72   Temp 36.9 °C (98.5 °F) (Oral)   Resp 18   Wt (!) 146 kg (322 lb 3.2 oz)   SpO2 99%   BMI 39.22 kg/m²

## 2024-04-11 NOTE — REMOTE PATIENT MONITORING NOTE
Remote patient monitoring set up complete. Device connectivity successful. Instructions left in the home with the following information:  the monitoring process,  how to contact the [facility] ([facility]), and when to call the [facility] for any technical issues with the device. The patient was also informed that they may receive follow up calls from [facility] when an alarm triggers and that the caller ID will identify the call as an unknown number.  The patient was informed that [facility] is  not a replacement for emergency services and to notify [facility] for any patient emergency.

## 2024-04-15 ENCOUNTER — TELEPHONE (OUTPATIENT)
Dept: CARDIOLOGY | Facility: CLINIC | Age: 39
End: 2024-04-15
Payer: MEDICAID

## 2024-04-15 NOTE — TELEPHONE ENCOUNTER
Incoming Refill Request      Medication requested (name and dose): HYDROCODONE    Pharmacy where request should be sent: [facility] [place]    Additional details provided by patient:     Best call back number: [phone]    Does the patient have less than a 3 day supply:  [x] Yes  [] No    Doris Bermudez Rep  04/15/24, 15:47 EDT

## 2024-04-15 NOTE — TELEPHONE ENCOUNTER
Patient asking for a refill of the Norco 5-325.   Are you willing to continue filling this medication?

## 2024-04-16 ENCOUNTER — HOME CARE VISIT (OUTPATIENT)
Dept: HOME HEALTH SERVICES | Facility: HOME HEALTHCARE | Age: 39
End: 2024-04-16
Payer: MEDICAID

## 2024-04-17 ENCOUNTER — TELEPHONE (OUTPATIENT)
Dept: CARDIOLOGY | Facility: CLINIC | Age: 39
End: 2024-04-17
Payer: MEDICAID

## 2024-04-17 NOTE — TELEPHONE ENCOUNTER
Incoming Refill Request      Medication requested (name and dose): FUROSEMIDE    Pharmacy where request should be sent: [facility]    Additional details provided by patient:     Best call back number: [phone]    Does the patient have less than a 3 day supply:  [x] Yes  [] No    Doris Bermudez Rep  04/17/24, 11:36 EDT

## 2024-04-17 NOTE — TELEPHONE ENCOUNTER
Called patient and informed him that a Rx for Furosemide was sent in by Dr. Carvajal on 4/9/24. Patient verbalizes understanding.

## 2024-04-22 ENCOUNTER — HOME CARE VISIT (OUTPATIENT)
Dept: HOME HEALTH SERVICES | Facility: HOME HEALTHCARE | Age: 39
End: 2024-04-22
Payer: MEDICAID

## 2024-04-22 VITALS
DIASTOLIC BLOOD PRESSURE: 64 MMHG | SYSTOLIC BLOOD PRESSURE: 134 MMHG | WEIGHT: 309.8 LBS | OXYGEN SATURATION: 99 % | HEART RATE: 83 BPM | HEIGHT: 76 IN | TEMPERATURE: 99 F | RESPIRATION RATE: 17 BRPM | BODY MASS INDEX: 37.72 KG/M2

## 2024-04-22 PROCEDURE — G0299 HHS/HOSPICE OF RN EA 15 MIN: HCPCS

## 2024-04-23 ENCOUNTER — READMISSION MANAGEMENT (OUTPATIENT)
Dept: CALL CENTER | Facility: HOSPITAL | Age: 39
End: 2024-04-23
Payer: MEDICAID

## 2024-04-23 NOTE — OUTREACH NOTE
General Surgery Week 3 Survey      Flowsheet Row Responses   [facility] patient discharged from? [facility]   Does the patient have one of the following disease processes/diagnoses(primary or secondary)? General Surgery   Week 3 attempt successful? Yes   Call start time 1509   Call end time 1512   Discharge diagnosis WILLIAM, Sternotomy, Repair of aortic dissection   Meds reviewed with patient/caregiver? Yes   Is the patient having any side effects they believe may be caused by any medication additions or changes? No   Does the patient have all medications related to this admission filled (includes all antibiotics, pain medications, etc.) Yes   Is the patient taking all medications as directed (includes completed medication regime)? Yes   Does the patient have a follow up appointment scheduled with their surgeon? No   What is preventing the patient from scheduling follow up appointments? Haven't had time   Nursing Interventions Advised patient to make appointment   Has the patient kept scheduled appointments due by today? N/A   What is the Home health agency?  [facility]   Has home health visited the patient within 72 hours of discharge? Yes   Psychosocial issues? No   What is the patient's perception of their health status since discharge? Improving   Week 3 call completed? Yes   Graduated Yes   Call end time 1512            Nohemy LAZO - Registered Nurse

## 2024-04-23 NOTE — HOME HEALTH
Routine Visit Note: patient reports physician wound not refill his pain medication    Skill/education provided: teaching related to alternative pain managment, reviewed all medications and teaching related to roll meds play in symptm managment, cardiopulmonary assessment, assess surgical incisions, dietary teaching, Commended patient for progress made on his weight loss goals and encouraged him to keep going., gave patient a medi-planner and instructed patient in proper procedure for filling. Patient demonstrated mediplanner fill as SN coached him in process.    Patient/caregiver response: Patient rep he is working to try and lose weight and has been eating a lot of salads and as of today has lost about 40 lbs.    Plan for next visit: [place] discharge, answer any questions and verify understanding of mediplanner fill    Other pertinent info: Patient is motivated and a quick learner

## 2024-04-24 ENCOUNTER — TELEPHONE (OUTPATIENT)
Dept: CARDIAC REHAB | Facility: HOSPITAL | Age: 39
End: 2024-04-24
Payer: MEDICAID

## 2024-04-25 PROBLEM — Z98.890 S/P AORTIC ARCH RECONSTRUCTION: Status: ACTIVE | Noted: 2024-04-25

## 2024-04-25 PROBLEM — I10 PRIMARY HYPERTENSION: Status: ACTIVE | Noted: 2024-04-25

## 2024-04-30 ENCOUNTER — HOME CARE VISIT (OUTPATIENT)
Dept: HOME HEALTH SERVICES | Facility: HOME HEALTHCARE | Age: 39
End: 2024-04-30
Payer: MEDICAID

## 2024-05-01 ENCOUNTER — TELEPHONE (OUTPATIENT)
Dept: CARDIAC REHAB | Facility: HOSPITAL | Age: 39
End: 2024-05-01
Payer: MEDICAID

## 2024-05-03 DIAGNOSIS — Z98.890 H/O AORTIC ARCH REPAIR: Primary | ICD-10-CM

## 2024-05-07 ENCOUNTER — HOME CARE VISIT (OUTPATIENT)
Dept: HOME HEALTH SERVICES | Facility: HOME HEALTHCARE | Age: 39
End: 2024-05-07
Payer: MEDICAID

## 2024-05-08 ENCOUNTER — TELEPHONE (OUTPATIENT)
Dept: CARDIAC SURGERY | Facility: CLINIC | Age: 39
End: 2024-05-08

## 2024-05-08 ENCOUNTER — TELEPHONE (OUTPATIENT)
Dept: CARDIAC SURGERY | Facility: CLINIC | Age: 39
End: 2024-05-08
Payer: MEDICAID

## 2024-05-08 DIAGNOSIS — Z98.890 H/O AORTIC ARCH REPAIR: Primary | ICD-10-CM

## 2024-05-08 NOTE — TELEPHONE ENCOUNTER
Caller: Tj Christopher    Relationship to patient: Self    Best call back number:    [phone] (Mobile)       Chief complaint: R/S POST OP    Type of visit: POST OP    Requested date: FIRST AVAILABLE     If rescheduling, when is the original appointment: 5/8/24     Additional notes:PT AND WIFE CALLED IN TO R/S MISSED POST OP APPT FROM 5/8/24. I COULD NOT FIND APPT WITHIN TIMEFRAME AND WAS GOING TO TRANSFER PT BUT THEY HAD TO GET OFF THE PHONE. PLEASE CALL PT WITH NEW POST OP APPT TIME.

## 2024-05-09 ENCOUNTER — OFFICE VISIT (OUTPATIENT)
Dept: CARDIAC REHAB | Facility: HOSPITAL | Age: 39
End: 2024-05-09
Payer: MEDICAID

## 2024-05-09 DIAGNOSIS — Z98.890 H/O AORTIC ARCH REPAIR: Primary | ICD-10-CM

## 2024-05-09 PROCEDURE — 93798 PHYS/QHP OP CAR RHAB W/ECG: CPT

## 2024-05-10 ENCOUNTER — APPOINTMENT (OUTPATIENT)
Dept: CARDIAC REHAB | Facility: HOSPITAL | Age: 39
End: 2024-05-10
Payer: MEDICAID

## 2024-05-14 ENCOUNTER — HOME CARE VISIT (OUTPATIENT)
Dept: HOME HEALTH SERVICES | Facility: HOME HEALTHCARE | Age: 39
End: 2024-05-14
Payer: MEDICAID

## 2024-05-14 VITALS
SYSTOLIC BLOOD PRESSURE: 144 MMHG | TEMPERATURE: 98.5 F | DIASTOLIC BLOOD PRESSURE: 108 MMHG | HEART RATE: 83 BPM | OXYGEN SATURATION: 98 %

## 2024-05-14 PROCEDURE — G0299 HHS/HOSPICE OF RN EA 15 MIN: HCPCS

## 2024-06-06 ENCOUNTER — TELEPHONE (OUTPATIENT)
Dept: CARDIAC REHAB | Facility: HOSPITAL | Age: 39
End: 2024-06-06
Payer: MEDICAID

## 2024-07-03 ENCOUNTER — TELEPHONE (OUTPATIENT)
Dept: CARDIAC REHAB | Facility: HOSPITAL | Age: 39
End: 2024-07-03
Payer: MEDICAID

## 2024-08-06 ENCOUNTER — HOSPITAL ENCOUNTER (EMERGENCY)
Facility: HOSPITAL | Age: 39
Discharge: HOME | End: 2024-08-06
Attending: EMERGENCY MEDICINE | Admitting: EMERGENCY MEDICINE
Payer: MEDICAID

## 2024-08-06 VITALS
HEIGHT: 76 IN | BODY MASS INDEX: 38.36 KG/M2 | OXYGEN SATURATION: 94 % | HEART RATE: 94 BPM | TEMPERATURE: 99.3 F | WEIGHT: 315 LBS | DIASTOLIC BLOOD PRESSURE: 118 MMHG | SYSTOLIC BLOOD PRESSURE: 167 MMHG | RESPIRATION RATE: 18 BRPM

## 2024-08-06 DIAGNOSIS — M25.422 SWELLING OF LEFT ELBOW: Primary | ICD-10-CM

## 2024-08-06 DIAGNOSIS — M79.89 RIGHT LEG SWELLING: ICD-10-CM

## 2024-08-06 DIAGNOSIS — I10 HYPERTENSION NOT AT GOAL: ICD-10-CM

## 2024-08-06 LAB
ALBUMIN SERPL-MCNC: 3.9 G/DL (ref 3.5–5.2)
ALBUMIN/GLOB SERPL: 1.1 G/DL
ALP SERPL-CCNC: 106 U/L (ref 39–117)
ALT SERPL W P-5'-P-CCNC: 28 U/L (ref 1–41)
ANION GAP SERPL CALCULATED.3IONS-SCNC: 9.5 MMOL/L (ref 5–15)
APTT PPP: 24.6 SECONDS (ref 22.7–35.4)
AST SERPL-CCNC: 16 U/L (ref 1–40)
BASOPHILS # BLD AUTO: 0.04 10*3/MM3 (ref 0–0.2)
BASOPHILS NFR BLD AUTO: 0.5 % (ref 0–1.5)
BILIRUB SERPL-MCNC: 0.6 MG/DL (ref 0–1.2)
BUN SERPL-MCNC: 7 MG/DL (ref 6–20)
BUN/CREAT SERPL: 6.2 (ref 7–25)
CALCIUM SPEC-SCNC: 9.3 MG/DL (ref 8.6–10.5)
CHLORIDE SERPL-SCNC: 102 MMOL/L (ref 98–107)
CO2 SERPL-SCNC: 26.5 MMOL/L (ref 22–29)
CREAT SERPL-MCNC: 1.13 MG/DL (ref 0.76–1.27)
D DIMER PPP FEU-MCNC: 0.46 MCGFEU/ML (ref 0–0.5)
DEPRECATED RDW RBC AUTO: 43.6 FL (ref 37–54)
EGFRCR SERPLBLD CKD-EPI 2021: 85.3 ML/MIN/1.73
EOSINOPHIL # BLD AUTO: 0.18 10*3/MM3 (ref 0–0.4)
EOSINOPHIL NFR BLD AUTO: 2.3 % (ref 0.3–6.2)
ERYTHROCYTE [DISTWIDTH] IN BLOOD BY AUTOMATED COUNT: 15 % (ref 12.3–15.4)
GLOBULIN UR ELPH-MCNC: 3.7 GM/DL
GLUCOSE SERPL-MCNC: 89 MG/DL (ref 65–99)
HCT VFR BLD AUTO: 43.9 % (ref 37.5–51)
HGB BLD-MCNC: 14.6 G/DL (ref 13–17.7)
IMM GRANULOCYTES # BLD AUTO: 0.03 10*3/MM3 (ref 0–0.05)
IMM GRANULOCYTES NFR BLD AUTO: 0.4 % (ref 0–0.5)
INR PPP: 1.01 (ref 0.9–1.1)
LYMPHOCYTES # BLD AUTO: 2.48 10*3/MM3 (ref 0.7–3.1)
LYMPHOCYTES NFR BLD AUTO: 31.5 % (ref 19.6–45.3)
MCH RBC QN AUTO: 27.2 PG (ref 26.6–33)
MCHC RBC AUTO-ENTMCNC: 33.3 G/DL (ref 31.5–35.7)
MCV RBC AUTO: 81.8 FL (ref 79–97)
MONOCYTES # BLD AUTO: 1.01 10*3/MM3 (ref 0.1–0.9)
MONOCYTES NFR BLD AUTO: 12.8 % (ref 5–12)
NEUTROPHILS NFR BLD AUTO: 4.14 10*3/MM3 (ref 1.7–7)
NEUTROPHILS NFR BLD AUTO: 52.5 % (ref 42.7–76)
NRBC BLD AUTO-RTO: 0 /100 WBC (ref 0–0.2)
PLATELET # BLD AUTO: 302 10*3/MM3 (ref 140–450)
PMV BLD AUTO: 9.3 FL (ref 6–12)
POTASSIUM SERPL-SCNC: 4.1 MMOL/L (ref 3.5–5.2)
PROT SERPL-MCNC: 7.6 G/DL (ref 6–8.5)
PROTHROMBIN TIME: 13.5 SECONDS (ref 11.7–14.2)
RBC # BLD AUTO: 5.37 10*6/MM3 (ref 4.14–5.8)
SODIUM SERPL-SCNC: 138 MMOL/L (ref 136–145)
URATE SERPL-MCNC: 6.7 MG/DL (ref 3.4–7)
WBC NRBC COR # BLD AUTO: 7.88 10*3/MM3 (ref 3.4–10.8)

## 2024-08-06 PROCEDURE — 85730 THROMBOPLASTIN TIME PARTIAL: CPT | Performed by: EMERGENCY MEDICINE

## 2024-08-06 PROCEDURE — 84550 ASSAY OF BLOOD/URIC ACID: CPT | Performed by: EMERGENCY MEDICINE

## 2024-08-06 PROCEDURE — 85379 FIBRIN DEGRADATION QUANT: CPT | Performed by: EMERGENCY MEDICINE

## 2024-08-06 PROCEDURE — 85610 PROTHROMBIN TIME: CPT | Performed by: EMERGENCY MEDICINE

## 2024-08-06 PROCEDURE — 80053 COMPREHEN METABOLIC PANEL: CPT | Performed by: EMERGENCY MEDICINE

## 2024-08-06 PROCEDURE — 85025 COMPLETE CBC W/AUTO DIFF WBC: CPT | Performed by: EMERGENCY MEDICINE

## 2024-08-06 PROCEDURE — 99283 EMERGENCY DEPT VISIT LOW MDM: CPT

## 2024-08-06 PROCEDURE — 36415 COLL VENOUS BLD VENIPUNCTURE: CPT

## 2024-08-06 RX ORDER — SODIUM CHLORIDE 0.9 % (FLUSH) 0.9 %
10 SYRINGE (ML) INJECTION AS NEEDED
Status: DISCONTINUED | OUTPATIENT
Start: 2024-08-06 | End: 2024-08-07 | Stop reason: HOSPADM

## 2024-08-07 NOTE — ED PROVIDER NOTES
EMERGENCY DEPARTMENT ENCOUNTER  Room Number:  15/15  PCP: Provider, No Known  Independent Historians: Patient and Family      HPI:  Chief Complaint: had concerns including Arm Swelling and Leg Swelling.     A complete HPI/ROS/PMH/PSH/SH/FH are unobtainable due to: None    Chronic or social conditions impacting patient care (Social Determinants of Health): None      Context: Tj Christopher is a 39 y.o. male with a medical history of hypertension and type a aortic dissection status post repair who presents to the ED c/o acute left elbow swelling and right ankle swelling for the past several days.  Patient denies any recent injuries.  He says that his right ankle will intermittently swell from time to time and over the past couple of weeks he has had persistent swelling in that area.  Although, however, he says the ankle swelling has gone down today to some degree.  However the left elbow seems to be a new phenomenon of swelling over the past 3 days for him.  Neither of the joints are particularly painful to the touch.  He denies any fevers.  He denies any redness.  Patient says he drinks alcohol on occasion, estimating about every 4 days.  He denies any recent heavy alcohol use.  He has never been diagnosed with gout before.  He has been taking his usual daily medications which include Lasix 40 mg x 1.      Review of prior external notes (non-ED) -and- Review of prior external test results outside of this encounter: I independently reviewed the hospital discharge summary from April 3, 2024 when patient had surgical management for type a aortic dissection and postop anemia.    Prescription drug monitoring program review: [facility] reviewed by Ramiro Finley MD       PAST MEDICAL HISTORY  Active Ambulatory Problems     Diagnosis Date Noted    Aortic dissection 03/28/2024    S/P type a aortic dissection, aortic arch repair, aortic root reconstruction by Dr. Parkinson 3/28/2024 04/25/2024    Primary hypertension 04/25/2024      Resolved Ambulatory Problems     Diagnosis Date Noted    No Resolved Ambulatory Problems     Past Medical History:   Diagnosis Date    Aneurysm     Hypertension          PAST SURGICAL HISTORY  Past Surgical History:   Procedure Laterality Date    ASCENDING ARCH/HEMIARCH REPLACEMENT N/A 3/27/2024    Procedure: WILLIAM, STERNOTOMY, REPAIR OF A  TYPE A AORTIC DISSECTION, HEMIARCH REPAIR, DEEP HYPOTHERMIC CIRCULATORY ARREST, ANTEGRADE SELECTIVE CEREBRAL PERFUSION, AORTIC VALVE RESUSPENTION/ROOT REPAIR, PRP;  Surgeon: Sukhdeep Parkinson MD;  Location: [facility];  Service: Cardiothoracic;  Laterality: N/A;         FAMILY HISTORY  Family History   Problem Relation Age of Onset    Heart disease Maternal Grandfather     Heart disease Paternal Grandmother          SOCIAL HISTORY  Social History     Socioeconomic History    Marital status: Single   Tobacco Use    Smoking status: Former     Types: Cigarettes     Passive exposure: Past    Smokeless tobacco: Never   Vaping Use    Vaping status: Never Used   Substance and Sexual Activity    Alcohol use: Not Currently    Drug use: Yes     Types: Amphetamines    Sexual activity: Defer         ALLERGIES  Patient has no known allergies.      REVIEW OF SYSTEMS  Review of Systems  Included in HPI  All systems reviewed and negative except for those discussed in HPI.      PHYSICAL EXAM    I have reviewed the triage vital signs and nursing notes.    ED Triage Vitals   Temp Heart Rate Resp BP SpO2   08/06/24 2052 08/06/24 2052 08/06/24 2052 08/06/24 2055 08/06/24 2052   99.3 °F (37.4 °C) 102 18 (!) 177/108 100 %      Temp src Heart Rate Source Patient Position BP Location FiO2 (%)   -- -- -- -- --              Physical Exam  GENERAL: alert, no acute distress, nontoxic-appearing  SKIN: Warm, dry, no rashes  HENT: Normocephalic, atraumatic  EYES: no scleral icterus, normal conjunctivae  CV: regular rhythm, regular rate  RESPIRATORY: normal effort, lungs clear bilaterally  ABDOMEN: soft,  nondistended, nontender  MUSCULOSKELETAL: Mild to moderate edema noted to the right foot and ankle.  However the foot and ankle are nontender to palpation and he has normal passive range of motion for dorsiflexion and plantarflexion of the ankle.  The foot is warm and well-perfused.  There is no erythema or induration of the soft tissues.  Similarly, there is moderate soft tissue edema of the left elbow joint.  However it is not hot to touch.  Is not erythematous or indurated.  Patient has normal flexion and extension of the left elbow joint without any limitation or pain on movement.  The left shoulder is nontender.  The left wrist and forearm and hand are all nontender and really do not show any significant edema.  The left hand is warm and well-perfused.  Distal neurovascular exam is normal with an easily palpable radial pulse on both wrists.  NEURO: alert, moves all extremities, follows commands      LAB RESULTS  Recent Results (from the past 24 hour(s))   Comprehensive Metabolic Panel    Collection Time: 08/06/24  9:35 PM    Specimen: Blood   Result Value Ref Range    Glucose 89 65 - 99 mg/dL    BUN 7 6 - 20 mg/dL    Creatinine 1.13 0.76 - 1.27 mg/dL    Sodium 138 136 - 145 mmol/L    Potassium 4.1 3.5 - 5.2 mmol/L    Chloride 102 98 - 107 mmol/L    CO2 26.5 22.0 - 29.0 mmol/L    Calcium 9.3 8.6 - 10.5 mg/dL    Total Protein 7.6 6.0 - 8.5 g/dL    Albumin 3.9 3.5 - 5.2 g/dL    ALT (SGPT) 28 1 - 41 U/L    AST (SGOT) 16 1 - 40 U/L    Alkaline Phosphatase 106 39 - 117 U/L    Total Bilirubin 0.6 0.0 - 1.2 mg/dL    Globulin 3.7 gm/dL    A/G Ratio 1.1 g/dL    BUN/Creatinine Ratio 6.2 (L) 7.0 - 25.0    Anion Gap 9.5 5.0 - 15.0 mmol/L    eGFR 85.3 >60.0 mL/min/1.73   D-dimer, Quantitative    Collection Time: 08/06/24  9:35 PM    Specimen: Blood   Result Value Ref Range    D-Dimer, Quantitative 0.46 0.00 - 0.50 MCGFEU/mL   Uric Acid    Collection Time: 08/06/24  9:35 PM    Specimen: Blood   Result Value Ref Range    Uric  Acid 6.7 3.4 - 7.0 mg/dL   Protime-INR    Collection Time: 08/06/24  9:35 PM    Specimen: Blood   Result Value Ref Range    Protime 13.5 11.7 - 14.2 Seconds    INR 1.01 0.90 - 1.10   aPTT    Collection Time: 08/06/24  9:35 PM    Specimen: Blood   Result Value Ref Range    PTT 24.6 22.7 - 35.4 seconds   CBC Auto Differential    Collection Time: 08/06/24  9:35 PM    Specimen: Blood   Result Value Ref Range    WBC 7.88 3.40 - 10.80 10*3/mm3    RBC 5.37 4.14 - 5.80 10*6/mm3    Hemoglobin 14.6 13.0 - 17.7 g/dL    Hematocrit 43.9 37.5 - 51.0 %    MCV 81.8 79.0 - 97.0 fL    MCH 27.2 26.6 - 33.0 pg    MCHC 33.3 31.5 - 35.7 g/dL    RDW 15.0 12.3 - 15.4 %    RDW-SD 43.6 37.0 - 54.0 fl    MPV 9.3 6.0 - 12.0 fL    Platelets 302 140 - 450 10*3/mm3    Neutrophil % 52.5 42.7 - 76.0 %    Lymphocyte % 31.5 19.6 - 45.3 %    Monocyte % 12.8 (H) 5.0 - 12.0 %    Eosinophil % 2.3 0.3 - 6.2 %    Basophil % 0.5 0.0 - 1.5 %    Immature Grans % 0.4 0.0 - 0.5 %    Neutrophils, Absolute 4.14 1.70 - 7.00 10*3/mm3    Lymphocytes, Absolute 2.48 0.70 - 3.10 10*3/mm3    Monocytes, Absolute 1.01 (H) 0.10 - 0.90 10*3/mm3    Eosinophils, Absolute 0.18 0.00 - 0.40 10*3/mm3    Basophils, Absolute 0.04 0.00 - 0.20 10*3/mm3    Immature Grans, Absolute 0.03 0.00 - 0.05 10*3/mm3    nRBC 0.0 0.0 - 0.2 /100 WBC         RADIOLOGY  No Radiology Exams Resulted Within Past 24 Hours      MEDICATIONS GIVEN IN ER  Medications   sodium chloride 0.9 % flush 10 mL (has no administration in time range)         ORDERS PLACED DURING THIS VISIT:  Orders Placed This Encounter   Procedures    Comprehensive Metabolic Panel    D-dimer, Quantitative    Uric Acid    Protime-INR    aPTT    CBC Auto Differential    Monitor Blood Pressure    Insert Peripheral IV    CBC & Differential         OUTPATIENT MEDICATION MANAGEMENT:  Current Facility-Administered Medications Ordered in Epic   Medication Dose Route Frequency Provider Last Rate Last Admin    sodium chloride 0.9 % flush 10 mL   10 mL Intravenous Ramiro Cuevas MD         Current Outpatient Medications Ordered in Epic   Medication Sig Dispense Refill    aspirin 81 MG EC tablet Take 1 tablet by mouth Daily. Indications: Disease involving Lipid Deposits in the Arteries 90 tablet 3    carvedilol (COREG) 6.25 MG tablet Take 1 tablet by mouth 2 (Two) Times a Day. 180 tablet 3    cyclobenzaprine (FLEXERIL) 10 MG tablet Take 1 tablet by mouth Every 8 (Eight) Hours As Needed for Muscle Spasms. Indications: Muscle Spasm 90 tablet 3    furosemide (LASIX) 40 MG tablet Take 1 tablet by mouth Daily. Indications: High Blood Pressure Disorder 90 tablet 3    gabapentin (NEURONTIN) 300 MG capsule Take 1 capsule by mouth Every 12 (Twelve) Hours. Indications: Neuropathic Pain 90 capsule 0    NIFEdipine CC (ADALAT CC) 60 MG 24 hr tablet Take 2 tablets by mouth Daily. Indications: High Blood Pressure Disorder 180 tablet 3         PROCEDURES  Procedures            PROGRESS, DATA ANALYSIS, CONSULTS, AND MEDICAL DECISION MAKING  All labs have been independently interpreted by me.  All radiology studies have been reviewed by me. All EKG's have been independently viewed and interpreted by me.  Discussion below represents my analysis of pertinent findings related to patient's condition, differential diagnosis, treatment plan and final disposition.    Differential diagnosis includes but is not limited to gout, rheumatoid arthritis, DVT, peripheral edema, CHF.    Clinical Scores:                   ED Course as of 08/06/24 2322   Tue Aug 06, 2024   2224 D-Dimer, Quant: 0.46 [AMARILYS]   2224 Uric Acid: 6.7 [AMARILYS]   2225 D-dimer is normal range.  This is a favorable result to indicate DVT is less likely. [AMARILYS]   2244 I discussed all the test results with the patient and his significant other at the bedside.  His white blood cell count is normal.  Uric acid level is normal.  I have no clinical concerns for septic arthritis or other acute orthopedic emergency right now.  I  "think a DVT is somewhat unlikely given the normal D-dimer, however, I think it would be prudent to have him return tomorrow for venous duplex ultrasound study.  He is agreeable with that plan. [AMARILYS]   2321 I did also provide resource information for the patient to call our \"patient connection\" phone number tomorrow so that he can establish a local PCP for prompt follow-up needs and ongoing care concerns.  And I reviewed with him the usual \"return to ER \"instructions prior to discharge as well. [AMARILYS]      ED Course User Index  [AMARILYS] Ramiro Finley MD             AS OF 23:22 EDT VITALS:    BP - (!) 167/118  HR - 94  TEMP - 99.3 °F (37.4 °C)  O2 SATS - 94%    COMPLEXITY OF CARE  Admission was considered but after careful review of the patient's presentation, physical examination, diagnostic results, and response to treatment the patient may be safely discharged with outpatient follow-up.      DIAGNOSIS  Final diagnoses:   Swelling of left elbow   Right leg swelling   Hypertension not at goal         DISPOSITION  ED Disposition       ED Disposition   Discharge    Condition   Stable    Comment   --                Please note that portions of this document were completed with a voice recognition program.    Note Disclaimer: At [facility], we believe that sharing information builds trust and better relationships. You are receiving this note because you recently visited [facility]. It is possible you will see health information before a provider has talked with you about it. This kind of information can be easy to misunderstand. To help you fully understand what it means for your health, we urge you to discuss this note with your provider.         Ramiro Finley MD  08/06/24 6102    "

## 2024-08-07 NOTE — DISCHARGE INSTRUCTIONS
Must follow-up tomorrow in hospital radiology department for ultrasound study as we discussed.  Must also follow-up with local PCP for further evaluation and outpatient testing as indicated.  Please return to the emergency room for any worsening symptoms or concerns.

## 2025-01-01 ENCOUNTER — ANESTHESIA EVENT (OUTPATIENT)
Dept: PERIOP | Facility: HOSPITAL | Age: 40
End: 2025-01-01
Payer: OTHER GOVERNMENT

## 2025-01-01 ENCOUNTER — APPOINTMENT (OUTPATIENT)
Dept: GENERAL RADIOLOGY | Facility: HOSPITAL | Age: 40
End: 2025-01-01
Payer: OTHER GOVERNMENT

## 2025-01-01 ENCOUNTER — ANESTHESIA EVENT CONVERTED (OUTPATIENT)
Dept: ANESTHESIOLOGY | Facility: HOSPITAL | Age: 40
End: 2025-01-01
Payer: OTHER GOVERNMENT

## 2025-01-01 ENCOUNTER — ANESTHESIA (OUTPATIENT)
Dept: PERIOP | Facility: HOSPITAL | Age: 40
End: 2025-01-01
Payer: OTHER GOVERNMENT

## 2025-01-01 ENCOUNTER — APPOINTMENT (OUTPATIENT)
Dept: CT IMAGING | Facility: HOSPITAL | Age: 40
End: 2025-01-01
Payer: OTHER GOVERNMENT

## 2025-01-01 ENCOUNTER — APPOINTMENT (OUTPATIENT)
Dept: CARDIOLOGY | Facility: HOSPITAL | Age: 40
End: 2025-01-01
Payer: OTHER GOVERNMENT

## 2025-01-01 ENCOUNTER — HOSPITAL ENCOUNTER (INPATIENT)
Facility: HOSPITAL | Age: 40
LOS: 6 days | End: 2025-07-12
Attending: INTERNAL MEDICINE | Admitting: INTERNAL MEDICINE
Payer: OTHER GOVERNMENT

## 2025-01-01 VITALS
DIASTOLIC BLOOD PRESSURE: 43 MMHG | SYSTOLIC BLOOD PRESSURE: 124 MMHG | TEMPERATURE: 102.8 F | HEIGHT: 76 IN | RESPIRATION RATE: 26 BRPM | BODY MASS INDEX: 38.36 KG/M2 | OXYGEN SATURATION: 98 % | HEART RATE: 138 BPM | WEIGHT: 315 LBS

## 2025-01-01 DIAGNOSIS — N17.9 AKI (ACUTE KIDNEY INJURY): ICD-10-CM

## 2025-01-01 DIAGNOSIS — J96.01 ACUTE HYPOXIC RESPIRATORY FAILURE: ICD-10-CM

## 2025-01-01 DIAGNOSIS — I48.91 ATRIAL FIBRILLATION, UNSPECIFIED TYPE: ICD-10-CM

## 2025-01-01 DIAGNOSIS — K55.069 OCCLUSION OF SUPERIOR MESENTERIC ARTERY: ICD-10-CM

## 2025-01-01 DIAGNOSIS — I77.79 DISSECTION OF MESENTERIC ARTERY: ICD-10-CM

## 2025-01-01 DIAGNOSIS — E87.20 LACTIC ACIDOSIS: ICD-10-CM

## 2025-01-01 DIAGNOSIS — R57.9 SHOCK, UNSPECIFIED: ICD-10-CM

## 2025-01-01 DIAGNOSIS — K55.9 MESENTERIC ISCHEMIA: ICD-10-CM

## 2025-01-01 DIAGNOSIS — K80.20 CHOLELITHIASIS: ICD-10-CM

## 2025-01-01 DIAGNOSIS — K55.069 MESENTERIC ARTERY THROMBOSIS: ICD-10-CM

## 2025-01-01 DIAGNOSIS — R10.9 ABDOMINAL PAIN, UNSPECIFIED ABDOMINAL LOCATION: ICD-10-CM

## 2025-01-01 DIAGNOSIS — I71.00 DISSECTION OF AORTA, UNSPECIFIED PORTION OF AORTA: Primary | ICD-10-CM

## 2025-01-01 DIAGNOSIS — N28.0 KIDNEY INFARCTION: ICD-10-CM

## 2025-01-01 DIAGNOSIS — R57.9 SHOCK: ICD-10-CM

## 2025-01-01 LAB
ABO GROUP BLD: NORMAL
ABO GROUP BLD: NORMAL
ACT BLD: 199 SECONDS (ref 89–137)
ACT BLD: 228 SECONDS (ref 89–137)
ALBUMIN SERPL-MCNC: 2.5 G/DL (ref 3.5–5.2)
ALBUMIN SERPL-MCNC: 2.6 G/DL (ref 3.5–5.2)
ALBUMIN SERPL-MCNC: 2.7 G/DL (ref 3.5–5.2)
ALBUMIN SERPL-MCNC: 2.9 G/DL (ref 3.5–5.2)
ALBUMIN SERPL-MCNC: 3 G/DL (ref 3.5–5.2)
ALBUMIN SERPL-MCNC: 3 G/DL (ref 3.5–5.2)
ALBUMIN SERPL-MCNC: 3.1 G/DL (ref 3.5–5.2)
ALBUMIN SERPL-MCNC: 3.3 G/DL (ref 3.5–5.2)
ALBUMIN SERPL-MCNC: 3.7 G/DL (ref 3.5–5.2)
ALBUMIN SERPL-MCNC: 3.9 G/DL (ref 3.5–5.2)
ALBUMIN SERPL-MCNC: 4.1 G/DL (ref 3.5–5.2)
ALBUMIN/GLOB SERPL: 1.3 G/DL
ALBUMIN/GLOB SERPL: 1.3 G/DL
ALBUMIN/GLOB SERPL: 1.4 G/DL
ALBUMIN/GLOB SERPL: 1.5 G/DL
ALBUMIN/GLOB SERPL: 1.6 G/DL
ALBUMIN/GLOB SERPL: 1.7 G/DL
ALBUMIN/GLOB SERPL: 1.9 G/DL
ALBUMIN/GLOB SERPL: 1.9 G/DL
ALBUMIN/GLOB SERPL: 2.9 G/DL
ALBUMIN/GLOB SERPL: 3 G/DL
ALP SERPL-CCNC: 100 U/L (ref 39–117)
ALP SERPL-CCNC: 130 U/L (ref 39–117)
ALP SERPL-CCNC: 195 U/L (ref 39–117)
ALP SERPL-CCNC: 201 U/L (ref 39–117)
ALP SERPL-CCNC: 272 U/L (ref 39–117)
ALP SERPL-CCNC: 376 U/L (ref 39–117)
ALP SERPL-CCNC: 389 U/L (ref 39–117)
ALP SERPL-CCNC: 472 U/L (ref 39–117)
ALP SERPL-CCNC: 84 U/L (ref 39–117)
ALP SERPL-CCNC: 84 U/L (ref 39–117)
ALT SERPL W P-5'-P-CCNC: 1260 U/L (ref 1–41)
ALT SERPL W P-5'-P-CCNC: 1793 U/L (ref 1–41)
ALT SERPL W P-5'-P-CCNC: 1817 U/L (ref 1–41)
ALT SERPL W P-5'-P-CCNC: 2175 U/L (ref 1–41)
ALT SERPL W P-5'-P-CCNC: 2421 U/L (ref 1–41)
ALT SERPL W P-5'-P-CCNC: 2428 U/L (ref 1–41)
ALT SERPL W P-5'-P-CCNC: 2561 U/L (ref 1–41)
ALT SERPL W P-5'-P-CCNC: 2692 U/L (ref 1–41)
ALT SERPL W P-5'-P-CCNC: 28 U/L (ref 1–41)
ALT SERPL W P-5'-P-CCNC: 585 U/L (ref 1–41)
AMMONIA BLD-SCNC: 125 UMOL/L (ref 16–60)
AMMONIA BLD-SCNC: 76 UMOL/L (ref 16–60)
AMPHET+METHAMPHET UR QL: NEGATIVE
AMPHETAMINES UR QL: NEGATIVE
ANION GAP SERPL CALCULATED.3IONS-SCNC: 14.1 MMOL/L (ref 5–15)
ANION GAP SERPL CALCULATED.3IONS-SCNC: 14.3 MMOL/L (ref 5–15)
ANION GAP SERPL CALCULATED.3IONS-SCNC: 15.3 MMOL/L (ref 5–15)
ANION GAP SERPL CALCULATED.3IONS-SCNC: 15.7 MMOL/L (ref 5–15)
ANION GAP SERPL CALCULATED.3IONS-SCNC: 16.6 MMOL/L (ref 5–15)
ANION GAP SERPL CALCULATED.3IONS-SCNC: 16.6 MMOL/L (ref 5–15)
ANION GAP SERPL CALCULATED.3IONS-SCNC: 17.3 MMOL/L (ref 5–15)
ANION GAP SERPL CALCULATED.3IONS-SCNC: 17.5 MMOL/L (ref 5–15)
ANION GAP SERPL CALCULATED.3IONS-SCNC: 19.1 MMOL/L (ref 5–15)
ANION GAP SERPL CALCULATED.3IONS-SCNC: 19.2 MMOL/L (ref 5–15)
ANION GAP SERPL CALCULATED.3IONS-SCNC: 20.3 MMOL/L (ref 5–15)
ANION GAP SERPL CALCULATED.3IONS-SCNC: 20.5 MMOL/L (ref 5–15)
ANION GAP SERPL CALCULATED.3IONS-SCNC: 20.5 MMOL/L (ref 5–15)
ANION GAP SERPL CALCULATED.3IONS-SCNC: 20.7 MMOL/L (ref 5–15)
ANION GAP SERPL CALCULATED.3IONS-SCNC: 21.2 MMOL/L (ref 5–15)
ANION GAP SERPL CALCULATED.3IONS-SCNC: 21.5 MMOL/L (ref 5–15)
ANION GAP SERPL CALCULATED.3IONS-SCNC: 21.7 MMOL/L (ref 5–15)
ANION GAP SERPL CALCULATED.3IONS-SCNC: 22 MMOL/L (ref 5–15)
ANION GAP SERPL CALCULATED.3IONS-SCNC: 22.7 MMOL/L (ref 5–15)
ANION GAP SERPL CALCULATED.3IONS-SCNC: 23.6 MMOL/L (ref 5–15)
ANISOCYTOSIS BLD QL: ABNORMAL
AORTIC DIMENSIONLESS INDEX: 0.84 (DI)
APTT PPP: 27.9 SECONDS (ref 22.7–35.4)
ARTERIAL PATENCY WRIST A: ABNORMAL
AST SERPL-CCNC: 2198 U/L (ref 1–40)
AST SERPL-CCNC: 27 U/L (ref 1–40)
AST SERPL-CCNC: 6142 U/L (ref 1–40)
AST SERPL-CCNC: 6867 U/L (ref 1–40)
AST SERPL-CCNC: 688 U/L (ref 1–40)
AST SERPL-CCNC: >7000 U/L (ref 1–40)
ATMOSPHERIC PRESS: ABNORMAL MM[HG]
AV MEAN PRESS GRAD SYS DOP V1V2: 6 MMHG
AV VMAX SYS DOP: 170 CM/SEC
B PARAPERT DNA SPEC QL NAA+PROBE: NOT DETECTED
B PERT DNA SPEC QL NAA+PROBE: NOT DETECTED
BACTERIA SPEC AEROBE CULT: NO GROWTH
BACTERIA SPEC AEROBE CULT: NORMAL
BACTERIA SPEC AEROBE CULT: NORMAL
BACTERIA UR QL AUTO: ABNORMAL /HPF
BARBITURATES UR QL SCN: NEGATIVE
BASE DEFICIT: ABNORMAL
BASE EXCESS BLDA CALC-SCNC: -10.8 MMOL/L (ref 0–3)
BASE EXCESS BLDA CALC-SCNC: -11.4 MMOL/L (ref 0–3)
BASE EXCESS BLDA CALC-SCNC: -11.6 MMOL/L (ref 0–3)
BASE EXCESS BLDA CALC-SCNC: -12.9 MMOL/L (ref 0–3)
BASE EXCESS BLDA CALC-SCNC: -12.9 MMOL/L (ref 0–3)
BASE EXCESS BLDA CALC-SCNC: -14 MMOL/L (ref 0–3)
BASE EXCESS BLDA CALC-SCNC: -2 MMOL/L (ref 0–3)
BASE EXCESS BLDA CALC-SCNC: -6.3 MMOL/L (ref 0–3)
BASE EXCESS BLDA CALC-SCNC: -6.8 MMOL/L (ref 0–3)
BASE EXCESS BLDA CALC-SCNC: -8.9 MMOL/L (ref 0–3)
BASE EXCESS BLDA CALC-SCNC: 0.1 MMOL/L (ref 0–3)
BASE EXCESS BLDA CALC-SCNC: 1.2 MMOL/L (ref 0–3)
BASE EXCESS BLDA CALC-SCNC: <0 MMOL/L (ref 0–3)
BASE EXCESS BLDV CALC-SCNC: -10.1 MMOL/L (ref -2–2)
BASE EXCESS BLDV CALC-SCNC: -8.6 MMOL/L (ref -2–2)
BASOPHILS # BLD AUTO: 0.02 10*3/MM3 (ref 0–0.2)
BASOPHILS # BLD AUTO: 0.02 10*3/MM3 (ref 0–0.2)
BASOPHILS # BLD AUTO: 0.03 10*3/MM3 (ref 0–0.2)
BASOPHILS # BLD MANUAL: 0.14 10*3/MM3 (ref 0–0.2)
BASOPHILS # BLD MANUAL: 0.22 10*3/MM3 (ref 0–0.2)
BASOPHILS # BLD MANUAL: 0.23 10*3/MM3 (ref 0–0.2)
BASOPHILS NFR BLD AUTO: 0.1 % (ref 0–1.5)
BASOPHILS NFR BLD AUTO: 0.1 % (ref 0–1.5)
BASOPHILS NFR BLD AUTO: 0.2 % (ref 0–1.5)
BASOPHILS NFR BLD MANUAL: 1 % (ref 0–1.5)
BDY SITE: ABNORMAL
BENZODIAZ UR QL SCN: NEGATIVE
BH BB BLOOD EXPIRATION DATE: NORMAL
BH BB BLOOD TYPE BARCODE: 1700
BH BB BLOOD TYPE BARCODE: 5100
BH BB BLOOD TYPE BARCODE: 600
BH BB BLOOD TYPE BARCODE: 6200
BH BB BLOOD TYPE BARCODE: 7300
BH BB BLOOD TYPE BARCODE: 7300
BH BB DISPENSE STATUS: NORMAL
BH BB PRODUCT CODE: NORMAL
BH BB UNIT NUMBER: NORMAL
BH CV ECHO LEFT VENTRICLE GLOBAL LONGITUDINAL STRAIN: -15.3 %
BH CV ECHO MEAS - ACS: 2.1 CM
BH CV ECHO MEAS - AO MAX PG: 11.6 MMHG
BH CV ECHO MEAS - AO V2 VTI: 30 CM
BH CV ECHO MEAS - AVA(I,D): 4.4 CM2
BH CV ECHO MEAS - EDV(CUBED): 97.3 ML
BH CV ECHO MEAS - EDV(MOD-SP4): 129 ML
BH CV ECHO MEAS - EF(MOD-SP4): 61.8 %
BH CV ECHO MEAS - ESV(CUBED): 29.8 ML
BH CV ECHO MEAS - ESV(MOD-SP4): 49.3 ML
BH CV ECHO MEAS - FS: 32.6 %
BH CV ECHO MEAS - IVS/LVPW: 1.08 CM
BH CV ECHO MEAS - IVSD: 1.4 CM
BH CV ECHO MEAS - LA DIMENSION: 4.1 CM
BH CV ECHO MEAS - LAT PEAK E' VEL: 11.1 CM/SEC
BH CV ECHO MEAS - LV DIASTOLIC VOL/BSA (35-75): 45.4 CM2
BH CV ECHO MEAS - LV MASS(C)D: 243.3 GRAMS
BH CV ECHO MEAS - LV MAX PG: 8.2 MMHG
BH CV ECHO MEAS - LV MEAN PG: 4 MMHG
BH CV ECHO MEAS - LV SYSTOLIC VOL/BSA (12-30): 17.3 CM2
BH CV ECHO MEAS - LV V1 MAX: 143 CM/SEC
BH CV ECHO MEAS - LV V1 VTI: 27 CM
BH CV ECHO MEAS - LVIDD: 4.6 CM
BH CV ECHO MEAS - LVIDS: 3.1 CM
BH CV ECHO MEAS - LVOT AREA: 4.9 CM2
BH CV ECHO MEAS - LVOT DIAM: 2.5 CM
BH CV ECHO MEAS - LVPWD: 1.3 CM
BH CV ECHO MEAS - MED PEAK E' VEL: 9.1 CM/SEC
BH CV ECHO MEAS - MV A MAX VEL: 62.2 CM/SEC
BH CV ECHO MEAS - MV DEC SLOPE: 754 CM/SEC2
BH CV ECHO MEAS - MV DEC TIME: 0.2 SEC
BH CV ECHO MEAS - MV E MAX VEL: 94.4 CM/SEC
BH CV ECHO MEAS - MV E/A: 1.52
BH CV ECHO MEAS - MV MAX PG: 6.5 MMHG
BH CV ECHO MEAS - MV MEAN PG: 2 MMHG
BH CV ECHO MEAS - MV P1/2T: 50.5 MSEC
BH CV ECHO MEAS - MV V2 VTI: 27.2 CM
BH CV ECHO MEAS - MVA(P1/2T): 4.4 CM2
BH CV ECHO MEAS - MVA(VTI): 4.9 CM2
BH CV ECHO MEAS - PA ACC TIME: 0.11 SEC
BH CV ECHO MEAS - PA V2 MAX: 145 CM/SEC
BH CV ECHO MEAS - RAP SYSTOLE: 3 MMHG
BH CV ECHO MEAS - RV MAX PG: 4.4 MMHG
BH CV ECHO MEAS - RV V1 MAX: 105 CM/SEC
BH CV ECHO MEAS - RV V1 VTI: 17.6 CM
BH CV ECHO MEAS - RVDD: 3.2 CM
BH CV ECHO MEAS - RVSP: 35.3 MMHG
BH CV ECHO MEAS - SV(LVOT): 132.5 ML
BH CV ECHO MEAS - SV(MOD-SP4): 79.7 ML
BH CV ECHO MEAS - SVI(LVOT): 46.6 ML/M2
BH CV ECHO MEAS - SVI(MOD-SP4): 28 ML/M2
BH CV ECHO MEAS - TAPSE (>1.6): 1.83 CM
BH CV ECHO MEAS - TR MAX PG: 32.3 MMHG
BH CV ECHO MEAS - TR MAX VEL: 284 CM/SEC
BH CV ECHO MEASUREMENTS AVERAGE E/E' RATIO: 9.35
BH CV UPPER VENOUS LEFT AXILLARY AUGMENT: NORMAL
BH CV UPPER VENOUS LEFT AXILLARY COMPRESS: NORMAL
BH CV UPPER VENOUS LEFT AXILLARY PHASIC: NORMAL
BH CV UPPER VENOUS LEFT AXILLARY SPONT: NORMAL
BH CV UPPER VENOUS LEFT BASILIC FOREARM COMPRESS: NORMAL
BH CV UPPER VENOUS LEFT BASILIC UPPER COMPRESS: NORMAL
BH CV UPPER VENOUS LEFT BRACHIAL COMPRESS: NORMAL
BH CV UPPER VENOUS LEFT CEPHALIC FOREARM COMPRESS: NORMAL
BH CV UPPER VENOUS LEFT CEPHALIC UPPER COLOR: 1
BH CV UPPER VENOUS LEFT CEPHALIC UPPER COMPRESS: NORMAL
BH CV UPPER VENOUS LEFT CEPHALIC UPPER THROMBUS: NORMAL
BH CV UPPER VENOUS LEFT INTERNAL JUGULAR AUGMENT: NORMAL
BH CV UPPER VENOUS LEFT INTERNAL JUGULAR COMPRESS: NORMAL
BH CV UPPER VENOUS LEFT INTERNAL JUGULAR PHASIC: NORMAL
BH CV UPPER VENOUS LEFT INTERNAL JUGULAR SPONT: NORMAL
BH CV UPPER VENOUS LEFT RADIAL COMPRESS: NORMAL
BH CV UPPER VENOUS LEFT SUBCLAVIAN AUGMENT: NORMAL
BH CV UPPER VENOUS LEFT SUBCLAVIAN PHASIC: NORMAL
BH CV UPPER VENOUS LEFT SUBCLAVIAN SPONT: NORMAL
BH CV UPPER VENOUS LEFT ULNAR COMPRESS: NORMAL
BH CV UPPER VENOUS RIGHT SUBCLAVIAN AUGMENT: NORMAL
BH CV UPPER VENOUS RIGHT SUBCLAVIAN COMPRESS: NORMAL
BH CV UPPER VENOUS RIGHT SUBCLAVIAN PHASIC: NORMAL
BH CV UPPER VENOUS RIGHT SUBCLAVIAN SPONT: NORMAL
BH CV XLRA - TDI S': 15.3 CM/SEC
BILIRUB SERPL-MCNC: 0.6 MG/DL (ref 0–1.2)
BILIRUB SERPL-MCNC: 0.8 MG/DL (ref 0–1.2)
BILIRUB SERPL-MCNC: 0.9 MG/DL (ref 0–1.2)
BILIRUB SERPL-MCNC: 1.2 MG/DL (ref 0–1.2)
BILIRUB SERPL-MCNC: 2.6 MG/DL (ref 0–1.2)
BILIRUB SERPL-MCNC: 2.9 MG/DL (ref 0–1.2)
BILIRUB SERPL-MCNC: 5.1 MG/DL (ref 0–1.2)
BILIRUB SERPL-MCNC: 8.3 MG/DL (ref 0–1.2)
BILIRUB SERPL-MCNC: 9.2 MG/DL (ref 0–1.2)
BILIRUB SERPL-MCNC: 9.2 MG/DL (ref 0–1.2)
BILIRUB UR QL STRIP: NEGATIVE
BLD GP AB SCN SERPL QL: NEGATIVE
BLD GP AB SCN SERPL QL: NEGATIVE
BUN SERPL-MCNC: 11.8 MG/DL (ref 6–20)
BUN SERPL-MCNC: 13.5 MG/DL (ref 6–20)
BUN SERPL-MCNC: 14.6 MG/DL (ref 6–20)
BUN SERPL-MCNC: 15.4 MG/DL (ref 6–20)
BUN SERPL-MCNC: 15.5 MG/DL (ref 6–20)
BUN SERPL-MCNC: 15.9 MG/DL (ref 6–20)
BUN SERPL-MCNC: 15.9 MG/DL (ref 6–20)
BUN SERPL-MCNC: 16.8 MG/DL (ref 6–20)
BUN SERPL-MCNC: 19.8 MG/DL (ref 6–20)
BUN SERPL-MCNC: 20 MG/DL (ref 6–20)
BUN SERPL-MCNC: 20.9 MG/DL (ref 6–20)
BUN SERPL-MCNC: 21.8 MG/DL (ref 6–20)
BUN SERPL-MCNC: 24.1 MG/DL (ref 6–20)
BUN SERPL-MCNC: 27.7 MG/DL (ref 6–20)
BUN SERPL-MCNC: 28.6 MG/DL (ref 6–20)
BUN SERPL-MCNC: 30.6 MG/DL (ref 6–20)
BUN SERPL-MCNC: 33.6 MG/DL (ref 6–20)
BUN SERPL-MCNC: 35.1 MG/DL (ref 6–20)
BUN SERPL-MCNC: 8 MG/DL (ref 6–20)
BUN SERPL-MCNC: 9.3 MG/DL (ref 6–20)
BUN/CREAT SERPL: 3.8 (ref 7–25)
BUN/CREAT SERPL: 3.9 (ref 7–25)
BUN/CREAT SERPL: 3.9 (ref 7–25)
BUN/CREAT SERPL: 4.2 (ref 7–25)
BUN/CREAT SERPL: 4.3 (ref 7–25)
BUN/CREAT SERPL: 4.3 (ref 7–25)
BUN/CREAT SERPL: 4.5 (ref 7–25)
BUN/CREAT SERPL: 4.6 (ref 7–25)
BUN/CREAT SERPL: 4.6 (ref 7–25)
BUN/CREAT SERPL: 4.9 (ref 7–25)
BUN/CREAT SERPL: 5 (ref 7–25)
BUN/CREAT SERPL: 5 (ref 7–25)
BUN/CREAT SERPL: 5.1 (ref 7–25)
BUN/CREAT SERPL: 5.2 (ref 7–25)
BUN/CREAT SERPL: 5.2 (ref 7–25)
BUN/CREAT SERPL: 5.4 (ref 7–25)
BUN/CREAT SERPL: 5.5 (ref 7–25)
BUN/CREAT SERPL: 5.7 (ref 7–25)
BUN/CREAT SERPL: 5.8 (ref 7–25)
BUN/CREAT SERPL: 5.9 (ref 7–25)
BUPRENORPHINE SERPL-MCNC: NEGATIVE NG/ML
C PNEUM DNA NPH QL NAA+NON-PROBE: NOT DETECTED
CA-I BLDA-SCNC: 1 MMOL/L (ref 1.12–1.32)
CA-I BLDA-SCNC: 1.01 MMOL/L (ref 1.12–1.32)
CA-I BLDA-SCNC: 1.02 MMOL/L (ref 1.12–1.32)
CA-I BLDA-SCNC: 1.04 MMOL/L (ref 1.12–1.32)
CA-I BLDA-SCNC: 1.04 MMOL/L (ref 1.15–1.33)
CA-I BLDA-SCNC: 1.05 MMOL/L (ref 1.12–1.32)
CA-I BLDA-SCNC: 1.19 MMOL/L (ref 1.15–1.33)
CA-I SERPL ISE-MCNC: 0.92 MMOL/L (ref 1.15–1.3)
CA-I SERPL ISE-MCNC: 1 MMOL/L (ref 1.15–1.3)
CA-I SERPL ISE-MCNC: 1.01 MMOL/L (ref 1.15–1.3)
CA-I SERPL ISE-MCNC: 1.03 MMOL/L (ref 1.15–1.3)
CA-I SERPL ISE-MCNC: 1.05 MMOL/L (ref 1.15–1.3)
CA-I SERPL ISE-MCNC: 1.05 MMOL/L (ref 1.15–1.3)
CA-I SERPL ISE-MCNC: 1.06 MMOL/L (ref 1.15–1.3)
CA-I SERPL ISE-MCNC: 1.06 MMOL/L (ref 1.15–1.3)
CA-I SERPL ISE-MCNC: 1.08 MMOL/L (ref 1.15–1.3)
CA-I SERPL ISE-MCNC: 1.09 MMOL/L (ref 1.15–1.3)
CA-I SERPL ISE-MCNC: 1.11 MMOL/L (ref 1.15–1.3)
CA-I SERPL ISE-MCNC: 1.12 MMOL/L (ref 1.15–1.3)
CALCIUM SPEC-SCNC: 7.4 MG/DL (ref 8.6–10.5)
CALCIUM SPEC-SCNC: 7.6 MG/DL (ref 8.6–10.5)
CALCIUM SPEC-SCNC: 7.6 MG/DL (ref 8.6–10.5)
CALCIUM SPEC-SCNC: 7.8 MG/DL (ref 8.6–10.5)
CALCIUM SPEC-SCNC: 7.9 MG/DL (ref 8.6–10.5)
CALCIUM SPEC-SCNC: 8 MG/DL (ref 8.6–10.5)
CALCIUM SPEC-SCNC: 8.1 MG/DL (ref 8.6–10.5)
CALCIUM SPEC-SCNC: 8.2 MG/DL (ref 8.6–10.5)
CALCIUM SPEC-SCNC: 8.2 MG/DL (ref 8.6–10.5)
CALCIUM SPEC-SCNC: 8.5 MG/DL (ref 8.6–10.5)
CALCIUM SPEC-SCNC: 8.6 MG/DL (ref 8.6–10.5)
CALCIUM SPEC-SCNC: 8.7 MG/DL (ref 8.6–10.5)
CALCIUM SPEC-SCNC: 8.8 MG/DL (ref 8.6–10.5)
CALCIUM SPEC-SCNC: 8.8 MG/DL (ref 8.6–10.5)
CALCIUM SPEC-SCNC: 8.9 MG/DL (ref 8.6–10.5)
CALCIUM SPEC-SCNC: 9.1 MG/DL (ref 8.6–10.5)
CANNABINOIDS SERPL QL: POSITIVE
CHLORIDE SERPL-SCNC: 100 MMOL/L (ref 98–107)
CHLORIDE SERPL-SCNC: 101 MMOL/L (ref 98–107)
CHLORIDE SERPL-SCNC: 103 MMOL/L (ref 98–107)
CHLORIDE SERPL-SCNC: 104 MMOL/L (ref 98–107)
CHLORIDE SERPL-SCNC: 98 MMOL/L (ref 98–107)
CHLORIDE SERPL-SCNC: 99 MMOL/L (ref 98–107)
CLARITY UR: CLEAR
CO2 BLDA-SCNC: 16.7 MMOL/L (ref 22–29)
CO2 BLDA-SCNC: 16.9 MMOL/L (ref 22–29)
CO2 BLDA-SCNC: 17.3 MMOL/L (ref 22–29)
CO2 BLDA-SCNC: 18 MMOL/L (ref 23–27)
CO2 BLDA-SCNC: 19 MMOL/L (ref 23–27)
CO2 BLDA-SCNC: 19 MMOL/L (ref 23–27)
CO2 BLDA-SCNC: 19.1 MMOL/L (ref 22–29)
CO2 BLDA-SCNC: 20 MMOL/L (ref 22–29)
CO2 BLDA-SCNC: 20.3 MMOL/L (ref 22–29)
CO2 BLDA-SCNC: 21.8 MMOL/L (ref 22–29)
CO2 BLDA-SCNC: 27.2 MMOL/L (ref 22–29)
CO2 BLDA-SCNC: 28.7 MMOL/L (ref 22–29)
CO2 BLDA-SCNC: 29.1 MMOL/L (ref 22–29)
CO2 SERPL-SCNC: 12.4 MMOL/L (ref 22–29)
CO2 SERPL-SCNC: 14.9 MMOL/L (ref 22–29)
CO2 SERPL-SCNC: 15.3 MMOL/L (ref 22–29)
CO2 SERPL-SCNC: 15.3 MMOL/L (ref 22–29)
CO2 SERPL-SCNC: 15.5 MMOL/L (ref 22–29)
CO2 SERPL-SCNC: 16 MMOL/L (ref 22–29)
CO2 SERPL-SCNC: 16.5 MMOL/L (ref 22–29)
CO2 SERPL-SCNC: 16.7 MMOL/L (ref 22–29)
CO2 SERPL-SCNC: 16.8 MMOL/L (ref 22–29)
CO2 SERPL-SCNC: 16.8 MMOL/L (ref 22–29)
CO2 SERPL-SCNC: 17.3 MMOL/L (ref 22–29)
CO2 SERPL-SCNC: 17.5 MMOL/L (ref 22–29)
CO2 SERPL-SCNC: 18.4 MMOL/L (ref 22–29)
CO2 SERPL-SCNC: 18.4 MMOL/L (ref 22–29)
CO2 SERPL-SCNC: 18.5 MMOL/L (ref 22–29)
CO2 SERPL-SCNC: 18.7 MMOL/L (ref 22–29)
CO2 SERPL-SCNC: 20.9 MMOL/L (ref 22–29)
CO2 SERPL-SCNC: 21.3 MMOL/L (ref 22–29)
CO2 SERPL-SCNC: 23.7 MMOL/L (ref 22–29)
CO2 SERPL-SCNC: 24.7 MMOL/L (ref 22–29)
COCAINE UR QL: NEGATIVE
COLOR UR: YELLOW
CREAT BLDA-MCNC: 3.42 MG/DL (ref 0.6–1.3)
CREAT BLDA-MCNC: 3.59 MG/DL (ref 0.6–1.3)
CREAT SERPL-MCNC: 1.78 MG/DL (ref 0.76–1.27)
CREAT SERPL-MCNC: 1.78 MG/DL (ref 0.76–1.27)
CREAT SERPL-MCNC: 2.79 MG/DL (ref 0.76–1.27)
CREAT SERPL-MCNC: 3.1 MG/DL (ref 0.76–1.27)
CREAT SERPL-MCNC: 3.47 MG/DL (ref 0.76–1.27)
CREAT SERPL-MCNC: 3.49 MG/DL (ref 0.76–1.27)
CREAT SERPL-MCNC: 3.49 MG/DL (ref 0.76–1.27)
CREAT SERPL-MCNC: 3.71 MG/DL (ref 0.76–1.27)
CREAT SERPL-MCNC: 3.75 MG/DL (ref 0.76–1.27)
CREAT SERPL-MCNC: 3.78 MG/DL (ref 0.76–1.27)
CREAT SERPL-MCNC: 3.88 MG/DL (ref 0.76–1.27)
CREAT SERPL-MCNC: 3.99 MG/DL (ref 0.76–1.27)
CREAT SERPL-MCNC: 4.26 MG/DL (ref 0.76–1.27)
CREAT SERPL-MCNC: 4.62 MG/DL (ref 0.76–1.27)
CREAT SERPL-MCNC: 4.74 MG/DL (ref 0.76–1.27)
CREAT SERPL-MCNC: 5.01 MG/DL (ref 0.76–1.27)
CREAT SERPL-MCNC: 5.17 MG/DL (ref 0.76–1.27)
CREAT SERPL-MCNC: 5.94 MG/DL (ref 0.76–1.27)
CREAT SERPL-MCNC: 6.1 MG/DL (ref 0.76–1.27)
CREAT SERPL-MCNC: 6.52 MG/DL (ref 0.76–1.27)
CROSSMATCH INTERPRETATION: NORMAL
CROSSMATCH INTERPRETATION: NORMAL
D-LACTATE SERPL-SCNC: 10 MMOL/L (ref 0.5–2)
D-LACTATE SERPL-SCNC: 10.5 MMOL/L (ref 0.5–2)
D-LACTATE SERPL-SCNC: 10.8 MMOL/L (ref 0.5–2)
D-LACTATE SERPL-SCNC: 11.5 MMOL/L (ref 0.5–2)
D-LACTATE SERPL-SCNC: 12.2 MMOL/L (ref 0.5–2)
D-LACTATE SERPL-SCNC: 12.7 MMOL/L (ref 0.5–2)
D-LACTATE SERPL-SCNC: 12.8 MMOL/L (ref 0.5–2)
D-LACTATE SERPL-SCNC: 13.3 MMOL/L (ref 0.2–2)
D-LACTATE SERPL-SCNC: 15.1 MMOL/L (ref 0.2–2)
D-LACTATE SERPL-SCNC: 4.2 MMOL/L (ref 0.5–2)
D-LACTATE SERPL-SCNC: 4.3 MMOL/L (ref 0.5–2)
D-LACTATE SERPL-SCNC: 4.4 MMOL/L (ref 0.5–2)
D-LACTATE SERPL-SCNC: 5 MMOL/L (ref 0.5–2)
D-LACTATE SERPL-SCNC: 5.9 MMOL/L (ref 0.3–2)
D-LACTATE SERPL-SCNC: 6.4 MMOL/L (ref 0.5–2)
D-LACTATE SERPL-SCNC: 7.1 MMOL/L (ref 0.5–2)
D-LACTATE SERPL-SCNC: 8.5 MMOL/L (ref 0.5–2)
D-LACTATE SERPL-SCNC: 8.7 MMOL/L (ref 0.2–2)
D-LACTATE SERPL-SCNC: 9 MMOL/L (ref 0.5–2)
D-LACTATE SERPL-SCNC: 9.3 MMOL/L (ref 0.2–2)
D-LACTATE SERPL-SCNC: 9.8 MMOL/L (ref 0.5–2)
DEPRECATED RDW RBC AUTO: 41.2 FL (ref 37–54)
DEPRECATED RDW RBC AUTO: 42.3 FL (ref 37–54)
DEPRECATED RDW RBC AUTO: 43.7 FL (ref 37–54)
DEPRECATED RDW RBC AUTO: 44.9 FL (ref 37–54)
DEPRECATED RDW RBC AUTO: 44.9 FL (ref 37–54)
DEPRECATED RDW RBC AUTO: 45.1 FL (ref 37–54)
DEPRECATED RDW RBC AUTO: 46.5 FL (ref 37–54)
DEPRECATED RDW RBC AUTO: 47.7 FL (ref 37–54)
DEPRECATED RDW RBC AUTO: 50.7 FL (ref 37–54)
DEPRECATED RDW RBC AUTO: 51.4 FL (ref 37–54)
DEVICE COMMENT: ABNORMAL
DOHLE BOD BLD QL SMEAR: PRESENT
EGFRCR SERPLBLD CKD-EPI 2021: 10.4 ML/MIN/1.73
EGFRCR SERPLBLD CKD-EPI 2021: 11.2 ML/MIN/1.73
EGFRCR SERPLBLD CKD-EPI 2021: 11.6 ML/MIN/1.73
EGFRCR SERPLBLD CKD-EPI 2021: 13.7 ML/MIN/1.73
EGFRCR SERPLBLD CKD-EPI 2021: 14.2 ML/MIN/1.73
EGFRCR SERPLBLD CKD-EPI 2021: 15.2 ML/MIN/1.73
EGFRCR SERPLBLD CKD-EPI 2021: 15.6 ML/MIN/1.73
EGFRCR SERPLBLD CKD-EPI 2021: 17.2 ML/MIN/1.73
EGFRCR SERPLBLD CKD-EPI 2021: 18.7 ML/MIN/1.73
EGFRCR SERPLBLD CKD-EPI 2021: 19.3 ML/MIN/1.73
EGFRCR SERPLBLD CKD-EPI 2021: 19.9 ML/MIN/1.73
EGFRCR SERPLBLD CKD-EPI 2021: 20.1 ML/MIN/1.73
EGFRCR SERPLBLD CKD-EPI 2021: 20.4 ML/MIN/1.73
EGFRCR SERPLBLD CKD-EPI 2021: 21.2 ML/MIN/1.73
EGFRCR SERPLBLD CKD-EPI 2021: 21.9 ML/MIN/1.73
EGFRCR SERPLBLD CKD-EPI 2021: 21.9 ML/MIN/1.73
EGFRCR SERPLBLD CKD-EPI 2021: 22.1 ML/MIN/1.73
EGFRCR SERPLBLD CKD-EPI 2021: 22.4 ML/MIN/1.73
EGFRCR SERPLBLD CKD-EPI 2021: 25.3 ML/MIN/1.73
EGFRCR SERPLBLD CKD-EPI 2021: 28.7 ML/MIN/1.73
EGFRCR SERPLBLD CKD-EPI 2021: 49.2 ML/MIN/1.73
EGFRCR SERPLBLD CKD-EPI 2021: 49.2 ML/MIN/1.73
EOSINOPHIL # BLD AUTO: 0 10*3/MM3 (ref 0–0.4)
EOSINOPHIL # BLD AUTO: 0 10*3/MM3 (ref 0–0.4)
EOSINOPHIL # BLD AUTO: 0.01 10*3/MM3 (ref 0–0.4)
EOSINOPHIL NFR BLD AUTO: 0 % (ref 0.3–6.2)
EOSINOPHIL NFR BLD AUTO: 0 % (ref 0.3–6.2)
EOSINOPHIL NFR BLD AUTO: 0.1 % (ref 0.3–6.2)
ERYTHROCYTE [DISTWIDTH] IN BLOOD BY AUTOMATED COUNT: 13.4 % (ref 12.3–15.4)
ERYTHROCYTE [DISTWIDTH] IN BLOOD BY AUTOMATED COUNT: 13.5 % (ref 12.3–15.4)
ERYTHROCYTE [DISTWIDTH] IN BLOOD BY AUTOMATED COUNT: 14.2 % (ref 12.3–15.4)
ERYTHROCYTE [DISTWIDTH] IN BLOOD BY AUTOMATED COUNT: 14.3 % (ref 12.3–15.4)
ERYTHROCYTE [DISTWIDTH] IN BLOOD BY AUTOMATED COUNT: 14.4 % (ref 12.3–15.4)
ERYTHROCYTE [DISTWIDTH] IN BLOOD BY AUTOMATED COUNT: 14.4 % (ref 12.3–15.4)
ERYTHROCYTE [DISTWIDTH] IN BLOOD BY AUTOMATED COUNT: 14.6 % (ref 12.3–15.4)
ERYTHROCYTE [DISTWIDTH] IN BLOOD BY AUTOMATED COUNT: 14.6 % (ref 12.3–15.4)
ERYTHROCYTE [DISTWIDTH] IN BLOOD BY AUTOMATED COUNT: 14.7 % (ref 12.3–15.4)
ERYTHROCYTE [DISTWIDTH] IN BLOOD BY AUTOMATED COUNT: 15 % (ref 12.3–15.4)
ERYTHROCYTE [DISTWIDTH] IN BLOOD BY AUTOMATED COUNT: 16.1 % (ref 12.3–15.4)
ERYTHROCYTE [DISTWIDTH] IN BLOOD BY AUTOMATED COUNT: 16.1 % (ref 12.3–15.4)
FIBRINOGEN PPP-MCNC: 140 MG/DL (ref 219–464)
FLUAV SUBTYP SPEC NAA+PROBE: NOT DETECTED
FLUBV RNA NPH QL NAA+NON-PROBE: NOT DETECTED
GLOBULIN UR ELPH-MCNC: 0.9 GM/DL
GLOBULIN UR ELPH-MCNC: 1 GM/DL
GLOBULIN UR ELPH-MCNC: 1.9 GM/DL
GLOBULIN UR ELPH-MCNC: 1.9 GM/DL
GLOBULIN UR ELPH-MCNC: 2 GM/DL
GLOBULIN UR ELPH-MCNC: 2.1 GM/DL
GLOBULIN UR ELPH-MCNC: 2.1 GM/DL
GLOBULIN UR ELPH-MCNC: 2.2 GM/DL
GLOBULIN UR ELPH-MCNC: 2.3 GM/DL
GLOBULIN UR ELPH-MCNC: 2.6 GM/DL
GLUCOSE BLDC GLUCOMTR-MCNC: 100 MG/DL (ref 74–100)
GLUCOSE BLDC GLUCOMTR-MCNC: 100 MG/DL (ref 74–100)
GLUCOSE BLDC GLUCOMTR-MCNC: 104 MG/DL (ref 70–105)
GLUCOSE BLDC GLUCOMTR-MCNC: 105 MG/DL (ref 70–105)
GLUCOSE BLDC GLUCOMTR-MCNC: 108 MG/DL (ref 70–105)
GLUCOSE BLDC GLUCOMTR-MCNC: 110 MG/DL (ref 70–105)
GLUCOSE BLDC GLUCOMTR-MCNC: 111 MG/DL (ref 70–105)
GLUCOSE BLDC GLUCOMTR-MCNC: 111 MG/DL (ref 70–105)
GLUCOSE BLDC GLUCOMTR-MCNC: 113 MG/DL (ref 70–105)
GLUCOSE BLDC GLUCOMTR-MCNC: 114 MG/DL (ref 70–105)
GLUCOSE BLDC GLUCOMTR-MCNC: 115 MG/DL (ref 70–105)
GLUCOSE BLDC GLUCOMTR-MCNC: 117 MG/DL (ref 70–105)
GLUCOSE BLDC GLUCOMTR-MCNC: 118 MG/DL (ref 70–105)
GLUCOSE BLDC GLUCOMTR-MCNC: 120 MG/DL (ref 70–105)
GLUCOSE BLDC GLUCOMTR-MCNC: 122 MG/DL (ref 70–105)
GLUCOSE BLDC GLUCOMTR-MCNC: 122 MG/DL (ref 70–105)
GLUCOSE BLDC GLUCOMTR-MCNC: 124 MG/DL (ref 70–105)
GLUCOSE BLDC GLUCOMTR-MCNC: 125 MG/DL (ref 70–105)
GLUCOSE BLDC GLUCOMTR-MCNC: 130 MG/DL (ref 70–105)
GLUCOSE BLDC GLUCOMTR-MCNC: 132 MG/DL (ref 70–105)
GLUCOSE BLDC GLUCOMTR-MCNC: 132 MG/DL (ref 70–105)
GLUCOSE BLDC GLUCOMTR-MCNC: 133 MG/DL (ref 70–105)
GLUCOSE BLDC GLUCOMTR-MCNC: 133 MG/DL (ref 70–105)
GLUCOSE BLDC GLUCOMTR-MCNC: 135 MG/DL (ref 70–105)
GLUCOSE BLDC GLUCOMTR-MCNC: 136 MG/DL (ref 70–105)
GLUCOSE BLDC GLUCOMTR-MCNC: 136 MG/DL (ref 70–105)
GLUCOSE BLDC GLUCOMTR-MCNC: 138 MG/DL (ref 70–105)
GLUCOSE BLDC GLUCOMTR-MCNC: 138 MG/DL (ref 74–100)
GLUCOSE BLDC GLUCOMTR-MCNC: 149 MG/DL (ref 70–105)
GLUCOSE BLDC GLUCOMTR-MCNC: 150 MG/DL (ref 70–105)
GLUCOSE BLDC GLUCOMTR-MCNC: 156 MG/DL (ref 70–105)
GLUCOSE BLDC GLUCOMTR-MCNC: 160 MG/DL (ref 74–100)
GLUCOSE BLDC GLUCOMTR-MCNC: 160 MG/DL (ref 74–100)
GLUCOSE BLDC GLUCOMTR-MCNC: 172 MG/DL (ref 70–105)
GLUCOSE BLDC GLUCOMTR-MCNC: 183 MG/DL (ref 70–105)
GLUCOSE BLDC GLUCOMTR-MCNC: 187 MG/DL (ref 70–105)
GLUCOSE BLDC GLUCOMTR-MCNC: 46 MG/DL (ref 70–105)
GLUCOSE BLDC GLUCOMTR-MCNC: 49 MG/DL (ref 70–105)
GLUCOSE BLDC GLUCOMTR-MCNC: 49 MG/DL (ref 70–105)
GLUCOSE BLDC GLUCOMTR-MCNC: 57 MG/DL (ref 70–105)
GLUCOSE BLDC GLUCOMTR-MCNC: 59 MG/DL (ref 74–100)
GLUCOSE BLDC GLUCOMTR-MCNC: 61 MG/DL (ref 70–105)
GLUCOSE BLDC GLUCOMTR-MCNC: 63 MG/DL (ref 74–100)
GLUCOSE BLDC GLUCOMTR-MCNC: 75 MG/DL (ref 70–105)
GLUCOSE BLDC GLUCOMTR-MCNC: 76 MG/DL (ref 70–105)
GLUCOSE BLDC GLUCOMTR-MCNC: 82 MG/DL (ref 70–105)
GLUCOSE BLDC GLUCOMTR-MCNC: 91 MG/DL (ref 70–105)
GLUCOSE BLDC GLUCOMTR-MCNC: 91 MG/DL (ref 70–105)
GLUCOSE BLDC GLUCOMTR-MCNC: 95 MG/DL (ref 70–105)
GLUCOSE BLDC GLUCOMTR-MCNC: 95 MG/DL (ref 70–105)
GLUCOSE SERPL-MCNC: 102 MG/DL (ref 65–99)
GLUCOSE SERPL-MCNC: 104 MG/DL (ref 65–99)
GLUCOSE SERPL-MCNC: 109 MG/DL (ref 65–99)
GLUCOSE SERPL-MCNC: 117 MG/DL (ref 65–99)
GLUCOSE SERPL-MCNC: 117 MG/DL (ref 65–99)
GLUCOSE SERPL-MCNC: 130 MG/DL (ref 65–99)
GLUCOSE SERPL-MCNC: 131 MG/DL (ref 65–99)
GLUCOSE SERPL-MCNC: 137 MG/DL (ref 65–99)
GLUCOSE SERPL-MCNC: 138 MG/DL (ref 65–99)
GLUCOSE SERPL-MCNC: 143 MG/DL (ref 65–99)
GLUCOSE SERPL-MCNC: 150 MG/DL (ref 65–99)
GLUCOSE SERPL-MCNC: 153 MG/DL (ref 65–99)
GLUCOSE SERPL-MCNC: 158 MG/DL (ref 65–99)
GLUCOSE SERPL-MCNC: 164 MG/DL (ref 65–99)
GLUCOSE SERPL-MCNC: 170 MG/DL (ref 65–99)
GLUCOSE SERPL-MCNC: 191 MG/DL (ref 65–99)
GLUCOSE SERPL-MCNC: 52 MG/DL (ref 65–99)
GLUCOSE SERPL-MCNC: 89 MG/DL (ref 65–99)
GLUCOSE SERPL-MCNC: 92 MG/DL (ref 65–99)
GLUCOSE SERPL-MCNC: 99 MG/DL (ref 65–99)
GLUCOSE UR STRIP-MCNC: NEGATIVE MG/DL
HADV DNA SPEC NAA+PROBE: NOT DETECTED
HBV SURFACE AG SERPL QL IA: NORMAL
HCO3 BLDA-SCNC: 14.6 MMOL/L (ref 21–28)
HCO3 BLDA-SCNC: 15.6 MMOL/L (ref 21–28)
HCO3 BLDA-SCNC: 15.7 MMOL/L (ref 21–28)
HCO3 BLDA-SCNC: 16.1 MMOL/L (ref 21–28)
HCO3 BLDA-SCNC: 16.5 MMOL/L (ref 22–26)
HCO3 BLDA-SCNC: 16.9 MMOL/L (ref 22–26)
HCO3 BLDA-SCNC: 17.1 MMOL/L (ref 22–26)
HCO3 BLDA-SCNC: 17.2 MMOL/L (ref 21–28)
HCO3 BLDA-SCNC: 17.2 MMOL/L (ref 22–26)
HCO3 BLDA-SCNC: 17.2 MMOL/L (ref 22–26)
HCO3 BLDA-SCNC: 18.3 MMOL/L (ref 21–28)
HCO3 BLDA-SCNC: 18.8 MMOL/L (ref 21–28)
HCO3 BLDA-SCNC: 18.9 MMOL/L (ref 21–28)
HCO3 BLDA-SCNC: 20.1 MMOL/L (ref 21–28)
HCO3 BLDA-SCNC: 25.6 MMOL/L (ref 21–28)
HCO3 BLDA-SCNC: 27.2 MMOL/L (ref 21–28)
HCO3 BLDA-SCNC: 27.4 MMOL/L (ref 21–28)
HCO3 BLDV-SCNC: 18.3 MMOL/L (ref 22–26)
HCO3 BLDV-SCNC: 20.9 MMOL/L (ref 22–26)
HCOV 229E RNA SPEC QL NAA+PROBE: NOT DETECTED
HCOV HKU1 RNA SPEC QL NAA+PROBE: NOT DETECTED
HCOV NL63 RNA SPEC QL NAA+PROBE: NOT DETECTED
HCOV OC43 RNA SPEC QL NAA+PROBE: NOT DETECTED
HCT VFR BLD AUTO: 22.6 % (ref 37.5–51)
HCT VFR BLD AUTO: 24.4 % (ref 37.5–51)
HCT VFR BLD AUTO: 31.1 % (ref 37.5–51)
HCT VFR BLD AUTO: 33.7 % (ref 37.5–51)
HCT VFR BLD AUTO: 35.9 % (ref 37.5–51)
HCT VFR BLD AUTO: 36.5 % (ref 37.5–51)
HCT VFR BLD AUTO: 37.2 % (ref 37.5–51)
HCT VFR BLD AUTO: 38.5 % (ref 37.5–51)
HCT VFR BLD AUTO: 39 % (ref 37.5–51)
HCT VFR BLD AUTO: 39.2 % (ref 37.5–51)
HCT VFR BLD AUTO: 40.4 % (ref 37.5–51)
HCT VFR BLD AUTO: 41.8 % (ref 37.5–51)
HCT VFR BLDA CALC: 23 % (ref 38–51)
HCT VFR BLDA CALC: 28 % (ref 38–51)
HCT VFR BLDA CALC: 29 % (ref 38–51)
HCT VFR BLDA CALC: 29 % (ref 38–51)
HCT VFR BLDA CALC: 30 % (ref 38–51)
HCT VFR BLDA CALC: 33 % (ref 38–51)
HCT VFR BLDA CALC: 37 % (ref 38–51)
HEMODILUTION: NO
HGB BLD-MCNC: 10.2 G/DL (ref 13–17.7)
HGB BLD-MCNC: 11.3 G/DL (ref 13–17.7)
HGB BLD-MCNC: 11.4 G/DL (ref 13–17.7)
HGB BLD-MCNC: 11.8 G/DL (ref 13–17.7)
HGB BLD-MCNC: 12.2 G/DL (ref 13–17.7)
HGB BLD-MCNC: 12.6 G/DL (ref 13–17.7)
HGB BLD-MCNC: 12.6 G/DL (ref 13–17.7)
HGB BLD-MCNC: 12.8 G/DL (ref 13–17.7)
HGB BLD-MCNC: 12.9 G/DL (ref 13–17.7)
HGB BLD-MCNC: 13.7 G/DL (ref 13–17.7)
HGB BLD-MCNC: 7.3 G/DL (ref 13–17.7)
HGB BLD-MCNC: 7.7 G/DL (ref 13–17.7)
HGB BLDA-MCNC: 10.2 G/DL (ref 12–17)
HGB BLDA-MCNC: 11.2 G/DL (ref 12–17)
HGB BLDA-MCNC: 12.6 G/DL (ref 12–17)
HGB BLDA-MCNC: 7.9 G/DL (ref 12–17)
HGB BLDA-MCNC: 9.6 G/DL (ref 12–17)
HGB BLDA-MCNC: 9.9 G/DL (ref 12–17)
HGB BLDA-MCNC: 9.9 G/DL (ref 12–17)
HGB UR QL STRIP.AUTO: ABNORMAL
HMPV RNA NPH QL NAA+NON-PROBE: NOT DETECTED
HOLD SPECIMEN: NORMAL
HPIV1 RNA ISLT QL NAA+PROBE: NOT DETECTED
HPIV2 RNA SPEC QL NAA+PROBE: NOT DETECTED
HPIV3 RNA NPH QL NAA+PROBE: NOT DETECTED
HPIV4 P GENE NPH QL NAA+PROBE: NOT DETECTED
HYALINE CASTS UR QL AUTO: ABNORMAL /LPF
IMM GRANULOCYTES # BLD AUTO: 0.07 10*3/MM3 (ref 0–0.05)
IMM GRANULOCYTES # BLD AUTO: 0.07 10*3/MM3 (ref 0–0.05)
IMM GRANULOCYTES # BLD AUTO: 0.14 10*3/MM3 (ref 0–0.05)
IMM GRANULOCYTES NFR BLD AUTO: 0.3 % (ref 0–0.5)
IMM GRANULOCYTES NFR BLD AUTO: 0.6 % (ref 0–0.5)
IMM GRANULOCYTES NFR BLD AUTO: 0.6 % (ref 0–0.5)
INHALED O2 CONCENTRATION: 100 %
INHALED O2 CONCENTRATION: 40 %
INHALED O2 CONCENTRATION: 50 %
INHALED O2 CONCENTRATION: 50 %
INHALED O2 CONCENTRATION: 60 %
INHALED O2 CONCENTRATION: 60 %
INHALED O2 CONCENTRATION: 80 %
INHALED O2 CONCENTRATION: 80 %
INR PPP: 1.3 (ref 0.9–1.1)
INR PPP: 1.4 (ref 0.9–1.1)
INR PPP: 2.32 (ref 0.9–1.1)
INR PPP: 2.32 (ref 0.9–1.1)
INR PPP: 2.34 (ref 0.9–1.1)
INR PPP: 2.35 (ref 2–3)
INR PPP: 2.45 (ref 2–3)
INR PPP: 3.74 (ref 0.9–1.1)
INR PPP: 4.88 (ref 0.9–1.1)
KETONES UR QL STRIP: ABNORMAL
LAB AP CASE REPORT: NORMAL
LARGE PLATELETS: ABNORMAL
LARGE PLATELETS: ABNORMAL
LEFT ATRIUM VOLUME INDEX: 21.7 ML/M2
LEUKOCYTE ESTERASE UR QL STRIP.AUTO: ABNORMAL
LIPASE SERPL-CCNC: 24 U/L (ref 13–60)
LV EF BIPLANE MOD: 62 %
LYMPHOCYTES # BLD AUTO: 0.68 10*3/MM3 (ref 0.7–3.1)
LYMPHOCYTES # BLD AUTO: 1.3 10*3/MM3 (ref 0.7–3.1)
LYMPHOCYTES # BLD AUTO: 1.68 10*3/MM3 (ref 0.7–3.1)
LYMPHOCYTES # BLD MANUAL: 0.9 10*3/MM3 (ref 0.7–3.1)
LYMPHOCYTES # BLD MANUAL: 1.52 10*3/MM3 (ref 0.7–3.1)
LYMPHOCYTES # BLD MANUAL: 1.75 10*3/MM3 (ref 0.7–3.1)
LYMPHOCYTES # BLD MANUAL: 2.21 10*3/MM3 (ref 0.7–3.1)
LYMPHOCYTES # BLD MANUAL: 2.26 10*3/MM3 (ref 0.7–3.1)
LYMPHOCYTES # BLD MANUAL: 3.22 10*3/MM3 (ref 0.7–3.1)
LYMPHOCYTES # BLD MANUAL: 5.67 10*3/MM3 (ref 0.7–3.1)
LYMPHOCYTES # BLD MANUAL: 6.09 10*3/MM3 (ref 0.7–3.1)
LYMPHOCYTES NFR BLD AUTO: 5.6 % (ref 19.6–45.3)
LYMPHOCYTES NFR BLD AUTO: 6.1 % (ref 19.6–45.3)
LYMPHOCYTES NFR BLD AUTO: 7.4 % (ref 19.6–45.3)
LYMPHOCYTES NFR BLD MANUAL: 1 % (ref 5–12)
LYMPHOCYTES NFR BLD MANUAL: 12 % (ref 5–12)
LYMPHOCYTES NFR BLD MANUAL: 3 % (ref 5–12)
LYMPHOCYTES NFR BLD MANUAL: 3 % (ref 5–12)
LYMPHOCYTES NFR BLD MANUAL: 7 % (ref 5–12)
LYMPHOCYTES NFR BLD MANUAL: 7 % (ref 5–12)
LYMPHOCYTES NFR BLD MANUAL: 9 % (ref 5–12)
LYMPHOCYTES NFR BLD MANUAL: 9 % (ref 5–12)
Lab: ABNORMAL
M PNEUMO IGG SER IA-ACNC: NOT DETECTED
MAGNESIUM SERPL-MCNC: 1.6 MG/DL (ref 1.6–2.6)
MAGNESIUM SERPL-MCNC: 1.7 MG/DL (ref 1.6–2.6)
MAGNESIUM SERPL-MCNC: 1.7 MG/DL (ref 1.6–2.6)
MAGNESIUM SERPL-MCNC: 1.8 MG/DL (ref 1.6–2.6)
MAGNESIUM SERPL-MCNC: 1.8 MG/DL (ref 1.6–2.6)
MAGNESIUM SERPL-MCNC: 1.9 MG/DL (ref 1.6–2.6)
MAGNESIUM SERPL-MCNC: 2 MG/DL (ref 1.6–2.6)
MAGNESIUM SERPL-MCNC: 2.1 MG/DL (ref 1.6–2.6)
MAGNESIUM SERPL-MCNC: 2.2 MG/DL (ref 1.6–2.6)
MAGNESIUM SERPL-MCNC: 2.3 MG/DL (ref 1.6–2.6)
MCH RBC QN AUTO: 27.6 PG (ref 26.6–33)
MCH RBC QN AUTO: 27.6 PG (ref 26.6–33)
MCH RBC QN AUTO: 27.8 PG (ref 26.6–33)
MCH RBC QN AUTO: 27.8 PG (ref 26.6–33)
MCH RBC QN AUTO: 28 PG (ref 26.6–33)
MCH RBC QN AUTO: 28 PG (ref 26.6–33)
MCH RBC QN AUTO: 28.2 PG (ref 26.6–33)
MCH RBC QN AUTO: 28.3 PG (ref 26.6–33)
MCH RBC QN AUTO: 28.3 PG (ref 26.6–33)
MCH RBC QN AUTO: 28.8 PG (ref 26.6–33)
MCHC RBC AUTO-ENTMCNC: 31.6 G/DL (ref 31.5–35.7)
MCHC RBC AUTO-ENTMCNC: 31.7 G/DL (ref 31.5–35.7)
MCHC RBC AUTO-ENTMCNC: 31.8 G/DL (ref 31.5–35.7)
MCHC RBC AUTO-ENTMCNC: 32.3 G/DL (ref 31.5–35.7)
MCHC RBC AUTO-ENTMCNC: 32.7 G/DL (ref 31.5–35.7)
MCHC RBC AUTO-ENTMCNC: 32.8 G/DL (ref 31.5–35.7)
MCHC RBC AUTO-ENTMCNC: 32.9 G/DL (ref 31.5–35.7)
MCHC RBC AUTO-ENTMCNC: 33.5 G/DL (ref 31.5–35.7)
MCV RBC AUTO: 83 FL (ref 79–97)
MCV RBC AUTO: 84.3 FL (ref 79–97)
MCV RBC AUTO: 85.5 FL (ref 79–97)
MCV RBC AUTO: 85.6 FL (ref 79–97)
MCV RBC AUTO: 85.8 FL (ref 79–97)
MCV RBC AUTO: 86.1 FL (ref 79–97)
MCV RBC AUTO: 86.4 FL (ref 79–97)
MCV RBC AUTO: 87.6 FL (ref 79–97)
MCV RBC AUTO: 88.1 FL (ref 79–97)
MCV RBC AUTO: 88.4 FL (ref 79–97)
MCV RBC AUTO: 88.9 FL (ref 79–97)
MCV RBC AUTO: 89 FL (ref 79–97)
METAMYELOCYTES NFR BLD MANUAL: 1 % (ref 0–0)
METAMYELOCYTES NFR BLD MANUAL: 2 % (ref 0–0)
METAMYELOCYTES NFR BLD MANUAL: 3 % (ref 0–0)
METAMYELOCYTES NFR BLD MANUAL: 4 % (ref 0–0)
METHADONE UR QL SCN: NEGATIVE
MODALITY: ABNORMAL
MONOCYTES # BLD AUTO: 1.08 10*3/MM3 (ref 0.1–0.9)
MONOCYTES # BLD AUTO: 1.2 10*3/MM3 (ref 0.1–0.9)
MONOCYTES # BLD AUTO: 1.4 10*3/MM3 (ref 0.1–0.9)
MONOCYTES # BLD: 0.18 10*3/MM3 (ref 0.1–0.9)
MONOCYTES # BLD: 0.65 10*3/MM3 (ref 0.1–0.9)
MONOCYTES # BLD: 0.66 10*3/MM3 (ref 0.1–0.9)
MONOCYTES # BLD: 1.25 10*3/MM3 (ref 0.1–0.9)
MONOCYTES # BLD: 1.58 10*3/MM3 (ref 0.1–0.9)
MONOCYTES # BLD: 1.75 10*3/MM3 (ref 0.1–0.9)
MONOCYTES # BLD: 2.15 10*3/MM3 (ref 0.1–0.9)
MONOCYTES # BLD: 2.21 10*3/MM3 (ref 0.1–0.9)
MONOCYTES NFR BLD AUTO: 5.6 % (ref 5–12)
MONOCYTES NFR BLD AUTO: 6.2 % (ref 5–12)
MONOCYTES NFR BLD AUTO: 8.9 % (ref 5–12)
MRSA DNA SPEC QL NAA+PROBE: NORMAL
MYELOCYTES NFR BLD MANUAL: 1 % (ref 0–0)
MYELOCYTES NFR BLD MANUAL: 13 % (ref 0–0)
MYELOCYTES NFR BLD MANUAL: 5 % (ref 0–0)
MYELOCYTES NFR BLD MANUAL: 6 % (ref 0–0)
NEUTROPHILS # BLD AUTO: 12.17 10*3/MM3 (ref 1.7–7)
NEUTROPHILS # BLD AUTO: 14.74 10*3/MM3 (ref 1.7–7)
NEUTROPHILS # BLD AUTO: 16.52 10*3/MM3 (ref 1.7–7)
NEUTROPHILS # BLD AUTO: 16.62 10*3/MM3 (ref 1.7–7)
NEUTROPHILS # BLD AUTO: 17.34 10*3/MM3 (ref 1.7–7)
NEUTROPHILS # BLD AUTO: 18.98 10*3/MM3 (ref 1.7–7)
NEUTROPHILS # BLD AUTO: 19.3 10*3/MM3 (ref 1.7–7)
NEUTROPHILS # BLD AUTO: 6.37 10*3/MM3 (ref 1.7–7)
NEUTROPHILS NFR BLD AUTO: 10.33 10*3/MM3 (ref 1.7–7)
NEUTROPHILS NFR BLD AUTO: 18.68 10*3/MM3 (ref 1.7–7)
NEUTROPHILS NFR BLD AUTO: 19.42 10*3/MM3 (ref 1.7–7)
NEUTROPHILS NFR BLD AUTO: 84.6 % (ref 42.7–76)
NEUTROPHILS NFR BLD AUTO: 85.7 % (ref 42.7–76)
NEUTROPHILS NFR BLD AUTO: 87.9 % (ref 42.7–76)
NEUTROPHILS NFR BLD MANUAL: 38 % (ref 42.7–76)
NEUTROPHILS NFR BLD MANUAL: 46 % (ref 42.7–76)
NEUTROPHILS NFR BLD MANUAL: 48 % (ref 42.7–76)
NEUTROPHILS NFR BLD MANUAL: 50 % (ref 42.7–76)
NEUTROPHILS NFR BLD MANUAL: 61 % (ref 42.7–76)
NEUTROPHILS NFR BLD MANUAL: 64 % (ref 42.7–76)
NEUTROPHILS NFR BLD MANUAL: 65 % (ref 42.7–76)
NEUTROPHILS NFR BLD MANUAL: 66 % (ref 42.7–76)
NEUTS BAND NFR BLD MANUAL: 12 % (ref 0–5)
NEUTS BAND NFR BLD MANUAL: 18 % (ref 0–5)
NEUTS BAND NFR BLD MANUAL: 20 % (ref 0–5)
NEUTS BAND NFR BLD MANUAL: 27 % (ref 0–5)
NEUTS BAND NFR BLD MANUAL: 28 % (ref 0–5)
NEUTS BAND NFR BLD MANUAL: 35 % (ref 0–5)
NEUTS BAND NFR BLD MANUAL: 7 % (ref 0–5)
NEUTS VAC BLD QL SMEAR: ABNORMAL
NITRITE UR QL STRIP: NEGATIVE
NOTIFIED WHO: ABNORMAL
NRBC BLD AUTO-RTO: 0 /100 WBC (ref 0–0.2)
NRBC SPEC MANUAL: 1 /100 WBC (ref 0–0.2)
NRBC SPEC MANUAL: 13 /100 WBC (ref 0–0.2)
NRBC SPEC MANUAL: 25 /100 WBC (ref 0–0.2)
NRBC SPEC MANUAL: 26 /100 WBC (ref 0–0.2)
NRBC SPEC MANUAL: 4 /100 WBC (ref 0–0.2)
OPIATES UR QL: NEGATIVE
OXYCODONE UR QL SCN: NEGATIVE
PATH REPORT.FINAL DX SPEC: NORMAL
PATH REPORT.GROSS SPEC: NORMAL
PATHOLOGY REVIEW: YES
PCO2 BLDA: 35.5 MM HG (ref 35–48)
PCO2 BLDA: 36.8 MM HG (ref 35–48)
PCO2 BLDA: 38.2 MM HG (ref 35–48)
PCO2 BLDA: 38.8 MM HG (ref 35–48)
PCO2 BLDA: 39.6 MM HG (ref 35–48)
PCO2 BLDA: 40.3 MM HG (ref 35–48)
PCO2 BLDA: 43.7 MM HG (ref 35–45)
PCO2 BLDA: 45.5 MM HG (ref 35–45)
PCO2 BLDA: 47.8 MM HG (ref 35–48)
PCO2 BLDA: 49.1 MM HG (ref 35–45)
PCO2 BLDA: 54.7 MM HG (ref 35–48)
PCO2 BLDA: 54.9 MM HG (ref 35–48)
PCO2 BLDA: 55.3 MM HG (ref 35–45)
PCO2 BLDA: 56.8 MM HG (ref 35–48)
PCO2 BLDA: 58.9 MM HG (ref 35–45)
PCO2 BLDA: 64.3 MM HG (ref 35–48)
PCO2 BLDA: 66.1 MM HG (ref 35–48)
PCO2 BLDV: 49.4 MM HG (ref 42–51)
PCO2 BLDV: 60.3 MM HG (ref 42–51)
PCP UR QL SCN: NEGATIVE
PEEP RESPIRATORY: 10 CM[H2O]
PEEP RESPIRATORY: 5 CM[H2O]
PEEP RESPIRATORY: 7 CM[H2O]
PEEP RESPIRATORY: 8 CM[H2O]
PH BLDA: 7.04 PH UNITS (ref 7.35–7.45)
PH BLDA: 7.05 PH UNITS (ref 7.35–7.45)
PH BLDA: 7.06 PH UNITS (ref 7.35–7.45)
PH BLDA: 7.1 PH UNITS (ref 7.35–7.45)
PH BLDA: 7.15 PH UNITS (ref 7.35–7.45)
PH BLDA: 7.16 PH UNITS (ref 7.35–7.45)
PH BLDA: 7.17 PH UNITS (ref 7.35–7.45)
PH BLDA: 7.18 PH UNITS (ref 7.35–7.45)
PH BLDA: 7.2 PH UNITS (ref 7.35–7.45)
PH BLDA: 7.21 PH UNITS (ref 7.35–7.45)
PH BLDA: 7.22 PH UNITS (ref 7.35–7.45)
PH BLDA: 7.23 PH UNITS (ref 7.35–7.45)
PH BLDA: 7.28 PH UNITS (ref 7.35–7.45)
PH BLDA: 7.31 PH UNITS (ref 7.35–7.45)
PH BLDA: 7.32 PH UNITS (ref 7.35–7.45)
PH BLDA: 7.33 PH UNITS (ref 7.35–7.45)
PH BLDA: 7.36 PH UNITS (ref 7.35–7.45)
PH BLDV: 7.15 PH UNITS (ref 7.32–7.43)
PH BLDV: 7.18 PH UNITS (ref 7.32–7.43)
PH UR STRIP.AUTO: 5.5 [PH] (ref 5–8)
PHOSPHATE SERPL-MCNC: 4.3 MG/DL (ref 2.5–4.5)
PHOSPHATE SERPL-MCNC: 4.4 MG/DL (ref 2.5–4.5)
PHOSPHATE SERPL-MCNC: 5 MG/DL (ref 2.5–4.5)
PHOSPHATE SERPL-MCNC: 5.3 MG/DL (ref 2.5–4.5)
PHOSPHATE SERPL-MCNC: 5.8 MG/DL (ref 2.5–4.5)
PHOSPHATE SERPL-MCNC: 6.3 MG/DL (ref 2.5–4.5)
PHOSPHATE SERPL-MCNC: 6.3 MG/DL (ref 2.5–4.5)
PHOSPHATE SERPL-MCNC: 6.4 MG/DL (ref 2.5–4.5)
PHOSPHATE SERPL-MCNC: 6.5 MG/DL (ref 2.5–4.5)
PHOSPHATE SERPL-MCNC: 6.7 MG/DL (ref 2.5–4.5)
PHOSPHATE SERPL-MCNC: 6.9 MG/DL (ref 2.5–4.5)
PHOSPHATE SERPL-MCNC: 7.3 MG/DL (ref 2.5–4.5)
PHOSPHATE SERPL-MCNC: 7.4 MG/DL (ref 2.5–4.5)
PLAT MORPH BLD: NORMAL
PLAT MORPH BLD: NORMAL
PLATELET # BLD AUTO: 128 10*3/MM3 (ref 140–450)
PLATELET # BLD AUTO: 137 10*3/MM3 (ref 140–450)
PLATELET # BLD AUTO: 147 10*3/MM3 (ref 140–450)
PLATELET # BLD AUTO: 148 10*3/MM3 (ref 140–450)
PLATELET # BLD AUTO: 156 10*3/MM3 (ref 140–450)
PLATELET # BLD AUTO: 161 10*3/MM3 (ref 140–450)
PLATELET # BLD AUTO: 174 10*3/MM3 (ref 140–450)
PLATELET # BLD AUTO: 227 10*3/MM3 (ref 140–450)
PLATELET # BLD AUTO: 31 10*3/MM3 (ref 140–450)
PLATELET # BLD AUTO: 55 10*3/MM3 (ref 140–450)
PLATELET # BLD AUTO: 59 10*3/MM3 (ref 140–450)
PLATELET # BLD AUTO: 99 10*3/MM3 (ref 140–450)
PMV BLD AUTO: 10.1 FL (ref 6–12)
PMV BLD AUTO: 10.3 FL (ref 6–12)
PMV BLD AUTO: 10.7 FL (ref 6–12)
PMV BLD AUTO: 10.7 FL (ref 6–12)
PMV BLD AUTO: 11 FL (ref 6–12)
PMV BLD AUTO: 11 FL (ref 6–12)
PMV BLD AUTO: 11.1 FL (ref 6–12)
PMV BLD AUTO: 11.3 FL (ref 6–12)
PMV BLD AUTO: 9.4 FL (ref 6–12)
PMV BLD AUTO: 9.6 FL (ref 6–12)
PMV BLD AUTO: 9.7 FL (ref 6–12)
PMV BLD AUTO: 9.8 FL (ref 6–12)
PO2 BLD: 111 MM[HG] (ref 0–500)
PO2 BLD: 128 MM[HG] (ref 0–500)
PO2 BLD: 149 MM[HG] (ref 0–500)
PO2 BLD: 163 MM[HG] (ref 0–500)
PO2 BLD: 171 MM[HG] (ref 0–500)
PO2 BLD: 184 MM[HG] (ref 0–500)
PO2 BLD: 191 MM[HG] (ref 0–500)
PO2 BLD: 320 MM[HG] (ref 0–500)
PO2 BLD: 56 MM[HG] (ref 0–500)
PO2 BLD: 79 MM[HG] (ref 0–500)
PO2 BLD: 85 MM[HG] (ref 0–500)
PO2 BLD: 86 MM[HG] (ref 0–500)
PO2 BLDA: 316 MM HG (ref 80–105)
PO2 BLDA: 319.7 MM HG (ref 83–108)
PO2 BLDA: 56.4 MM HG (ref 83–108)
PO2 BLDA: 59.7 MM HG (ref 83–108)
PO2 BLDA: 66.5 MM HG (ref 83–108)
PO2 BLDA: 73.7 MM HG (ref 83–108)
PO2 BLDA: 76.5 MM HG (ref 83–108)
PO2 BLDA: 76.8 MM HG (ref 83–108)
PO2 BLDA: 78.8 MM HG (ref 83–108)
PO2 BLDA: 81.7 MM HG (ref 83–108)
PO2 BLDA: 85.3 MM HG (ref 83–108)
PO2 BLDA: 85.4 MM HG (ref 83–108)
PO2 BLDA: 85.8 MM HG (ref 83–108)
PO2 BLDA: 88 MM HG (ref 80–105)
PO2 BLDA: 96 MM HG (ref 80–105)
PO2 BLDA: 99 MM HG (ref 80–105)
PO2 BLDA: 99 MM HG (ref 80–105)
PO2 BLDV: 23.6 MM HG (ref 40–42)
PO2 BLDV: 25.8 MM HG (ref 40–42)
POTASSIUM BLDA-SCNC: 4.2 MMOL/L (ref 3.5–4.5)
POTASSIUM BLDA-SCNC: 4.6 MMOL/L (ref 3.5–4.9)
POTASSIUM BLDA-SCNC: 4.7 MMOL/L (ref 3.5–4.9)
POTASSIUM BLDA-SCNC: 4.8 MMOL/L (ref 3.5–4.5)
POTASSIUM BLDA-SCNC: 4.8 MMOL/L (ref 3.5–4.9)
POTASSIUM BLDA-SCNC: 4.8 MMOL/L (ref 3.5–4.9)
POTASSIUM BLDA-SCNC: 4.9 MMOL/L (ref 3.5–4.9)
POTASSIUM SERPL-SCNC: 2.7 MMOL/L (ref 3.5–5.2)
POTASSIUM SERPL-SCNC: 3.7 MMOL/L (ref 3.5–5.2)
POTASSIUM SERPL-SCNC: 4.4 MMOL/L (ref 3.5–5.2)
POTASSIUM SERPL-SCNC: 4.6 MMOL/L (ref 3.5–5.2)
POTASSIUM SERPL-SCNC: 4.7 MMOL/L (ref 3.5–5.2)
POTASSIUM SERPL-SCNC: 4.7 MMOL/L (ref 3.5–5.2)
POTASSIUM SERPL-SCNC: 4.8 MMOL/L (ref 3.5–5.2)
POTASSIUM SERPL-SCNC: 4.9 MMOL/L (ref 3.5–5.2)
POTASSIUM SERPL-SCNC: 4.9 MMOL/L (ref 3.5–5.2)
POTASSIUM SERPL-SCNC: 5 MMOL/L (ref 3.5–5.2)
POTASSIUM SERPL-SCNC: 5.1 MMOL/L (ref 3.5–5.2)
POTASSIUM SERPL-SCNC: 5.2 MMOL/L (ref 3.5–5.2)
POTASSIUM SERPL-SCNC: 5.3 MMOL/L (ref 3.5–5.2)
POTASSIUM SERPL-SCNC: 5.5 MMOL/L (ref 3.5–5.2)
POTASSIUM SERPL-SCNC: 5.6 MMOL/L (ref 3.5–5.2)
POTASSIUM SERPL-SCNC: 5.9 MMOL/L (ref 3.5–5.2)
POTASSIUM SERPL-SCNC: 6.5 MMOL/L (ref 3.5–5.2)
PROCALCITONIN SERPL-MCNC: 89.3 NG/ML (ref 0–0.25)
PROMYELOCYTES NFR BLD MANUAL: 3 % (ref 0–0)
PROT SERPL-MCNC: 3.5 G/DL (ref 6–8.5)
PROT SERPL-MCNC: 4 G/DL (ref 6–8.5)
PROT SERPL-MCNC: 4.6 G/DL (ref 6–8.5)
PROT SERPL-MCNC: 5.1 G/DL (ref 6–8.5)
PROT SERPL-MCNC: 5.2 G/DL (ref 6–8.5)
PROT SERPL-MCNC: 5.3 G/DL (ref 6–8.5)
PROT SERPL-MCNC: 5.4 G/DL (ref 6–8.5)
PROT SERPL-MCNC: 5.6 G/DL (ref 6–8.5)
PROT SERPL-MCNC: 6 G/DL (ref 6–8.5)
PROT SERPL-MCNC: 6.7 G/DL (ref 6–8.5)
PROT UR QL STRIP: ABNORMAL
PROTHROMBIN TIME: 16.1 SECONDS (ref 11.7–14.2)
PROTHROMBIN TIME: 17.1 SECONDS (ref 11.7–14.2)
PROTHROMBIN TIME: 25.6 SECONDS (ref 11.7–14.2)
PROTHROMBIN TIME: 25.7 SECONDS (ref 11.7–14.2)
PROTHROMBIN TIME: 25.9 SECONDS (ref 11.7–14.2)
PROTHROMBIN TIME: 25.9 SECONDS (ref 19.4–28.5)
PROTHROMBIN TIME: 26.8 SECONDS (ref 19.4–28.5)
PROTHROMBIN TIME: 37.7 SECONDS (ref 11.7–14.2)
PROTHROMBIN TIME: 46.5 SECONDS (ref 11.7–14.2)
QT INTERVAL: 479 MS
QTC INTERVAL: 465 MS
RBC # BLD AUTO: 2.58 10*6/MM3 (ref 4.14–5.8)
RBC # BLD AUTO: 2.77 10*6/MM3 (ref 4.14–5.8)
RBC # BLD AUTO: 3.6 10*6/MM3 (ref 4.14–5.8)
RBC # BLD AUTO: 4.04 10*6/MM3 (ref 4.14–5.8)
RBC # BLD AUTO: 4.06 10*6/MM3 (ref 4.14–5.8)
RBC # BLD AUTO: 4.1 10*6/MM3 (ref 4.14–5.8)
RBC # BLD AUTO: 4.32 10*6/MM3 (ref 4.14–5.8)
RBC # BLD AUTO: 4.5 10*6/MM3 (ref 4.14–5.8)
RBC # BLD AUTO: 4.56 10*6/MM3 (ref 4.14–5.8)
RBC # BLD AUTO: 4.57 10*6/MM3 (ref 4.14–5.8)
RBC # BLD AUTO: 4.57 10*6/MM3 (ref 4.14–5.8)
RBC # BLD AUTO: 4.96 10*6/MM3 (ref 4.14–5.8)
RBC # UR STRIP: ABNORMAL /HPF
RBC MORPH BLD: NORMAL
READ BACK: ABNORMAL
REF LAB TEST METHOD: ABNORMAL
RESPIRATORY RATE: 16
RESPIRATORY RATE: 18
RESPIRATORY RATE: 26
RH BLD: POSITIVE
RH BLD: POSITIVE
RHINOVIRUS RNA SPEC NAA+PROBE: NOT DETECTED
RSV RNA NPH QL NAA+NON-PROBE: NOT DETECTED
SAO2 % BLDCOA: 100 % (ref 95–98)
SAO2 % BLDCOA: 82.3 % (ref 94–98)
SAO2 % BLDCOA: 85.8 % (ref 94–98)
SAO2 % BLDCOA: 86.1 % (ref 94–98)
SAO2 % BLDCOA: 86.4 % (ref 94–98)
SAO2 % BLDCOA: 86.6 % (ref 94–98)
SAO2 % BLDCOA: 91.7 % (ref 94–98)
SAO2 % BLDCOA: 93.5 % (ref 94–98)
SAO2 % BLDCOA: 94 % (ref 95–98)
SAO2 % BLDCOA: 94 % (ref 95–98)
SAO2 % BLDCOA: 94.3 % (ref 94–98)
SAO2 % BLDCOA: 94.3 % (ref 94–98)
SAO2 % BLDCOA: 95 % (ref 95–98)
SAO2 % BLDCOA: 95.4 % (ref 94–98)
SAO2 % BLDCOA: 95.6 % (ref 94–98)
SAO2 % BLDCOA: 96 % (ref 95–98)
SAO2 % BLDCOA: 99.9 % (ref 94–98)
SAO2 % BLDCOV: 28.9 % (ref 45–75)
SAO2 % BLDCOV: 31.5 % (ref 45–75)
SARS-COV-2 RNA RESP QL NAA+PROBE: NOT DETECTED
SCAN SLIDE: NORMAL
SINUS: 3.4 CM
SMALL PLATELETS BLD QL SMEAR: ABNORMAL
SMALL PLATELETS BLD QL SMEAR: ADEQUATE
SODIUM BLD-SCNC: 133 MMOL/L (ref 138–146)
SODIUM BLD-SCNC: 134 MMOL/L (ref 138–146)
SODIUM BLD-SCNC: 135 MMOL/L (ref 138–146)
SODIUM BLD-SCNC: 139 MMOL/L (ref 138–146)
SODIUM BLD-SCNC: 139 MMOL/L (ref 138–146)
SODIUM SERPL-SCNC: 133 MMOL/L (ref 136–145)
SODIUM SERPL-SCNC: 134 MMOL/L (ref 136–145)
SODIUM SERPL-SCNC: 135 MMOL/L (ref 136–145)
SODIUM SERPL-SCNC: 136 MMOL/L (ref 136–145)
SODIUM SERPL-SCNC: 137 MMOL/L (ref 136–145)
SODIUM SERPL-SCNC: 138 MMOL/L (ref 136–145)
SODIUM SERPL-SCNC: 139 MMOL/L (ref 136–145)
SODIUM SERPL-SCNC: 140 MMOL/L (ref 136–145)
SODIUM SERPL-SCNC: 141 MMOL/L (ref 136–145)
SODIUM SERPL-SCNC: 142 MMOL/L (ref 136–145)
SP GR UR STRIP: 1.02 (ref 1–1.03)
SQUAMOUS #/AREA URNS HPF: ABNORMAL /HPF
T&S EXPIRATION DATE: NORMAL
T&S EXPIRATION DATE: NORMAL
TOXIC GRANULATION: ABNORMAL
TRICYCLICS UR QL SCN: NEGATIVE
TRIGL SERPL-MCNC: 614 MG/DL (ref 0–150)
UNIT  ABO: NORMAL
UNIT  RH: NORMAL
UROBILINOGEN UR QL STRIP: ABNORMAL
VARIANT LYMPHS NFR BLD MANUAL: 10 % (ref 19.6–45.3)
VARIANT LYMPHS NFR BLD MANUAL: 18 % (ref 19.6–45.3)
VARIANT LYMPHS NFR BLD MANUAL: 18 % (ref 19.6–45.3)
VARIANT LYMPHS NFR BLD MANUAL: 2 % (ref 0–5)
VARIANT LYMPHS NFR BLD MANUAL: 2 % (ref 0–5)
VARIANT LYMPHS NFR BLD MANUAL: 3 % (ref 0–5)
VARIANT LYMPHS NFR BLD MANUAL: 4 % (ref 0–5)
VARIANT LYMPHS NFR BLD MANUAL: 4 % (ref 19.6–45.3)
VARIANT LYMPHS NFR BLD MANUAL: 40 % (ref 19.6–45.3)
VARIANT LYMPHS NFR BLD MANUAL: 5 % (ref 19.6–45.3)
VARIANT LYMPHS NFR BLD MANUAL: 7 % (ref 19.6–45.3)
VARIANT LYMPHS NFR BLD MANUAL: 8 % (ref 19.6–45.3)
VENTILATOR MODE: AC
VT ON VENT VENT: 550 ML
VT ON VENT VENT: 550 ML
VT ON VENT VENT: 600 ML
VT ON VENT VENT: 700 ML
WBC # UR STRIP: ABNORMAL /HPF
WBC MORPH BLD: NORMAL
WBC NRBC COR # BLD AUTO: 12.19 10*3/MM3 (ref 3.4–10.8)
WBC NRBC COR # BLD AUTO: 13.85 10*3/MM3 (ref 3.4–10.8)
WBC NRBC COR # BLD AUTO: 17.9 10*3/MM3 (ref 3.4–10.8)
WBC NRBC COR # BLD AUTO: 18.06 10*3/MM3 (ref 3.4–10.8)
WBC NRBC COR # BLD AUTO: 19.4 10*3/MM3 (ref 3.4–10.8)
WBC NRBC COR # BLD AUTO: 21.27 10*3/MM3 (ref 3.4–10.8)
WBC NRBC COR # BLD AUTO: 21.68 10*3/MM3 (ref 3.4–10.8)
WBC NRBC COR # BLD AUTO: 22.07 10*3/MM3 (ref 3.4–10.8)
WBC NRBC COR # BLD AUTO: 22.63 10*3/MM3 (ref 3.4–10.8)
WBC NRBC COR # BLD AUTO: 22.67 10*3/MM3 (ref 3.4–10.8)
WBC NRBC COR # BLD AUTO: 31.52 10*3/MM3 (ref 3.4–10.8)
WBC NRBC COR # BLD AUTO: 32.73 10*3/MM3 (ref 3.4–10.8)
WHOLE BLOOD HOLD COAG: NORMAL
WHOLE BLOOD HOLD SPECIMEN: NORMAL

## 2025-01-01 PROCEDURE — 85610 PROTHROMBIN TIME: CPT | Performed by: STUDENT IN AN ORGANIZED HEALTH CARE EDUCATION/TRAINING PROGRAM

## 2025-01-01 PROCEDURE — 82803 BLOOD GASES ANY COMBINATION: CPT

## 2025-01-01 PROCEDURE — 25810000003 SODIUM CHLORIDE 0.9 % SOLUTION: Performed by: NURSE PRACTITIONER

## 2025-01-01 PROCEDURE — 93306 TTE W/DOPPLER COMPLETE: CPT

## 2025-01-01 PROCEDURE — 35631 BPG AOR-CELIAC-MSN-RENAL: CPT | Performed by: STUDENT IN AN ORGANIZED HEALTH CARE EDUCATION/TRAINING PROGRAM

## 2025-01-01 PROCEDURE — 25010000002 CALCIUM CHLORIDE 10 % SOLUTION: Performed by: EMERGENCY MEDICINE

## 2025-01-01 PROCEDURE — 82140 ASSAY OF AMMONIA: CPT | Performed by: NURSE PRACTITIONER

## 2025-01-01 PROCEDURE — 84100 ASSAY OF PHOSPHORUS: CPT | Performed by: INTERNAL MEDICINE

## 2025-01-01 PROCEDURE — 25010000002 HEPARIN (PORCINE) PER 1000 UNITS: Performed by: STUDENT IN AN ORGANIZED HEALTH CARE EDUCATION/TRAINING PROGRAM

## 2025-01-01 PROCEDURE — 0B9J8ZX DRAINAGE OF LEFT LOWER LUNG LOBE, VIA NATURAL OR ARTIFICIAL OPENING ENDOSCOPIC, DIAGNOSTIC: ICD-10-PCS | Performed by: EMERGENCY MEDICINE

## 2025-01-01 PROCEDURE — 25010000002 FUROSEMIDE PER 20 MG: Performed by: EMERGENCY MEDICINE

## 2025-01-01 PROCEDURE — 49002 REOPENING OF ABDOMEN: CPT | Performed by: STUDENT IN AN ORGANIZED HEALTH CARE EDUCATION/TRAINING PROGRAM

## 2025-01-01 PROCEDURE — P9041 ALBUMIN (HUMAN),5%, 50ML: HCPCS | Performed by: NURSE ANESTHETIST, CERTIFIED REGISTERED

## 2025-01-01 PROCEDURE — 94799 UNLISTED PULMONARY SVC/PX: CPT

## 2025-01-01 PROCEDURE — 84100 ASSAY OF PHOSPHORUS: CPT | Performed by: NURSE PRACTITIONER

## 2025-01-01 PROCEDURE — 84132 ASSAY OF SERUM POTASSIUM: CPT

## 2025-01-01 PROCEDURE — 04U50KZ SUPPLEMENT SUPERIOR MESENTERIC ARTERY WITH NONAUTOLOGOUS TISSUE SUBSTITUTE, OPEN APPROACH: ICD-10-PCS | Performed by: SURGERY

## 2025-01-01 PROCEDURE — 85025 COMPLETE CBC W/AUTO DIFF WBC: CPT | Performed by: EMERGENCY MEDICINE

## 2025-01-01 PROCEDURE — 25010000002 CALCIUM CHLORIDE 10 % SOLUTION: Performed by: NURSE PRACTITIONER

## 2025-01-01 PROCEDURE — 36415 COLL VENOUS BLD VENIPUNCTURE: CPT

## 2025-01-01 PROCEDURE — 84145 PROCALCITONIN (PCT): CPT | Performed by: EMERGENCY MEDICINE

## 2025-01-01 PROCEDURE — 82948 REAGENT STRIP/BLOOD GLUCOSE: CPT | Performed by: INTERNAL MEDICINE

## 2025-01-01 PROCEDURE — 85384 FIBRINOGEN ACTIVITY: CPT | Performed by: NURSE PRACTITIONER

## 2025-01-01 PROCEDURE — 74018 RADEX ABDOMEN 1 VIEW: CPT

## 2025-01-01 PROCEDURE — P9047 ALBUMIN (HUMAN), 25%, 50ML: HCPCS

## 2025-01-01 PROCEDURE — C1874 STENT, COATED/COV W/DEL SYS: HCPCS

## 2025-01-01 PROCEDURE — 25010000002 HYDROMORPHONE 1 MG/ML SOLUTION: Performed by: NURSE PRACTITIONER

## 2025-01-01 PROCEDURE — 83605 ASSAY OF LACTIC ACID: CPT

## 2025-01-01 PROCEDURE — 87086 URINE CULTURE/COLONY COUNT: CPT | Performed by: NURSE PRACTITIONER

## 2025-01-01 PROCEDURE — 85025 COMPLETE CBC W/AUTO DIFF WBC: CPT | Performed by: NURSE PRACTITIONER

## 2025-01-01 PROCEDURE — 82948 REAGENT STRIP/BLOOD GLUCOSE: CPT

## 2025-01-01 PROCEDURE — 63710000001 INSULIN REGULAR HUMAN PER 5 UNITS: Performed by: NURSE PRACTITIONER

## 2025-01-01 PROCEDURE — 86901 BLOOD TYPING SEROLOGIC RH(D): CPT | Performed by: EMERGENCY MEDICINE

## 2025-01-01 PROCEDURE — 85025 COMPLETE CBC W/AUTO DIFF WBC: CPT | Performed by: STUDENT IN AN ORGANIZED HEALTH CARE EDUCATION/TRAINING PROGRAM

## 2025-01-01 PROCEDURE — 94003 VENT MGMT INPAT SUBQ DAY: CPT

## 2025-01-01 PROCEDURE — P9035 PLATELET PHERES LEUKOREDUCED: HCPCS

## 2025-01-01 PROCEDURE — 25810000003 LACTATED RINGERS PER 1000 ML: Performed by: EMERGENCY MEDICINE

## 2025-01-01 PROCEDURE — 80053 COMPREHEN METABOLIC PANEL: CPT | Performed by: INTERNAL MEDICINE

## 2025-01-01 PROCEDURE — 82948 REAGENT STRIP/BLOOD GLUCOSE: CPT | Performed by: NURSE PRACTITIONER

## 2025-01-01 PROCEDURE — 93356 MYOCRD STRAIN IMG SPCKL TRCK: CPT | Performed by: STUDENT IN AN ORGANIZED HEALTH CARE EDUCATION/TRAINING PROGRAM

## 2025-01-01 PROCEDURE — 85007 BL SMEAR W/DIFF WBC COUNT: CPT | Performed by: NURSE PRACTITIONER

## 2025-01-01 PROCEDURE — 25010000002 PHENYLEPHRINE 10 MG/ML SOLUTION: Performed by: NURSE PRACTITIONER

## 2025-01-01 PROCEDURE — 25010000002 VASOPRESSIN 20-5 UT/100ML-% SOLUTION: Performed by: EMERGENCY MEDICINE

## 2025-01-01 PROCEDURE — 82803 BLOOD GASES ANY COMBINATION: CPT | Performed by: NURSE PRACTITIONER

## 2025-01-01 PROCEDURE — 87340 HEPATITIS B SURFACE AG IA: CPT | Performed by: INTERNAL MEDICINE

## 2025-01-01 PROCEDURE — 25010000002 ALBUMIN HUMAN 25% PER 50 ML: Performed by: EMERGENCY MEDICINE

## 2025-01-01 PROCEDURE — 25010000002 PIPERACILLIN SOD-TAZOBACTAM PER 1 G: Performed by: NURSE PRACTITIONER

## 2025-01-01 PROCEDURE — 25010000002 FENTANYL CITRATE (PF) 50 MCG/ML SOLUTION: Performed by: NURSE PRACTITIONER

## 2025-01-01 PROCEDURE — 35341 RECHANNELING OF ARTERY: CPT | Performed by: SURGERY

## 2025-01-01 PROCEDURE — 84295 ASSAY OF SERUM SODIUM: CPT

## 2025-01-01 PROCEDURE — 71045 X-RAY EXAM CHEST 1 VIEW: CPT

## 2025-01-01 PROCEDURE — 83735 ASSAY OF MAGNESIUM: CPT | Performed by: NURSE PRACTITIONER

## 2025-01-01 PROCEDURE — 86900 BLOOD TYPING SEROLOGIC ABO: CPT | Performed by: EMERGENCY MEDICINE

## 2025-01-01 PROCEDURE — 25010000002 VASOPRESSIN 20 UNIT/ML SOLUTION: Performed by: NURSE ANESTHETIST, CERTIFIED REGISTERED

## 2025-01-01 PROCEDURE — 25010000003 DEXTROSE 5 % SOLUTION 1,000 ML FLEX CONT: Performed by: NURSE PRACTITIONER

## 2025-01-01 PROCEDURE — 25010000002 HYDROMORPHONE 1 MG/ML SOLUTION: Performed by: NURSE ANESTHETIST, CERTIFIED REGISTERED

## 2025-01-01 PROCEDURE — 82330 ASSAY OF CALCIUM: CPT

## 2025-01-01 PROCEDURE — 82565 ASSAY OF CREATININE: CPT

## 2025-01-01 PROCEDURE — 25810000003 SODIUM CHLORIDE 0.9 % SOLUTION: Performed by: ANESTHESIOLOGY

## 2025-01-01 PROCEDURE — 86927 PLASMA FRESH FROZEN: CPT

## 2025-01-01 PROCEDURE — 94761 N-INVAS EAR/PLS OXIMETRY MLT: CPT

## 2025-01-01 PROCEDURE — 83735 ASSAY OF MAGNESIUM: CPT | Performed by: STUDENT IN AN ORGANIZED HEALTH CARE EDUCATION/TRAINING PROGRAM

## 2025-01-01 PROCEDURE — 25010000002 MIDAZOLAM PER 1 MG: Performed by: NURSE ANESTHETIST, CERTIFIED REGISTERED

## 2025-01-01 PROCEDURE — 80051 ELECTROLYTE PANEL: CPT

## 2025-01-01 PROCEDURE — 25010000002 CALCIUM CHLORIDE 10 % SOLUTION: Performed by: NURSE ANESTHETIST, CERTIFIED REGISTERED

## 2025-01-01 PROCEDURE — 83605 ASSAY OF LACTIC ACID: CPT | Performed by: NURSE PRACTITIONER

## 2025-01-01 PROCEDURE — 25010000002 HYDROCORTISONE SOD SUC (PF) 100 MG RECONSTITUTED SOLUTION: Performed by: STUDENT IN AN ORGANIZED HEALTH CARE EDUCATION/TRAINING PROGRAM

## 2025-01-01 PROCEDURE — 82330 ASSAY OF CALCIUM: CPT | Performed by: INTERNAL MEDICINE

## 2025-01-01 PROCEDURE — 35341 RECHANNELING OF ARTERY: CPT | Performed by: STUDENT IN AN ORGANIZED HEALTH CARE EDUCATION/TRAINING PROGRAM

## 2025-01-01 PROCEDURE — 25010000002 NICARDIPINE 2.5 MG/ML SOLUTION: Performed by: NURSE PRACTITIONER

## 2025-01-01 PROCEDURE — 0DBU0ZZ EXCISION OF OMENTUM, OPEN APPROACH: ICD-10-PCS | Performed by: STUDENT IN AN ORGANIZED HEALTH CARE EDUCATION/TRAINING PROGRAM

## 2025-01-01 PROCEDURE — 25010000002 HYDROCORTISONE SOD SUC (PF) 100 MG RECONSTITUTED SOLUTION: Performed by: EMERGENCY MEDICINE

## 2025-01-01 PROCEDURE — C1757 CATH, THROMBECTOMY/EMBOLECT: HCPCS | Performed by: STUDENT IN AN ORGANIZED HEALTH CARE EDUCATION/TRAINING PROGRAM

## 2025-01-01 PROCEDURE — 25010000002 PIPERACILLIN SOD-TAZOBACTAM PER 1 G: Performed by: STUDENT IN AN ORGANIZED HEALTH CARE EDUCATION/TRAINING PROGRAM

## 2025-01-01 PROCEDURE — 25010000002 PROPOFOL 10 MG/ML EMULSION: Performed by: STUDENT IN AN ORGANIZED HEALTH CARE EDUCATION/TRAINING PROGRAM

## 2025-01-01 PROCEDURE — C1752 CATH,HEMODIALYSIS,SHORT-TERM: HCPCS

## 2025-01-01 PROCEDURE — 03HY32Z INSERTION OF MONITORING DEVICE INTO UPPER ARTERY, PERCUTANEOUS APPROACH: ICD-10-PCS | Performed by: EMERGENCY MEDICINE

## 2025-01-01 PROCEDURE — C1768 GRAFT, VASCULAR: HCPCS | Performed by: STUDENT IN AN ORGANIZED HEALTH CARE EDUCATION/TRAINING PROGRAM

## 2025-01-01 PROCEDURE — 05HN33Z INSERTION OF INFUSION DEVICE INTO LEFT INTERNAL JUGULAR VEIN, PERCUTANEOUS APPROACH: ICD-10-PCS | Performed by: STUDENT IN AN ORGANIZED HEALTH CARE EDUCATION/TRAINING PROGRAM

## 2025-01-01 PROCEDURE — 25010000002 FENTANYL CITRATE (PF) 50 MCG/ML SOLUTION: Performed by: EMERGENCY MEDICINE

## 2025-01-01 PROCEDURE — 83735 ASSAY OF MAGNESIUM: CPT | Performed by: EMERGENCY MEDICINE

## 2025-01-01 PROCEDURE — 0FT40ZZ RESECTION OF GALLBLADDER, OPEN APPROACH: ICD-10-PCS | Performed by: STUDENT IN AN ORGANIZED HEALTH CARE EDUCATION/TRAINING PROGRAM

## 2025-01-01 PROCEDURE — 25010000002 FUROSEMIDE PER 20 MG: Performed by: INTERNAL MEDICINE

## 2025-01-01 PROCEDURE — 93971 EXTREMITY STUDY: CPT

## 2025-01-01 PROCEDURE — 25010000002 CALCIUM GLUCONATE 2-0.675 GM/100ML-% SOLUTION: Performed by: NURSE PRACTITIONER

## 2025-01-01 PROCEDURE — 25010000002 CALCIUM GLUCONATE-NACL 1-0.675 GM/50ML-% SOLUTION: Performed by: INTERNAL MEDICINE

## 2025-01-01 PROCEDURE — 85027 COMPLETE CBC AUTOMATED: CPT | Performed by: EMERGENCY MEDICINE

## 2025-01-01 PROCEDURE — 85014 HEMATOCRIT: CPT

## 2025-01-01 PROCEDURE — 83605 ASSAY OF LACTIC ACID: CPT | Performed by: EMERGENCY MEDICINE

## 2025-01-01 PROCEDURE — 25010000002 MIDAZOLAM PER 1 MG: Performed by: ANESTHESIOLOGY

## 2025-01-01 PROCEDURE — 84478 ASSAY OF TRIGLYCERIDES: CPT | Performed by: EMERGENCY MEDICINE

## 2025-01-01 PROCEDURE — 25010000002 PROCHLORPERAZINE 10 MG/2ML SOLUTION: Performed by: NURSE PRACTITIONER

## 2025-01-01 PROCEDURE — 25010000002 THIAMINE PER 100 MG: Performed by: STUDENT IN AN ORGANIZED HEALTH CARE EDUCATION/TRAINING PROGRAM

## 2025-01-01 PROCEDURE — 99024 POSTOP FOLLOW-UP VISIT: CPT | Performed by: NURSE PRACTITIONER

## 2025-01-01 PROCEDURE — 25810000003 SODIUM CHLORIDE 0.9 % SOLUTION: Performed by: STUDENT IN AN ORGANIZED HEALTH CARE EDUCATION/TRAINING PROGRAM

## 2025-01-01 PROCEDURE — 44120 REMOVAL OF SMALL INTESTINE: CPT | Performed by: STUDENT IN AN ORGANIZED HEALTH CARE EDUCATION/TRAINING PROGRAM

## 2025-01-01 PROCEDURE — 85730 THROMBOPLASTIN TIME PARTIAL: CPT | Performed by: EMERGENCY MEDICINE

## 2025-01-01 PROCEDURE — 5A1955Z RESPIRATORY VENTILATION, GREATER THAN 96 CONSECUTIVE HOURS: ICD-10-PCS | Performed by: INTERNAL MEDICINE

## 2025-01-01 PROCEDURE — 85007 BL SMEAR W/DIFF WBC COUNT: CPT | Performed by: EMERGENCY MEDICINE

## 2025-01-01 PROCEDURE — 85007 BL SMEAR W/DIFF WBC COUNT: CPT | Performed by: STUDENT IN AN ORGANIZED HEALTH CARE EDUCATION/TRAINING PROGRAM

## 2025-01-01 PROCEDURE — 99223 1ST HOSP IP/OBS HIGH 75: CPT | Performed by: SURGERY

## 2025-01-01 PROCEDURE — 80053 COMPREHEN METABOLIC PANEL: CPT | Performed by: NURSE PRACTITIONER

## 2025-01-01 PROCEDURE — 82330 ASSAY OF CALCIUM: CPT | Performed by: STUDENT IN AN ORGANIZED HEALTH CARE EDUCATION/TRAINING PROGRAM

## 2025-01-01 PROCEDURE — 25010000002 CALCIUM CHLORIDE 10 % SOLUTION

## 2025-01-01 PROCEDURE — 25010000002 ALBUMIN HUMAN 25% PER 50 ML

## 2025-01-01 PROCEDURE — 25010000002 ALBUMIN HUMAN 5% PER 50 ML: Performed by: NURSE ANESTHETIST, CERTIFIED REGISTERED

## 2025-01-01 PROCEDURE — 82330 ASSAY OF CALCIUM: CPT | Performed by: NURSE PRACTITIONER

## 2025-01-01 PROCEDURE — 93005 ELECTROCARDIOGRAM TRACING: CPT | Performed by: EMERGENCY MEDICINE

## 2025-01-01 PROCEDURE — 85018 HEMOGLOBIN: CPT

## 2025-01-01 PROCEDURE — 84100 ASSAY OF PHOSPHORUS: CPT | Performed by: EMERGENCY MEDICINE

## 2025-01-01 PROCEDURE — 84100 ASSAY OF PHOSPHORUS: CPT

## 2025-01-01 PROCEDURE — 85610 PROTHROMBIN TIME: CPT | Performed by: EMERGENCY MEDICINE

## 2025-01-01 PROCEDURE — 0202U NFCT DS 22 TRGT SARS-COV-2: CPT | Performed by: NURSE PRACTITIONER

## 2025-01-01 PROCEDURE — 36430 TRANSFUSION BLD/BLD COMPNT: CPT

## 2025-01-01 PROCEDURE — 25010000002 METHYLENE BLUE 50 MG/10ML SOLUTION 10 ML AMPULE: Performed by: NURSE PRACTITIONER

## 2025-01-01 PROCEDURE — 88304 TISSUE EXAM BY PATHOLOGIST: CPT | Performed by: STUDENT IN AN ORGANIZED HEALTH CARE EDUCATION/TRAINING PROGRAM

## 2025-01-01 PROCEDURE — 5A2204Z RESTORATION OF CARDIAC RHYTHM, SINGLE: ICD-10-PCS | Performed by: EMERGENCY MEDICINE

## 2025-01-01 PROCEDURE — 44160 REMOVAL OF COLON: CPT | Performed by: STUDENT IN AN ORGANIZED HEALTH CARE EDUCATION/TRAINING PROGRAM

## 2025-01-01 PROCEDURE — 82803 BLOOD GASES ANY COMBINATION: CPT | Performed by: EMERGENCY MEDICINE

## 2025-01-01 PROCEDURE — 99024 POSTOP FOLLOW-UP VISIT: CPT | Performed by: STUDENT IN AN ORGANIZED HEALTH CARE EDUCATION/TRAINING PROGRAM

## 2025-01-01 PROCEDURE — 74177 CT ABD & PELVIS W/CONTRAST: CPT

## 2025-01-01 PROCEDURE — 25010000002 PROPOFOL 200 MG/20ML EMULSION: Performed by: ANESTHESIOLOGY

## 2025-01-01 PROCEDURE — 83735 ASSAY OF MAGNESIUM: CPT | Performed by: INTERNAL MEDICINE

## 2025-01-01 PROCEDURE — 82947 ASSAY GLUCOSE BLOOD QUANT: CPT

## 2025-01-01 PROCEDURE — 25010000002 MAGNESIUM SULFATE 2 GM/50ML SOLUTION: Performed by: EMERGENCY MEDICINE

## 2025-01-01 PROCEDURE — 83605 ASSAY OF LACTIC ACID: CPT | Performed by: STUDENT IN AN ORGANIZED HEALTH CARE EDUCATION/TRAINING PROGRAM

## 2025-01-01 PROCEDURE — 25510000001 IOPAMIDOL PER 1 ML: Performed by: NURSE PRACTITIONER

## 2025-01-01 PROCEDURE — 81001 URINALYSIS AUTO W/SCOPE: CPT | Performed by: NURSE PRACTITIONER

## 2025-01-01 PROCEDURE — 99223 1ST HOSP IP/OBS HIGH 75: CPT | Performed by: STUDENT IN AN ORGANIZED HEALTH CARE EDUCATION/TRAINING PROGRAM

## 2025-01-01 PROCEDURE — 25810000003 SODIUM CHLORIDE 0.9 % SOLUTION: Performed by: NURSE ANESTHETIST, CERTIFIED REGISTERED

## 2025-01-01 PROCEDURE — 71275 CT ANGIOGRAPHY CHEST: CPT

## 2025-01-01 PROCEDURE — 88307 TISSUE EXAM BY PATHOLOGIST: CPT | Performed by: STUDENT IN AN ORGANIZED HEALTH CARE EDUCATION/TRAINING PROGRAM

## 2025-01-01 PROCEDURE — 25010000002 ONDANSETRON PER 1 MG: Performed by: NURSE PRACTITIONER

## 2025-01-01 PROCEDURE — 94002 VENT MGMT INPAT INIT DAY: CPT

## 2025-01-01 PROCEDURE — 25010000002 PROTHROMBIN COMPLEX CONC HUMAN 1000 UNITS KIT: Performed by: STUDENT IN AN ORGANIZED HEALTH CARE EDUCATION/TRAINING PROGRAM

## 2025-01-01 PROCEDURE — 25010000002 AMIODARONE PER 30 MG: Performed by: EMERGENCY MEDICINE

## 2025-01-01 PROCEDURE — P9100 PATHOGEN TEST FOR PLATELETS: HCPCS

## 2025-01-01 PROCEDURE — 25010000002 VASOPRESSIN 20-5 UT/100ML-% SOLUTION: Performed by: STUDENT IN AN ORGANIZED HEALTH CARE EDUCATION/TRAINING PROGRAM

## 2025-01-01 PROCEDURE — 25010000002 PHENYLEPHRINE 10 MG/ML SOLUTION: Performed by: STUDENT IN AN ORGANIZED HEALTH CARE EDUCATION/TRAINING PROGRAM

## 2025-01-01 PROCEDURE — 5A1D90Z PERFORMANCE OF URINARY FILTRATION, CONTINUOUS, GREATER THAN 18 HOURS PER DAY: ICD-10-PCS | Performed by: INTERNAL MEDICINE

## 2025-01-01 PROCEDURE — 86850 RBC ANTIBODY SCREEN: CPT | Performed by: EMERGENCY MEDICINE

## 2025-01-01 PROCEDURE — 25010000002 MAGNESIUM SULFATE 2 GM/50ML SOLUTION: Performed by: NURSE PRACTITIONER

## 2025-01-01 PROCEDURE — 82803 BLOOD GASES ANY COMBINATION: CPT | Performed by: INTERNAL MEDICINE

## 2025-01-01 PROCEDURE — 47600 CHOLECYSTECTOMY: CPT | Performed by: STUDENT IN AN ORGANIZED HEALTH CARE EDUCATION/TRAINING PROGRAM

## 2025-01-01 PROCEDURE — 25010000002 MIDAZOLAM PER 1 MG

## 2025-01-01 PROCEDURE — 86923 COMPATIBILITY TEST ELECTRIC: CPT

## 2025-01-01 PROCEDURE — 97606 NEG PRS WND THER DME>50 SQCM: CPT | Performed by: STUDENT IN AN ORGANIZED HEALTH CARE EDUCATION/TRAINING PROGRAM

## 2025-01-01 PROCEDURE — 25010000002 MAGNESIUM SULFATE IN D5W 1G/100ML (PREMIX) 1-5 GM/100ML-% SOLUTION: Performed by: NURSE PRACTITIONER

## 2025-01-01 PROCEDURE — 25010000003 DEXTROSE 5 % SOLUTION 1,000 ML FLEX CONT

## 2025-01-01 PROCEDURE — 93971 EXTREMITY STUDY: CPT | Performed by: SURGERY

## 2025-01-01 PROCEDURE — 84100 ASSAY OF PHOSPHORUS: CPT | Performed by: STUDENT IN AN ORGANIZED HEALTH CARE EDUCATION/TRAINING PROGRAM

## 2025-01-01 PROCEDURE — 25010000002 PROPOFOL 200 MG/20ML EMULSION: Performed by: NURSE ANESTHETIST, CERTIFIED REGISTERED

## 2025-01-01 PROCEDURE — 25010000002 MEROPENEM PER 100 MG: Performed by: EMERGENCY MEDICINE

## 2025-01-01 PROCEDURE — 85610 PROTHROMBIN TIME: CPT | Performed by: NURSE PRACTITIONER

## 2025-01-01 PROCEDURE — 97608 NEG PRS WND THER NDME>50SQCM: CPT | Performed by: STUDENT IN AN ORGANIZED HEALTH CARE EDUCATION/TRAINING PROGRAM

## 2025-01-01 PROCEDURE — 25010000002 HEPARIN (PORCINE) PER 1000 UNITS: Performed by: INTERNAL MEDICINE

## 2025-01-01 PROCEDURE — 25010000002 THIAMINE PER 100 MG: Performed by: EMERGENCY MEDICINE

## 2025-01-01 PROCEDURE — 99291 CRITICAL CARE FIRST HOUR: CPT

## 2025-01-01 PROCEDURE — 25010000002 EPINEPHRINE 1 MG/10ML SOLUTION PREFILLED SYRINGE

## 2025-01-01 PROCEDURE — 83690 ASSAY OF LIPASE: CPT | Performed by: EMERGENCY MEDICINE

## 2025-01-01 PROCEDURE — 25010000002 FUROSEMIDE PER 20 MG: Performed by: STUDENT IN AN ORGANIZED HEALTH CARE EDUCATION/TRAINING PROGRAM

## 2025-01-01 PROCEDURE — 25010000002 CALCIUM GLUCONATE-NACL 1-0.675 GM/50ML-% SOLUTION: Performed by: NURSE PRACTITIONER

## 2025-01-01 PROCEDURE — 0DBA0ZZ EXCISION OF JEJUNUM, OPEN APPROACH: ICD-10-PCS | Performed by: STUDENT IN AN ORGANIZED HEALTH CARE EDUCATION/TRAINING PROGRAM

## 2025-01-01 PROCEDURE — 25010000002 METHYLENE BLUE 50 MG/10ML SOLUTION 10 ML AMPULE: Performed by: EMERGENCY MEDICINE

## 2025-01-01 PROCEDURE — 25010000002 PROTHROMBIN COMPLEX CONC HUMAN 500 UNITS KIT: Performed by: STUDENT IN AN ORGANIZED HEALTH CARE EDUCATION/TRAINING PROGRAM

## 2025-01-01 PROCEDURE — 85347 COAGULATION TIME ACTIVATED: CPT

## 2025-01-01 PROCEDURE — 25010000002 POTASSIUM CHLORIDE 10 MEQ/100ML SOLUTION: Performed by: EMERGENCY MEDICINE

## 2025-01-01 PROCEDURE — B544ZZA ULTRASONOGRAPHY OF LEFT JUGULAR VEINS, GUIDANCE: ICD-10-PCS | Performed by: STUDENT IN AN ORGANIZED HEALTH CARE EDUCATION/TRAINING PROGRAM

## 2025-01-01 PROCEDURE — 02JY0ZZ INSPECTION OF GREAT VESSEL, OPEN APPROACH: ICD-10-PCS | Performed by: SURGERY

## 2025-01-01 PROCEDURE — 80053 COMPREHEN METABOLIC PANEL: CPT | Performed by: STUDENT IN AN ORGANIZED HEALTH CARE EDUCATION/TRAINING PROGRAM

## 2025-01-01 PROCEDURE — 25010000002 MIDAZOLAM PER 1 MG: Performed by: STUDENT IN AN ORGANIZED HEALTH CARE EDUCATION/TRAINING PROGRAM

## 2025-01-01 PROCEDURE — 87641 MR-STAPH DNA AMP PROBE: CPT | Performed by: NURSE PRACTITIONER

## 2025-01-01 PROCEDURE — P9059 PLASMA, FRZ BETWEEN 8-24HOUR: HCPCS

## 2025-01-01 PROCEDURE — 92950 HEART/LUNG RESUSCITATION CPR: CPT

## 2025-01-01 PROCEDURE — 36600 WITHDRAWAL OF ARTERIAL BLOOD: CPT

## 2025-01-01 PROCEDURE — 25010000002 VITAMIN K1 PER 1 MG: Performed by: EMERGENCY MEDICINE

## 2025-01-01 PROCEDURE — 25010000002 METOPROLOL TARTRATE 5 MG/5ML SOLUTION

## 2025-01-01 PROCEDURE — 35631 BPG AOR-CELIAC-MSN-RENAL: CPT | Performed by: SURGERY

## 2025-01-01 PROCEDURE — 80306 DRUG TEST PRSMV INSTRMNT: CPT | Performed by: NURSE PRACTITIONER

## 2025-01-01 PROCEDURE — 93356 MYOCRD STRAIN IMG SPCKL TRCK: CPT

## 2025-01-01 PROCEDURE — 0WJP0ZZ INSPECTION OF GASTROINTESTINAL TRACT, OPEN APPROACH: ICD-10-PCS | Performed by: STUDENT IN AN ORGANIZED HEALTH CARE EDUCATION/TRAINING PROGRAM

## 2025-01-01 PROCEDURE — 25010000002 AMIODARONE PER 30 MG

## 2025-01-01 PROCEDURE — 25810000003 LACTATED RINGERS SOLUTION: Performed by: NURSE PRACTITIONER

## 2025-01-01 PROCEDURE — 25010000002 CALCIUM GLUCONATE 2-0.675 GM/100ML-% SOLUTION: Performed by: EMERGENCY MEDICINE

## 2025-01-01 PROCEDURE — 0DTH0ZZ RESECTION OF CECUM, OPEN APPROACH: ICD-10-PCS | Performed by: STUDENT IN AN ORGANIZED HEALTH CARE EDUCATION/TRAINING PROGRAM

## 2025-01-01 PROCEDURE — 82140 ASSAY OF AMMONIA: CPT | Performed by: EMERGENCY MEDICINE

## 2025-01-01 PROCEDURE — P9047 ALBUMIN (HUMAN), 25%, 50ML: HCPCS | Performed by: EMERGENCY MEDICINE

## 2025-01-01 PROCEDURE — 80053 COMPREHEN METABOLIC PANEL: CPT

## 2025-01-01 PROCEDURE — 87040 BLOOD CULTURE FOR BACTERIA: CPT | Performed by: NURSE PRACTITIONER

## 2025-01-01 PROCEDURE — 0DBB0ZZ EXCISION OF ILEUM, OPEN APPROACH: ICD-10-PCS | Performed by: STUDENT IN AN ORGANIZED HEALTH CARE EDUCATION/TRAINING PROGRAM

## 2025-01-01 PROCEDURE — 63710000001 INSULIN REGULAR HUMAN PER 5 UNITS: Performed by: INTERNAL MEDICINE

## 2025-01-01 PROCEDURE — 04HY32Z INSERTION OF MONITORING DEVICE INTO LOWER ARTERY, PERCUTANEOUS APPROACH: ICD-10-PCS | Performed by: EMERGENCY MEDICINE

## 2025-01-01 PROCEDURE — 93306 TTE W/DOPPLER COMPLETE: CPT | Performed by: STUDENT IN AN ORGANIZED HEALTH CARE EDUCATION/TRAINING PROGRAM

## 2025-01-01 PROCEDURE — 25010000002 POTASSIUM CHLORIDE 10 MEQ/100ML SOLUTION: Performed by: INTERNAL MEDICINE

## 2025-01-01 PROCEDURE — 25010000003 DEXTROSE 5 % SOLUTION 1,000 ML FLEX CONT: Performed by: STUDENT IN AN ORGANIZED HEALTH CARE EDUCATION/TRAINING PROGRAM

## 2025-01-01 PROCEDURE — P9045 ALBUMIN (HUMAN), 5%, 250 ML: HCPCS | Performed by: ANESTHESIOLOGY

## 2025-01-01 PROCEDURE — 25810000003 LACTATED RINGERS PER 1000 ML: Performed by: NURSE ANESTHETIST, CERTIFIED REGISTERED

## 2025-01-01 PROCEDURE — 25810000003 LACTATED RINGERS SOLUTION: Performed by: STUDENT IN AN ORGANIZED HEALTH CARE EDUCATION/TRAINING PROGRAM

## 2025-01-01 PROCEDURE — 04C50ZZ EXTIRPATION OF MATTER FROM SUPERIOR MESENTERIC ARTERY, OPEN APPROACH: ICD-10-PCS | Performed by: SURGERY

## 2025-01-01 PROCEDURE — 25010000002 METOPROLOL TARTRATE: Performed by: EMERGENCY MEDICINE

## 2025-01-01 PROCEDURE — 25010000002 ALBUMIN HUMAN 5% PER 50 ML: Performed by: ANESTHESIOLOGY

## 2025-01-01 PROCEDURE — 25010000002 DIPHENHYDRAMINE PER 50 MG: Performed by: NURSE PRACTITIONER

## 2025-01-01 PROCEDURE — 92960 CARDIOVERSION ELECTRIC EXT: CPT | Performed by: EMERGENCY MEDICINE

## 2025-01-01 PROCEDURE — 25010000002 INDOCYANINE GREEN 25 MG RECONSTITUTED SOLUTION: Performed by: NURSE ANESTHETIST, CERTIFIED REGISTERED

## 2025-01-01 PROCEDURE — 25010000002 VITAMIN K1 PER 1 MG: Performed by: STUDENT IN AN ORGANIZED HEALTH CARE EDUCATION/TRAINING PROGRAM

## 2025-01-01 DEVICE — PROXIMATE LINEAR CUTTER RELOAD, BLUE, 75MM
Type: IMPLANTABLE DEVICE | Site: ABDOMEN | Status: FUNCTIONAL
Brand: PROXIMATE

## 2025-01-01 DEVICE — ECHELON CONTOUR W/ GREEN RELOAD
Type: IMPLANTABLE DEVICE | Site: ABDOMEN | Status: FUNCTIONAL
Brand: ECHELON

## 2025-01-01 DEVICE — ABSORBABLE HEMOSTAT (OXIDIZED REGENERATED CELLULOSE)
Type: IMPLANTABLE DEVICE | Site: ABDOMEN | Status: FUNCTIONAL
Brand: SURGICEL NU-KNIT

## 2025-01-01 DEVICE — LIGACLIP MCA MULTIPLE CLIP APPLIERS, 20 MEDIUM CLIPS
Type: IMPLANTABLE DEVICE | Site: ABDOMEN | Status: FUNCTIONAL
Brand: LIGACLIP

## 2025-01-01 DEVICE — GRFT VASC COLLGN 6MM 45CM: Type: IMPLANTABLE DEVICE | Site: ABDOMEN | Status: FUNCTIONAL

## 2025-01-01 DEVICE — LIGACLIP MCA MULTIPLE CLIP APPLIERS, 20 SMALL CLIPS
Type: IMPLANTABLE DEVICE | Site: ABDOMEN | Status: FUNCTIONAL
Brand: LIGACLIP

## 2025-01-01 DEVICE — FLOSEAL WITH RECOTHROM - 10ML.
Type: IMPLANTABLE DEVICE | Site: ABDOMEN | Status: FUNCTIONAL
Brand: FLOSEAL HEMOSTATIC MATRIX

## 2025-01-01 DEVICE — PROXIMATE RELOADABLE LINEAR CUTTER WITH SAFETY LOCK-OUT, 75MM
Type: IMPLANTABLE DEVICE | Site: ABDOMEN | Status: FUNCTIONAL
Brand: PROXIMATE

## 2025-01-01 DEVICE — INCISIONLINE PLEDGET TFLN SFT LG: Type: IMPLANTABLE DEVICE | Site: ABDOMEN | Status: FUNCTIONAL

## 2025-01-01 DEVICE — LIGACLIP MCA MULTIPLE CLIP APPLIERS, 20 LARGE CLIPS
Type: IMPLANTABLE DEVICE | Site: ABDOMEN | Status: FUNCTIONAL
Brand: LIGACLIP

## 2025-01-01 RX ORDER — NOREPINEPHRINE BITARTRATE 0.03 MG/ML
.02-.3 INJECTION, SOLUTION INTRAVENOUS
Status: DISCONTINUED | OUTPATIENT
Start: 2025-01-01 | End: 2025-01-01

## 2025-01-01 RX ORDER — ALBUMIN (HUMAN) 12.5 G/50ML
25 SOLUTION INTRAVENOUS ONCE
Status: DISCONTINUED | OUTPATIENT
Start: 2025-01-01 | End: 2025-01-01

## 2025-01-01 RX ORDER — INDOMETHACIN 25 MG/1
50 CAPSULE ORAL
Status: COMPLETED | OUTPATIENT
Start: 2025-01-01 | End: 2025-01-01

## 2025-01-01 RX ORDER — NOREPINEPHRINE BITARTRATE 1 MG/ML
INJECTION, SOLUTION INTRAVENOUS CONTINUOUS PRN
Status: DISCONTINUED | OUTPATIENT
Start: 2025-01-01 | End: 2025-01-01 | Stop reason: SURG

## 2025-01-01 RX ORDER — NITROGLYCERIN 0.4 MG/1
0.4 TABLET SUBLINGUAL
Status: DISCONTINUED | OUTPATIENT
Start: 2025-01-01 | End: 2025-01-01 | Stop reason: HOSPADM

## 2025-01-01 RX ORDER — DEXTROSE MONOHYDRATE 25 G/50ML
25 INJECTION, SOLUTION INTRAVENOUS ONCE
Status: COMPLETED | OUTPATIENT
Start: 2025-01-01 | End: 2025-01-01

## 2025-01-01 RX ORDER — SODIUM CHLORIDE, SODIUM LACTATE, POTASSIUM CHLORIDE, CALCIUM CHLORIDE 600; 310; 30; 20 MG/100ML; MG/100ML; MG/100ML; MG/100ML
INJECTION, SOLUTION INTRAVENOUS CONTINUOUS PRN
Status: DISCONTINUED | OUTPATIENT
Start: 2025-01-01 | End: 2025-01-01 | Stop reason: SURG

## 2025-01-01 RX ORDER — MAGNESIUM SULFATE HEPTAHYDRATE 40 MG/ML
2 INJECTION, SOLUTION INTRAVENOUS ONCE
Status: COMPLETED | OUTPATIENT
Start: 2025-01-01 | End: 2025-01-01

## 2025-01-01 RX ORDER — MIDAZOLAM HYDROCHLORIDE 1 MG/ML
INJECTION, SOLUTION INTRAMUSCULAR; INTRAVENOUS
Status: COMPLETED
Start: 2025-01-01 | End: 2025-01-01

## 2025-01-01 RX ORDER — POTASSIUM CHLORIDE 1500 MG/1
40 TABLET, EXTENDED RELEASE ORAL
Status: DISPENSED | OUTPATIENT
Start: 2025-01-01 | End: 2025-01-01

## 2025-01-01 RX ORDER — INDOMETHACIN 25 MG/1
CAPSULE ORAL
Status: ACTIVE
Start: 2025-01-01 | End: 2025-01-01

## 2025-01-01 RX ORDER — METOPROLOL TARTRATE 1 MG/ML
2.5 INJECTION, SOLUTION INTRAVENOUS ONCE
Status: COMPLETED | OUTPATIENT
Start: 2025-01-01 | End: 2025-01-01

## 2025-01-01 RX ORDER — CALCIUM CHLORIDE, MAGNESIUM CHLORIDE, DEXTROSE MONOHYDRATE, LACTIC ACID, SODIUM CHLORIDE, SODIUM BICARBONATE AND POTASSIUM CHLORIDE 5.15; 2.03; 22; 5.4; 6.46; 3.09; .157 G/L; G/L; G/L; G/L; G/L; G/L; G/L
2000 INJECTION INTRAVENOUS CONTINUOUS
Status: DISCONTINUED | OUTPATIENT
Start: 2025-01-01 | End: 2025-01-01

## 2025-01-01 RX ORDER — DEXTROSE MONOHYDRATE 25 G/50ML
INJECTION, SOLUTION INTRAVENOUS
Status: ACTIVE
Start: 2025-01-01 | End: 2025-01-01

## 2025-01-01 RX ORDER — AMIODARONE HYDROCHLORIDE 50 MG/ML
150 INJECTION, SOLUTION INTRAVENOUS ONCE
Status: COMPLETED | OUTPATIENT
Start: 2025-01-01 | End: 2025-01-01

## 2025-01-01 RX ORDER — INDOMETHACIN 25 MG/1
CAPSULE ORAL
Status: COMPLETED | OUTPATIENT
Start: 2025-01-01 | End: 2025-01-01

## 2025-01-01 RX ORDER — INDOMETHACIN 25 MG/1
50 CAPSULE ORAL ONCE
Status: COMPLETED | OUTPATIENT
Start: 2025-01-01 | End: 2025-01-01

## 2025-01-01 RX ORDER — PROPOFOL 10 MG/ML
INJECTION, EMULSION INTRAVENOUS AS NEEDED
Status: DISCONTINUED | OUTPATIENT
Start: 2025-01-01 | End: 2025-01-01 | Stop reason: SURG

## 2025-01-01 RX ORDER — ALBUMIN (HUMAN) 12.5 G/50ML
12.5 SOLUTION INTRAVENOUS ONCE
Status: DISCONTINUED | OUTPATIENT
Start: 2025-01-01 | End: 2025-01-01 | Stop reason: SDUPTHER

## 2025-01-01 RX ORDER — ALBUMIN (HUMAN) 12.5 G/50ML
SOLUTION INTRAVENOUS
Status: COMPLETED
Start: 2025-01-01 | End: 2025-01-01

## 2025-01-01 RX ORDER — HEPARIN SODIUM 1000 [USP'U]/ML
INJECTION, SOLUTION INTRAVENOUS; SUBCUTANEOUS AS NEEDED
Status: DISCONTINUED | OUTPATIENT
Start: 2025-01-01 | End: 2025-01-01

## 2025-01-01 RX ORDER — ROCURONIUM BROMIDE 10 MG/ML
INJECTION, SOLUTION INTRAVENOUS AS NEEDED
Status: DISCONTINUED | OUTPATIENT
Start: 2025-01-01 | End: 2025-01-01 | Stop reason: SURG

## 2025-01-01 RX ORDER — CALCIUM CHLORIDE 100 MG/ML
2 INJECTION INTRAVENOUS; INTRAVENTRICULAR ONCE
Status: COMPLETED | OUTPATIENT
Start: 2025-01-01 | End: 2025-01-01

## 2025-01-01 RX ORDER — INDOMETHACIN 25 MG/1
CAPSULE ORAL AS NEEDED
Status: DISCONTINUED | OUTPATIENT
Start: 2025-01-01 | End: 2025-01-01 | Stop reason: SURG

## 2025-01-01 RX ORDER — HEPARIN SODIUM 5000 [USP'U]/ML
INJECTION, SOLUTION INTRAVENOUS; SUBCUTANEOUS
Status: DISPENSED
Start: 2025-01-01 | End: 2025-01-01

## 2025-01-01 RX ORDER — PANTOPRAZOLE SODIUM 40 MG/10ML
40 INJECTION, POWDER, LYOPHILIZED, FOR SOLUTION INTRAVENOUS
Status: DISCONTINUED | OUTPATIENT
Start: 2025-01-01 | End: 2025-01-01 | Stop reason: HOSPADM

## 2025-01-01 RX ORDER — VASOPRESSIN IN DEXTROSE 5 % 20/100 ML
0.05 PLASTIC BAG, INJECTION (ML) INTRAVENOUS CONTINUOUS
Status: DISCONTINUED | OUTPATIENT
Start: 2025-01-01 | End: 2025-01-01 | Stop reason: HOSPADM

## 2025-01-01 RX ORDER — PHENYLEPHRINE HCL IN 0.9% NACL 1 MG/10 ML
SYRINGE (ML) INTRAVENOUS AS NEEDED
Status: DISCONTINUED | OUTPATIENT
Start: 2025-01-01 | End: 2025-01-01 | Stop reason: SURG

## 2025-01-01 RX ORDER — METOPROLOL TARTRATE 1 MG/ML
2.5 INJECTION, SOLUTION INTRAVENOUS EVERY 6 HOURS
Status: DISCONTINUED | OUTPATIENT
Start: 2025-01-01 | End: 2025-01-01

## 2025-01-01 RX ORDER — SODIUM CHLORIDE 9 MG/ML
INJECTION, SOLUTION INTRAVENOUS CONTINUOUS PRN
Status: DISCONTINUED | OUTPATIENT
Start: 2025-01-01 | End: 2025-01-01 | Stop reason: SURG

## 2025-01-01 RX ORDER — AMIODARONE HYDROCHLORIDE 50 MG/ML
INJECTION, SOLUTION INTRAVENOUS
Status: COMPLETED
Start: 2025-01-01 | End: 2025-01-01

## 2025-01-01 RX ORDER — MIDAZOLAM HYDROCHLORIDE 1 MG/ML
4 INJECTION, SOLUTION INTRAMUSCULAR; INTRAVENOUS ONCE
Status: COMPLETED | OUTPATIENT
Start: 2025-01-01 | End: 2025-01-01

## 2025-01-01 RX ORDER — METOPROLOL TARTRATE 1 MG/ML
10 INJECTION, SOLUTION INTRAVENOUS ONCE
Status: DISCONTINUED | OUTPATIENT
Start: 2025-01-01 | End: 2025-01-01 | Stop reason: SDUPTHER

## 2025-01-01 RX ORDER — SODIUM CHLORIDE 0.9 % (FLUSH) 0.9 %
10 SYRINGE (ML) INJECTION AS NEEDED
Status: DISCONTINUED | OUTPATIENT
Start: 2025-01-01 | End: 2025-01-01 | Stop reason: HOSPADM

## 2025-01-01 RX ORDER — CALCIUM GLUCONATE 20 MG/ML
2000 INJECTION, SOLUTION INTRAVENOUS ONCE
Status: COMPLETED | OUTPATIENT
Start: 2025-01-01 | End: 2025-01-01

## 2025-01-01 RX ORDER — METRONIDAZOLE 500 MG/100ML
INJECTION, SOLUTION INTRAVENOUS
Status: COMPLETED
Start: 2025-01-01 | End: 2025-01-01

## 2025-01-01 RX ORDER — CALCIUM CHLORIDE 100 MG/ML
INJECTION INTRAVENOUS; INTRAVENTRICULAR
Status: COMPLETED
Start: 2025-01-01 | End: 2025-01-01

## 2025-01-01 RX ORDER — FENTANYL CITRATE 50 UG/ML
50 INJECTION, SOLUTION INTRAMUSCULAR; INTRAVENOUS ONCE
Refills: 0 | Status: COMPLETED | OUTPATIENT
Start: 2025-01-01 | End: 2025-01-01

## 2025-01-01 RX ORDER — FUROSEMIDE 10 MG/ML
20 INJECTION INTRAMUSCULAR; INTRAVENOUS EVERY 8 HOURS
Status: DISCONTINUED | OUTPATIENT
Start: 2025-01-01 | End: 2025-01-01

## 2025-01-01 RX ORDER — ALBUMIN (HUMAN) 12.5 G/50ML
25 SOLUTION INTRAVENOUS ONCE
Status: COMPLETED | OUTPATIENT
Start: 2025-01-01 | End: 2025-01-01

## 2025-01-01 RX ORDER — NICOTINE POLACRILEX 4 MG
15 LOZENGE BUCCAL
Status: DISCONTINUED | OUTPATIENT
Start: 2025-01-01 | End: 2025-01-01 | Stop reason: HOSPADM

## 2025-01-01 RX ORDER — DEXMEDETOMIDINE HYDROCHLORIDE 4 UG/ML
.2-1.5 INJECTION, SOLUTION INTRAVENOUS
Status: DISCONTINUED | OUTPATIENT
Start: 2025-01-01 | End: 2025-01-01 | Stop reason: HOSPADM

## 2025-01-01 RX ORDER — SODIUM CHLORIDE, SODIUM LACTATE, POTASSIUM CHLORIDE, CALCIUM CHLORIDE 600; 310; 30; 20 MG/100ML; MG/100ML; MG/100ML; MG/100ML
125 INJECTION, SOLUTION INTRAVENOUS CONTINUOUS
Status: DISCONTINUED | OUTPATIENT
Start: 2025-01-01 | End: 2025-01-01

## 2025-01-01 RX ORDER — POTASSIUM CHLORIDE 7.45 MG/ML
10 INJECTION INTRAVENOUS
Status: COMPLETED | OUTPATIENT
Start: 2025-01-01 | End: 2025-01-01

## 2025-01-01 RX ORDER — NOREPINEPHRINE BITARTRATE 0.03 MG/ML
INJECTION, SOLUTION INTRAVENOUS
Status: COMPLETED
Start: 2025-01-01 | End: 2025-01-01

## 2025-01-01 RX ORDER — ALBUMIN HUMAN 50 G/1000ML
SOLUTION INTRAVENOUS CONTINUOUS PRN
Status: DISCONTINUED | OUTPATIENT
Start: 2025-01-01 | End: 2025-01-01 | Stop reason: SURG

## 2025-01-01 RX ORDER — INDOMETHACIN 25 MG/1
25 CAPSULE ORAL ONCE
Status: DISCONTINUED | OUTPATIENT
Start: 2025-01-01 | End: 2025-01-01

## 2025-01-01 RX ORDER — SODIUM CHLORIDE, SODIUM LACTATE, POTASSIUM CHLORIDE, CALCIUM CHLORIDE 600; 310; 30; 20 MG/100ML; MG/100ML; MG/100ML; MG/100ML
100 INJECTION, SOLUTION INTRAVENOUS CONTINUOUS
Status: CANCELLED | OUTPATIENT
Start: 2025-01-01

## 2025-01-01 RX ORDER — MIDAZOLAM HYDROCHLORIDE 1 MG/ML
INJECTION, SOLUTION INTRAMUSCULAR; INTRAVENOUS AS NEEDED
Status: DISCONTINUED | OUTPATIENT
Start: 2025-01-01 | End: 2025-01-01 | Stop reason: SURG

## 2025-01-01 RX ORDER — FENTANYL CITRATE-0.9 % NACL/PF 10 MCG/ML
50-300 PLASTIC BAG, INJECTION (ML) INTRAVENOUS
Status: DISCONTINUED | OUTPATIENT
Start: 2025-01-01 | End: 2025-01-01 | Stop reason: HOSPADM

## 2025-01-01 RX ORDER — MAGNESIUM SULFATE 1 G/100ML
1 INJECTION INTRAVENOUS ONCE
Status: COMPLETED | OUTPATIENT
Start: 2025-01-01 | End: 2025-01-01

## 2025-01-01 RX ORDER — LACTULOSE 10 G/15ML
300 SOLUTION ORAL EVERY 6 HOURS SCHEDULED
Status: DISCONTINUED | OUTPATIENT
Start: 2025-01-01 | End: 2025-01-01 | Stop reason: HOSPADM

## 2025-01-01 RX ORDER — METOPROLOL TARTRATE 1 MG/ML
INJECTION, SOLUTION INTRAVENOUS
Status: COMPLETED
Start: 2025-01-01 | End: 2025-01-01

## 2025-01-01 RX ORDER — CALCIUM GLUCONATE 20 MG/ML
2000 INJECTION, SOLUTION INTRAVENOUS ONCE
Status: DISCONTINUED | OUTPATIENT
Start: 2025-01-01 | End: 2025-01-01 | Stop reason: SDUPTHER

## 2025-01-01 RX ORDER — NITROGLYCERIN 0.4 MG/1
0.4 TABLET SUBLINGUAL
Status: DISCONTINUED | OUTPATIENT
Start: 2025-01-01 | End: 2025-01-01

## 2025-01-01 RX ORDER — MIDAZOLAM HYDROCHLORIDE 1 MG/ML
2 INJECTION, SOLUTION INTRAMUSCULAR; INTRAVENOUS ONCE
Status: COMPLETED | OUTPATIENT
Start: 2025-01-01 | End: 2025-01-01

## 2025-01-01 RX ORDER — HYDROCORTISONE SODIUM SUCCINATE 100 MG/2ML
100 INJECTION INTRAMUSCULAR; INTRAVENOUS EVERY 8 HOURS
Status: DISCONTINUED | OUTPATIENT
Start: 2025-01-01 | End: 2025-01-01

## 2025-01-01 RX ORDER — CALCIUM CHLORIDE 100 MG/ML
INJECTION INTRAVENOUS; INTRAVENTRICULAR
Status: COMPLETED | OUTPATIENT
Start: 2025-01-01 | End: 2025-01-01

## 2025-01-01 RX ORDER — ONDANSETRON 2 MG/ML
4 INJECTION INTRAMUSCULAR; INTRAVENOUS ONCE
Status: COMPLETED | OUTPATIENT
Start: 2025-01-01 | End: 2025-01-01

## 2025-01-01 RX ORDER — CALCIUM CHLORIDE 100 MG/ML
1 INJECTION INTRAVENOUS; INTRAVENTRICULAR ONCE
Status: COMPLETED | OUTPATIENT
Start: 2025-01-01 | End: 2025-01-01

## 2025-01-01 RX ORDER — DEXTROSE MONOHYDRATE 100 MG/ML
100 INJECTION, SOLUTION INTRAVENOUS CONTINUOUS
Status: DISCONTINUED | OUTPATIENT
Start: 2025-01-01 | End: 2025-01-01

## 2025-01-01 RX ORDER — HEPARIN SODIUM 1000 [USP'U]/ML
INJECTION, SOLUTION INTRAVENOUS; SUBCUTANEOUS
Status: DISPENSED
Start: 2025-01-01 | End: 2025-01-01

## 2025-01-01 RX ORDER — DEXTROSE 20 G/100ML
50 INJECTION, SOLUTION INTRAVENOUS CONTINUOUS
Status: DISCONTINUED | OUTPATIENT
Start: 2025-01-01 | End: 2025-01-01

## 2025-01-01 RX ORDER — CALCIUM GLUCONATE 20 MG/ML
1000 INJECTION, SOLUTION INTRAVENOUS ONCE
Status: COMPLETED | OUTPATIENT
Start: 2025-01-01 | End: 2025-01-01

## 2025-01-01 RX ORDER — IBUPROFEN 600 MG/1
1 TABLET ORAL
Status: DISCONTINUED | OUTPATIENT
Start: 2025-01-01 | End: 2025-01-01 | Stop reason: HOSPADM

## 2025-01-01 RX ORDER — PROCHLORPERAZINE EDISYLATE 5 MG/ML
5 INJECTION INTRAMUSCULAR; INTRAVENOUS ONCE
Status: COMPLETED | OUTPATIENT
Start: 2025-01-01 | End: 2025-01-01

## 2025-01-01 RX ORDER — DEXTROSE MONOHYDRATE 25 G/50ML
25 INJECTION, SOLUTION INTRAVENOUS
Status: DISCONTINUED | OUTPATIENT
Start: 2025-01-01 | End: 2025-01-01 | Stop reason: HOSPADM

## 2025-01-01 RX ORDER — DEXTROSE 20 G/100ML
75 INJECTION, SOLUTION INTRAVENOUS CONTINUOUS
Status: DISCONTINUED | OUTPATIENT
Start: 2025-01-01 | End: 2025-01-01 | Stop reason: HOSPADM

## 2025-01-01 RX ORDER — HYDROCORTISONE SODIUM SUCCINATE 100 MG/2ML
100 INJECTION INTRAMUSCULAR; INTRAVENOUS EVERY 6 HOURS SCHEDULED
Status: DISCONTINUED | OUTPATIENT
Start: 2025-01-01 | End: 2025-01-01 | Stop reason: HOSPADM

## 2025-01-01 RX ORDER — VASOPRESSIN 20 [USP'U]/ML
INJECTION, SOLUTION INTRAVENOUS AS NEEDED
Status: DISCONTINUED | OUTPATIENT
Start: 2025-01-01 | End: 2025-01-01 | Stop reason: SURG

## 2025-01-01 RX ORDER — CALCIUM CHLORIDE 100 MG/ML
INJECTION INTRAVENOUS; INTRAVENTRICULAR AS NEEDED
Status: DISCONTINUED | OUTPATIENT
Start: 2025-01-01 | End: 2025-01-01 | Stop reason: SURG

## 2025-01-01 RX ORDER — INDOMETHACIN 25 MG/1
CAPSULE ORAL
Status: COMPLETED
Start: 2025-01-01 | End: 2025-01-01

## 2025-01-01 RX ORDER — DEXTROSE MONOHYDRATE 100 MG/ML
50 INJECTION, SOLUTION INTRAVENOUS CONTINUOUS
Status: DISCONTINUED | OUTPATIENT
Start: 2025-01-01 | End: 2025-01-01

## 2025-01-01 RX ORDER — FUROSEMIDE 10 MG/ML
40 INJECTION INTRAMUSCULAR; INTRAVENOUS ONCE
Status: COMPLETED | OUTPATIENT
Start: 2025-01-01 | End: 2025-01-01

## 2025-01-01 RX ORDER — SODIUM CHLORIDE 9 MG/ML
125 INJECTION, SOLUTION INTRAVENOUS CONTINUOUS
Status: DISPENSED | OUTPATIENT
Start: 2025-01-01 | End: 2025-01-01

## 2025-01-01 RX ORDER — HYDROMORPHONE HYDROCHLORIDE 1 MG/ML
0.5 INJECTION, SOLUTION INTRAMUSCULAR; INTRAVENOUS; SUBCUTANEOUS ONCE
Status: DISCONTINUED | OUTPATIENT
Start: 2025-01-01 | End: 2025-01-01

## 2025-01-01 RX ORDER — IOPAMIDOL 755 MG/ML
100 INJECTION, SOLUTION INTRAVASCULAR
Status: COMPLETED | OUTPATIENT
Start: 2025-01-01 | End: 2025-01-01

## 2025-01-01 RX ORDER — HEPARIN SODIUM 1000 [USP'U]/ML
5000 INJECTION, SOLUTION INTRAVENOUS; SUBCUTANEOUS AS NEEDED
Status: DISCONTINUED | OUTPATIENT
Start: 2025-01-01 | End: 2025-01-01 | Stop reason: HOSPADM

## 2025-01-01 RX ORDER — INDOCYANINE GREEN AND WATER 25 MG
KIT INJECTION AS NEEDED
Status: DISCONTINUED | OUTPATIENT
Start: 2025-01-01 | End: 2025-01-01 | Stop reason: SURG

## 2025-01-01 RX ORDER — DIPHENHYDRAMINE HYDROCHLORIDE 50 MG/ML
25 INJECTION, SOLUTION INTRAMUSCULAR; INTRAVENOUS ONCE
Status: COMPLETED | OUTPATIENT
Start: 2025-01-01 | End: 2025-01-01

## 2025-01-01 RX ORDER — PROPOFOL 10 MG/ML
INJECTION, EMULSION INTRAVENOUS CONTINUOUS PRN
Status: DISCONTINUED | OUTPATIENT
Start: 2025-01-01 | End: 2025-01-01 | Stop reason: SURG

## 2025-01-01 RX ORDER — ALBUMIN (HUMAN) 12.5 G/50ML
25 SOLUTION INTRAVENOUS EVERY 6 HOURS
Status: DISPENSED | OUTPATIENT
Start: 2025-01-01 | End: 2025-01-01

## 2025-01-01 RX ORDER — INDOMETHACIN 25 MG/1
100 CAPSULE ORAL ONCE
Status: DISCONTINUED | OUTPATIENT
Start: 2025-01-01 | End: 2025-01-01 | Stop reason: SDUPTHER

## 2025-01-01 RX ORDER — METOPROLOL TARTRATE 1 MG/ML
5 INJECTION, SOLUTION INTRAVENOUS ONCE
Status: COMPLETED | OUTPATIENT
Start: 2025-01-01 | End: 2025-01-01

## 2025-01-01 RX ORDER — CEFAZOLIN SODIUM 1 G/3ML
INJECTION, POWDER, FOR SOLUTION INTRAMUSCULAR; INTRAVENOUS
Status: DISPENSED
Start: 2025-01-01 | End: 2025-01-01

## 2025-01-01 RX ORDER — FENTANYL CITRATE 50 UG/ML
100 INJECTION, SOLUTION INTRAMUSCULAR; INTRAVENOUS ONCE
Refills: 0 | Status: COMPLETED | OUTPATIENT
Start: 2025-01-01 | End: 2025-01-01

## 2025-01-01 RX ADMIN — PROPOFOL 40 MCG/KG/MIN: 10 INJECTION, EMULSION INTRAVENOUS at 12:06

## 2025-01-01 RX ADMIN — SODIUM CHLORIDE, POTASSIUM CHLORIDE, SODIUM LACTATE AND CALCIUM CHLORIDE 125 ML/HR: 600; 310; 30; 20 INJECTION, SOLUTION INTRAVENOUS at 12:07

## 2025-01-01 RX ADMIN — PHENYLEPHRINE HYDROCHLORIDE 6 MCG/KG/MIN: 10 INJECTION INTRAVENOUS at 03:33

## 2025-01-01 RX ADMIN — PROPOFOL 45 MCG/KG/MIN: 10 INJECTION, EMULSION INTRAVENOUS at 00:16

## 2025-01-01 RX ADMIN — PHENYLEPHRINE HYDROCHLORIDE 0.5 MCG/KG/MIN: 10 INJECTION INTRAVENOUS at 13:43

## 2025-01-01 RX ADMIN — ALBUMIN (HUMAN): 12.5 INJECTION, SOLUTION INTRAVENOUS at 09:30

## 2025-01-01 RX ADMIN — CALCIUM CHLORIDE, MAGNESIUM CHLORIDE, DEXTROSE MONOHYDRATE, LACTIC ACID, SODIUM CHLORIDE, SODIUM BICARBONATE AND POTASSIUM CHLORIDE 2000 ML/HR: 5.15; 2.03; 22; 5.4; 6.46; 3.09; .157 INJECTION INTRAVENOUS at 00:41

## 2025-01-01 RX ADMIN — CALCIUM CHLORIDE, MAGNESIUM CHLORIDE, DEXTROSE MONOHYDRATE, LACTIC ACID, SODIUM CHLORIDE, SODIUM BICARBONATE AND POTASSIUM CHLORIDE 2000 ML/HR: 5.15; 2.03; 22; 5.4; 6.46; 3.09; .157 INJECTION INTRAVENOUS at 14:37

## 2025-01-01 RX ADMIN — MUPIROCIN 1 APPLICATION: 20 OINTMENT TOPICAL at 21:22

## 2025-01-01 RX ADMIN — PHENYLEPHRINE HYDROCHLORIDE 6 MCG/KG/MIN: 10 INJECTION INTRAVENOUS at 00:40

## 2025-01-01 RX ADMIN — CALCIUM CHLORIDE, MAGNESIUM CHLORIDE, DEXTROSE MONOHYDRATE, LACTIC ACID, SODIUM CHLORIDE, SODIUM BICARBONATE AND POTASSIUM CHLORIDE 2000 ML/HR: 5.15; 2.03; 22; 5.4; 6.46; 3.09; .157 INJECTION INTRAVENOUS at 17:03

## 2025-01-01 RX ADMIN — FUROSEMIDE 20 MG: 10 INJECTION, SOLUTION INTRAMUSCULAR; INTRAVENOUS at 21:02

## 2025-01-01 RX ADMIN — INDOMETHACIN 50 MEQ: 25 CAPSULE ORAL at 03:26

## 2025-01-01 RX ADMIN — CALCIUM CHLORIDE, MAGNESIUM CHLORIDE, DEXTROSE MONOHYDRATE, LACTIC ACID, SODIUM CHLORIDE, SODIUM BICARBONATE AND POTASSIUM CHLORIDE 2000 ML/HR: 5.15; 2.03; 22; 5.4; 6.46; 3.09; .157 INJECTION INTRAVENOUS at 12:13

## 2025-01-01 RX ADMIN — CALCIUM CHLORIDE, MAGNESIUM CHLORIDE, DEXTROSE MONOHYDRATE, LACTIC ACID, SODIUM CHLORIDE, SODIUM BICARBONATE AND POTASSIUM CHLORIDE 2000 ML/HR: 5.15; 2.03; 22; 5.4; 6.46; 3.09; .157 INJECTION INTRAVENOUS at 04:36

## 2025-01-01 RX ADMIN — PROPOFOL 40 MCG/KG/MIN: 10 INJECTION, EMULSION INTRAVENOUS at 11:21

## 2025-01-01 RX ADMIN — CALCIUM CHLORIDE 2 G: 100 INJECTION INTRAVENOUS; INTRAVENTRICULAR at 14:15

## 2025-01-01 RX ADMIN — LACTULOSE SOLUTION USP, 10 G/15 ML 300 ML: 10 SOLUTION ORAL; RECTAL at 23:38

## 2025-01-01 RX ADMIN — LACTULOSE SOLUTION USP, 10 G/15 ML 300 ML: 10 SOLUTION ORAL; RECTAL at 12:02

## 2025-01-01 RX ADMIN — CALCIUM CHLORIDE, MAGNESIUM CHLORIDE, DEXTROSE MONOHYDRATE, LACTIC ACID, SODIUM CHLORIDE, SODIUM BICARBONATE AND POTASSIUM CHLORIDE 2000 ML/HR: 5.15; 2.03; 22; 5.4; 6.46; 3.09; .157 INJECTION INTRAVENOUS at 10:04

## 2025-01-01 RX ADMIN — PHENYLEPHRINE HYDROCHLORIDE 6 MCG/KG/MIN: 10 INJECTION INTRAVENOUS at 22:15

## 2025-01-01 RX ADMIN — SODIUM BICARBONATE 150 MEQ: 84 INJECTION, SOLUTION INTRAVENOUS at 14:54

## 2025-01-01 RX ADMIN — NOREPINEPHRINE BITARTRATE 0.03 MCG/KG/MIN: 64 SOLUTION INTRAVENOUS at 08:26

## 2025-01-01 RX ADMIN — MUPIROCIN 1 APPLICATION: 20 OINTMENT TOPICAL at 08:06

## 2025-01-01 RX ADMIN — CALCIUM CHLORIDE, MAGNESIUM CHLORIDE, DEXTROSE MONOHYDRATE, LACTIC ACID, SODIUM CHLORIDE, SODIUM BICARBONATE AND POTASSIUM CHLORIDE 2000 ML/HR: 5.15; 2.03; 22; 5.4; 6.46; 3.09; .157 INJECTION INTRAVENOUS at 05:10

## 2025-01-01 RX ADMIN — CALCIUM CHLORIDE, MAGNESIUM CHLORIDE, DEXTROSE MONOHYDRATE, LACTIC ACID, SODIUM CHLORIDE, SODIUM BICARBONATE AND POTASSIUM CHLORIDE 2000 ML/HR: 5.15; 2.03; 22; 5.4; 6.46; 3.09; .157 INJECTION INTRAVENOUS at 00:56

## 2025-01-01 RX ADMIN — SODIUM BICARBONATE 50 MEQ: 84 INJECTION INTRAVENOUS at 20:45

## 2025-01-01 RX ADMIN — LACTULOSE SOLUTION USP, 10 G/15 ML 300 ML: 10 SOLUTION ORAL; RECTAL at 17:31

## 2025-01-01 RX ADMIN — PHENYLEPHRINE HYDROCHLORIDE 6 MCG/KG/MIN: 10 INJECTION INTRAVENOUS at 08:56

## 2025-01-01 RX ADMIN — CALCIUM CHLORIDE, MAGNESIUM CHLORIDE, DEXTROSE MONOHYDRATE, LACTIC ACID, SODIUM CHLORIDE, SODIUM BICARBONATE AND POTASSIUM CHLORIDE 2000 ML/HR: 5.15; 2.03; 22; 5.4; 6.46; 3.09; .157 INJECTION INTRAVENOUS at 10:41

## 2025-01-01 RX ADMIN — SODIUM BICARBONATE 50 MEQ: 84 INJECTION, SOLUTION INTRAVENOUS at 04:46

## 2025-01-01 RX ADMIN — SODIUM BICARBONATE 50 MEQ: 84 INJECTION, SOLUTION INTRAVENOUS at 14:48

## 2025-01-01 RX ADMIN — METOROPROLOL TARTRATE 5 MG: 5 INJECTION, SOLUTION INTRAVENOUS at 14:16

## 2025-01-01 RX ADMIN — HYDROMORPHONE HYDROCHLORIDE 1 MG: 1 INJECTION, SOLUTION INTRAMUSCULAR; INTRAVENOUS; SUBCUTANEOUS at 08:41

## 2025-01-01 RX ADMIN — NOREPINEPHRINE BITARTRATE 0.3 MCG/KG/MIN: 0.06 INJECTION, SOLUTION INTRAVENOUS at 11:46

## 2025-01-01 RX ADMIN — ROCURONIUM BROMIDE 30 MG: 10 INJECTION INTRAVENOUS at 09:36

## 2025-01-01 RX ADMIN — SODIUM CHLORIDE, SODIUM LACTATE, POTASSIUM CHLORIDE, AND CALCIUM CHLORIDE 1000 ML: .6; .31; .03; .02 INJECTION, SOLUTION INTRAVENOUS at 09:28

## 2025-01-01 RX ADMIN — CALCIUM CHLORIDE, MAGNESIUM CHLORIDE, DEXTROSE MONOHYDRATE, LACTIC ACID, SODIUM CHLORIDE, SODIUM BICARBONATE AND POTASSIUM CHLORIDE 2000 ML/HR: 5.15; 2.03; 22; 5.4; 6.46; 3.09; .157 INJECTION INTRAVENOUS at 03:22

## 2025-01-01 RX ADMIN — SODIUM BICARBONATE 50 MEQ: 84 INJECTION INTRAVENOUS at 19:23

## 2025-01-01 RX ADMIN — CALCIUM CHLORIDE, MAGNESIUM CHLORIDE, DEXTROSE MONOHYDRATE, LACTIC ACID, SODIUM CHLORIDE, SODIUM BICARBONATE AND POTASSIUM CHLORIDE 2000 ML/HR: 5.15; 2.03; 22; 5.4; 6.46; 3.09; .157 INJECTION INTRAVENOUS at 10:03

## 2025-01-01 RX ADMIN — PIPERACILLIN AND TAZOBACTAM 4.5 G: 4; .5 INJECTION, POWDER, FOR SOLUTION INTRAVENOUS at 10:04

## 2025-01-01 RX ADMIN — ALBUMIN (HUMAN) 25 G: 0.25 INJECTION, SOLUTION INTRAVENOUS at 02:54

## 2025-01-01 RX ADMIN — SODIUM BICARBONATE 150 MEQ: 84 INJECTION, SOLUTION INTRAVENOUS at 02:43

## 2025-01-01 RX ADMIN — PROPOFOL 40 MCG/KG/MIN: 10 INJECTION, EMULSION INTRAVENOUS at 17:19

## 2025-01-01 RX ADMIN — CALCIUM CHLORIDE, MAGNESIUM CHLORIDE, DEXTROSE MONOHYDRATE, LACTIC ACID, SODIUM CHLORIDE, SODIUM BICARBONATE AND POTASSIUM CHLORIDE 2000 ML/HR: 5.15; 2.03; 22; 5.4; 6.46; 3.09; .157 INJECTION INTRAVENOUS at 13:47

## 2025-01-01 RX ADMIN — CALCIUM CHLORIDE, MAGNESIUM CHLORIDE, DEXTROSE MONOHYDRATE, LACTIC ACID, SODIUM CHLORIDE, SODIUM BICARBONATE AND POTASSIUM CHLORIDE 2000 ML/HR: 5.15; 2.03; 22; 5.4; 6.46; 3.09; .157 INJECTION INTRAVENOUS at 19:53

## 2025-01-01 RX ADMIN — DEXMEDETOMIDINE HYDROCHLORIDE 0.5 MCG/KG/HR: 4 INJECTION, SOLUTION INTRAVENOUS at 16:36

## 2025-01-01 RX ADMIN — PIPERACILLIN AND TAZOBACTAM 4.5 G: 4; .5 INJECTION, POWDER, FOR SOLUTION INTRAVENOUS at 11:45

## 2025-01-01 RX ADMIN — CALCIUM CHLORIDE, MAGNESIUM CHLORIDE, DEXTROSE MONOHYDRATE, LACTIC ACID, SODIUM CHLORIDE, SODIUM BICARBONATE AND POTASSIUM CHLORIDE 2000 ML/HR: 5.15; 2.03; 22; 5.4; 6.46; 3.09; .157 INJECTION INTRAVENOUS at 00:40

## 2025-01-01 RX ADMIN — PROPOFOL 35 MCG/KG/MIN: 10 INJECTION, EMULSION INTRAVENOUS at 03:43

## 2025-01-01 RX ADMIN — DEXMEDETOMIDINE HYDROCHLORIDE 0.5 MCG/KG/HR: 4 INJECTION, SOLUTION INTRAVENOUS at 21:00

## 2025-01-01 RX ADMIN — VASOPRESSIN 0.05 UNITS/MIN: 0.2 INJECTION INTRAVENOUS at 03:12

## 2025-01-01 RX ADMIN — SODIUM BICARBONATE 50 MEQ: 84 INJECTION INTRAVENOUS at 14:16

## 2025-01-01 RX ADMIN — PROPOFOL 40 MCG/KG/MIN: 10 INJECTION, EMULSION INTRAVENOUS at 05:14

## 2025-01-01 RX ADMIN — PROPOFOL 45 MCG/KG/MIN: 10 INJECTION, EMULSION INTRAVENOUS at 20:21

## 2025-01-01 RX ADMIN — ALBUMIN (HUMAN) 25 G: 0.25 INJECTION, SOLUTION INTRAVENOUS at 21:39

## 2025-01-01 RX ADMIN — SODIUM BICARBONATE INJECTION, 150 MEQ: 84 SOLUTION INTRAVENOUS at 23:27

## 2025-01-01 RX ADMIN — PHENYLEPHRINE HYDROCHLORIDE 5 MCG/KG/MIN: 10 INJECTION INTRAVENOUS at 14:56

## 2025-01-01 RX ADMIN — PROPOFOL 30 MG: 10 INJECTION, EMULSION INTRAVENOUS at 13:55

## 2025-01-01 RX ADMIN — Medication 300 MCG/HR: at 00:16

## 2025-01-01 RX ADMIN — DEXTROSE 60 ML/HR: 20 INJECTION, SOLUTION INTRAVENOUS at 08:11

## 2025-01-01 RX ADMIN — Medication 100 MCG: at 08:28

## 2025-01-01 RX ADMIN — PHENYLEPHRINE HYDROCHLORIDE 6 MCG/KG/MIN: 10 INJECTION INTRAVENOUS at 04:58

## 2025-01-01 RX ADMIN — PROPOFOL 35 MCG/KG/MIN: 10 INJECTION, EMULSION INTRAVENOUS at 07:33

## 2025-01-01 RX ADMIN — DEXTROSE MONOHYDRATE 25 G: 25 INJECTION, SOLUTION INTRAVENOUS at 19:40

## 2025-01-01 RX ADMIN — MIDAZOLAM 1 MG: 1 INJECTION INTRAMUSCULAR; INTRAVENOUS at 23:49

## 2025-01-01 RX ADMIN — SODIUM CHLORIDE: 9 INJECTION, SOLUTION INTRAVENOUS at 07:51

## 2025-01-01 RX ADMIN — LACTULOSE SOLUTION USP, 10 G/15 ML 300 ML: 10 SOLUTION ORAL; RECTAL at 13:00

## 2025-01-01 RX ADMIN — NOREPINEPHRINE BITARTRATE 0.05 MCG/KG/MIN: 32 SOLUTION INTRAVENOUS at 12:29

## 2025-01-01 RX ADMIN — PANTOPRAZOLE SODIUM 40 MG: 40 INJECTION, POWDER, FOR SOLUTION INTRAVENOUS at 05:01

## 2025-01-01 RX ADMIN — SODIUM BICARBONATE 50 MEQ: 84 INJECTION INTRAVENOUS at 01:43

## 2025-01-01 RX ADMIN — METHYLENE BLUE 100 MG: 5 INJECTION INTRAVENOUS at 14:56

## 2025-01-01 RX ADMIN — AMIODARONE HYDROCHLORIDE 150 MG: 50 INJECTION, SOLUTION INTRAVENOUS at 14:14

## 2025-01-01 RX ADMIN — VASOPRESSIN 0.03 UNITS/MIN: 0.2 INJECTION INTRAVENOUS at 03:11

## 2025-01-01 RX ADMIN — SODIUM CHLORIDE, SODIUM LACTATE, POTASSIUM CHLORIDE, AND CALCIUM CHLORIDE: .6; .31; .03; .02 INJECTION, SOLUTION INTRAVENOUS at 07:51

## 2025-01-01 RX ADMIN — SODIUM BICARBONATE 50 MEQ: 84 INJECTION INTRAVENOUS at 14:08

## 2025-01-01 RX ADMIN — HYDROCORTISONE SODIUM SUCCINATE 100 MG: 100 INJECTION, POWDER, FOR SOLUTION INTRAMUSCULAR; INTRAVENOUS at 12:02

## 2025-01-01 RX ADMIN — NOREPINEPHRINE BITARTRATE 0.3 MCG/KG/MIN: 64 SOLUTION INTRAVENOUS at 20:51

## 2025-01-01 RX ADMIN — CALCIUM CHLORIDE, MAGNESIUM CHLORIDE, DEXTROSE MONOHYDRATE, LACTIC ACID, SODIUM CHLORIDE, SODIUM BICARBONATE AND POTASSIUM CHLORIDE 2000 ML/HR: 5.15; 2.03; 22; 5.4; 6.46; 3.09; .157 INJECTION INTRAVENOUS at 06:42

## 2025-01-01 RX ADMIN — HYDROMORPHONE HYDROCHLORIDE 1 MG: 1 INJECTION, SOLUTION INTRAMUSCULAR; INTRAVENOUS; SUBCUTANEOUS at 08:24

## 2025-01-01 RX ADMIN — ROCURONIUM BROMIDE 50 MG: 10 INJECTION INTRAVENOUS at 08:39

## 2025-01-01 RX ADMIN — NOREPINEPHRINE BITARTRATE 0.14 MCG/KG/MIN: 32 SOLUTION INTRAVENOUS at 21:25

## 2025-01-01 RX ADMIN — PANTOPRAZOLE SODIUM 40 MG: 40 INJECTION, POWDER, FOR SOLUTION INTRAVENOUS at 05:14

## 2025-01-01 RX ADMIN — MAGNESIUM SULFATE HEPTAHYDRATE 2 G: 40 INJECTION, SOLUTION INTRAVENOUS at 14:15

## 2025-01-01 RX ADMIN — DEXMEDETOMIDINE HYDROCHLORIDE 1.5 MCG/KG/HR: 4 INJECTION, SOLUTION INTRAVENOUS at 14:06

## 2025-01-01 RX ADMIN — PHENYLEPHRINE HYDROCHLORIDE 5 MCG/KG/MIN: 10 INJECTION INTRAVENOUS at 12:51

## 2025-01-01 RX ADMIN — DIPHENHYDRAMINE HYDROCHLORIDE 25 MG: 50 INJECTION INTRAMUSCULAR; INTRAVENOUS at 07:22

## 2025-01-01 RX ADMIN — INSULIN HUMAN 5 UNITS: 100 INJECTION, SOLUTION PARENTERAL at 14:08

## 2025-01-01 RX ADMIN — DEXTROSE MONOHYDRATE 25 G: 25 INJECTION, SOLUTION INTRAVENOUS at 04:04

## 2025-01-01 RX ADMIN — THIAMINE HYDROCHLORIDE 500 MG: 100 INJECTION, SOLUTION INTRAMUSCULAR; INTRAVENOUS at 05:11

## 2025-01-01 RX ADMIN — CALCIUM CHLORIDE, MAGNESIUM CHLORIDE, DEXTROSE MONOHYDRATE, LACTIC ACID, SODIUM CHLORIDE, SODIUM BICARBONATE AND POTASSIUM CHLORIDE 2000 ML/HR: 5.15; 2.03; 22; 5.4; 6.46; 3.09; .157 INJECTION INTRAVENOUS at 03:21

## 2025-01-01 RX ADMIN — PHENYLEPHRINE HYDROCHLORIDE 6 MCG/KG/MIN: 10 INJECTION INTRAVENOUS at 07:21

## 2025-01-01 RX ADMIN — PHENYLEPHRINE HYDROCHLORIDE 6 MCG/KG/MIN: 10 INJECTION INTRAVENOUS at 12:21

## 2025-01-01 RX ADMIN — POTASSIUM CHLORIDE 10 MEQ: 7.46 INJECTION, SOLUTION INTRAVENOUS at 08:41

## 2025-01-01 RX ADMIN — CALCIUM CHLORIDE, MAGNESIUM CHLORIDE, DEXTROSE MONOHYDRATE, LACTIC ACID, SODIUM CHLORIDE, SODIUM BICARBONATE AND POTASSIUM CHLORIDE 2000 ML/HR: 5.15; 2.03; 22; 5.4; 6.46; 3.09; .157 INJECTION INTRAVENOUS at 09:02

## 2025-01-01 RX ADMIN — MUPIROCIN 1 APPLICATION: 20 OINTMENT TOPICAL at 10:44

## 2025-01-01 RX ADMIN — METOROPROLOL TARTRATE 5 MG: 5 INJECTION, SOLUTION INTRAVENOUS at 04:23

## 2025-01-01 RX ADMIN — DEXTROSE MONOHYDRATE 25 G: 25 INJECTION, SOLUTION INTRAVENOUS at 12:08

## 2025-01-01 RX ADMIN — SODIUM BICARBONATE 50 MEQ: 84 INJECTION INTRAVENOUS at 10:04

## 2025-01-01 RX ADMIN — FUROSEMIDE 20 MG: 10 INJECTION, SOLUTION INTRAMUSCULAR; INTRAVENOUS at 03:58

## 2025-01-01 RX ADMIN — CALCIUM CHLORIDE, MAGNESIUM CHLORIDE, DEXTROSE MONOHYDRATE, LACTIC ACID, SODIUM CHLORIDE, SODIUM BICARBONATE AND POTASSIUM CHLORIDE 2000 ML/HR: 5.15; 2.03; 22; 5.4; 6.46; 3.09; .157 INJECTION INTRAVENOUS at 08:06

## 2025-01-01 RX ADMIN — PROPOFOL 45 MCG/KG/MIN: 10 INJECTION, EMULSION INTRAVENOUS at 22:13

## 2025-01-01 RX ADMIN — ALBUMIN (HUMAN) 25 G: 0.25 INJECTION, SOLUTION INTRAVENOUS at 03:45

## 2025-01-01 RX ADMIN — MEROPENEM 1000 MG: 1 INJECTION INTRAVENOUS at 14:05

## 2025-01-01 RX ADMIN — DEXMEDETOMIDINE HYDROCHLORIDE 0.5 MCG/KG/HR: 4 INJECTION, SOLUTION INTRAVENOUS at 16:07

## 2025-01-01 RX ADMIN — MIDAZOLAM 2 MG: 1 INJECTION INTRAMUSCULAR; INTRAVENOUS at 08:15

## 2025-01-01 RX ADMIN — VASOPRESSIN 0.05 UNITS/MIN: 0.2 INJECTION INTRAVENOUS at 20:26

## 2025-01-01 RX ADMIN — CALCIUM CHLORIDE INJECTION 1 G: 100 INJECTION, SOLUTION INTRAVENOUS at 02:14

## 2025-01-01 RX ADMIN — METOROPROLOL TARTRATE 5 MG: 5 INJECTION, SOLUTION INTRAVENOUS at 14:14

## 2025-01-01 RX ADMIN — CALCIUM CHLORIDE, MAGNESIUM CHLORIDE, DEXTROSE MONOHYDRATE, LACTIC ACID, SODIUM CHLORIDE, SODIUM BICARBONATE AND POTASSIUM CHLORIDE 2000 ML/HR: 5.15; 2.03; 22; 5.4; 6.46; 3.09; .157 INJECTION INTRAVENOUS at 06:43

## 2025-01-01 RX ADMIN — CALCIUM GLUCONATE 1000 MG: 20 INJECTION, SOLUTION INTRAVENOUS at 18:08

## 2025-01-01 RX ADMIN — FENTANYL CITRATE 100 MCG: 50 INJECTION, SOLUTION INTRAMUSCULAR; INTRAVENOUS at 12:45

## 2025-01-01 RX ADMIN — DEXMEDETOMIDINE HYDROCHLORIDE 0.5 MCG/KG/HR: 4 INJECTION, SOLUTION INTRAVENOUS at 20:14

## 2025-01-01 RX ADMIN — SODIUM CHLORIDE 5 MG/HR: 9 INJECTION, SOLUTION INTRAVENOUS at 03:01

## 2025-01-01 RX ADMIN — ROCURONIUM BROMIDE 50 MG: 10 INJECTION INTRAVENOUS at 14:17

## 2025-01-01 RX ADMIN — METOPROLOL TARTRATE 2.5 MG: 1 INJECTION, SOLUTION INTRAVENOUS at 03:00

## 2025-01-01 RX ADMIN — MUPIROCIN 1 APPLICATION: 20 OINTMENT TOPICAL at 21:15

## 2025-01-01 RX ADMIN — MUPIROCIN 1 APPLICATION: 20 OINTMENT TOPICAL at 08:26

## 2025-01-01 RX ADMIN — PHENYLEPHRINE HYDROCHLORIDE 6 MCG/KG/MIN: 10 INJECTION INTRAVENOUS at 10:03

## 2025-01-01 RX ADMIN — SODIUM BICARBONATE 50 MEQ: 84 INJECTION, SOLUTION INTRAVENOUS at 14:04

## 2025-01-01 RX ADMIN — CALCIUM CHLORIDE 100 MG: 100 INJECTION INTRAVENOUS; INTRAVENTRICULAR at 09:39

## 2025-01-01 RX ADMIN — Medication 200 MCG: at 08:32

## 2025-01-01 RX ADMIN — CALCIUM CHLORIDE, MAGNESIUM CHLORIDE, DEXTROSE MONOHYDRATE, LACTIC ACID, SODIUM CHLORIDE, SODIUM BICARBONATE AND POTASSIUM CHLORIDE 2000 ML/HR: 5.15; 2.03; 22; 5.4; 6.46; 3.09; .157 INJECTION INTRAVENOUS at 19:44

## 2025-01-01 RX ADMIN — CALCIUM CHLORIDE INJECTION 2 G: 100 INJECTION, SOLUTION INTRAVENOUS at 13:55

## 2025-01-01 RX ADMIN — PROPOFOL 45 MCG/KG/MIN: 10 INJECTION, EMULSION INTRAVENOUS at 02:46

## 2025-01-01 RX ADMIN — DEXTROSE 50 ML/HR: 20 INJECTION, SOLUTION INTRAVENOUS at 11:28

## 2025-01-01 RX ADMIN — PROPOFOL 200 MG: 10 INJECTION, EMULSION INTRAVENOUS at 08:37

## 2025-01-01 RX ADMIN — SODIUM CHLORIDE, POTASSIUM CHLORIDE, SODIUM LACTATE AND CALCIUM CHLORIDE 1000 ML: 600; 310; 30; 20 INJECTION, SOLUTION INTRAVENOUS at 11:20

## 2025-01-01 RX ADMIN — SODIUM CHLORIDE: 9 INJECTION, SOLUTION INTRAVENOUS at 14:04

## 2025-01-01 RX ADMIN — Medication 50 MCG/HR: at 17:23

## 2025-01-01 RX ADMIN — PIPERACILLIN AND TAZOBACTAM 4.5 G: 4; .5 INJECTION, POWDER, FOR SOLUTION INTRAVENOUS at 11:14

## 2025-01-01 RX ADMIN — HYDROCORTISONE SODIUM SUCCINATE 100 MG: 100 INJECTION, POWDER, FOR SOLUTION INTRAMUSCULAR; INTRAVENOUS at 21:38

## 2025-01-01 RX ADMIN — DEXMEDETOMIDINE HYDROCHLORIDE 1.5 MCG/KG/HR: 4 INJECTION, SOLUTION INTRAVENOUS at 23:39

## 2025-01-01 RX ADMIN — PHENYLEPHRINE HYDROCHLORIDE 5 MCG/KG/MIN: 10 INJECTION INTRAVENOUS at 17:14

## 2025-01-01 RX ADMIN — METHYLENE BLUE 100 MG: 5 INJECTION INTRAVENOUS at 03:24

## 2025-01-01 RX ADMIN — NOREPINEPHRINE BITARTRATE 0.15 MCG/KG/MIN: 64 SOLUTION INTRAVENOUS at 05:40

## 2025-01-01 RX ADMIN — ROCURONIUM BROMIDE 50 MG: 10 INJECTION INTRAVENOUS at 07:51

## 2025-01-01 RX ADMIN — SODIUM CHLORIDE 125 ML/HR: 9 INJECTION, SOLUTION INTRAVENOUS at 09:57

## 2025-01-01 RX ADMIN — PHENYLEPHRINE HYDROCHLORIDE 5 MCG/KG/MIN: 10 INJECTION INTRAVENOUS at 13:00

## 2025-01-01 RX ADMIN — DEXTROSE MONOHYDRATE 50 ML/HR: 100 INJECTION, SOLUTION INTRAVENOUS at 00:41

## 2025-01-01 RX ADMIN — AMIODARONE HYDROCHLORIDE 150 MG: 50 INJECTION, SOLUTION INTRAVENOUS at 13:55

## 2025-01-01 RX ADMIN — SODIUM BICARBONATE 50 MEQ: 84 INJECTION INTRAVENOUS at 19:24

## 2025-01-01 RX ADMIN — NOREPINEPHRINE BITARTRATE 0.3 MCG/KG/MIN: 1 INJECTION, SOLUTION, CONCENTRATE INTRAVENOUS at 07:51

## 2025-01-01 RX ADMIN — DEXTROSE MONOHYDRATE 25 G: 25 INJECTION, SOLUTION INTRAVENOUS at 00:15

## 2025-01-01 RX ADMIN — CALCIUM CHLORIDE 200 MG: 100 INJECTION INTRAVENOUS; INTRAVENTRICULAR at 09:43

## 2025-01-01 RX ADMIN — CALCIUM CHLORIDE, MAGNESIUM CHLORIDE, DEXTROSE MONOHYDRATE, LACTIC ACID, SODIUM CHLORIDE, SODIUM BICARBONATE AND POTASSIUM CHLORIDE 2000 ML/HR: 5.15; 2.03; 22; 5.4; 6.46; 3.09; .157 INJECTION INTRAVENOUS at 05:43

## 2025-01-01 RX ADMIN — NOREPINEPHRINE BITARTRATE 0.28 MCG/KG/MIN: 32 SOLUTION INTRAVENOUS at 05:14

## 2025-01-01 RX ADMIN — MIDAZOLAM 2 MG: 1 INJECTION INTRAMUSCULAR; INTRAVENOUS at 13:46

## 2025-01-01 RX ADMIN — DEXMEDETOMIDINE HYDROCHLORIDE 0.5 MCG/KG/HR: 4 INJECTION, SOLUTION INTRAVENOUS at 20:24

## 2025-01-01 RX ADMIN — FUROSEMIDE 120 MG: 10 INJECTION, SOLUTION INTRAMUSCULAR; INTRAVENOUS at 11:02

## 2025-01-01 RX ADMIN — CALCIUM CHLORIDE 1 G: 100 INJECTION, SOLUTION INTRAVENOUS at 04:49

## 2025-01-01 RX ADMIN — DEXTROSE MONOHYDRATE 25 G: 25 INJECTION, SOLUTION INTRAVENOUS at 12:41

## 2025-01-01 RX ADMIN — DEXTROSE 50 ML/HR: 20 INJECTION, SOLUTION INTRAVENOUS at 21:58

## 2025-01-01 RX ADMIN — EPINEPHRINE 1 MG: 0.1 INJECTION, SOLUTION ENDOTRACHEAL; INTRACARDIAC; INTRAVENOUS at 04:45

## 2025-01-01 RX ADMIN — MUPIROCIN 1 APPLICATION: 20 OINTMENT TOPICAL at 21:02

## 2025-01-01 RX ADMIN — Medication 100 MCG/HR: at 03:26

## 2025-01-01 RX ADMIN — HYDROCORTISONE SODIUM SUCCINATE 100 MG: 100 INJECTION, POWDER, FOR SOLUTION INTRAMUSCULAR; INTRAVENOUS at 15:11

## 2025-01-01 RX ADMIN — DEXTROSE MONOHYDRATE 25 G: 25 INJECTION, SOLUTION INTRAVENOUS at 14:08

## 2025-01-01 RX ADMIN — VASOPRESSIN 0.05 UNITS/MIN: 0.2 INJECTION INTRAVENOUS at 15:30

## 2025-01-01 RX ADMIN — THIAMINE HYDROCHLORIDE 500 MG: 100 INJECTION, SOLUTION INTRAMUSCULAR; INTRAVENOUS at 21:16

## 2025-01-01 RX ADMIN — WHITE PETROLATUM 57.7 %-MINERAL OIL 31.9 % EYE OINTMENT: at 05:29

## 2025-01-01 RX ADMIN — PANTOPRAZOLE SODIUM 40 MG: 40 INJECTION, POWDER, FOR SOLUTION INTRAVENOUS at 05:10

## 2025-01-01 RX ADMIN — ROCURONIUM BROMIDE 50 MG: 10 INJECTION INTRAVENOUS at 13:50

## 2025-01-01 RX ADMIN — ROCURONIUM BROMIDE 30 MG: 10 INJECTION INTRAVENOUS at 14:51

## 2025-01-01 RX ADMIN — PIPERACILLIN AND TAZOBACTAM 4.5 G: 4; .5 INJECTION, POWDER, FOR SOLUTION INTRAVENOUS at 17:53

## 2025-01-01 RX ADMIN — SODIUM CHLORIDE 5 MG/HR: 9 INJECTION, SOLUTION INTRAVENOUS at 18:37

## 2025-01-01 RX ADMIN — CALCIUM CHLORIDE, MAGNESIUM CHLORIDE, DEXTROSE MONOHYDRATE, LACTIC ACID, SODIUM CHLORIDE, SODIUM BICARBONATE AND POTASSIUM CHLORIDE 2000 ML/HR: 5.15; 2.03; 22; 5.4; 6.46; 3.09; .157 INJECTION INTRAVENOUS at 18:30

## 2025-01-01 RX ADMIN — THIAMINE HYDROCHLORIDE 500 MG: 100 INJECTION, SOLUTION INTRAMUSCULAR; INTRAVENOUS at 15:09

## 2025-01-01 RX ADMIN — PROPOFOL 40 MCG/KG/MIN: 10 INJECTION, EMULSION INTRAVENOUS at 11:45

## 2025-01-01 RX ADMIN — DEXMEDETOMIDINE HYDROCHLORIDE 0.5 MCG/KG/HR: 4 INJECTION, SOLUTION INTRAVENOUS at 16:05

## 2025-01-01 RX ADMIN — VASOPRESSIN 0.03 UNITS/MIN: 0.2 INJECTION INTRAVENOUS at 13:38

## 2025-01-01 RX ADMIN — HYDROCORTISONE SODIUM SUCCINATE 100 MG: 100 INJECTION, POWDER, FOR SOLUTION INTRAMUSCULAR; INTRAVENOUS at 04:10

## 2025-01-01 RX ADMIN — CALCIUM CHLORIDE, MAGNESIUM CHLORIDE, DEXTROSE MONOHYDRATE, LACTIC ACID, SODIUM CHLORIDE, SODIUM BICARBONATE AND POTASSIUM CHLORIDE 2000 ML/HR: 5.15; 2.03; 22; 5.4; 6.46; 3.09; .157 INJECTION INTRAVENOUS at 00:03

## 2025-01-01 RX ADMIN — PIPERACILLIN AND TAZOBACTAM 4.5 G: 4; .5 INJECTION, POWDER, FOR SOLUTION INTRAVENOUS at 18:07

## 2025-01-01 RX ADMIN — PIPERACILLIN AND TAZOBACTAM 4.5 G: 4; .5 INJECTION, POWDER, FOR SOLUTION INTRAVENOUS at 01:45

## 2025-01-01 RX ADMIN — HEPARIN SODIUM 2400 UNITS: 1000 INJECTION INTRAVENOUS; SUBCUTANEOUS at 03:39

## 2025-01-01 RX ADMIN — VASOPRESSIN 1 UNITS: 20 INJECTION INTRAVENOUS at 08:32

## 2025-01-01 RX ADMIN — CALCIUM CHLORIDE, MAGNESIUM CHLORIDE, DEXTROSE MONOHYDRATE, LACTIC ACID, SODIUM CHLORIDE, SODIUM BICARBONATE AND POTASSIUM CHLORIDE 2000 ML/HR: 5.15; 2.03; 22; 5.4; 6.46; 3.09; .157 INJECTION INTRAVENOUS at 22:22

## 2025-01-01 RX ADMIN — PROPOFOL 50 MCG/KG/MIN: 10 INJECTION, EMULSION INTRAVENOUS at 17:39

## 2025-01-01 RX ADMIN — PHENYLEPHRINE HYDROCHLORIDE 4 MCG/KG/MIN: 10 INJECTION INTRAVENOUS at 10:05

## 2025-01-01 RX ADMIN — VASOPRESSIN 0.03 UNITS/MIN: 0.2 INJECTION INTRAVENOUS at 00:30

## 2025-01-01 RX ADMIN — PROPOFOL 40 MCG/KG/MIN: 10 INJECTION, EMULSION INTRAVENOUS at 07:51

## 2025-01-01 RX ADMIN — PROPOFOL 40 MCG/KG/MIN: 10 INJECTION, EMULSION INTRAVENOUS at 14:36

## 2025-01-01 RX ADMIN — CALCIUM CHLORIDE 100 MG: 100 INJECTION INTRAVENOUS; INTRAVENTRICULAR at 08:52

## 2025-01-01 RX ADMIN — METHYLENE BLUE 100 MG: 5 INJECTION INTRAVENOUS at 20:30

## 2025-01-01 RX ADMIN — MIDAZOLAM 4 MG: 1 INJECTION INTRAMUSCULAR; INTRAVENOUS at 12:55

## 2025-01-01 RX ADMIN — SODIUM BICARBONATE 50 MEQ: 84 INJECTION INTRAVENOUS at 14:14

## 2025-01-01 RX ADMIN — DEXTROSE MONOHYDRATE 25 G: 25 INJECTION, SOLUTION INTRAVENOUS at 19:33

## 2025-01-01 RX ADMIN — METOROPROLOL TARTRATE 2.5 MG: 5 INJECTION, SOLUTION INTRAVENOUS at 03:15

## 2025-01-01 RX ADMIN — VASOPRESSIN 0.03 UNITS/MIN: 0.2 INJECTION INTRAVENOUS at 15:58

## 2025-01-01 RX ADMIN — POTASSIUM CHLORIDE 10 MEQ: 7.46 INJECTION, SOLUTION INTRAVENOUS at 09:49

## 2025-01-01 RX ADMIN — PIPERACILLIN AND TAZOBACTAM 4.5 G: 4; .5 INJECTION, POWDER, FOR SOLUTION INTRAVENOUS at 04:04

## 2025-01-01 RX ADMIN — ROCURONIUM BROMIDE 20 MG: 10 INJECTION INTRAVENOUS at 09:44

## 2025-01-01 RX ADMIN — PROPOFOL 35 MCG/KG/MIN: 10 INJECTION, EMULSION INTRAVENOUS at 23:49

## 2025-01-01 RX ADMIN — PIPERACILLIN AND TAZOBACTAM 4.5 G: 4; .5 INJECTION, POWDER, FOR SOLUTION INTRAVENOUS at 17:31

## 2025-01-01 RX ADMIN — SODIUM BICARBONATE 50 MEQ: 84 INJECTION INTRAVENOUS at 13:24

## 2025-01-01 RX ADMIN — DEXMEDETOMIDINE HYDROCHLORIDE 1.5 MCG/KG/HR: 4 INJECTION, SOLUTION INTRAVENOUS at 13:39

## 2025-01-01 RX ADMIN — SODIUM CHLORIDE 10 MG: 9 INJECTION, SOLUTION INTRAVENOUS at 11:28

## 2025-01-01 RX ADMIN — Medication 100 MCG/HR: at 02:56

## 2025-01-01 RX ADMIN — VASOPRESSIN 0.05 UNITS/MIN: 0.2 INJECTION INTRAVENOUS at 03:03

## 2025-01-01 RX ADMIN — SODIUM BICARBONATE 150 MEQ: 84 INJECTION, SOLUTION INTRAVENOUS at 19:53

## 2025-01-01 RX ADMIN — THIAMINE HYDROCHLORIDE 500 MG: 100 INJECTION, SOLUTION INTRAMUSCULAR; INTRAVENOUS at 05:10

## 2025-01-01 RX ADMIN — CALCIUM GLUCONATE 1000 MG: 20 INJECTION, SOLUTION INTRAVENOUS at 19:24

## 2025-01-01 RX ADMIN — DEXMEDETOMIDINE HYDROCHLORIDE 0.5 MCG/KG/HR: 4 INJECTION, SOLUTION INTRAVENOUS at 01:51

## 2025-01-01 RX ADMIN — HYDROCORTISONE SODIUM SUCCINATE 100 MG: 100 INJECTION, POWDER, FOR SOLUTION INTRAMUSCULAR; INTRAVENOUS at 20:00

## 2025-01-01 RX ADMIN — ALBUMIN (HUMAN): 12.5 INJECTION, SOLUTION INTRAVENOUS at 14:04

## 2025-01-01 RX ADMIN — DEXTROSE MONOHYDRATE 25 G: 25 INJECTION, SOLUTION INTRAVENOUS at 07:24

## 2025-01-01 RX ADMIN — CALCIUM CHLORIDE, MAGNESIUM CHLORIDE, DEXTROSE MONOHYDRATE, LACTIC ACID, SODIUM CHLORIDE, SODIUM BICARBONATE AND POTASSIUM CHLORIDE 2000 ML/HR: 5.15; 2.03; 22; 5.4; 6.46; 3.09; .157 INJECTION INTRAVENOUS at 04:37

## 2025-01-01 RX ADMIN — MAGNESIUM SULFATE HEPTAHYDRATE 2 G: 40 INJECTION, SOLUTION INTRAVENOUS at 18:07

## 2025-01-01 RX ADMIN — SODIUM CHLORIDE, POTASSIUM CHLORIDE, SODIUM LACTATE AND CALCIUM CHLORIDE 125 ML/HR: 600; 310; 30; 20 INJECTION, SOLUTION INTRAVENOUS at 19:24

## 2025-01-01 RX ADMIN — DEXMEDETOMIDINE HYDROCHLORIDE 1.5 MCG/KG/HR: 4 INJECTION, SOLUTION INTRAVENOUS at 15:09

## 2025-01-01 RX ADMIN — DEXMEDETOMIDINE HYDROCHLORIDE 1.5 MCG/KG/HR: 4 INJECTION, SOLUTION INTRAVENOUS at 16:53

## 2025-01-01 RX ADMIN — DEXTROSE MONOHYDRATE 25 G: 25 INJECTION, SOLUTION INTRAVENOUS at 22:40

## 2025-01-01 RX ADMIN — PHENYLEPHRINE HYDROCHLORIDE 6 MCG/KG/MIN: 10 INJECTION INTRAVENOUS at 10:36

## 2025-01-01 RX ADMIN — VASOPRESSIN 0.05 UNITS/MIN: 0.2 INJECTION INTRAVENOUS at 03:09

## 2025-01-01 RX ADMIN — VASOPRESSIN 0.03 UNITS/MIN: 0.2 INJECTION INTRAVENOUS at 02:43

## 2025-01-01 RX ADMIN — SODIUM CHLORIDE 10 MG: 9 INJECTION, SOLUTION INTRAVENOUS at 13:53

## 2025-01-01 RX ADMIN — DEXMEDETOMIDINE HYDROCHLORIDE 1.5 MCG/KG/HR: 4 INJECTION, SOLUTION INTRAVENOUS at 20:13

## 2025-01-01 RX ADMIN — MUPIROCIN 1 APPLICATION: 20 OINTMENT TOPICAL at 20:15

## 2025-01-01 RX ADMIN — DEXTROSE 15 ML/HR: 20 INJECTION, SOLUTION INTRAVENOUS at 02:57

## 2025-01-01 RX ADMIN — PROTHROMBIN, COAGULATION FACTOR VII HUMAN, COAGULATION FACTOR IX HUMAN, COAGULATION FACTOR X HUMAN, PROTEIN C, PROTEIN S HUMAN, AND WATER 1607 UNITS: KIT at 13:53

## 2025-01-01 RX ADMIN — VASOPRESSIN 0.05 UNITS/MIN: 0.2 INJECTION INTRAVENOUS at 20:01

## 2025-01-01 RX ADMIN — DEXTROSE 75 ML/HR: 20 INJECTION, SOLUTION INTRAVENOUS at 03:27

## 2025-01-01 RX ADMIN — SODIUM BICARBONATE 50 MEQ: 84 INJECTION INTRAVENOUS at 03:26

## 2025-01-01 RX ADMIN — METHYLENE BLUE 100 MG: 5 INJECTION INTRAVENOUS at 08:46

## 2025-01-01 RX ADMIN — CALCIUM GLUCONATE 2000 MG: 20 INJECTION, SOLUTION INTRAVENOUS at 10:20

## 2025-01-01 RX ADMIN — MUPIROCIN 1 APPLICATION: 20 OINTMENT TOPICAL at 08:11

## 2025-01-01 RX ADMIN — CALCIUM CHLORIDE 1 G: 100 INJECTION INTRAVENOUS; INTRAVENTRICULAR at 03:25

## 2025-01-01 RX ADMIN — CALCIUM CHLORIDE 100 MG: 100 INJECTION INTRAVENOUS; INTRAVENTRICULAR at 09:24

## 2025-01-01 RX ADMIN — PIPERACILLIN AND TAZOBACTAM 4.5 G: 4; .5 INJECTION, POWDER, FOR SOLUTION INTRAVENOUS at 02:37

## 2025-01-01 RX ADMIN — NOREPINEPHRINE BITARTRATE 0.2 MCG/KG/MIN: 64 SOLUTION INTRAVENOUS at 10:04

## 2025-01-01 RX ADMIN — NOREPINEPHRINE BITARTRATE 0.22 MCG/KG/MIN: 32 SOLUTION INTRAVENOUS at 01:30

## 2025-01-01 RX ADMIN — CALCIUM CHLORIDE, MAGNESIUM CHLORIDE, DEXTROSE MONOHYDRATE, LACTIC ACID, SODIUM CHLORIDE, SODIUM BICARBONATE AND POTASSIUM CHLORIDE 2000 ML/HR: 5.15; 2.03; 22; 5.4; 6.46; 3.09; .157 INJECTION INTRAVENOUS at 00:02

## 2025-01-01 RX ADMIN — PANTOPRAZOLE SODIUM 40 MG: 40 INJECTION, POWDER, FOR SOLUTION INTRAVENOUS at 05:11

## 2025-01-01 RX ADMIN — PHENYLEPHRINE HYDROCHLORIDE 6 MCG/KG/MIN: 10 INJECTION INTRAVENOUS at 16:49

## 2025-01-01 RX ADMIN — PROPOFOL 45 MCG/KG/MIN: 10 INJECTION, EMULSION INTRAVENOUS at 22:32

## 2025-01-01 RX ADMIN — PHENYLEPHRINE HYDROCHLORIDE 3 MCG/KG/MIN: 10 INJECTION INTRAVENOUS at 18:33

## 2025-01-01 RX ADMIN — INDOCYANINE GREEN AND WATER 2.5 MG: KIT at 14:28

## 2025-01-01 RX ADMIN — ALBUMIN (HUMAN) 25 G: 0.25 INJECTION, SOLUTION INTRAVENOUS at 14:13

## 2025-01-01 RX ADMIN — Medication 100 MCG/HR: at 05:20

## 2025-01-01 RX ADMIN — PROPOFOL 40 MCG/KG/MIN: 10 INJECTION, EMULSION INTRAVENOUS at 19:50

## 2025-01-01 RX ADMIN — HYDROCORTISONE SODIUM SUCCINATE 100 MG: 100 INJECTION, POWDER, FOR SOLUTION INTRAMUSCULAR; INTRAVENOUS at 04:24

## 2025-01-01 RX ADMIN — PHENYLEPHRINE HYDROCHLORIDE 6 MCG/KG/MIN: 10 INJECTION INTRAVENOUS at 00:01

## 2025-01-01 RX ADMIN — METOROPROLOL TARTRATE 2.5 MG: 5 INJECTION, SOLUTION INTRAVENOUS at 03:00

## 2025-01-01 RX ADMIN — HYDROMORPHONE HYDROCHLORIDE 1 MG: 1 INJECTION, SOLUTION INTRAMUSCULAR; INTRAVENOUS; SUBCUTANEOUS at 08:19

## 2025-01-01 RX ADMIN — PROPOFOL 35 MCG/KG/MIN: 10 INJECTION, EMULSION INTRAVENOUS at 10:44

## 2025-01-01 RX ADMIN — HYDROCORTISONE SODIUM SUCCINATE 100 MG: 100 INJECTION, POWDER, FOR SOLUTION INTRAMUSCULAR; INTRAVENOUS at 23:45

## 2025-01-01 RX ADMIN — CALCIUM CHLORIDE 2 G: 100 INJECTION INTRAVENOUS; INTRAVENTRICULAR at 13:55

## 2025-01-01 RX ADMIN — THIAMINE HYDROCHLORIDE 500 MG: 100 INJECTION, SOLUTION INTRAMUSCULAR; INTRAVENOUS at 22:33

## 2025-01-01 RX ADMIN — ONDANSETRON 4 MG: 2 INJECTION, SOLUTION INTRAMUSCULAR; INTRAVENOUS at 08:41

## 2025-01-01 RX ADMIN — SODIUM BICARBONATE 50 MEQ: 84 INJECTION, SOLUTION INTRAVENOUS at 14:24

## 2025-01-01 RX ADMIN — ALBUMIN (HUMAN): 12.5 INJECTION, SOLUTION INTRAVENOUS at 08:43

## 2025-01-01 RX ADMIN — PHENYLEPHRINE HYDROCHLORIDE 4.5 MCG/KG/MIN: 10 INJECTION INTRAVENOUS at 22:12

## 2025-01-01 RX ADMIN — DEXMEDETOMIDINE HYDROCHLORIDE 0.5 MCG/KG/HR: 4 INJECTION, SOLUTION INTRAVENOUS at 10:35

## 2025-01-01 RX ADMIN — FUROSEMIDE 20 MG: 10 INJECTION, SOLUTION INTRAMUSCULAR; INTRAVENOUS at 11:44

## 2025-01-01 RX ADMIN — PHENYLEPHRINE HYDROCHLORIDE 6 MCG/KG/MIN: 10 INJECTION INTRAVENOUS at 03:27

## 2025-01-01 RX ADMIN — Medication 200 MCG/HR: at 14:06

## 2025-01-01 RX ADMIN — FUROSEMIDE 40 MG: 10 INJECTION, SOLUTION INTRAMUSCULAR; INTRAVENOUS at 19:40

## 2025-01-01 RX ADMIN — NOREPINEPHRINE BITARTRATE 0.13 MCG/KG/MIN: 64 SOLUTION INTRAVENOUS at 00:26

## 2025-01-01 RX ADMIN — VASOPRESSIN 0.05 UNITS/MIN: 0.2 INJECTION INTRAVENOUS at 09:24

## 2025-01-01 RX ADMIN — INSULIN HUMAN 5 UNITS: 100 INJECTION, SOLUTION PARENTERAL at 19:40

## 2025-01-01 RX ADMIN — MIDAZOLAM HYDROCHLORIDE 4 MG: 1 INJECTION, SOLUTION INTRAMUSCULAR; INTRAVENOUS at 12:55

## 2025-01-01 RX ADMIN — SODIUM CHLORIDE, SODIUM LACTATE, POTASSIUM CHLORIDE, AND CALCIUM CHLORIDE 1000 ML: .6; .31; .03; .02 INJECTION, SOLUTION INTRAVENOUS at 08:41

## 2025-01-01 RX ADMIN — DEXMEDETOMIDINE HYDROCHLORIDE 1.5 MCG/KG/HR: 4 INJECTION, SOLUTION INTRAVENOUS at 18:32

## 2025-01-01 RX ADMIN — PROCHLORPERAZINE EDISYLATE 5 MG: 5 INJECTION INTRAMUSCULAR; INTRAVENOUS at 07:22

## 2025-01-01 RX ADMIN — SODIUM BICARBONATE 50 MEQ: 84 INJECTION INTRAVENOUS at 04:40

## 2025-01-01 RX ADMIN — SODIUM BICARBONATE 50 MEQ: 84 INJECTION INTRAVENOUS at 15:17

## 2025-01-01 RX ADMIN — SODIUM BICARBONATE 150 MEQ: 84 INJECTION, SOLUTION INTRAVENOUS at 05:40

## 2025-01-01 RX ADMIN — CALCIUM CHLORIDE INJECTION 1 G: 100 INJECTION, SOLUTION INTRAVENOUS at 03:25

## 2025-01-01 RX ADMIN — PHENYLEPHRINE HYDROCHLORIDE 5 MCG/KG/MIN: 10 INJECTION INTRAVENOUS at 21:58

## 2025-01-01 RX ADMIN — CALCIUM CHLORIDE 1 G: 100 INJECTION INTRAVENOUS; INTRAVENTRICULAR at 20:41

## 2025-01-01 RX ADMIN — PHENYLEPHRINE HYDROCHLORIDE 6 MCG/KG/MIN: 10 INJECTION INTRAVENOUS at 08:30

## 2025-01-01 RX ADMIN — DEXTROSE 75 ML/HR: 20 INJECTION, SOLUTION INTRAVENOUS at 20:01

## 2025-01-01 RX ADMIN — THIAMINE HYDROCHLORIDE 500 MG: 100 INJECTION, SOLUTION INTRAMUSCULAR; INTRAVENOUS at 16:36

## 2025-01-01 RX ADMIN — Medication 250 MCG/HR: at 18:22

## 2025-01-01 RX ADMIN — SODIUM BICARBONATE INJECTION, 150 MEQ: 84 SOLUTION INTRAVENOUS at 19:37

## 2025-01-01 RX ADMIN — SODIUM BICARBONATE 50 MEQ: 84 INJECTION INTRAVENOUS at 02:55

## 2025-01-01 RX ADMIN — PHENYLEPHRINE HYDROCHLORIDE 5 MCG/KG/MIN: 10 INJECTION INTRAVENOUS at 00:46

## 2025-01-01 RX ADMIN — PANTOPRAZOLE SODIUM 40 MG: 40 INJECTION, POWDER, FOR SOLUTION INTRAVENOUS at 10:44

## 2025-01-01 RX ADMIN — PIPERACILLIN AND TAZOBACTAM 4.5 G: 4; .5 INJECTION, POWDER, FOR SOLUTION INTRAVENOUS at 10:20

## 2025-01-01 RX ADMIN — SODIUM BICARBONATE 150 MEQ: 84 INJECTION, SOLUTION INTRAVENOUS at 20:54

## 2025-01-01 RX ADMIN — SODIUM CHLORIDE, POTASSIUM CHLORIDE, SODIUM LACTATE AND CALCIUM CHLORIDE 125 ML/HR: 600; 310; 30; 20 INJECTION, SOLUTION INTRAVENOUS at 02:47

## 2025-01-01 RX ADMIN — PHENYLEPHRINE HYDROCHLORIDE 6 MCG/KG/MIN: 10 INJECTION INTRAVENOUS at 03:11

## 2025-01-01 RX ADMIN — CALCIUM CHLORIDE, MAGNESIUM CHLORIDE, DEXTROSE MONOHYDRATE, LACTIC ACID, SODIUM CHLORIDE, SODIUM BICARBONATE AND POTASSIUM CHLORIDE 2000 ML/HR: 5.15; 2.03; 22; 5.4; 6.46; 3.09; .157 INJECTION INTRAVENOUS at 04:35

## 2025-01-01 RX ADMIN — CALCIUM GLUCONATE 2000 MG: 20 INJECTION, SOLUTION INTRAVENOUS at 14:37

## 2025-01-01 RX ADMIN — MAGNESIUM SULFATE HEPTAHYDRATE 1 G: 1 INJECTION, SOLUTION INTRAVENOUS at 14:37

## 2025-01-01 RX ADMIN — FUROSEMIDE 20 MG: 10 INJECTION, SOLUTION INTRAMUSCULAR; INTRAVENOUS at 19:24

## 2025-01-01 RX ADMIN — PHENYLEPHRINE HYDROCHLORIDE 6 MCG/KG/MIN: 10 INJECTION INTRAVENOUS at 14:42

## 2025-01-01 RX ADMIN — PHENYLEPHRINE HYDROCHLORIDE 0.8 MCG/KG/MIN: 10 INJECTION INTRAVENOUS at 20:50

## 2025-01-01 RX ADMIN — MUPIROCIN 1 APPLICATION: 20 OINTMENT TOPICAL at 21:01

## 2025-01-01 RX ADMIN — PHENYLEPHRINE HYDROCHLORIDE 1 MCG/KG/MIN: 10 INJECTION INTRAVENOUS at 05:40

## 2025-01-01 RX ADMIN — CALCIUM CHLORIDE 100 MG: 100 INJECTION INTRAVENOUS; INTRAVENTRICULAR at 09:30

## 2025-01-01 RX ADMIN — HYDROCORTISONE SODIUM SUCCINATE 100 MG: 100 INJECTION, POWDER, FOR SOLUTION INTRAMUSCULAR; INTRAVENOUS at 11:58

## 2025-01-01 RX ADMIN — FENTANYL CITRATE 50 MCG: 50 INJECTION, SOLUTION INTRAMUSCULAR; INTRAVENOUS at 09:58

## 2025-01-01 RX ADMIN — MEROPENEM 1000 MG: 1 INJECTION INTRAVENOUS at 20:23

## 2025-01-01 RX ADMIN — IOPAMIDOL 100 ML: 755 INJECTION, SOLUTION INTRAVENOUS at 07:59

## 2025-01-01 RX ADMIN — DEXTROSE MONOHYDRATE 25 G: 25 INJECTION, SOLUTION INTRAVENOUS at 01:44

## 2025-01-01 RX ADMIN — SODIUM BICARBONATE INJECTION, 150 MEQ: 84 SOLUTION INTRAVENOUS at 03:00

## 2025-01-01 RX ADMIN — PHENYLEPHRINE HYDROCHLORIDE 3 MCG/KG/MIN: 10 INJECTION INTRAVENOUS at 18:30

## 2025-01-01 RX ADMIN — PHENYLEPHRINE HYDROCHLORIDE 5 MCG/KG/MIN: 10 INJECTION INTRAVENOUS at 19:34

## 2025-01-01 RX ADMIN — FUROSEMIDE 20 MG: 10 INJECTION, SOLUTION INTRAMUSCULAR; INTRAVENOUS at 04:16

## 2025-07-06 ENCOUNTER — ANESTHESIA EVENT (OUTPATIENT)
Dept: PERIOP | Facility: HOSPITAL | Age: 40
End: 2025-07-06
Payer: OTHER GOVERNMENT

## 2025-07-06 ENCOUNTER — ANESTHESIA (OUTPATIENT)
Dept: PERIOP | Facility: HOSPITAL | Age: 40
End: 2025-07-06
Payer: OTHER GOVERNMENT

## 2025-07-06 PROBLEM — K55.059 ACUTE MESENTERIC ISCHEMIA: Status: ACTIVE | Noted: 2025-07-06

## 2025-07-06 PROBLEM — I77.79 DISSECTION OF MESENTERIC ARTERY: Status: ACTIVE | Noted: 2025-07-06

## 2025-07-06 PROCEDURE — 25010000002 ALBUMIN HUMAN 5% PER 50 ML: Performed by: NURSE ANESTHETIST, CERTIFIED REGISTERED

## 2025-07-06 PROCEDURE — C1751 CATH, INF, PER/CENT/MIDLINE: HCPCS | Performed by: ANESTHESIOLOGY

## 2025-07-06 PROCEDURE — 25010000002 HEPARIN (PORCINE) PER 1000 UNITS: Performed by: NURSE ANESTHETIST, CERTIFIED REGISTERED

## 2025-07-06 PROCEDURE — 25010000002 METRONIDAZOLE 500 MG/100ML SOLUTION: Performed by: NURSE ANESTHETIST, CERTIFIED REGISTERED

## 2025-07-06 PROCEDURE — 25010000002 PROTAMINE SULFATE PER 10 MG

## 2025-07-06 PROCEDURE — 25010000002 PROPOFOL 10 MG/ML EMULSION

## 2025-07-06 PROCEDURE — 25010000002 PROPOFOL 10 MG/ML EMULSION: Performed by: NURSE ANESTHETIST, CERTIFIED REGISTERED

## 2025-07-06 PROCEDURE — 25010000002 CALCIUM CHLORIDE 10 % SOLUTION

## 2025-07-06 PROCEDURE — 25010000002 MIDAZOLAM PER 1 MG: Performed by: NURSE ANESTHETIST, CERTIFIED REGISTERED

## 2025-07-06 PROCEDURE — 25010000002 NICARDIPINE 2.5 MG/ML SOLUTION: Performed by: ANESTHESIOLOGY

## 2025-07-06 PROCEDURE — 25810000003 LACTATED RINGERS PER 1000 ML: Performed by: NURSE ANESTHETIST, CERTIFIED REGISTERED

## 2025-07-06 PROCEDURE — 25010000002 CEFAZOLIN PER 500 MG: Performed by: NURSE ANESTHETIST, CERTIFIED REGISTERED

## 2025-07-06 PROCEDURE — 25010000002 HYDROMORPHONE 1 MG/ML SOLUTION: Performed by: ANESTHESIOLOGY

## 2025-07-06 PROCEDURE — 25010000002 SUCCINYLCHOLINE PER 20 MG: Performed by: NURSE ANESTHETIST, CERTIFIED REGISTERED

## 2025-07-06 PROCEDURE — 25010000002 ESMOLOL 100 MG/10ML SOLUTION: Performed by: NURSE ANESTHETIST, CERTIFIED REGISTERED

## 2025-07-06 PROCEDURE — 25010000002 PIPERACILLIN SOD-TAZOBACTAM PER 1 G: Performed by: NURSE PRACTITIONER

## 2025-07-06 PROCEDURE — 25810000003 SODIUM CHLORIDE 0.9 % SOLUTION: Performed by: NURSE ANESTHETIST, CERTIFIED REGISTERED

## 2025-07-06 PROCEDURE — P9041 ALBUMIN (HUMAN),5%, 50ML: HCPCS | Performed by: NURSE ANESTHETIST, CERTIFIED REGISTERED

## 2025-07-06 PROCEDURE — 25010000002 FENTANYL CITRATE (PF) 100 MCG/2ML SOLUTION: Performed by: ANESTHESIOLOGY

## 2025-07-06 RX ORDER — ROCURONIUM BROMIDE 10 MG/ML
INJECTION, SOLUTION INTRAVENOUS AS NEEDED
Status: DISCONTINUED | OUTPATIENT
Start: 2025-07-06 | End: 2025-07-06 | Stop reason: SURG

## 2025-07-06 RX ORDER — CALCIUM CHLORIDE 100 MG/ML
INJECTION INTRAVENOUS; INTRAVENTRICULAR AS NEEDED
Status: DISCONTINUED | OUTPATIENT
Start: 2025-07-06 | End: 2025-07-06 | Stop reason: SURG

## 2025-07-06 RX ORDER — HEPARIN SODIUM 1000 [USP'U]/ML
INJECTION, SOLUTION INTRAVENOUS; SUBCUTANEOUS AS NEEDED
Status: DISCONTINUED | OUTPATIENT
Start: 2025-07-06 | End: 2025-07-06 | Stop reason: SURG

## 2025-07-06 RX ORDER — FENTANYL CITRATE 50 UG/ML
INJECTION, SOLUTION INTRAMUSCULAR; INTRAVENOUS AS NEEDED
Status: DISCONTINUED | OUTPATIENT
Start: 2025-07-06 | End: 2025-07-06 | Stop reason: SURG

## 2025-07-06 RX ORDER — SODIUM CHLORIDE, SODIUM LACTATE, POTASSIUM CHLORIDE, CALCIUM CHLORIDE 600; 310; 30; 20 MG/100ML; MG/100ML; MG/100ML; MG/100ML
INJECTION, SOLUTION INTRAVENOUS CONTINUOUS PRN
Status: DISCONTINUED | OUTPATIENT
Start: 2025-07-06 | End: 2025-07-06 | Stop reason: SURG

## 2025-07-06 RX ORDER — SODIUM CHLORIDE 9 MG/ML
INJECTION, SOLUTION INTRAVENOUS CONTINUOUS PRN
Status: DISCONTINUED | OUTPATIENT
Start: 2025-07-06 | End: 2025-07-06 | Stop reason: SURG

## 2025-07-06 RX ORDER — NICARDIPINE HYDROCHLORIDE 2.5 MG/ML
INJECTION INTRAVENOUS AS NEEDED
Status: DISCONTINUED | OUTPATIENT
Start: 2025-07-06 | End: 2025-07-06 | Stop reason: SURG

## 2025-07-06 RX ORDER — METRONIDAZOLE 500 MG/100ML
INJECTION, SOLUTION INTRAVENOUS AS NEEDED
Status: DISCONTINUED | OUTPATIENT
Start: 2025-07-06 | End: 2025-07-06 | Stop reason: SURG

## 2025-07-06 RX ORDER — SUCCINYLCHOLINE CHLORIDE 20 MG/ML
INJECTION INTRAMUSCULAR; INTRAVENOUS AS NEEDED
Status: DISCONTINUED | OUTPATIENT
Start: 2025-07-06 | End: 2025-07-06 | Stop reason: SURG

## 2025-07-06 RX ORDER — MIDAZOLAM HYDROCHLORIDE 1 MG/ML
INJECTION, SOLUTION INTRAMUSCULAR; INTRAVENOUS AS NEEDED
Status: DISCONTINUED | OUTPATIENT
Start: 2025-07-06 | End: 2025-07-06 | Stop reason: SURG

## 2025-07-06 RX ORDER — CEFAZOLIN SODIUM 1 G/3ML
INJECTION, POWDER, FOR SOLUTION INTRAMUSCULAR; INTRAVENOUS AS NEEDED
Status: DISCONTINUED | OUTPATIENT
Start: 2025-07-06 | End: 2025-07-06 | Stop reason: SURG

## 2025-07-06 RX ORDER — ESMOLOL HYDROCHLORIDE 10 MG/ML
INJECTION INTRAVENOUS AS NEEDED
Status: DISCONTINUED | OUTPATIENT
Start: 2025-07-06 | End: 2025-07-06 | Stop reason: SURG

## 2025-07-06 RX ORDER — PROTAMINE SULFATE 10 MG/ML
INJECTION, SOLUTION INTRAVENOUS AS NEEDED
Status: DISCONTINUED | OUTPATIENT
Start: 2025-07-06 | End: 2025-07-06 | Stop reason: SURG

## 2025-07-06 RX ORDER — ALBUMIN HUMAN 50 G/1000ML
SOLUTION INTRAVENOUS CONTINUOUS PRN
Status: DISCONTINUED | OUTPATIENT
Start: 2025-07-06 | End: 2025-07-06 | Stop reason: SURG

## 2025-07-06 RX ORDER — PROPOFOL 10 MG/ML
VIAL (ML) INTRAVENOUS AS NEEDED
Status: DISCONTINUED | OUTPATIENT
Start: 2025-07-06 | End: 2025-07-06 | Stop reason: SURG

## 2025-07-06 RX ADMIN — ALBUMIN (HUMAN): 12.5 INJECTION, SOLUTION INTRAVENOUS at 12:17

## 2025-07-06 RX ADMIN — SODIUM CHLORIDE: 9 INJECTION, SOLUTION INTRAVENOUS at 10:18

## 2025-07-06 RX ADMIN — PROPOFOL 150 MG: 10 INJECTION, EMULSION INTRAVENOUS at 10:28

## 2025-07-06 RX ADMIN — METRONIDAZOLE 500 MG: 500 INJECTION, SOLUTION INTRAVENOUS at 11:03

## 2025-07-06 RX ADMIN — SUCCINYLCHOLINE CHLORIDE 200 MG: 20 INJECTION, SOLUTION INTRAMUSCULAR; INTRAVENOUS at 10:29

## 2025-07-06 RX ADMIN — PROPOFOL INJECTABLE EMULSION 80 MCG/KG/MIN: 10 INJECTION, EMULSION INTRAVENOUS at 16:04

## 2025-07-06 RX ADMIN — HEPARIN SODIUM 10000 UNITS: 1000 INJECTION INTRAVENOUS; SUBCUTANEOUS at 13:27

## 2025-07-06 RX ADMIN — ALBUMIN (HUMAN): 12.5 INJECTION, SOLUTION INTRAVENOUS at 10:37

## 2025-07-06 RX ADMIN — CALCIUM CHLORIDE 1 G: 100 INJECTION, SOLUTION INTRAVENOUS at 15:55

## 2025-07-06 RX ADMIN — PROTAMINE SULFATE 40 MG: 10 INJECTION, SOLUTION INTRAVENOUS at 15:40

## 2025-07-06 RX ADMIN — HYDROMORPHONE HYDROCHLORIDE 1 MG: 1 INJECTION, SOLUTION INTRAMUSCULAR; INTRAVENOUS; SUBCUTANEOUS at 11:44

## 2025-07-06 RX ADMIN — NICARDIPINE HYDROCHLORIDE 0.5 MG: 25 INJECTION INTRAVENOUS at 11:58

## 2025-07-06 RX ADMIN — PIPERACILLIN AND TAZOBACTAM 3.38 G: 3; .375 INJECTION, POWDER, LYOPHILIZED, FOR SOLUTION INTRAVENOUS at 10:39

## 2025-07-06 RX ADMIN — SODIUM CHLORIDE: 9 INJECTION, SOLUTION INTRAVENOUS at 10:35

## 2025-07-06 RX ADMIN — ROCURONIUM BROMIDE 10 MG: 10 INJECTION, SOLUTION INTRAVENOUS at 13:53

## 2025-07-06 RX ADMIN — ESMOLOL HYDROCHLORIDE 20 MG: 100 INJECTION, SOLUTION INTRAVENOUS at 10:29

## 2025-07-06 RX ADMIN — HYDROMORPHONE HYDROCHLORIDE 1 MG: 1 INJECTION, SOLUTION INTRAMUSCULAR; INTRAVENOUS; SUBCUTANEOUS at 11:55

## 2025-07-06 RX ADMIN — FENTANYL CITRATE 100 MCG: 50 INJECTION, SOLUTION INTRAMUSCULAR; INTRAVENOUS at 11:39

## 2025-07-06 RX ADMIN — ROCURONIUM BROMIDE 50 MG: 10 INJECTION, SOLUTION INTRAVENOUS at 12:22

## 2025-07-06 RX ADMIN — SODIUM CHLORIDE, SODIUM LACTATE, POTASSIUM CHLORIDE, AND CALCIUM CHLORIDE: .6; .31; .03; .02 INJECTION, SOLUTION INTRAVENOUS at 10:18

## 2025-07-06 RX ADMIN — SODIUM CHLORIDE: 9 INJECTION, SOLUTION INTRAVENOUS at 13:34

## 2025-07-06 RX ADMIN — NICARDIPINE HYDROCHLORIDE 0.5 MG: 25 INJECTION INTRAVENOUS at 11:50

## 2025-07-06 RX ADMIN — MIDAZOLAM 2 MG: 1 INJECTION INTRAMUSCULAR; INTRAVENOUS at 10:18

## 2025-07-06 RX ADMIN — ROCURONIUM BROMIDE 10 MG: 10 INJECTION, SOLUTION INTRAVENOUS at 14:51

## 2025-07-06 RX ADMIN — NICARDIPINE HYDROCHLORIDE 5 MG/HR: 25 INJECTION INTRAVENOUS at 12:03

## 2025-07-06 RX ADMIN — ROCURONIUM BROMIDE 100 MG: 10 INJECTION, SOLUTION INTRAVENOUS at 10:42

## 2025-07-06 RX ADMIN — HEPARIN SODIUM 5000 UNITS: 1000 INJECTION INTRAVENOUS; SUBCUTANEOUS at 14:38

## 2025-07-06 RX ADMIN — ROCURONIUM BROMIDE 20 MG: 10 INJECTION, SOLUTION INTRAVENOUS at 15:55

## 2025-07-06 RX ADMIN — ALBUMIN (HUMAN): 12.5 INJECTION, SOLUTION INTRAVENOUS at 13:04

## 2025-07-06 RX ADMIN — HEPARIN SODIUM 5000 UNITS: 1000 INJECTION INTRAVENOUS; SUBCUTANEOUS at 13:57

## 2025-07-06 RX ADMIN — CEFAZOLIN 3 G: 1 INJECTION, POWDER, FOR SOLUTION INTRAMUSCULAR; INTRAVENOUS at 11:00

## 2025-07-06 RX ADMIN — CEFAZOLIN 3 G: 1 INJECTION, POWDER, FOR SOLUTION INTRAMUSCULAR; INTRAVENOUS at 14:52

## 2025-07-06 NOTE — CONSULTS
[facility] note  Asked to see patient because of aortic dissection which was repaired by me in 2024 and new onset abdominal pain.  I have reviewed both CT scans from 2024 and now and there is enlargement of the thoracic aortic chronic dissection.  The patient has abdominal pain and tender abdomen.  There is questionable loss of perfusion in the left kidney however I can see contrast in the left renal artery.  It is unclear if there is perfusion in the SMA.  Due to the abdominal pain, I recommend admission for observation and vascular surgery has been consulted regarding abdominal pain and possibility of malperfusion and he may need also general surgery consult

## 2025-07-06 NOTE — CONSULTS
Patient Name: Tj Christopher Account #: [id number]    MRN: [id number] Admission Date: 7/6/2025      Consulting Service: Vascular Surgery Date of Evaluation: July 6, 2025    Requesting Provider: Nick Correa DO    CHIEF COMPLAINT: Acute mesenteric ischemia with dissection of SMA extending off of new thoracoabdominal aortic dissection  HPI: Tj Christopher is a 40 y.o. male is being seen for a consultation and evaluation/management of acute mesenteric ischemia with acute abdomen due to mesenteric ischemia and occlusion of the SMA on CT angiogram done this morning.  We are contacted emergently by the ER and mobilized into the operating room ASA.  During this time we discussed the possibility of heparinization but we are moving rapidly enough that I feel it full heparinization will be more morbid during dissection at this time.  We are proceeding and patient is already on the table under anesthetic and lines are in place.  Exploratory laparotomy with appropriate exposure of SMA and celiac vessels followed by potential revascularization would be in line if possible.  If not then a right ileal mesenteric bypass may be needed.  [place] here is poor given his age and the recurrence of dissection.  Risk of on table death is significant.    PAST MEDICAL HISTORY:   Past Medical History:   Diagnosis Date    Aneurysm     Hypertension       PAST SURGICAL HISTORY:   Past Surgical History:   Procedure Laterality Date    ASCENDING ARCH/HEMIARCH REPLACEMENT N/A 3/27/2024    Procedure: WILLIAM, STERNOTOMY, REPAIR OF A  TYPE A AORTIC DISSECTION, HEMIARCH REPAIR, DEEP HYPOTHERMIC CIRCULATORY ARREST, ANTEGRADE SELECTIVE CEREBRAL PERFUSION, AORTIC VALVE RESUSPENTION/ROOT REPAIR, PRP;  Surgeon: Sukhdeep Parkinson MD;  Location: [facility];  Service: Cardiothoracic;  Laterality: N/A;      FAMILY HISTORY:   Family History   Problem Relation Age of Onset    Heart disease Maternal Grandfather     Heart disease Paternal Grandmother       SOCIAL  HISTORY:   Social History     Tobacco Use    Smoking status: Former     Types: Cigarettes     Passive exposure: Past    Smokeless tobacco: Never   Vaping Use    Vaping status: Never Used   Substance Use Topics    Alcohol use: Not Currently    Drug use: Yes     Types: Amphetamines      MEDICATIONS:   No current facility-administered medications on file prior to encounter.     Current Outpatient Medications on File Prior to Encounter   Medication Sig Dispense Refill    aspirin 81 MG EC tablet Take 1 tablet by mouth Daily. Indications: Disease involving Lipid Deposits in the Arteries 90 tablet 3    [DISCONTINUED] carvedilol (COREG) 6.25 MG tablet Take 1 tablet by mouth 2 (Two) Times a Day. 180 tablet 3    [DISCONTINUED] cyclobenzaprine (FLEXERIL) 10 MG tablet Take 1 tablet by mouth Every 8 (Eight) Hours As Needed for Muscle Spasms. Indications: Muscle Spasm 90 tablet 3    [DISCONTINUED] furosemide (LASIX) 40 MG tablet Take 1 tablet by mouth Daily. Indications: High Blood Pressure Disorder 90 tablet 3    [DISCONTINUED] gabapentin (NEURONTIN) 300 MG capsule Take 1 capsule by mouth Every 12 (Twelve) Hours. Indications: Neuropathic Pain 90 capsule 0    [DISCONTINUED] NIFEdipine CC (ADALAT CC) 60 MG 24 hr tablet Take 2 tablets by mouth Daily. Indications: High Blood Pressure Disorder 180 tablet 3             ALLERGIES: Patient has no known allergies.   COMPLETE REVIEW OF SYSTEMS:     Intubated and unresponsive      PHYSICAL EXAM:   Patient Vitals for the past 24 hrs:   BP Temp Pulse Resp SpO2 Height Weight   07/06/25 0954 -- -- 71 -- 92 % -- --   07/06/25 0953 -- -- (!) 45 -- 94 % -- --   07/06/25 0952 154/90 -- 65 -- 92 % -- --   07/06/25 0951 -- -- 62 -- 94 % -- --   07/06/25 0950 -- -- 69 -- (!) 87 % -- --   07/06/25 0947 162/78 -- 75 -- (!) 88 % -- --   07/06/25 0925 155/83 -- 69 -- 91 % -- --   07/06/25 0731 133/58 -- 62 -- 96 % -- --   07/06/25 0630 108/62 -- 77 -- 98 % -- --   07/06/25 0606 -- 98.8 °F (37.1 °C) 64 24  "98 % 193 cm (76\") (!) 145 kg (320 lb)        General appearance: Intubated and unresponsive.  Neurological exam reveals intubated and unresponsive.  ENT exam reveals - ENT exam normal, no neck nodes or sinus tenderness.  [facility] exam: Tachycardic.  Chest: clear to auscultation, no wheezes, rales or rhonchi, symmetric air entry.  Abdominal exam: Distended and tense..  Examination of the feet reveals warm, good capillary refill.        LABS:      Results Review:       I reviewed the patient's new clinical results.  Results from last 7 days   Lab Units 07/06/25  0623   WBC 10*3/mm3 13.85*   HEMOGLOBIN g/dL 13.7   PLATELETS 10*3/mm3 227     Results from last 7 days   Lab Units 07/06/25  0623   SODIUM mmol/L 140   POTASSIUM mmol/L 2.7*   CHLORIDE mmol/L 101   CO2 mmol/L 18.5*   BUN mg/dL 8.0   CREATININE mg/dL 1.78*   GLUCOSE mg/dL 191*   Estimated Creatinine Clearance: 86.7 mL/min (A) (by C-G formula based on SCr of 1.78 mg/dL (H)).  Results from last 7 days   Lab Units 07/06/25  0623   CALCIUM mg/dL 8.6   ALBUMIN g/dL 4.1   MAGNESIUM mg/dL 2.2     Results from last 7 days   Lab Units 07/06/25  0623   PROTIME Seconds 16.1*   INR  1.30*       The following radiologic or non-invasive studies have been reviewed by me: CT angiogram reviewed with findings of spiral dissection extending from what was formerly false lumen into the SMA.  There is occlusion about 2 cm beyond the origin of the SMA with reconstitution of distal vessels.  No bowel pneumatosis is noted at the time of the CAT scan which was 7:48 AM.    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         ASSESSMENT/PLAN: 40 y.o. male with mesenteric ischemia with likely  injury to the bowel.  Patient is in need of emergent laparotomy.  After contact we have been moving " steadily towards the OR.  I believe that revascularization is needed and then we will determine what portion of the bowel will be viable.  Patient is critically ill and the outlook here is questionable but could be portogram depending on the amount of bowel injury.      I discussed the plan with the operative team and they are agreeable to the plan of care at this point.  This is a true life-threatening emergency with emergent timing critical.  Patient  care is moved along well after diagnosis.  Thank you for this consult.     George Saenz MD   07/06/25

## 2025-07-06 NOTE — ANESTHESIA PROCEDURE NOTES
Arterial Line      Patient location during procedure: OR   Performed By   CRNA/CAA: Mariza Roman CRNA   Preanesthetic Checklist  Completed: patient identified, IV checked, site marked, risks and benefits discussed, surgical consent, monitors and equipment checked, pre-op evaluation and timeout performed  Arterial Line Prep    Sterile Tech: cap, gloves, mask and sterile barriers  Prep: ChloraPrep  Patient monitoring: blood pressure monitoring, continuous pulse oximetry and EKG  Arterial Line Procedure   Laterality:right  Location:  radial artery  Catheter size: 20 G   Number of attempts: 1  Successful placement: yes   Post Assessment   Dressing Type: occlusive dressing applied and secured with tape.   Complications no  Circ/Move/Sens Assessment: unchanged.   Patient Tolerance: patient tolerated the procedure well with no apparent complications

## 2025-07-06 NOTE — ED PROVIDER NOTES
Subjective   History of Present Illness  Chief complaint: abdominal pain      Context: Patient is a 40-year-old male who presents with his significant other with complaints of crampy abdominal pain that is generalized with 3 episodes of nausea and 2 episodes of diarrhea that started 30 minutes after eating BBQ around 0200; spouse states she has nausea as well. Denies fever URI symptoms. No urinary symptoms. Denies chest pain or shortness of breath. States he has been taking his BP meds as prescribed; he denies any recreational drug use;   chart review from 3/2025 shows methamphetamine use history which in conjunction with uncontrolled hypertension contributed to type A aortic dissection with repair by Dr. Parkinson 3/2025. Chart review shows he was a no show for several subsequent follow up appointments with CT surgery.    He has no family history of aneurysms dissection connective tissue disorders or lupus.        PCP:         Review of Systems   Constitutional:  Negative for fever.       Past Medical History:   Diagnosis Date    Aneurysm     Hypertension        No Known Allergies    Past Surgical History:   Procedure Laterality Date    ASCENDING ARCH/HEMIARCH REPLACEMENT N/A 3/27/2024    Procedure: WILLIAM, STERNOTOMY, REPAIR OF A  TYPE A AORTIC DISSECTION, HEMIARCH REPAIR, DEEP HYPOTHERMIC CIRCULATORY ARREST, ANTEGRADE SELECTIVE CEREBRAL PERFUSION, AORTIC VALVE RESUSPENTION/ROOT REPAIR, PRP;  Surgeon: Sukhdeep Parkinson MD;  Location: [facility];  Service: Cardiothoracic;  Laterality: N/A;       Family History   Problem Relation Age of Onset    Heart disease Maternal Grandfather     Heart disease Paternal Grandmother        Social History     Socioeconomic History    Marital status: Single   Tobacco Use    Smoking status: Former     Types: Cigarettes     Passive exposure: Past    Smokeless tobacco: Never   Vaping Use    Vaping status: Never Used   Substance and Sexual Activity    Alcohol use: Not Currently    Drug use:  Yes     Types: Amphetamines    Sexual activity: Defer           Objective   Physical Exam    Vital signs and triage nurse note reviewed.  Constitutional: Awake, alert; uncomfortable.  Laying on the floor and encouraged her to return to the bed.  Spouse at bedside.  BMI over 38  HEENT: Normocephalic, atraumatic; with intact EOM; oropharynx is pink and moist without exudate or erythema.  Neck: Supple, no JVD  Cardiovascular: Regular rate and rhythm, normal S1-S2. Faint jennifer  Pulmonary: Respiratory effort regular nonlabored, breath sounds clear to auscultation all fields.  Abdomen: Soft, tender across the lower abd  nondistended with hypoactive bowel sounds; no peritonitis  Musculoskeletal: Independent range of motion of all extremities with no palpable tenderness or edema.  Neuro: Alert oriented x3, speech is clear and appropriate, GCS 15  Skin:  Fleshtone warm, dry, intact; no erythematous or petechial rash or lesion      Procedures           ED Course  ED Course as of 07/06/25 0937   Sun Jul 06, 2025   0842 Spoke with Dr. Parkinson [JW]   0851 Spoke with dr chong [JW]   0911 Updated family   [JW]      ED Course User Index  [JW] Zaida John, APROLI        Labs Reviewed   COMPREHENSIVE METABOLIC PANEL - Abnormal; Notable for the following components:       Result Value    Glucose 191 (*)     Creatinine 1.78 (*)     Potassium 2.7 (*)     CO2 18.5 (*)     BUN/Creatinine Ratio 4.5 (*)     Anion Gap 20.5 (*)     eGFR 49.2 (*)     All other components within normal limits    Narrative:     GFR Categories in Chronic Kidney Disease (CKD)              GFR Category          GFR (mL/min/1.73)    Interpretation  G1                    90 or greater        Normal or high (1)  G2                    60-89                Mild decrease (1)  G3a                   45-59                Mild to moderate decrease  G3b                   30-44                Moderate to severe decrease  G4                    15-29                Severe  decrease  G5                    14 or less           Kidney failure    (1)In the absence of evidence of kidney disease, neither GFR category G1 or G2 fulfill the criteria for CKD.    eGFR calculation 2021 CKD-EPI creatinine equation, which does not include race as a factor   PROTIME-INR - Abnormal; Notable for the following components:    Protime 16.1 (*)     INR 1.30 (*)     All other components within normal limits   CBC WITH AUTO DIFFERENTIAL - Abnormal; Notable for the following components:    WBC 13.85 (*)     All other components within normal limits    Narrative:     The previously reported component NRBC is no longer being reported. Previous result was 0.0 /100 WBC (Reference Range: 0.0-0.2 /100 WBC) on 7/6/2025 at 0655 EDT.   URINALYSIS W/ MICROSCOPIC IF INDICATED (NO CULTURE) - Abnormal; Notable for the following components:    Ketones, UA Trace (*)     Blood, UA Large (3+) (*)     Protein, UA 30 mg/dL (1+) (*)     Leuk Esterase, UA Trace (*)     All other components within normal limits   URINE DRUG SCREEN - Abnormal; Notable for the following components:    THC, Screen, Urine Positive (*)     All other components within normal limits    Narrative:     Cutoff For Drugs Screened:    Amphetamines               500 ng/ml  Barbiturates               200 ng/ml  Benzodiazepines            150 ng/ml  Cocaine                    150 ng/ml  Methadone                  200 ng/ml  Opiates                    100 ng/ml  Phencyclidine               25 ng/ml  THC                         50 ng/ml  Methamphetamine            500 ng/ml  Tricyclic Antidepressants  300 ng/ml  Oxycodone                  100 ng/ml  Buprenorphine               10 ng/ml    The normal value for all drugs tested is negative. This report includes unconfirmed screening results, with the cutoff values listed, to be used for medical treatment purposes only.  Unconfirmed results must not be used for non-medical purposes such as employment or legal  testing.  Clinical consideration should be applied to any drug of abuse test, particularly when unconfirmed results are used.    All urine drugs of abuse requests without chain of custody are for medical screening purposes only.  False positives are possible.     MANUAL DIFFERENTIAL - Abnormal; Notable for the following components:    Lymphocytes Absolute 6.09 (*)     Monocytes Absolute 1.25 (*)     All other components within normal limits   URINALYSIS, MICROSCOPIC ONLY - Abnormal; Notable for the following components:    RBC, UA 3-5 (*)     WBC, UA 6-10 (*)     All other components within normal limits   POC LACTATE - Abnormal; Notable for the following components:    Lactate 5.9 (*)     All other components within normal limits   RESPIRATORY PANEL PCR W/ COVID-19 (SARS-COV-2), NP SWAB IN UTM/VTP, 2 HR TAT - Normal    Narrative:     In the setting of a positive respiratory panel with a viral infection PLUS a negative procalcitonin without other underlying concern for bacterial infection, consider observing off antibiotics or discontinuation of antibiotics and continue supportive care. If the respiratory panel is positive for atypical bacterial infection (Bordetella pertussis, Chlamydophila pneumoniae, or Mycoplasma pneumoniae), consider antibiotic de-escalation to target atypical bacterial infection.   APTT - Normal   LIPASE - Normal   MAGNESIUM - Normal   BLOOD CULTURE   BLOOD CULTURE   RAINBOW DRAW    Narrative:     The following orders were created for panel order [place] Draw.  Procedure                               Abnormality         Status                     ---------                               -----------         ------                     Green Top (Gel)[[id number]]                                  Final result               Lavender Top[[id number]]                                     Final result               Gold Top - SST[[id number]]                                   Final result               Light  Blue Top[[id number]]                                   Final result                 Please view results for these tests on the individual orders.   SCAN SLIDE   LACTIC ACID, REFLEX   POC LACTATE   TYPE AND SCREEN   GREEN TOP   LAVENDER TOP   GOLD TOP - SST   LIGHT BLUE TOP   CBC AND DIFFERENTIAL    Narrative:     The following orders were created for panel order CBC & Differential.  Procedure                               Abnormality         Status                     ---------                               -----------         ------                     CBC Auto Differential[[id number]]        Abnormal            Final result               Scan Slide[[id number]]                                       Final result                 Please view results for these tests on the individual orders.     Medications   sodium chloride 0.9 % flush 10 mL (has no administration in time range)   Potassium Replacement - Follow Nurse / BPA Driven Protocol (has no administration in time range)   potassium chloride (KLOR-CON M20) CR tablet 40 mEq (40 mEq Oral Not Given 7/6/25 0843)   potassium chloride 10 mEq in 100 mL IVPB (10 mEq Intravenous New Bag 7/6/25 0841)   lactated ringers bolus 1,000 mL (1,000 mL Intravenous New Bag 7/6/25 0928)   piperacillin-tazobactam (ZOSYN) 3.375 g IVPB in 100 mL NS MBP (CD) (has no administration in time range)   fentaNYL citrate (PF) (SUBLIMAZE) injection 50 mcg (has no administration in time range)   sodium chloride 0.9 % infusion (has no administration in time range)   prochlorperazine (COMPAZINE) injection 5 mg (5 mg Intravenous Given 7/6/25 0722)   diphenhydrAMINE (BENADRYL) injection 25 mg (25 mg Intravenous Given 7/6/25 0722)   iopamidol (ISOVUE-370) 76 % injection 100 mL (100 mL Intravenous Given 7/6/25 0759)   lactated ringers bolus 1,000 mL (1,000 mL Intravenous New Bag 7/6/25 0841)   HYDROmorphone (DILAUDID) injection 1 mg (1 mg Intravenous Given 7/6/25 0841)   ondansetron (ZOFRAN) injection 4  mg (4 mg Intravenous Given 7/6/25 0841)     CT Angiogram Chest  Result Date: 7/6/2025  Impression: Postsurgical changes of prior aortic dissection repair including proximal aortic arch repair and aortic root reconstruction, now with new/worsened complex aortic dissection spanning from the proximal aortic arch to the level of the aortic bifurcation, notably with appearance of multiple/three lumens, which may be related to reentry intimal tear/secondary dissection. Dissection extends into the superior mesenteric artery with subsequent downstream occlusion at its midportion with reconstitution at its branch point. There is a replaced right hepatic artery off the superior mesenteric artery that demonstrates only thready opacification with subsequent perfusional changes within the right hepatic lobe. Complex dissection flap involves the origin of two codominant left renal arteries with subsequent partial infarction of the left kidney. Celiac artery is narrowed but patent. Right renal artery and inferior mesenteric artery appear patent. Short segment extension to the left subclavian artery which is otherwise patent. There is overall increased aneurysmal dilatation of the thoracoabdominal aorta, for example measuring 5.2 cm at the descending thoracic aorta and 5.7 cm at the infrarenal abdominal aorta. 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 Cardiothoracic surgery consult is recommended. Please note while the chest CT was performed as a CTA with adequate/arterial contrast bolus timing, the CT abdomen pelvis was performed with routine/portal venous phase and thus suboptimal for assessment of arterial structures. Findings and recommendations discussed with Zaida Kruse NP at 8:28 a.m. on 7/6/2025. Electronically Signed: Alexander Swenson MD  7/6/2025 9:07 AM EDT  Workstation ID: [id number]    CT Abdomen Pelvis With Contrast  Result Date: 7/6/2025  Impression: Postsurgical changes of prior aortic  dissection repair including proximal aortic arch repair and aortic root reconstruction, now with new/worsened complex aortic dissection spanning from the proximal aortic arch to the level of the aortic bifurcation, notably with appearance of multiple/three lumens, which may be related to reentry intimal tear/secondary dissection. Dissection extends into the superior mesenteric artery with subsequent downstream occlusion at its midportion with reconstitution at its branch point. There is a replaced right hepatic artery off the superior mesenteric artery that demonstrates only thready opacification with subsequent perfusional changes within the right hepatic lobe. Complex dissection flap involves the origin of two codominant left renal arteries with subsequent partial infarction of the left kidney. Celiac artery is narrowed but patent. Right renal artery and inferior mesenteric artery appear patent. Short segment extension to the left subclavian artery which is otherwise patent. There is overall increased aneurysmal dilatation of the thoracoabdominal aorta, for example measuring 5.2 cm at the descending thoracic aorta and 5.7 cm at the infrarenal abdominal aorta. 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 Cardiothoracic surgery consult is recommended. Please note while the chest CT was performed as a CTA with adequate/arterial contrast bolus timing, the CT abdomen pelvis was performed with routine/portal venous phase and thus suboptimal for assessment of arterial structures. Findings and recommendations discussed with Zaida Kruse NP at 8:28 a.m. on 7/6/2025. Electronically Signed: Alexander Swenson MD  7/6/2025 9:07 AM EDT  Workstation ID: [id number]    Prior to Admission medications    Medication Sig Start Date End Date Taking? Authorizing Provider   aspirin 81 MG EC tablet Take 1 tablet by mouth Daily. Indications: Disease involving Lipid Deposits in the Arteries 4/9/24   Charlie Carvajal,  "MD   carvedilol (COREG) 6.25 MG tablet Take 1 tablet by mouth 2 (Two) Times a Day. 4/9/24   Charlie Carvajal MD   cyclobenzaprine (FLEXERIL) 10 MG tablet Take 1 tablet by mouth Every 8 (Eight) Hours As Needed for Muscle Spasms. Indications: Muscle Spasm 4/9/24   Charlie Carvajal MD   furosemide (LASIX) 40 MG tablet Take 1 tablet by mouth Daily. Indications: High Blood Pressure Disorder 4/9/24   Charlie Carvajal MD   gabapentin (NEURONTIN) 300 MG capsule Take 1 capsule by mouth Every 12 (Twelve) Hours. Indications: Neuropathic Pain 4/9/24   Charlie Carvajal MD   NIFEdipine CC (ADALAT CC) 60 MG 24 hr tablet Take 2 tablets by mouth Daily. Indications: High Blood Pressure Disorder 4/9/24   Charlie Carvajal MD                                                    Medical Decision Making      /58   Pulse 62   Temp 98.8 °F (37.1 °C)   Resp 24   Ht 193 cm (76\")   Wt (!) 145 kg (320 lb)   SpO2 96%   BMI 38.95 kg/m²      Chart review:  3/2025- sandro note- type a dissection    Radiology interpretation: CT of the chest abdomen and pelvis reviewed and interpreted by Nitish:  Further interpretation by radiologist as above  Lab interpretation:  Labs all viewed by me and significant for, potassium 2.7 glucose 191 creatinine 1.7 PTT 27 white count 13.8.  Drug screen positive for THC lactic 5.9    EKG viewed and interpreted by Dr. Gresham, sinus bradycardia rate of 57 QTc 465  comparison: 3/30/2024 sinus rhythm rate of 69            Appropriate PPE worn during exam.  Discussed care with: dr jo, dr chong, dr vidal, johann np icu      He was placed on a cardiac monitor had an IV established labs and imaging was obtained to evaluate for dissection enteritis dehydration.  He was given IV fluids analgesics and antiemetics.  He has remained hemodynamically stable with a controlled blood pressure.  Patient screened positive for hospital sepsis policy and orders were placed accordingly. patient was given antibiotics, IVF. I attest that I have " reassessed tissue perfusion after the fluid bolus given.  He was given 2 L of fluid and started on a saline infusion.  He required multiple rounds of analgesics.  He was given Zosyn.     Potassium was replaced.  I spoke with Dr. Swenson with radiology regarding his CT results and cardiothoracic surgery was paged who reviewed images and evaluated patient while he was in the ER and did not advise surgical intervention for ct surgery.  Vascular surgery was consulted who reviewed images; also discussed with dr vdial with general surgery. He will be admitted to ICU. On reexam patient is hemodynamically stable but continues to complain of some pain and was given additional analgesics.  I discussed with Joelle nurse practitioner for ICU. Discussed findings with patient and significant other and updated on plan of care and likely surgery today.    Based on the clinical findings at this time I anticipate the patient will require a 2 midnight stay         Discussed with Dr. Talbert    Amount and/or Complexity of Data Reviewed  Labs: ordered.  Radiology: ordered.    Risk  OTC drugs.  Prescription drug management.  Decision regarding hospitalization.    Critical Care  Total time providing critical care: minutes (45)        Final diagnoses:   Dissection of aorta, unspecified portion of aorta   Occlusion of superior mesenteric artery   MONIKA (acute kidney injury)   Lactic acidosis   Kidney infarction   Abdominal pain, unspecified abdominal location       ED Disposition  ED Disposition       ED Disposition   Decision to Admit    Condition   --    Comment   Level of Care: Critical Care [6]   Admitting Physician: MILO GERARDO [[id number]]   Attending Physician: MILO GERARDO [[id number]]                 No follow-up provider specified.       Medication List      No changes were made to your prescriptions during this visit.            Zaida John, APRN  07/06/25 0937

## 2025-07-06 NOTE — ANESTHESIA PROCEDURE NOTES
Airway  Reason: elective    Date/Time: 7/6/2025 10:31 AM  Airway not difficult    General Information and Staff    Patient location during procedure: OR  Anesthesiologist: Jeff Hudson MD  CRNA/CAA: Mariza Roman CRNA    Indications and Patient Condition  Indications for airway management: airway protection    Preoxygenated: yes  MILS maintained throughout    Mask difficulty assessment: 0 - not attempted    Final Airway Details    Final airway type: endotracheal airway      Successful airway: ETT and Microcuff Subglottic Suctioning ETT  Cuffed: yes   Successful intubation technique: video laryngoscopy and RSI  Adjuncts used in placement: intubating stylet and cricoid pressure  Endotracheal tube insertion site: oral  Blade: Huitron  Blade size: 4  ETT size (mm): 7.5  Cormack-Lehane Classification: grade IIa - partial view of glottis  Placement verified by: chest auscultation and capnometry   Cuff volume (mL): 8  Measured from: teeth  ETT/EBT  to teeth (cm): 23  Number of attempts at approach: 1  Assessment: lips, teeth, and gum same as pre-op and atraumatic intubation    Additional Comments  Preoxygenation, smooth IV induction. Head/neck neutral during induction/intubation. Oropharynx clear.     Copious amount of oral mucosa noted around airway. Unable to view cords as mucosa layered over them. Good visualization of epiglottis and arytenoids.

## 2025-07-06 NOTE — ANESTHESIA PROCEDURE NOTES
Central Line      Patient reassessed immediately prior to procedure    Patient location during procedure: OR  Start time: 7/6/2025 10:35 AM  Stop Time:7/6/2025 10:42 AM  Indications: central pressure monitoring, vascular access and MD/Surgeon request  Staff  Anesthesiologist: Jeff Hudson MD  Preanesthetic Checklist  Completed: patient identified, IV checked, site marked, risks and benefits discussed, surgical consent, monitors and equipment checked, pre-op evaluation and timeout performed  Central Line Prep  Sterile Tech:cap, gloves, gown, mask and sterile barriers  Prep: chloraprep  Patient monitoring: blood pressure monitoring, continuous pulse oximetry and EKG  Central Line Procedure  Laterality:right  Location:internal jugular  Catheter Type:Cordis and double lumen  Catheter Size:9 Fr  Guidance:ultrasound guided  PROCEDURE NOTE/ULTRASOUND INTERPRETATION.  Using ultrasound guidance the potential vascular sites for insertion of the catheter were visualized to determine the patency of the vessel to be used for vascular access.  After selecting the appropriate site for insertion, the needle was visualized under ultrasound being inserted into the internal jugular vein, followed by ultrasound confirmation of wire and catheter placement. There were no abnormalities seen on ultrasound; an image was taken; and the patient tolerated the procedure with no complications. Images: still images obtained, printed/placed on chart  Assessment  Post procedure:biopatch applied, line sutured and occlusive dressing applied  Assessement:blood return through all ports, free fluid flow and Richardson Test  Complications:no  Patient Tolerance:patient tolerated the procedure well with no apparent complications  Additional Notes  No cx.

## 2025-07-06 NOTE — ANESTHESIA POSTPROCEDURE EVALUATION
Patient: Tj Christopher    Procedure Summary       Date: 07/06/25 Room / Location: [facility] OR 04 / [facility] MAIN OR    Anesthesia Start: 1018 Anesthesia Stop: 1642    Procedure: LAPAROTOMY EXPLORATORY, superior mesenteric artery thromboembolectomy, repair of mesenteric artery dissection with interposition graft placement, temporary abdominal closure (Abdomen) Diagnosis:     Surgeons: Mitchell Crawford MD Provider: Jeff Hudson MD    Anesthesia Type: general, [place]ovana, CVL ASA Status: 5 - Emergent            Anesthesia Type: general, [place], CVL    Vitals  Vitals Value Taken Time   /61 07/06/25 16:49   Temp 97.7 °F (36.5 °C) 07/06/25 16:49   Pulse 92 07/06/25 17:08   Resp 18 07/06/25 16:49   SpO2 96 % 07/06/25 17:08   Vitals shown include unfiled device data.        Post Anesthesia Care and Evaluation    Patient location during evaluation: ICU  Patient participation: complete - patient cannot participate  Level of consciousness: obtunded/minimal responses  Pain scale: See nurse's notes for pain score.  Pain management: adequate    Airway patency: patent  Anesthetic complications: No anesthetic complications  PONV Status: none  Cardiovascular status: acceptable  Respiratory status: acceptable, ventilator, ETT and intubated  Hydration status: acceptable    Comments: Patient seen and examined postoperatively; vital signs stable; SpO2 greater than or equal to 90%; cardiopulmonary status stable; nausea/vomiting adequately controlled; pain adequately controlled; no apparent anesthesia complications; patient discharged from anesthesia care when discharge criteria were met

## 2025-07-06 NOTE — OP NOTE
Operative Note  Date of Admission:  7/6/2025  OR Date: 7/6/2025    Pre-op Diagnosis:   Acute mesenteric ischemia with thrombosed dissection of superior mesenteric artery    Post-Op Diagnosis Codes:  Same    Procedure:   1) Superior mesenteric artery thromboembolectomy with passage of Charline thrombectomy catheters into the aorta and into the distal mesenteric branches  2) repair of mesenteric artery dissection with proximal eversion endarterectomy and interposition bovine pericardial graft placement from superior mesenteric artery origin to superior mesenteric artery distal branch point    Surgeon: Artur Saenz MD and Mitchell Crawford MD    Assistant: Xenia ROLDAN CSA and they provided critical assistance during the case including suctioning, exposure, retraction, and reduction of blood loss.    Anesthesia: General    Staff:   Cell Saver : Jah John  Circulator: Carol Ann Erazo RN; George Bledsoe RN  Scrub Person: Jacqueline Magallon  Assistant: Xenia Hernández CSA    Estimated Blood Loss: 350 mL    Specimens:   Order Name Source Comment Collection Info Order Time   BASIC METABOLIC PANEL   Collected By: Carol Ann Erazo RN 7/6/2025 10:55 AM     Release to patient   Routine Release        PROTIME-INR   Collected By: George Bledsoe RN 7/6/2025 10:55 AM     Is the Patient on Coumadin (Warfarin) therapy?   No          Release to patient   Routine Release        LACTIC ACID, PLASMA   Collected By: Carol Ann Erazo RN 7/6/2025 10:55 AM     Release to patient   Routine Release        PREPARE RBC Other   7/6/2025 10:25 AM     When to Transfuse?   Transfuse          Indication for Transfusion - Choose One   Hgb less than 9 g/dL or Hct less than 27%          Secondary Indication - Choose One   Active Perioperative Bleeding        TISSUE PATHOLOGY EXAM Artery  Collected By: Mitchell Crawford MD 7/6/2025  2:27 PM     Release to patient   Routine Release             Complications: None    Findings:  Biphasic flow to the graft with good signals to 3 mesenteric outflow vessels    Indications:    The patient is an 40 y.o. male seen for evaluation of mesenteric ischemia acute in nature with acute thrombosis of the superior mesenteric artery due to distal dissection.  Patient has had new dissection of the thoracic aorta down through the thoracoabdominal aorta with this being the endorgan damage noted.  Emergent operation required for life-saving reperfusion to the gut.  Patient and family understand the risk benefits complications and agrees to procedure.  This is a true emergency with high risk for loss of life       Procedure:    Patient was prepped and draped sterilely and was on table with laparotomy and lysis of adhesions performed by Dr. Crawford.  Exploration showed viable but ischemic bowel with no obvious necrosis at the time of laparotomy.  Because the artery was dissected and thrombosed was very difficult to isolate without pulses even using ultrasound duplex intra abdominally with a sterile probe cover we had significant difficulty with the amount of adipose tissue and depth of the abdomen and isolating the occluded segment of artery.  After exploring the lesser sac and partially kocherized and the duodenum we dissected down onto the aorta.  Because of his extensive dissection of the aorta we are extremely careful and not getting too close to the adventitia which we assumed to be thinned.  We were eventually able to isolate the SMA which was found to be thrombosed and dissected with a plane of clot underneath the adventitia up into the trifurcation of the SMA.  All vessels leading up to this were occluded.  After controlling the major outflow vessels x 3 and mobilizing the superior mesenteric vein completely off of this area we were able to control side branches as well and have a Marissa loop on the proximal SMA artery.  Making a transverse arteriotomy in the mid segment of the SMA it is clear that there  is dissection and the entirety of the median intima had been lifted off proximally and were shifted.  After endarterectomized in these is an eversion manner and thrombectomize in the area of clot underneath the adventitia by direct forceps thrombectomy we were able to open and there was some moderate flow coming through.  Using a 4 Charline embolectomy catheter he passed it to 10 cm down into this area and thrombectomized proximally.  Marked improvement in the pressure of the flow was found.  Assessing the area distally we incised up the thrombosed artery and found the area of dissection that it created the occlusion.  We carefully created a distal landing zone to allow us to place a interposition graft as it onlay patch to all 3 vessels.  We then chose a 6 mm Artegraft which was sewn with 5-0 Prolene proximally to the proximal SMA under clamp control.  This running 5-0 Prolene allowed excellent pressurization and flow into the graft.  With pulsatility noted in the graft and good flow we then cut the 6 mm Artegraft to shape to be an end to end anastomosis to the spatulated endpoint of the SMA above these 3 outflows.  Using a parachute technique with a 6-0 Prolene we carefully maintain ligaments to all of the 3 outflow vessels while simultaneously tacking down the entirety of the intima and media segments that have been lifted by the dissection.  With these tacked to the adventitia and flow established there is signals in all 3 outflow vessels.  Prior to completing the anastomosis we backbled then and the main outflow vessel seemed to have poor backbleeding and we did pass a 3 Charline embolectomy catheter down the segment received no clot.  It was felt that this was likely lack of perfusion.  Consistent with this the bowel turgor and character had gotten steadily worse during our procedure.  With flow established and hemostasis assured the bowel did not appear appeared to pink up significantly.  Patient did have  20,000 units of heparin given total during the procedure and this was reversed with 40 mg of protamine to completion.  Hemostasis was sought and assured with clips Bovie cutting cautery and Floseal and Nu-Knit.  It was noted that one of the side branches that have been clipped started bleeding significantly and it was felt to have markedly improved pressure from the overall collateral filling from our bypass.  This send on table interrogation with ultrasound confirmed flows.  Irrigation was performed and then closure of the abdomen was deferred to general surgery who placed a wound VAC for second look in 2 days.  Patient was sent to the recovery room intubated sedated and in critical condition      Radiographic Findings:  Duplex ultrasound interrogation to isolate thrombosed artery attempted intraoperatively without ability to isolate thrombosed artery      Active Hospital Problems    Diagnosis  POA    Acute mesenteric ischemia [K55.059]  Unknown    Dissection of mesenteric artery [I77.79]  Unknown    Aortic dissection [I71.00]  Yes      Resolved Hospital Problems   No resolved problems to display.      George Saenz MD     Date: 7/6/2025  Time: 15:50 EDT

## 2025-07-06 NOTE — H&P
Critical Care History and Physical     Tj Christopher : 1985 MRN:[id number] LOS:0 ROOM: 2305/1     Reason for admission: Dissection of mesenteric artery     Assessment / Plan     SMA occlusion  Mesenteric ischemia  Mesenteric artery dissection  Now s/p SMA thromboembolectomy, repair of mesenteric artery dissection, eversion endarterectomy and interposition bovine pericardial graft placement from superior mesenteric artery origin to superior mesenteric artery distal branch point  Plan to return to surgery Tuesday for evaluation of bowel viability  Vascular surgery and general surgery following closely  Remain intubated and heavily sedated pending return to surgery  SBP goal <130 mmHg    Acute respiratory failure associated with surgery  Intubated for anesthesia, remained intubated due to critical illness and planned return to surgery  Keep intubated pending return to surgery Tuesday and due to very critical illness and lengthy surgery  Ventilator settings noted and adjusted as needed.   Close monitoring of ABG.   Minimize sedation/analgesia to keep RASS of 0 to -1.    Lactic acidemia  Likely secondary to hypoperfusion due to SMA occlusion and cross-clamp during surgery  Continue to trend  IV fluid resuscitation  Received 1 dose of Zosyn in the ED.  Hold further antibiotics for now pending cultures  Chest x-ray negative for acute finding  Blood cultures pending  Urinalysis with reflex culture pending    Essential Hypertension: not well controlled.   Hold home medications while in the ICU, critically ill, n.p.o.  Currently on a Cardene drip for SBP goal <130 mmHg  Titrate medications as needed.            Nutrition:   NPO Diet NPO Type: Strict NPO     VTE Prophylaxis:  Pharmacologic VTE prophylaxis orders are present.         History of Present illness     Tj Christopher is a 40 y.o. male with PMH of uncontrolled hypertension and AAA dissection with surgical repair (3/27/2024) who presented to the ED for evaluation  of abdominal pain, nausea and vomiting since approximately 0300 which started shortly after he had eaten. The patient has a history of uncontrolled hypertension which contributed to a type A aortic dissection. On 3/27/2024 the patient had repair of a type a aortic dissection, hemiarch repair and aortic valve resuspension with root repair by Dr. Parkinson 3/2025. The patient was reportedly lost to follow-up however he did report compliance with his medications.     Diagnostic imaging in the ED with abdominal angiogram revealed acute mesenteric ischemia with acute abdomen due to mesenteric ischemia and occlusion of the SMA.  Vascular surgery was consulted emergently by the ED NP Zaida John patient was taken emergently to the OR.  OR interventions included superior mesenteric artery thromboembolectomy with passage of ectomy catheters into the aorta and into the distal mesenteric branches as well as repair of mesenteric artery dissection with eversion endarterectomy and interposition bovine pericardial graft placement from superior mesenteric artery origin to superior mesenteric artery distal branch point.  The surgery was conducted by Dr. Saenz and Dr. Crawford who worked in tandem for surgical intervention in an effort to reperfuse the gut.  This was a lengthy surgery and the patient was admitted to the ICU postoperatively where he will remain intubated and sedated pending planned return to the OR Tuesday for reevaluation of the bowel viability.    Information for this H&P was obtained primarily from review of documentation and collaboration with other providers the patient is intubated and sedated.  He presented to the ED this morning and was taken emergently to the OR prior to being seen by the ICU team.  He arrived to the ICU postoperatively, intubated and sedated.    ACP: No ACP documentation on file    Patient was seen and examined on 07/06/25 at 18:21 EDT .      Past Medical/Surgical/Social/Family History &  Allergies     Past Medical History:   Diagnosis Date    Aneurysm     Hypertension       Past Surgical History:   Procedure Laterality Date    ASCENDING ARCH/HEMIARCH REPLACEMENT N/A 3/27/2024    Procedure: WILLIAM, STERNOTOMY, REPAIR OF A  TYPE A AORTIC DISSECTION, HEMIARCH REPAIR, DEEP HYPOTHERMIC CIRCULATORY ARREST, ANTEGRADE SELECTIVE CEREBRAL PERFUSION, AORTIC VALVE RESUSPENTION/ROOT REPAIR, PRP;  Surgeon: Sukhdeep Parkinson MD;  Location[facility];  Service: Cardiothoracic;  Laterality: N/A;      Social History     Socioeconomic History    Marital status: Single   Tobacco Use    Smoking status: Former     Types: Cigarettes     Passive exposure: Past    Smokeless tobacco: Never   Vaping Use    Vaping status: Never Used   Substance and Sexual Activity    Alcohol use: Not Currently    Drug use: Yes     Types: Amphetamines    Sexual activity: Defer      Family History   Problem Relation Age of Onset    Heart disease Maternal Grandfather     Heart disease Paternal Grandmother       No Known Allergies   Social Drivers of Health     Tobacco Use: Medium Risk (8/6/2024)    Patient History     Smoking Tobacco Use: Former     Smokeless Tobacco Use: Never     Passive Exposure: Past   Alcohol Use: Not At Risk (4/1/2024)    AUDIT-C     Frequency of Alcohol Consumption: Monthly or less     Average Number of Drinks: 1 or 2     Frequency of Binge Drinking: Never   Financial Resource Strain: Medium Risk (4/1/2024)    Overall Financial Resource Strain (CARDIA)     Difficulty of Paying Living Expenses: Somewhat hard   Food Insecurity: No Food Insecurity (4/1/2024)    Hunger Vital Sign     Worried About Running Out of Food in the Last Year: Never true     Ran Out of Food in the Last Year: Never true   Transportation Needs: No Transportation Needs (5/14/2024)    OASIS : Transportation     Lack of Transportation (Medical): No     Lack of Transportation (Non-Medical): No     Patient Unable or Declines to Respond: No   Physical  Activity: Sufficiently Active (4/1/2024)    Exercise Vital Sign     Days of Exercise per Week: 5 days     Minutes of Exercise per Session: 40 min   Stress: No Stress Concern Present (4/1/2024)    Citizen of [place] [employer] - Occupational Stress Questionnaire     Feeling of Stress : Not at all   Social Connections: Feeling Socially Integrated (5/14/2024)    OASIS : Social Isolation     Frequency of experiencing loneliness or isolation: Never   Interpersonal Safety: Not At Risk (7/6/2025)    Abuse Screen     Unsafe at Home or Work/School: no     Feels Threatened by Someone?: no     Does Anyone Keep You from Contacting Others or Doint Things Outside the Home?: no     Physical Sign of Abuse Present: no   Depression: Not at risk (4/1/2024)    PHQ-2     PHQ-2 Score: 0   Housing Stability: Not At Risk (4/1/2024)    Housing Stability     Current Living Arrangements: home     Potentially Unsafe Housing Conditions: none   Utilities: Not At Risk (4/1/2024)    [facility] Utilities     Threatened with loss of utilities: No   Health Literacy: Inadequate Health Literacy (5/14/2024)    OASIS : Health Literacy     Frequency of needing help to read materials from doctor or pharmacy: Sometimes   Employment: Not At Risk (4/1/2024)    Employment     Do you want help finding or keeping work or a job?: I do not need or want help   Disabilities: Not At Risk (4/2/2024)    Disabilities     Concentrating, Remembering, or Making Decisions Difficulty: no     Doing Errands Independently Difficulty: no        Home Medications     Prior to Admission medications    Medication Sig Start Date End Date Taking? Authorizing Provider   aspirin 81 MG EC tablet Take 1 tablet by mouth Daily. Indications: Disease involving Lipid Deposits in the Arteries 4/9/24   Charlie Carvajal MD   carvedilol (COREG) 6.25 MG tablet Take 1 tablet by mouth 2 (Two) Times a Day. 4/9/24   Charlie Carvajal MD   cyclobenzaprine (FLEXERIL) 10 MG tablet Take 1 tablet by  mouth Every 8 (Eight) Hours As Needed for Muscle Spasms. Indications: Muscle Spasm 4/9/24   Charile Carvajal MD   furosemide (LASIX) 40 MG tablet Take 1 tablet by mouth Daily. Indications: High Blood Pressure Disorder 4/9/24   Charlie Carvajal MD   gabapentin (NEURONTIN) 300 MG capsule Take 1 capsule by mouth Every 12 (Twelve) Hours. Indications: Neuropathic Pain 4/9/24   Charlie Carvajal MD   NIFEdipine CC (ADALAT CC) 60 MG 24 hr tablet Take 2 tablets by mouth Daily. Indications: High Blood Pressure Disorder 4/9/24   Charlie Carvajal MD        Objective / Physical Exam     Vital signs:  Temp: 97.7 °F (36.5 °C)  BP: 147/63  Heart Rate: 87  Resp: 18  SpO2: 96 %  Weight: (!) 164 kg (360 lb 14.3 oz)    Admission Weight: Weight: (!) 145 kg (320 lb)    Physical Exam  Vitals and nursing note reviewed.   Constitutional:       General: He is not in acute distress.     Comments: Intubated and sedated   HENT:      Head: Normocephalic and atraumatic.      Right Ear: External ear normal.      Left Ear: External ear normal.      Nose: Nose normal.      Mouth/Throat:      Mouth: Mucous membranes are dry.      Comments: Oral ET tube secured  OG tube secured  Eyes:      General: No scleral icterus.     Comments: Pupils pinpoint  Conjunctiva all injection  Subconjunctival edema, L>R   Neck:      Comments: No JVD  Trachea midline  Cardiovascular:      Heart sounds: S1 normal and S2 normal. Murmur (Coarse, holosystolic) heard.      Comments: Sinus rhythm  Pulmonary:      Effort: No respiratory distress.      Breath sounds: Normal breath sounds. No wheezing or rhonchi.      Comments: Mechanically ventilated  Equal expansion and excursion of the chest wall  Abdominal:      General: There is no distension.      Palpations: Abdomen is soft.      Comments: Absent bowel sounds  Midline abdominal wound VAC   Musculoskeletal:      Right lower leg: No edema.      Left lower leg: No edema.   Skin:     General: Skin is warm and dry.      Comments: Mid  abdominal open surgical wound with wound VAC in place   Neurological:      Comments: Intubated and sedated          Labs     Results from last 7 days   Lab Units 07/06/25  1056 07/06/25  0623   WBC 10*3/mm3 12.19* 13.85*   HEMOGLOBIN g/dL 12.6* 13.7   HEMATOCRIT % 38.5 41.8   PLATELETS 10*3/mm3 174 227      Results from last 7 days   Lab Units 07/06/25  1056 07/06/25  0623   SODIUM mmol/L 136 140   POTASSIUM mmol/L 3.7 2.7*   CHLORIDE mmol/L 103 101   CO2 mmol/L 18.7* 18.5*   ANION GAP mmol/L 14.3 20.5*   BUN mg/dL 9.3 8.0   CREATININE mg/dL 1.78* 1.78*   GLUCOSE mg/dL 150* 191*   MAGNESIUM mg/dL  --  2.2   ALT (SGPT) U/L  --  28   AST (SGOT) U/L  --  27   ALK PHOS U/L  --  100      Results from last 7 days   Lab Units 07/06/25  1719   PH, ARTERIAL pH units 7.035*   PO2 ART mm Hg 76.5*   PCO2, ARTERIAL mm Hg 64.3*   HCO3 ART mmol/L 17.2*       Imaging     Chest X ray: My independent assessment showed no infiltrates or effusions    EKG: My independent evaluation showed sinus rhythm, no ST -T changes    Current Medications     Scheduled Meds:  ceFAZolin, , ,   heparin (porcine), , ,   heparin (porcine), , ,          Continuous Infusions:  fentanyl 10 mcg/mL,  mcg/hr, Last Rate: 50 mcg/hr (07/06/25 1723)  niCARdipine, 5-15 mg/hr  propofol, 5-50 mcg/kg/min, Last Rate: 50 mcg/kg/min (07/06/25 1803)  sodium chloride, 125 mL/hr, Last Rate: 125 mL/hr (07/06/25 0957)           MAYRA Wilder   Critical Care  07/06/25   18:21 EDT

## 2025-07-06 NOTE — BRIEF OP NOTE
LAPAROTOMY EXPLORATORY  Progress Note    Tj Christopher  7/6/2025    Pre-op Diagnosis:   Acute mesenteric ischemia with dissected thrombosed SMA       Post-Op Diagnosis Codes:  Same    Procedure(s):      Procedure(s):  LAPAROTOMY EXPLORATORY with superior mesenteric artery thromboembolectomy and repair of mesenteric artery dissection with interposition graft placement see dictation              Surgeon(s):  Mitchell Crawford MD Dawson, Timothy, MD Jung, Matthew T, MD    Anesthesia: General    Staff:   Cell Saver : Jah John  Circulator: Carol Ann Erazo RN; George Bledsoe RN  Scrub Person: Jacqueline Magallon  Assistant: Xenia Hernández CSA  Assistant: Xenia Hernández CSA    Estimated Blood Loss: 350 mL    Urine Voided: 600 mL    Specimens:                Specimens       ID Source Type Tests Collected By Collected At Frozen?    A Artery Tissue TISSUE PATHOLOGY EXAM   Mitchell Crawford MD 7/6/25 [id number]     Description: thromboembolectomy of SMA              Drains:   Urethral Catheter Silicone 16 Fr. (Active)       [REMOVED] Chest Tube 3 Right Mediastinal (Removed)       Findings: See dictation      Complications: None    Assistant: Xenia Hernández CSA  was responsible for performing the following activities: Retraction, Suction, Irrigation, and Suturing and their skilled assistance was necessary for the success of this case.    George Saenz MD     Date: 7/6/2025  Time: 15:48 EDT

## 2025-07-07 NOTE — ANESTHESIA PREPROCEDURE EVALUATION
Anesthesia Evaluation     Patient summary reviewed and Nursing notes reviewed   NPO Solid Status: > 8 hours  NPO Liquid Status: > 8 hours           Airway   Mallampati: II  TM distance: >3 FB  Neck ROM: full  No difficulty expected  Dental - normal exam     Pulmonary - normal exam   Cardiovascular - normal exam    ECG reviewed    (+) hypertension      Neuro/Psych  GI/Hepatic/Renal/Endo      Musculoskeletal     Abdominal  - normal exam    Bowel sounds: normal.   Substance History      OB/GYN          Other        ROS/Med Hx Other: SMA occlusion  Mesenteric ischemia  Mesenteric artery dissection  · Now s/p SMA thromboembolectomy, repair of mesenteric artery dissection, eversion endarterectomy and interposition bovine pericardial graft placement from superior mesenteric artery origin to superior mesenteric artery distal branch point  · Plan to return to surgery Tuesday for evaluation of bowel viability  · Vascular surgery and general surgery following closely  · Remain intubated and heavily sedated pending return to surgery  · SBP goal <130 mmHg     ECHO:  ·  Left ventricular systolic function is normal. Calculated left ventricular EF = 62%  ·  Left ventricular wall thickness is consistent with mild concentric hypertrophy.  ·  Left ventricular diastolic function was normal.  ·  No significant valve disease.  ·  Estimated right ventricular systolic pressure from tricuspid regurgitation is normal (<35 mmHg).  ·  Suboptimal visualization of ascending aorta, but no obvious acute dissection plane noted.                     Anesthesia Plan    ASA 5     general     intravenous induction     Anesthetic plan, risks, benefits, and alternatives have been provided, discussed and informed consent has been obtained with: patient.    Plan discussed with CRNA.    CODE STATUS:    Code Status (Patient has no pulse and is not breathing): CPR (Attempt to Resuscitate)  Medical Interventions (Patient has pulse or is breathing): Full  Support

## 2025-07-07 NOTE — PLAN OF CARE
Goal Outcome Evaluation:    Pt is post op sedated and on the vent. Open belly with wound vac in place. Cardene gtt infusing for a systolic goal under 130. Propofol and fentanyl gtts infusing. Lactic 10.5 at beginning of shift. Bicard gtt started. Lots of output from LWS.

## 2025-07-07 NOTE — CONSULTS
General Surgery Consult Note      Name: Tj Christopher ADMIT: 2025   : 1985  PCP: Provider, No Known    MRN: [id number] LOS: 1 days   AGE/SEX: 40 y.o. male  ROOM: 2305/1   [facility]      Patient Care Team:  Provider, No Known as PCP - General  Chief Complaint   Patient presents with    Abdominal Pain       Subjective   40-year-old gentleman with history of type a aortic dissection repair, known chronic aortic dissection who presents with acute onset abdominal pain.  CT with worsening dissection dissection include the renal artery as well as the SMA with SMA occlusion.  Pain out of proportion to exam.  Elevated lactic acidosis.    Past Medical History:   Diagnosis Date    Aneurysm     Hypertension      Past Surgical History:   Procedure Laterality Date    ASCENDING ARCH/HEMIARCH REPLACEMENT N/A 2024    Procedure: WILLIAM, STERNOTOMY, REPAIR OF A  TYPE A AORTIC DISSECTION, HEMIARCH REPAIR, DEEP HYPOTHERMIC CIRCULATORY ARREST, ANTEGRADE SELECTIVE CEREBRAL PERFUSION, AORTIC VALVE RESUSPENTION/ROOT REPAIR, PRP;  Surgeon: Sukhdeep Parkinson MD;  Location: [facility];  Service: Cardiothoracic;  Laterality: N/A;    CARDIAC SURGERY       Family History   Problem Relation Age of Onset    Heart disease Maternal Grandfather     Heart disease Paternal Grandmother        Social History     Tobacco Use    Smoking status: Former     Current packs/day: 0.00     Types: Cigarettes     Quit date: 2025     Years since quittin.5     Passive exposure: Past    Smokeless tobacco: Never   Vaping Use    Vaping status: Never Used   Substance Use Topics    Alcohol use: Not Currently    Drug use: Yes     Types: Amphetamines     Medications Prior to Admission   Medication Sig Dispense Refill Last Dose/Taking    aspirin 81 MG EC tablet Take 1 tablet by mouth Daily. Indications: Disease involving Lipid Deposits in the Arteries 90 tablet 3      calcium gluconate, 1,000 mg, Intravenous, Once  dextrose, 25 g, Intravenous,  Once  furosemide, 20 mg, Intravenous, Q8H  furosemide, 40 mg, Intravenous, Once  insulin regular, 5 Units, Intravenous, Once  mupirocin, 1 Application, Each Nare, BID  pantoprazole, 40 mg, Intravenous, Q AM      fentanyl 10 mcg/mL,  mcg/hr, Last Rate: 250 mcg/hr (07/07/25 1752)  lactated ringers, 125 mL/hr, Last Rate: 125 mL/hr (07/07/25 1924)  norepinephrine, 0.02-0.3 mcg/kg/min, Last Rate: 0.12 mcg/kg/min (07/07/25 1927)  propofol, 5-50 mcg/kg/min, Last Rate: 40 mcg/kg/min (07/07/25 1719)        fentaNYL    nitroglycerin    nitroglycerin    Potassium Replacement - Follow Nurse / BPA Driven Protocol    [COMPLETED] Insert Peripheral IV **AND** sodium chloride  Patient has no known allergies.    Review of Systems   Constitutional:  Negative for chills and fever.   HENT:  Negative for rhinorrhea and trouble swallowing.    Eyes:  Negative for blurred vision and double vision.   Respiratory:  Negative for cough and shortness of breath.    Cardiovascular:  Negative for chest pain and palpitations.   Gastrointestinal:  Negative for abdominal distention and abdominal pain.   Musculoskeletal:  Negative for back pain and myalgias.   Skin:  Negative for color change and dry skin.   Neurological:  Negative for headache and confusion.   Psychiatric/Behavioral:  Negative for agitation and hallucinations.         Objective     Vital Signs and Labs:  Vital Signs Patient Vitals for the past 24 hrs:   BP Temp Temp src Pulse Resp SpO2   07/07/25 1848 -- -- -- 77 20 100 %   07/07/25 1600 108/62 99.9 °F (37.7 °C) Oral 78 20 92 %   07/07/25 1530 -- 99.9 °F (37.7 °C) Oral -- -- --   07/07/25 1407 -- -- -- 77 20 94 %   07/07/25 1400 -- -- -- 77 -- 93 %   07/07/25 1300 -- -- -- 76 -- 95 %   07/07/25 1200 94/50 (!) 100.6 °F (38.1 °C) Oral 83 16 94 %   07/07/25 1100 -- -- -- 81 -- 95 %   07/07/25 1012 -- -- -- 81 16 95 %   07/07/25 1000 -- -- -- 81 -- 95 %   07/07/25 0900 -- -- -- 81 -- 95 %   07/07/25 0800 -- 99.1 °F (37.3 °C) Oral 80  -- 94 %   07/07/25 0700 -- -- -- 80 -- 95 %   07/07/25 0635 -- -- -- 79 16 93 %   07/07/25 0600 95/54 -- -- 79 20 92 %   07/07/25 0500 -- -- -- 79 -- 92 %   07/07/25 0400 112/57 -- -- 79 20 94 %   07/07/25 0300 -- -- -- 81 -- 97 %   07/07/25 0242 -- -- -- 80 20 100 %   07/07/25 0200 128/62 -- -- 80 19 100 %   07/07/25 0100 -- -- -- 78 -- 100 %   07/07/25 0000 107/55 -- -- 80 19 98 %   07/06/25 2349 -- 98.5 °F (36.9 °C) Oral -- -- --   07/06/25 2335 -- -- -- 81 18 99 %   07/06/25 2300 -- -- -- 82 -- 99 %   07/06/25 2200 110/59 -- -- 80 19 100 %   07/06/25 2100 -- -- -- 84 -- 100 %   07/06/25 2000 104/47 98.1 °F (36.7 °C) Oral 83 19 100 %     I/O:  I/O last 3 completed shifts:  In: 6125 [I.V.:4025; Blood:150; IV Piggyback:1950]  Out: 3915 [Urine:1115; Emesis/NG output:1750; Blood:350]    Physical Exam:  Physical Exam  Constitutional:       General: He is not in acute distress.     Appearance: Normal appearance. He is not ill-appearing.   HENT:      Head: Normocephalic and atraumatic.      Right Ear: External ear normal.      Left Ear: External ear normal.   Eyes:      Extraocular Movements: Extraocular movements intact.      Conjunctiva/sclera: Conjunctivae normal.   Cardiovascular:      Rate and Rhythm: Normal rate and regular rhythm.   Pulmonary:      Effort: Pulmonary effort is normal. No respiratory distress.   Abdominal:      General: There is no distension.      Palpations: Abdomen is soft.      Tenderness: There is no abdominal tenderness.   Musculoskeletal:         General: No swelling or deformity.   Skin:     General: Skin is warm and dry.   Neurological:      Mental Status: He is alert and oriented to person, place, and time. Mental status is at baseline.         CBC    Results from last 7 days   Lab Units 07/07/25  1216 07/07/25  0348 07/06/25  1936 07/06/25  1518 07/06/25  1056 07/06/25  0623   WBC 10*3/mm3 22.67* 21.27* 22.07*  --  12.19* 13.85*   HEMOGLOBIN g/dL 12.2* 12.6* 12.8*  --  12.6* 13.7    HEMOGLOBIN, POC g/dL  --   --   --  12.6  --   --    PLATELETS 10*3/mm3 137* 147 161  --  174 227     BMP   Results from last 7 days   Lab Units 07/07/25  1727 07/07/25  1130 07/07/25  0348 07/06/25  1936 07/06/25  1056 07/06/25  0623   SODIUM mmol/L 139 141 134* 142 136 140   POTASSIUM mmol/L 5.6* 5.5* 5.9* 4.8 3.7 2.7*   CHLORIDE mmol/L 100 99 99 104 103 101   CO2 mmol/L 23.7 24.7 20.9* 16.5* 18.7* 18.5*   BUN mg/dL 35.1* 28.6* 21.8* 15.4 9.3 8.0   CREATININE mg/dL 5.94* 5.01* 3.75* 2.79* 1.78* 1.78*   GLUCOSE mg/dL 131* 137* 153* 170* 150* 191*   MAGNESIUM mg/dL  --  1.6 1.7 2.0  --  2.2   PHOSPHORUS mg/dL  --  4.3  --  6.5*  --   --      Radiology(recent) XR Chest 1 View  Result Date: 7/6/2025  Impression: Tip of the right internal jugular central venous catheter terminates in the upper SVC. No pneumothorax visible on this supine image. Tip of the endotracheal tube terminates in the midthoracic trachea approximately 3 to 4 cm above the orion. Tip of the enteric tube terminates in the gastric fundus. Electronically Signed: Kelli Olivera MD  7/6/2025 5:16 PM EDT  Workstation ID: [id number]    CT Angiogram Chest  Result Date: 7/6/2025  Impression: Postsurgical changes of prior aortic dissection repair including proximal aortic arch repair and aortic root reconstruction, now with new/worsened complex aortic dissection spanning from the proximal aortic arch to the level of the aortic bifurcation, notably with appearance of multiple/three lumens, which may be related to reentry intimal tear/secondary dissection. Dissection extends into the superior mesenteric artery with subsequent downstream occlusion at its midportion with reconstitution at its branch point. There is a replaced right hepatic artery off the superior mesenteric artery that demonstrates only thready opacification with subsequent perfusional changes within the right hepatic lobe. Complex dissection flap involves the origin of two codominant left renal  arteries with subsequent partial infarction of the left kidney. Celiac artery is narrowed but patent. Right renal artery and inferior mesenteric artery appear patent. Short segment extension to the left subclavian artery which is otherwise patent. There is overall increased aneurysmal dilatation of the thoracoabdominal aorta, for example measuring 5.2 cm at the descending thoracic aorta and 5.7 cm at the infrarenal abdominal aorta. 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 Cardiothoracic surgery consult is recommended. Please note while the chest CT was performed as a CTA with adequate/arterial contrast bolus timing, the CT abdomen pelvis was performed with routine/portal venous phase and thus suboptimal for assessment of arterial structures. Findings and recommendations discussed with Zaida Kruse NP at 8:28 a.m. on 7/6/2025. Electronically Signed: Alexander Swenson MD  7/6/2025 9:07 AM EDT  Workstation ID: [id number]    CT Abdomen Pelvis With Contrast  Result Date: 7/6/2025  Impression: Postsurgical changes of prior aortic dissection repair including proximal aortic arch repair and aortic root reconstruction, now with new/worsened complex aortic dissection spanning from the proximal aortic arch to the level of the aortic bifurcation, notably with appearance of multiple/three lumens, which may be related to reentry intimal tear/secondary dissection. Dissection extends into the superior mesenteric artery with subsequent downstream occlusion at its midportion with reconstitution at its branch point. There is a replaced right hepatic artery off the superior mesenteric artery that demonstrates only thready opacification with subsequent perfusional changes within the right hepatic lobe. Complex dissection flap involves the origin of two codominant left renal arteries with subsequent partial infarction of the left kidney. Celiac artery is narrowed but patent. Right renal artery and inferior  mesenteric artery appear patent. Short segment extension to the left subclavian artery which is otherwise patent. There is overall increased aneurysmal dilatation of the thoracoabdominal aorta, for example measuring 5.2 cm at the descending thoracic aorta and 5.7 cm at the infrarenal abdominal aorta. 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 Cardiothoracic surgery consult is recommended. Please note while the chest CT was performed as a CTA with adequate/arterial contrast bolus timing, the CT abdomen pelvis was performed with routine/portal venous phase and thus suboptimal for assessment of arterial structures. Findings and recommendations discussed with Zaiad Kruse NP at 8:28 a.m. on 7/6/2025. Electronically Signed: Alexander Swenson MD  7/6/2025 9:07 AM EDT  Workstation ID: [id number]     I reviewed the patient's new clinical results.    Assessment & Plan       Dissection of mesenteric artery    Aortic dissection    Acute mesenteric ischemia      40-year-old gentleman with history of type a aortic dissection repair, known chronic aortic dissection who presents with acute onset abdominal pain.  CT with worsening dissection dissection include the renal artery as well as the SMA with SMA occlusion.  Pain out of proportion to exam.  Elevated lactic acidosis.  Suspect he has bowel ischemia, discussed with Dr. Saenz, plan for emergent exploratory laparotomy with attempted revascularization of his SMA.  Discussed risk benefits and alternatives including infection, bleeding, injury track structures with patient and family and they elected to proceed.      This note was created using Dragon Voice Recognition software.    Mitchell Crawford MD  07/07/25  19:29 EDT

## 2025-07-07 NOTE — PLAN OF CARE
Goal Outcome Evaluation:              Outcome Evaluation: Levophed, LR, fentanyl, propofol gtts infusing. Vent 50% 5 PEEP. Minimal UOP, ahuja in place. Wound vac in place 250 ml out throughout shift. NG to  ml out throughout shift. Plans for dialysis tonight and to go to OR tomorrow for expl. and to close up wound.

## 2025-07-07 NOTE — CASE MANAGEMENT/SOCIAL WORK
Discharge Planning Assessment   Clem     Patient Name: Tj Christopher  MRN: [id number]  Today's Date: 7/7/2025    Admit Date: 7/6/2025    Plan: From home with aunt.   Discharge Needs Assessment       Row Name 07/07/25 1107       Living Environment    People in Home other relative(s)    Name(s) of People in Home Matilde - aunt    Current Living Arrangements home    Potentially Unsafe Housing Conditions none    In the past 12 months has the electric, gas, oil, or water company threatened to shut off services in your home? No    Primary Care Provided by self    Provides Primary Care For no one    Family Caregiver if Needed other relative(s)    Family Caregiver Names Matilde - aunt    Quality of Family Relationships helpful;involved;supportive    Able to Return to Prior Arrangements yes       Resource/Environmental Concerns    Resource/Environmental Concerns none    Transportation Concerns none       Transportation Needs    In the past 12 months, has lack of transportation kept you from medical appointments or from getting medications? no    In the past 12 months, has lack of transportation kept you from meetings, work, or from getting things needed for daily living? No       Food Insecurity    Within the past 12 months, you worried that your food would run out before you got the money to buy more. Never true    Within the past 12 months, the food you bought just didn't last and you didn't have money to get more. Never true       Transition Planning    Patient/Family Anticipates Transition to home    Patient/Family Anticipated Services at Transition none    Transportation Anticipated car, drives self;family or friend will provide       Discharge Needs Assessment    Readmission Within the Last 30 Days no previous admission in last 30 days    Equipment Currently Used at Home none    Concerns to be Addressed discharge planning    Anticipated Changes Related to Illness none    Equipment Needed After Discharge none                    Discharge Plan       Row Name 07/07/25 1108       Plan    Plan From home with aunt.    Patient/Family in Agreement with Plan yes    Plan Comments CM met with patient at bedside. Patient intubated/sedated. Patient lives at home with aunt who will transport at discharge. Patient performs ADLs and drives self. PCP (needs a new one) and pharmacy confirmed. Agreeable to M2B.  Denies financial assistance needs for medication and/or food. Denies any current DME, HH, Caregiver, or rehab services. DC Barriers: Plan to return to surgery 7/8, NPO, NG, vent 50/5, C-line, A-line, FC, Propofol/Fent/Bicarb gtts, Surgery/Vasc Sx.                 Expected Discharge Date and Time       Expected Discharge Date Expected Discharge Time    Jul 11, 2025            Demographic Summary       Row Name 07/07/25 1106       General Information    Admission Type inpatient    Arrived From emergency department    Referral Source admission list    Reason for Consult discharge planning    Preferred Language English                   Functional Status       Row Name 07/07/25 1106       Functional Status    Usual Activity Tolerance moderate    Current Activity Tolerance poor       Functional Status, IADL    Medications independent    Meal Preparation independent    Housekeeping independent    Laundry independent    Shopping independent       Mental Status    General Appearance WDL WDL       Mental Status Summary    Recent Changes in Mental Status/Cognitive Functioning no changes                   Psychosocial    No documentation.            O. Lise Escobedo RN  ICU/CVU   O: [phone]  C: [phone]  [email or web address]

## 2025-07-07 NOTE — PROGRESS NOTES
[facility] Vascular Surgery Progress Note    Name: Tj Christopher ADMIT: 2025   : 1985  PCP: Provider, No Known    MRN: [id number] LOS: 1 days   AGE/SEX: 40 y.o. male  ROOM: 2305/1   [facility]    CC: Post op; s/p exploratory laparotomy with SMA thromboembolectomy and repair of mesenteric artery dissection with their position graft placement on     Subjective     Intubated, sedated    Objective     Scheduled Medications:   mupirocin, 1 Application, Each Nare, BID  pantoprazole, 40 mg, Intravenous, Q AM        Active Infusions:  fentanyl 10 mcg/mL,  mcg/hr, Last Rate: 150 mcg/hr (25)  niCARdipine, 5-15 mg/hr, Last Rate: Stopped (25 0556)  propofol, 5-50 mcg/kg/min, Last Rate: 35 mcg/kg/min (25)  sodium bicarbonate, 150 mEq, Last Rate: 150 mEq (25 0300)        As Needed Medications:    fentaNYL    nitroglycerin    Potassium Replacement - Follow Nurse / BPA Driven Protocol    [COMPLETED] Insert Peripheral IV **AND** sodium chloride    Vital Signs  Vitals:    25 0800   BP:    Pulse:    Resp:    Temp: 99.1 °F (37.3 °C)   SpO2:       Body mass index is 43.93 kg/m².     Physical Exam:  Intubated, sedated  Mechanically ventilated  Open abdomen with wound vac in place  Palpable pedal pulses    Results Review:     CBC    Results from last 7 days   Lab Units 25  1518 25  10525  0623   WBC 10*3/mm3 21.27* 22.07*  --  12.19* 13.85*   HEMOGLOBIN g/dL 12.6* 12.8*  --  12.6* 13.7   HEMOGLOBIN, POC g/dL  --   --  12.6  --   --    PLATELETS 10*3/mm3 147 161  --  174 227     BMP   Results from last 7 days   Lab Units 25  1936 25  1056 25  0623   SODIUM mmol/L 134* 142 136 140   POTASSIUM mmol/L 5.9* 4.8 3.7 2.7*   CHLORIDE mmol/L 99 104 103 101   CO2 mmol/L 20.9* 16.5* 18.7* 18.5*   BUN mg/dL 21.8* 15.4 9.3 8.0   CREATININE mg/dL 3.75* 2.79* 1.78* 1.78*   GLUCOSE mg/dL 153* 170* 150* 191*  "  MAGNESIUM mg/dL 1.7 2.0  --  2.2   PHOSPHORUS mg/dL  --  6.5*  --   --      Coag   Results from last 7 days   Lab Units 07/06/25  1127 07/06/25  0623   INR  1.40* 1.30*   APTT seconds  --  27.9     HbA1C No results found for: \"HGBA1C\"  Infection   Results from last 7 days   Lab Units 07/06/25  0925   BLOODCX  No growth at 24 hours     Radiology(recent) XR Chest 1 View  Result Date: 7/6/2025  Impression: Tip of the right internal jugular central venous catheter terminates in the upper SVC. No pneumothorax visible on this supine image. Tip of the endotracheal tube terminates in the midthoracic trachea approximately 3 to 4 cm above the orion. Tip of the enteric tube terminates in the gastric fundus. Electronically Signed: Kelli Olivera MD  7/6/2025 5:16 PM EDT  Workstation ID: [id number]    CT Angiogram Chest  Result Date: 7/6/2025  Impression: Postsurgical changes of prior aortic dissection repair including proximal aortic arch repair and aortic root reconstruction, now with new/worsened complex aortic dissection spanning from the proximal aortic arch to the level of the aortic bifurcation, notably with appearance of multiple/three lumens, which may be related to reentry intimal tear/secondary dissection. Dissection extends into the superior mesenteric artery with subsequent downstream occlusion at its midportion with reconstitution at its branch point. There is a replaced right hepatic artery off the superior mesenteric artery that demonstrates only thready opacification with subsequent perfusional changes within the right hepatic lobe. Complex dissection flap involves the origin of two codominant left renal arteries with subsequent partial infarction of the left kidney. Celiac artery is narrowed but patent. Right renal artery and inferior mesenteric artery appear patent. Short segment extension to the left subclavian artery which is otherwise patent. There is overall increased aneurysmal dilatation of the " thoracoabdominal aorta, for example measuring 5.2 cm at the descending thoracic aorta and 5.7 cm at the infrarenal abdominal aorta. 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 Cardiothoracic surgery consult is recommended. Please note while the chest CT was performed as a CTA with adequate/arterial contrast bolus timing, the CT abdomen pelvis was performed with routine/portal venous phase and thus suboptimal for assessment of arterial structures. Findings and recommendations discussed with Zaida Kruse NP at 8:28 a.m. on 7/6/2025. Electronically Signed: Alexander Swenson MD  7/6/2025 9:07 AM EDT  Workstation ID: [id number]    CT Abdomen Pelvis With Contrast  Result Date: 7/6/2025  Impression: Postsurgical changes of prior aortic dissection repair including proximal aortic arch repair and aortic root reconstruction, now with new/worsened complex aortic dissection spanning from the proximal aortic arch to the level of the aortic bifurcation, notably with appearance of multiple/three lumens, which may be related to reentry intimal tear/secondary dissection. Dissection extends into the superior mesenteric artery with subsequent downstream occlusion at its midportion with reconstitution at its branch point. There is a replaced right hepatic artery off the superior mesenteric artery that demonstrates only thready opacification with subsequent perfusional changes within the right hepatic lobe. Complex dissection flap involves the origin of two codominant left renal arteries with subsequent partial infarction of the left kidney. Celiac artery is narrowed but patent. Right renal artery and inferior mesenteric artery appear patent. Short segment extension to the left subclavian artery which is otherwise patent. There is overall increased aneurysmal dilatation of the thoracoabdominal aorta, for example measuring 5.2 cm at the descending thoracic aorta and 5.7 cm at the infrarenal abdominal aorta.  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 Cardiothoracic surgery consult is recommended. Please note while the chest CT was performed as a CTA with adequate/arterial contrast bolus timing, the CT abdomen pelvis was performed with routine/portal venous phase and thus suboptimal for assessment of arterial structures. Findings and recommendations discussed with Zaida Kruse NP at 8:28 a.m. on 7/6/2025. Electronically Signed: Alexander Swenson MD  7/6/2025 9:07 AM EDT  Workstation ID: [id number]     VTE Prophylaxis:  Mechanical VTE prophylaxis orders are present.         Problems:    Active Hospital Problems:  Active Hospital Problems    Diagnosis  POA    **Dissection of mesenteric artery [I77.79]  Unknown    Acute mesenteric ischemia [K55.059]  Unknown    Aortic dissection [I71.00]  Yes      Resolved Hospital Problems   No resolved problems to display.        Assessment & Plan   Assessment / Plan     Dissection of mesenteric artery    Aortic dissection    Acute mesenteric ischemia      7/6/2025 - exploratory laparotomy with SMA thromboembolectomy and repair of mesenteric artery dissection with their position graft placement by Dr. Saenz    POD1  Remains critically ill  Plans to coordinate timing with general surgery to return to OR for reexploration, possible tomorrow  Intensivist managing ICU care        MAYRA Ellington  [facility] Vascular Surgery  07/07/25   O: [phone]  F: [phone]

## 2025-07-07 NOTE — CONSULTS
Nephrology Consult Note                                                [facility]      Patient Identification:  Name: Tj Christopher  Age: 40 y.o.  Sex: male  :  1985  MRN: [id number]               Requesting Physician: Nick Subramanian DO  Reason for Consultation: management recommendations      History of Present Illness:    Patient is a 40-year-old -American male patient with history of uncontrolled hypertension abdominal aortic aneurysm s/p repair back in 2024 presented to the hospital with abdominal pain nausea vomiting yesterday and was diagnosed with SMA occlusion status post thrombectomy patient received IV contrast yesterday  Patient's urine output has been very low all day today  And his creatinine has been going up to 5.9 with hyperkalemia  Consulted to help manage hyperkalemia with acute kidney injury  Problem List:    Dissection of mesenteric artery    Aortic dissection    Acute mesenteric ischemia     Past Medical History:  Past Medical History:   Diagnosis Date    Aneurysm     Hypertension      Past Surgical History:  Past Surgical History:   Procedure Laterality Date    ASCENDING ARCH/HEMIARCH REPLACEMENT N/A 2024    Procedure: WILLIAM, STERNOTOMY, REPAIR OF A  TYPE A AORTIC DISSECTION, HEMIARCH REPAIR, DEEP HYPOTHERMIC CIRCULATORY ARREST, ANTEGRADE SELECTIVE CEREBRAL PERFUSION, AORTIC VALVE RESUSPENTION/ROOT REPAIR, PRP;  Surgeon: Sukhdeep Parkinson MD;  Location: [facility];  Service: Cardiothoracic;  Laterality: N/A;    CARDIAC SURGERY        Home Meds:  Medications Prior to Admission   Medication Sig Dispense Refill Last Dose/Taking    aspirin 81 MG EC tablet Take 1 tablet by mouth Daily. Indications: Disease involving Lipid Deposits in the Arteries 90 tablet 3      Current Meds:     Current Facility-Administered Medications:     fentaNYL (SUBLIMAZE) bolus from bag 10 mcg/mL injection 50 mcg, 50 mcg, Intravenous, Q30 Min PRN, Day, Joelle G, APRN    " fentaNYL 2500 mcg in 250 mL NS infusion,  mcg/hr, Intravenous, Titrated, Day, Joelle G, APRN, Last Rate: 25 mL/hr at 25 175, 250 mcg/hr at 25 175    lactated ringers infusion, 125 mL/hr, Intravenous, Continuous, Kyler Morgan MD, Last Rate: 125 mL/hr at 25 1207, 125 mL/hr at 25 120    mupirocin (BACTROBAN) 2 % nasal ointment 1 Application, 1 Application, Each Nare, BID, Kyler Morgan MD, 1 Application at 25 104    nitroglycerin (NITROSTAT) SL tablet 0.4 mg, 0.4 mg, Sublingual, Q5 Min PRN, George Saenz MD    norepinephrine (LEVOPHED) 8 mg in 250 mL NS infusion (premix), 0.02-0.3 mcg/kg/min, Intravenous, Titrated, Day, Joelle WHITLEY , Last Rate: 30.8 mL/hr at 25, 0.1 mcg/kg/min at 25    pantoprazole (PROTONIX) injection 40 mg, 40 mg, Intravenous, Q AM, Day, Joelle WHITLEY, 40 mg at 25 104    Potassium Replacement - Follow Nurse / BPA Driven Protocol, , Not Applicable, PRN, Mitchell Crawford MD    propofol (DIPRIVAN) infusion 10 mg/mL 100 mL, 5-50 mcg/kg/min, Intravenous, Titrated, Mitchell Crawford MD, Last Rate: 34.8 mL/hr at 25 171, 40 mcg/kg/min at 25    [COMPLETED] Insert Peripheral IV, , , Once **AND** sodium chloride 0.9 % flush 10 mL, 10 mL, Intravenous, PRN, Mitchell Crawford MD    Allergies:  No Known Allergies  Social History:   Social History     Tobacco Use    Smoking status: Former     Current packs/day: 0.00     Types: Cigarettes     Quit date: 2025     Years since quittin.5     Passive exposure: Past    Smokeless tobacco: Never   Substance Use Topics    Alcohol use: Not Currently      Family History:  Family History   Problem Relation Age of Onset    Heart disease Maternal Grandfather     Heart disease Paternal Grandmother         Review of Systems  Unobtainable     Objective:  Vitals:   BP 95/54   Pulse 77   Temp 99.9 °F (37.7 °C) (Oral)   Resp 20   Ht 193 cm (76\")   Wt (!) 164 kg (360 lb 14.3 oz)   SpO2 " 94%   BMI 43.93 kg/m²   I/O:     Intake/Output Summary (Last 24 hours) at 7/7/2025 1821  Last data filed at 7/7/2025 1752  Gross per 24 hour   Intake 2025 ml   Output 2965 ml   Net -940 ml       Exam:  General Appearance:  on the vent and Levophed  Head:  Normocephalic, without obvious abnormality, atraumatic  Eyes:  PERRL, conjunctiva/corneas clear     Neck:  Supple,  no adenopathy;      Lungs:  Decreased BS occasion ronchi  Heart:  Regular rate and rhythm, S1 and S2 normal  Abdomen:  Soft, non-tender, bowel sounds active   Extremities: trace edema  Data Review:  All labs (24hrs):   Recent Results (from the past 24 hours)   Comprehensive Metabolic Panel    Collection Time: 07/06/25  7:36 PM    Specimen: Blood   Result Value Ref Range    Glucose 170 (H) 65 - 99 mg/dL    BUN 15.4 6.0 - 20.0 mg/dL    Creatinine 2.79 (H) 0.76 - 1.27 mg/dL    Sodium 142 136 - 145 mmol/L    Potassium 4.8 3.5 - 5.2 mmol/L    Chloride 104 98 - 107 mmol/L    CO2 16.5 (L) 22.0 - 29.0 mmol/L    Calcium 8.2 (L) 8.6 - 10.5 mg/dL    Total Protein 6.0 6.0 - 8.5 g/dL    Albumin 3.9 3.5 - 5.2 g/dL    ALT (SGPT) 585 (H) 1 - 41 U/L    AST (SGOT) 688 (H) 1 - 40 U/L    Alkaline Phosphatase 84 39 - 117 U/L    Total Bilirubin 0.8 0.0 - 1.2 mg/dL    Globulin 2.1 gm/dL    A/G Ratio 1.9 g/dL    BUN/Creatinine Ratio 5.5 (L) 7.0 - 25.0    Anion Gap 21.5 (H) 5.0 - 15.0 mmol/L    eGFR 28.7 (L) >60.0 mL/min/1.73   Magnesium    Collection Time: 07/06/25  7:36 PM    Specimen: Blood   Result Value Ref Range    Magnesium 2.0 1.6 - 2.6 mg/dL   Phosphorus    Collection Time: 07/06/25  7:36 PM    Specimen: Blood   Result Value Ref Range    Phosphorus 6.5 (H) 2.5 - 4.5 mg/dL   Calcium, Ionized    Collection Time: 07/06/25  7:36 PM    Specimen: Blood   Result Value Ref Range    Ionized Calcium 1.05 (L) 1.15 - 1.30 mmol/L   CBC Auto Differential    Collection Time: 07/06/25  7:36 PM    Specimen: Blood   Result Value Ref Range    WBC 22.07 (H) 3.40 - 10.80 10*3/mm3    RBC  4.57 4.14 - 5.80 10*6/mm3    Hemoglobin 12.8 (L) 13.0 - 17.7 g/dL    Hematocrit 40.4 37.5 - 51.0 %    MCV 88.4 79.0 - 97.0 fL    MCH 28.0 26.6 - 33.0 pg    MCHC 31.7 31.5 - 35.7 g/dL    RDW 14.4 12.3 - 15.4 %    RDW-SD 46.5 37.0 - 54.0 fl    MPV 9.8 6.0 - 12.0 fL    Platelets 161 140 - 450 10*3/mm3   Lactic Acid, Plasma    Collection Time: 07/06/25  7:36 PM    Specimen: Blood   Result Value Ref Range    Lactate 10.5 (C) 0.5 - 2.0 mmol/L   Scan Slide    Collection Time: 07/06/25  7:36 PM    Specimen: Blood   Result Value Ref Range    Scan Slide     Manual Differential    Collection Time: 07/06/25  7:36 PM    Specimen: Blood   Result Value Ref Range    Neutrophil % 66.0 42.7 - 76.0 %    Lymphocyte % 10.0 (L) 19.6 - 45.3 %    Monocyte % 3.0 (L) 5.0 - 12.0 %    Basophil % 1.0 0.0 - 1.5 %    Bands %  20.0 (H) 0.0 - 5.0 %    Neutrophils Absolute 18.98 (H) 1.70 - 7.00 10*3/mm3    Lymphocytes Absolute 2.21 0.70 - 3.10 10*3/mm3    Monocytes Absolute 0.66 0.10 - 0.90 10*3/mm3    Basophils Absolute 0.22 (H) 0.00 - 0.20 10*3/mm3    RBC Morphology Normal Normal    Toxic Granulation Slight/1+ None Seen    Vacuolated Neutrophils Slight/1+ None Seen    Platelet Morphology Normal Normal   MRSA Screen, PCR (Inpatient) - Swab, Nares    Collection Time: 07/06/25  7:37 PM    Specimen: Nares; Swab   Result Value Ref Range    MRSA PCR No MRSA Detected No MRSA Detected   Adult Transthoracic Echo Complete W/ Cont if Necessary Per Protocol    Collection Time: 07/06/25  8:23 PM   Result Value Ref Range    LV GLOBAL STRAIN  -15.3 %    LVIDd 4.6 cm    LVIDs 3.1 cm    IVSd 1.40 cm    LVPWd 1.30 cm    FS 32.6 %    IVS/LVPW 1.08 cm    ESV(cubed) 29.8 ml    LV Sys Vol (BSA corrected) 17.3 cm2    EDV(cubed) 97.3 ml    LV Barbour Vol (BSA corrected) 45.4 cm2    LV mass(C)d 243.3 grams    LVOT area 4.9 cm2    LVOT diam 2.5 cm    EDV(MOD-sp4) 129.0 ml    ESV(MOD-sp4) 49.3 ml    SV(MOD-sp4) 79.7 ml    SVi(MOD-SP4) 28.0 ml/m2    SVi (LVOT) 46.6 ml/m2     EF(MOD-sp4) 61.8 %    MV E max maximino 94.4 cm/sec    MV A max maximino 62.2 cm/sec    MV dec time 0.20 sec    MV E/A 1.52     LA ESV Index (BP) 21.7 ml/m2    Med Peak E' Maximino 9.1 cm/sec    Lat Peak E' Maximino 11.1 cm/sec    TR max maximino 284.0 cm/sec    Avg E/e' ratio 9.35     SV(LVOT) 132.5 ml    RVIDd 3.2 cm    TAPSE (>1.6) 1.83 cm    RV S' 15.3 cm/sec    LA dimension (2D)  4.1 cm    LV V1 max 143.0 cm/sec    LV V1 max PG 8.2 mmHg    LV V1 mean PG 4.0 mmHg    LV V1 VTI 27.0 cm    Ao pk maximino 170.0 cm/sec    Ao max PG 11.6 mmHg    Ao mean PG 6.0 mmHg    Ao V2 VTI 30.0 cm    MELISSA(I,D) 4.4 cm2    Dimensionless Index 0.84 (DI)    MV max PG 6.5 mmHg    MV mean PG 2.00 mmHg    MV V2 VTI 27.2 cm    MV P1/2t 50.5 msec    MVA(P1/2t) 4.4 cm2    MVA(VTI) 4.9 cm2    MV dec slope 754.0 cm/sec2    TR max PG 32.3 mmHg    RVSP(TR) 35.3 mmHg    RAP systole 3.0 mmHg    RV V1 max PG 4.4 mmHg    RV V1 max 105.0 cm/sec    RV V1 VTI 17.6 cm    PA V2 max 145.0 cm/sec    PA acc time 0.11 sec    ACS 2.10 cm    Sinus 3.4 cm    EF(MOD-bp) 62.0 %   Lactic Acid, Plasma    Collection Time: 07/06/25 11:49 PM    Specimen: Blood   Result Value Ref Range    Lactate 6.4 (C) 0.5 - 2.0 mmol/L   Blood Gas, Arterial -    Collection Time: 07/07/25 12:01 AM    Specimen: Arterial Blood   Result Value Ref Range    Site Arterial Line     Henok's Test N/A     pH, Arterial 7.277 (L) 7.350 - 7.450 pH units    pCO2, Arterial 54.7 (H) 35.0 - 48.0 mm Hg    pO2, Arterial 59.7 (L) 83.0 - 108.0 mm Hg    HCO3, Arterial 25.6 21.0 - 28.0 mmol/L    Base Excess, Arterial -2.0 (L) 0.0 - 3.0 mmol/L    O2 Saturation, Arterial 86.4 (L) 94.0 - 98.0 %    CO2 Content 27.2 22 - 29 mmol/L    Barometric Pressure for Blood Gas      Modality Adult Vent     FIO2 40 %    Ventilator Mode AC     Set Tidal Volume 600     PEEP 5     Hemodilution No     Respiratory Rate 16     PO2/FIO2 149 0 - 500   Comprehensive Metabolic Panel    Collection Time: 07/07/25  3:48 AM    Specimen: Blood   Result Value Ref  Range    Glucose 153 (H) 65 - 99 mg/dL    BUN 21.8 (H) 6.0 - 20.0 mg/dL    Creatinine 3.75 (H) 0.76 - 1.27 mg/dL    Sodium 134 (L) 136 - 145 mmol/L    Potassium 5.9 (H) 3.5 - 5.2 mmol/L    Chloride 99 98 - 107 mmol/L    CO2 20.9 (L) 22.0 - 29.0 mmol/L    Calcium 7.6 (L) 8.6 - 10.5 mg/dL    Total Protein 5.6 (L) 6.0 - 8.5 g/dL    Albumin 3.7 3.5 - 5.2 g/dL    ALT (SGPT) 1,260 (H) 1 - 41 U/L    AST (SGOT) 2,198 (H) 1 - 40 U/L    Alkaline Phosphatase 84 39 - 117 U/L    Total Bilirubin 0.9 0.0 - 1.2 mg/dL    Globulin 1.9 gm/dL    A/G Ratio 1.9 g/dL    BUN/Creatinine Ratio 5.8 (L) 7.0 - 25.0    Anion Gap 14.1 5.0 - 15.0 mmol/L    eGFR 20.1 (L) >60.0 mL/min/1.73   Magnesium    Collection Time: 07/07/25  3:48 AM    Specimen: Blood   Result Value Ref Range    Magnesium 1.7 1.6 - 2.6 mg/dL   CBC Auto Differential    Collection Time: 07/07/25  3:48 AM    Specimen: Blood   Result Value Ref Range    WBC 21.27 (H) 3.40 - 10.80 10*3/mm3    RBC 4.56 4.14 - 5.80 10*6/mm3    Hemoglobin 12.6 (L) 13.0 - 17.7 g/dL    Hematocrit 39.0 37.5 - 51.0 %    MCV 85.5 79.0 - 97.0 fL    MCH 27.6 26.6 - 33.0 pg    MCHC 32.3 31.5 - 35.7 g/dL    RDW 14.2 12.3 - 15.4 %    RDW-SD 44.9 37.0 - 54.0 fl    MPV 10.1 6.0 - 12.0 fL    Platelets 147 140 - 450 10*3/mm3    Neutrophil % 87.9 (H) 42.7 - 76.0 %    Lymphocyte % 6.1 (L) 19.6 - 45.3 %    Monocyte % 5.6 5.0 - 12.0 %    Eosinophil % 0.0 (L) 0.3 - 6.2 %    Basophil % 0.1 0.0 - 1.5 %    Immature Grans % 0.3 0.0 - 0.5 %    Neutrophils, Absolute 18.68 (H) 1.70 - 7.00 10*3/mm3    Lymphocytes, Absolute 1.30 0.70 - 3.10 10*3/mm3    Monocytes, Absolute 1.20 (H) 0.10 - 0.90 10*3/mm3    Eosinophils, Absolute 0.00 0.00 - 0.40 10*3/mm3    Basophils, Absolute 0.02 0.00 - 0.20 10*3/mm3    Immature Grans, Absolute 0.07 (H) 0.00 - 0.05 10*3/mm3    nRBC 0.0 0.0 - 0.2 /100 WBC   Lactic Acid, Plasma    Collection Time: 07/07/25  8:31 AM    Specimen: Blood, Arterial Line   Result Value Ref Range    Lactate 5.0 (C) 0.5 -  2.0 mmol/L   Blood Gas, Arterial -    Collection Time: 07/07/25  9:28 AM    Specimen: Arterial Blood   Result Value Ref Range    Site Arterial Line     Henok's Test N/A     pH, Arterial 7.363 7.350 - 7.450 pH units    pCO2, Arterial 47.8 35.0 - 48.0 mm Hg    pO2, Arterial 81.7 (L) 83.0 - 108.0 mm Hg    HCO3, Arterial 27.2 21.0 - 28.0 mmol/L    Base Excess, Arterial 1.2 0.0 - 3.0 mmol/L    O2 Saturation, Arterial 95.4 94.0 - 98.0 %    CO2 Content 28.7 22 - 29 mmol/L    Barometric Pressure for Blood Gas      Modality Adult Vent     FIO2 50 %    Ventilator Mode AC     Set Tidal Volume 600     PEEP 5     Hemodilution No     Respiratory Rate 16     PO2/FIO2 163 0 - 500   STAT Lactic Acid, Reflex    Collection Time: 07/07/25 11:30 AM    Specimen: Blood   Result Value Ref Range    Lactate 4.4 (C) 0.5 - 2.0 mmol/L   Comprehensive Metabolic Panel    Collection Time: 07/07/25 11:30 AM    Specimen: Blood   Result Value Ref Range    Glucose 137 (H) 65 - 99 mg/dL    BUN 28.6 (H) 6.0 - 20.0 mg/dL    Creatinine 5.01 (H) 0.76 - 1.27 mg/dL    Sodium 141 136 - 145 mmol/L    Potassium 5.5 (H) 3.5 - 5.2 mmol/L    Chloride 99 98 - 107 mmol/L    CO2 24.7 22.0 - 29.0 mmol/L    Calcium 7.4 (L) 8.6 - 10.5 mg/dL    Total Protein 5.3 (L) 6.0 - 8.5 g/dL    Albumin 3.3 (L) 3.5 - 5.2 g/dL    ALT (SGPT) 2,421 (H) 1 - 41 U/L    AST (SGOT) 6,142 (H) 1 - 40 U/L    Alkaline Phosphatase 130 (H) 39 - 117 U/L    Total Bilirubin 1.2 0.0 - 1.2 mg/dL    Globulin 2.0 gm/dL    A/G Ratio 1.7 g/dL    BUN/Creatinine Ratio 5.7 (L) 7.0 - 25.0    Anion Gap 17.3 (H) 5.0 - 15.0 mmol/L    eGFR 14.2 (L) >60.0 mL/min/1.73   Magnesium    Collection Time: 07/07/25 11:30 AM    Specimen: Blood   Result Value Ref Range    Magnesium 1.6 1.6 - 2.6 mg/dL   Phosphorus    Collection Time: 07/07/25 11:30 AM    Specimen: Blood   Result Value Ref Range    Phosphorus 4.3 2.5 - 4.5 mg/dL   Calcium, Ionized    Collection Time: 07/07/25 12:16 PM    Specimen: Blood   Result Value Ref  Range    Ionized Calcium 0.92 (L) 1.15 - 1.30 mmol/L   CBC Auto Differential    Collection Time: 07/07/25 12:16 PM    Specimen: Blood   Result Value Ref Range    WBC 22.67 (H) 3.40 - 10.80 10*3/mm3    RBC 4.32 4.14 - 5.80 10*6/mm3    Hemoglobin 12.2 (L) 13.0 - 17.7 g/dL    Hematocrit 37.2 (L) 37.5 - 51.0 %    MCV 86.1 79.0 - 97.0 fL    MCH 28.2 26.6 - 33.0 pg    MCHC 32.8 31.5 - 35.7 g/dL    RDW 14.4 12.3 - 15.4 %    RDW-SD 45.1 37.0 - 54.0 fl    MPV 10.3 6.0 - 12.0 fL    Platelets 137 (L) 140 - 450 10*3/mm3    Neutrophil % 85.7 (H) 42.7 - 76.0 %    Lymphocyte % 7.4 (L) 19.6 - 45.3 %    Monocyte % 6.2 5.0 - 12.0 %    Eosinophil % 0.0 (L) 0.3 - 6.2 %    Basophil % 0.1 0.0 - 1.5 %    Immature Grans % 0.6 (H) 0.0 - 0.5 %    Neutrophils, Absolute 19.42 (H) 1.70 - 7.00 10*3/mm3    Lymphocytes, Absolute 1.68 0.70 - 3.10 10*3/mm3    Monocytes, Absolute 1.40 (H) 0.10 - 0.90 10*3/mm3    Eosinophils, Absolute 0.00 0.00 - 0.40 10*3/mm3    Basophils, Absolute 0.03 0.00 - 0.20 10*3/mm3    Immature Grans, Absolute 0.14 (H) 0.00 - 0.05 10*3/mm3    nRBC 0.0 0.0 - 0.2 /100 WBC   STAT Lactic Acid, Reflex    Collection Time: 07/07/25  2:45 PM    Specimen: Blood, Arterial Line   Result Value Ref Range    Lactate 4.2 (C) 0.5 - 2.0 mmol/L   STAT Lactic Acid, Reflex    Collection Time: 07/07/25  5:24 PM    Specimen: Blood, Arterial Line   Result Value Ref Range    Lactate 4.3 (C) 0.5 - 2.0 mmol/L   Basic Metabolic Panel    Collection Time: 07/07/25  5:27 PM    Specimen: Blood   Result Value Ref Range    Glucose 131 (H) 65 - 99 mg/dL    BUN 35.1 (H) 6.0 - 20.0 mg/dL    Creatinine 5.94 (H) 0.76 - 1.27 mg/dL    Sodium 139 136 - 145 mmol/L    Potassium 5.6 (H) 3.5 - 5.2 mmol/L    Chloride 100 98 - 107 mmol/L    CO2 23.7 22.0 - 29.0 mmol/L    Calcium 7.6 (L) 8.6 - 10.5 mg/dL    BUN/Creatinine Ratio 5.9 (L) 7.0 - 25.0    Anion Gap 15.3 (H) 5.0 - 15.0 mmol/L    eGFR 11.6 (L) >60.0 mL/min/1.73       Current Facility-Administered Medications:      fentaNYL (SUBLIMAZE) bolus from bag 10 mcg/mL injection 50 mcg, 50 mcg, Intravenous, Q30 Min PRN, Day, Joelle G, APRN    fentaNYL 2500 mcg in 250 mL NS infusion,  mcg/hr, Intravenous, Titrated, Day, Joelle G, APRN, Last Rate: 25 mL/hr at 07/07/25 1752, 250 mcg/hr at 07/07/25 1752    lactated ringers infusion, 125 mL/hr, Intravenous, Continuous, Kyler Morgan MD, Last Rate: 125 mL/hr at 07/07/25 1207, 125 mL/hr at 07/07/25 1207    mupirocin (BACTROBAN) 2 % nasal ointment 1 Application, 1 Application, Each Nare, BID, Kyler Morgan MD, 1 Application at 07/07/25 1044    nitroglycerin (NITROSTAT) SL tablet 0.4 mg, 0.4 mg, Sublingual, Q5 Min PRN, George Saenz MD    norepinephrine (LEVOPHED) 8 mg in 250 mL NS infusion (premix), 0.02-0.3 mcg/kg/min, Intravenous, Titrated, Day, Joelle G, APRN, Last Rate: 30.8 mL/hr at 07/07/25 1752, 0.1 mcg/kg/min at 07/07/25 1752    pantoprazole (PROTONIX) injection 40 mg, 40 mg, Intravenous, Q AM, Day, Joelle G, APRN, 40 mg at 07/07/25 1044    Potassium Replacement - Follow Nurse / BPA Driven Protocol, , Not Applicable, PRN, Mitchell Crawford MD    propofol (DIPRIVAN) infusion 10 mg/mL 100 mL, 5-50 mcg/kg/min, Intravenous, Titrated, Mitchell Crawford MD, Last Rate: 34.8 mL/hr at 07/07/25 1719, 40 mcg/kg/min at 07/07/25 1719    [COMPLETED] Insert Peripheral IV, , , Once **AND** sodium chloride 0.9 % flush 10 mL, 10 mL, Intravenous, PRN, Mitchell Crawford MD    Assessment:  Acute kidney injury   hyperkalemia  SMA occlusion Status post thrombectomy with repair of mesenteric artery dissection  Mesenteric ischemia   Mesenteric artery dissection   Acute respiratory failure   Metabolic acidosis   History of hypertension    Acute liver injury    Recommendations:  Patient with acute kidney injury and rapidly rising creatinine and potassium with no urine output  Anticipate that he will not recover with medical therapy we will proceed with dialysis  In the meantime ordered hyperkalemic  protocol  Check urine studies  Rule out reversible causes of kidney failure  Patient is critically ill  CCT 32  Discussed with nursing staff  Will follow closely with you  Thank you for the consult      Cody Arciniega MD  7/7/2025  18:21 EDT

## 2025-07-07 NOTE — ACP (ADVANCE CARE PLANNING)
Social Work Assessment  [facility]     Patient Name: Tj Christopher  MRN: [id number]  Today's Date: 2025    Admit Date: 2025     Demographic Summary       Row Name 25 1321       General Information    Reason for Consult notify/locate next of kin      General Information Comments SW was consulted to determine legal NOK. No AD's on file, pt's aunt is the only kin listed as emergency contact. ALLISON met with pt (intubated) and pt's aunt Matilde at bedside in room 2305 to discuss further. Pt's aunt affirms pt has no AD's, is single, has 2 minor children, both parents are , and pt has one sibling - a sister who lives in [place]. ALLISON called pt's sister, Colleen, who agreed to defer decision-making. Per pt's aunt Matilde, there are 5 other aunts and an uncle, all of whom reside in different states. Christina reports pt resides with her and she is the closest to pt. According to HB 1119, pt's aunt Matilde should be consulted for decision-making since pt's sister defers.        ELIECER William, [facility]  Medical Social Worker  Ph [phone]  Fax [phone]  [email or web address]

## 2025-07-07 NOTE — SIGNIFICANT NOTE
07/07/25 1418   Living Situation   Current Living Arrangements home   Potentially Unsafe Housing Conditions none   Food Insecurity   Within the past 12 months, you worried that your food would run out before you got the money to buy more. Never true   Within the past 12 months, the food you bought just didn't last and you didn't have money to get more. Never true   Transportation Needs   In the past 12 months, has lack of transportation kept you from medical appointments or from getting medications? no   In the past 12 months, has lack of transportation kept you from meetings, work, or from getting things needed for daily living? No   Utilities   In the past 12 months has the electric, gas, oil, or water company threatened to shut off services in your home? No   Abuse Screen (yes response referral indicated)   Feels Unsafe at Home or Work/School unable to answer (comment required)   Feels Threatened by Someone unable to answer (comment required)   Does Anyone Try to Keep You From Having Contact with Others or Doing Things Outside Your Home? unable to answer (comment required)   Physical Signs of Abuse Present patient unable to answer   Financial Resource Strain   How hard is it for you to pay for the very basics like food, housing, medical care, and heating? Somewhat   Employment   Do you want help finding or keeping work or a job? Patient unable to answer   Family and Community Support   If for any reason you need help with day-to-day activities such as bathing, preparing meals, shopping, managing finances, etc., do you get the help you need? Patient unable to answer   How often do you feel lonely or isolated from those around you? Patient unable to answer   Education   Preferred Language English   Do you want help with school or training? For example, starting or completing job training or getting a high school diploma, GED or equivalent Patient unable to answer   Physical Activity   On average, how many days  per week do you engage in moderate to strenuous exercise (like a brisk walk)? 0 days   On average, how many minutes do you engage in exercise at this level? 0 min   Number of minutes of exercise per week (!) 0   Alcohol Use   Q1: How often do you have a drink containing alcohol? 2-4 pr month   Q2: How many drinks containing alcohol do you have on a typical day when you are drinking? 3 or 4   Q3: How often do you have six or more drinks on one occasion? Less than mo   Stress   Do you feel stress - tense, restless, nervous, or anxious, or unable to sleep at night because your mind is troubled all the time - these days? Pt Unable   Mental Health   Why did the patient not complete the Depression Screen questions? Patient unable to answer  (intubated)   Disabilities   Difficulty Concentrating, Remembering or Making Decisions patient unable to answer   Difficulty Managing Errands Independently no

## 2025-07-07 NOTE — PROGRESS NOTES
Critical Care Progress Note     Tj Christopher : 1985 MRN:[id number] LOS:1  Rm: 2305/1     Principal Problem: Dissection of mesenteric artery     Reason for follow up: All the medical problems listed below    Summary     Tj Christopher is a 40 y.o. male with PMH of uncontrolled hypertension and AAA dissection with surgical repair (3/27/2024) who presented to the ED for evaluation of abdominal pain, nausea and vomiting since approximately 0300 which started shortly after he had eaten. The patient has a history of uncontrolled hypertension which contributed to a type A aortic dissection. On 3/27/2024 the patient had repair of a type a aortic dissection, hemiarch repair and aortic valve resuspension with root repair by Dr. Parkinson 3/2025. The patient was reportedly lost to follow-up however he did report compliance with his medications.      Diagnostic imaging in the ED with abdominal angiogram revealed acute mesenteric ischemia with acute abdomen due to mesenteric ischemia and occlusion of the SMA.  Vascular surgery was consulted emergently by the ED NP Zaida John patient was taken emergently to the OR.  OR interventions included superior mesenteric artery thromboembolectomy with passage of ectomy catheters into the aorta and into the distal mesenteric branches as well as repair of mesenteric artery dissection with eversion endarterectomy and interposition bovine pericardial graft placement from superior mesenteric artery origin to superior mesenteric artery distal branch point.  The surgery was conducted by Dr. Saenz and Dr. Crawford who worked in tandem for surgical intervention in an effort to reperfuse the gut.  This was a lengthy surgery and the patient was admitted to the ICU postoperatively where he will remain intubated and sedated pending planned return to the OR Tuesday for reevaluation of the bowel viability.     Information for this H&P was obtained primarily from review of documentation and  collaboration with other providers the patient is intubated and sedated.  He presented to the ED this morning and was taken emergently to the OR prior to being seen by the ICU team.  He arrived to the ICU postoperatively, intubated and sedated.     ACP: No ACP documentation on file    Patient is on Hospital Day: 2.    Significant Events / Subjective     07/07/25 : The patient had no acute events overnight.  He remains afebrile and hemodynamically stable.  Intubated and sedated with fentanyl and propofol.  Currently on a bicarb drip, change to LR. Cardene drip off overnight. Abdominal wound VAC with serosanguineous drainage. Worsening MONIKA anticipated from hypoperfusion, continue to monitor closely. Repeat BMP pending. Continue to monitor off antibiotics, no indication of infection.    Assessment / Plan     SMA occlusion  Mesenteric ischemia  Mesenteric artery dissection  Now s/p SMA thromboembolectomy, repair of mesenteric artery dissection, eversion endarterectomy and interposition bovine pericardial graft placement from superior mesenteric artery origin to superior mesenteric artery distal branch point  Plan to return to surgery Tuesday for evaluation of bowel viability  Vascular surgery and general surgery following closely  Remain intubated and sedated pending return to surgery  SBP goal <130 mmHg     Acute respiratory failure associated with surgery  Intubated for anesthesia, remained intubated due to critical illness and planned return to surgery  Keep intubated pending return to surgery Tuesday and due to very critical illness as well as undergoing a very lengthy surgery  Ventilator settings noted and adjusted as needed.   Close monitoring of ABG.   Minimize sedation/analgesia to keep RASS of 0 to -1.     Lactic acidemia  Likely secondary to hypoperfusion due to SMA occlusion and cross-clamp during surgery  Rule out infectious source  Continue to trend, 10.5-->> 6.4  IV fluid resuscitation  Received 1 dose of  Rosaliasyn in the ED.  Hold further antibiotics for now pending cultures  Chest x-ray negative for acute finding  Blood cultures pending and NTD  MRSA PCR negative  Urinalysis with reflex culture pending    Acute liver injury  Likely secondary to hypoperfusion related to SMA occlusion and cross-clamp during surgery  Avoid hepatotoxic agents  Maintain normotension, currently off pressors     Essential Hypertension: not well controlled.   Hold home medications while in the ICU, critically ill, n.p.o.  Cardene drip as needed for SBP goal <130 mmHg  Titrate medications as needed.    Disposition: ICU, ventilator    Code status:   Code Status (Patient has no pulse and is not breathing): CPR (Attempt to Resuscitate)  Medical Interventions (Patient has pulse or is breathing): Full Support       Nutrition:   NPO Diet NPO Type: Strict NPO   Patient isn't on Tube Feeding     VTE Prophylaxis:  Mechanical VTE prophylaxis orders are present.           Objective / Physical Exam     Vital signs:  Temp: 99.1 °F (37.3 °C)  BP: 95/54  Heart Rate: 79  Resp: 16  SpO2: 93 %  Weight: (!) 164 kg (360 lb 14.3 oz)    Admission Weight: Weight: (!) 145 kg (320 lb)  Current Weight: Weight: (!) 164 kg (360 lb 14.3 oz)    Input/Output in last 24 hours:    Intake/Output Summary (Last 24 hours) at 7/7/2025 0839  Last data filed at 7/7/2025 0800  Gross per 24 hour   Intake 6125 ml   Output 3075 ml   Net 3050 ml      Net IO Since Admission: 3,050 mL [07/07/25 0839]     Physical Exam  Vitals and nursing note reviewed.   Constitutional:       General: He is not in acute distress.     Appearance: He is obese. He is ill-appearing.      Comments: Intubated and sedated   HENT:      Head: Normocephalic and atraumatic.      Right Ear: External ear normal.      Left Ear: External ear normal.      Mouth/Throat:      Comments: Oral ET tube secured  OG tube secured  Eyes:      General: No scleral icterus.     Comments: Pupils pinpoint  Conjunctival injection   Neck:       Comments: Right IJ cordis  Trachea midline    Cardiovascular:      Heart sounds: S1 normal and S2 normal. Murmur (Coarse, holosystolic) heard.      Comments: Sinus rhythm  Pulmonary:      Breath sounds: No wheezing or rhonchi.      Comments: Mechanically ventilated  Clear and equal  Equal expansion and excursion of the chest wall  Abdominal:      General: There is no distension.      Palpations: Abdomen is soft.      Comments: Bowel sounds absent  Midline surgical wound with wound VAC in place   Musculoskeletal:         General: No deformity.      Right lower leg: No edema.      Left lower leg: No edema.   Skin:     General: Skin is warm and dry.   Neurological:      Comments: Intubated and sedated          Radiology and Labs     Results from last 7 days   Lab Units 07/07/25 0348 07/06/25 1936 07/06/25  1518 07/06/25  1056 07/06/25  0623   WBC 10*3/mm3 21.27* 22.07*  --  12.19* 13.85*   HEMOGLOBIN g/dL 12.6* 12.8*  --  12.6* 13.7   HEMOGLOBIN, POC g/dL  --   --  12.6  --   --    HEMATOCRIT % 39.0 40.4  --  38.5 41.8   HEMATOCRIT POC %  --   --  37*  --   --    PLATELETS 10*3/mm3 147 161  --  174 227      Results from last 7 days   Lab Units 07/06/25  1127 07/06/25  0623   PROTIME Seconds 17.1* 16.1*   INR  1.40* 1.30*   APTT seconds  --  27.9      Results from last 7 days   Lab Units 07/07/25 0348 07/06/25  1936 07/06/25  1056 07/06/25  0623   SODIUM mmol/L 134* 142 136 140   POTASSIUM mmol/L 5.9* 4.8 3.7 2.7*   CHLORIDE mmol/L 99 104 103 101   CO2 mmol/L 20.9* 16.5* 18.7* 18.5*   ANION GAP mmol/L 14.1 21.5* 14.3 20.5*   BUN mg/dL 21.8* 15.4 9.3 8.0   CREATININE mg/dL 3.75* 2.79* 1.78* 1.78*   GLUCOSE mg/dL 153* 170* 150* 191*   PHOSPHORUS mg/dL  --  6.5*  --   --    MAGNESIUM mg/dL 1.7 2.0  --  2.2   ALT (SGPT) U/L 1,260* 585*  --  28   AST (SGOT) U/L 2,198* 688*  --  27   ALK PHOS U/L 84 84  --  100      Results from last 7 days   Lab Units 07/07/25  0348 07/06/25  1936 07/06/25  0623   ALT (SGPT) U/L  1,260* 585* 28   AST (SGOT) U/L 2,198* 688* 27   ALK PHOS U/L 84 84 100     Results from last 7 days   Lab Units 07/07/25  0001 07/06/25  1819 07/06/25  1719 07/06/25  1518   PH, ARTERIAL pH units 7.277* 7.050* 7.035* 7.060*   PCO2, ARTERIAL mm Hg 54.7* 66.1* 64.3* 58.9*   PO2 ART mm Hg 59.7* 73.7* 76.5* 316.0*   O2 SATURATION ART % 86.4* 85.8* 86.6* 100.0*   FIO2 % 40 40 40  --    HCO3 ART mmol/L 25.6 18.3* 17.2* 16.5*   BASE EXCESS ART mmol/L -2.0* -12.9* -14.0* <0.0*       XR Chest 1 View  Result Date: 7/6/2025  Impression: Tip of the right internal jugular central venous catheter terminates in the upper SVC. No pneumothorax visible on this supine image. Tip of the endotracheal tube terminates in the midthoracic trachea approximately 3 to 4 cm above the orion. Tip of the enteric tube terminates in the gastric fundus. Electronically Signed: Kelli Olivera MD  7/6/2025 5:16 PM EDT  Workstation ID: [id number]    CT Angiogram Chest  Result Date: 7/6/2025  Impression: Postsurgical changes of prior aortic dissection repair including proximal aortic arch repair and aortic root reconstruction, now with new/worsened complex aortic dissection spanning from the proximal aortic arch to the level of the aortic bifurcation, notably with appearance of multiple/three lumens, which may be related to reentry intimal tear/secondary dissection. Dissection extends into the superior mesenteric artery with subsequent downstream occlusion at its midportion with reconstitution at its branch point. There is a replaced right hepatic artery off the superior mesenteric artery that demonstrates only thready opacification with subsequent perfusional changes within the right hepatic lobe. Complex dissection flap involves the origin of two codominant left renal arteries with subsequent partial infarction of the left kidney. Celiac artery is narrowed but patent. Right renal artery and inferior mesenteric artery appear patent. Short segment  extension to the left subclavian artery which is otherwise patent. There is overall increased aneurysmal dilatation of the thoracoabdominal aorta, for example measuring 5.2 cm at the descending thoracic aorta and 5.7 cm at the infrarenal abdominal aorta. 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 Cardiothoracic surgery consult is recommended. Please note while the chest CT was performed as a CTA with adequate/arterial contrast bolus timing, the CT abdomen pelvis was performed with routine/portal venous phase and thus suboptimal for assessment of arterial structures. Findings and recommendations discussed with Zaida Kruse NP at 8:28 a.m. on 7/6/2025. Electronically Signed: Alexander Swenson MD  7/6/2025 9:07 AM EDT  Workstation ID: [id number]    CT Abdomen Pelvis With Contrast  Result Date: 7/6/2025  Impression: Postsurgical changes of prior aortic dissection repair including proximal aortic arch repair and aortic root reconstruction, now with new/worsened complex aortic dissection spanning from the proximal aortic arch to the level of the aortic bifurcation, notably with appearance of multiple/three lumens, which may be related to reentry intimal tear/secondary dissection. Dissection extends into the superior mesenteric artery with subsequent downstream occlusion at its midportion with reconstitution at its branch point. There is a replaced right hepatic artery off the superior mesenteric artery that demonstrates only thready opacification with subsequent perfusional changes within the right hepatic lobe. Complex dissection flap involves the origin of two codominant left renal arteries with subsequent partial infarction of the left kidney. Celiac artery is narrowed but patent. Right renal artery and inferior mesenteric artery appear patent. Short segment extension to the left subclavian artery which is otherwise patent. There is overall increased aneurysmal dilatation of the  thoracoabdominal aorta, for example measuring 5.2 cm at the descending thoracic aorta and 5.7 cm at the infrarenal abdominal aorta. 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 Cardiothoracic surgery consult is recommended. Please note while the chest CT was performed as a CTA with adequate/arterial contrast bolus timing, the CT abdomen pelvis was performed with routine/portal venous phase and thus suboptimal for assessment of arterial structures. Findings and recommendations discussed with Zaida Kruse NP at 8:28 a.m. on 7/6/2025. Electronically Signed: Alexander Swenson MD  7/6/2025 9:07 AM EDT  Workstation ID: [id number]      Current medications     Scheduled Meds:   mupirocin, 1 Application, Each Nare, BID        Continuous Infusions:   fentanyl 10 mcg/mL,  mcg/hr, Last Rate: 150 mcg/hr (07/07/25 0739)  niCARdipine, 5-15 mg/hr, Last Rate: Stopped (07/07/25 0556)  propofol, 5-50 mcg/kg/min, Last Rate: 35 mcg/kg/min (07/07/25 0733)  sodium bicarbonate, 150 mEq, Last Rate: 150 mEq (07/07/25 0300)          Plan discussed with RN. Reviewed all other data in the last 24 hours, including but not limited to vitals, labs, microbiology, imaging and pertinent notes from other providers.        MAYRA Wilder   Critical Care  07/07/25   08:39 EDT

## 2025-07-07 NOTE — SIGNIFICANT NOTE
NP notified of temp. Sheet removed from patient and fan placed in room will continue to monitor temp.

## 2025-07-07 NOTE — PROGRESS NOTES
General Surgery Progress Note    Name: Tj Christopher ADMIT: 2025   : 1985  PCP: Provider, No Known    MRN: [id number] LOS: 1 days   AGE/SEX: 40 y.o. male  ROOM: [address] [place] [address]    Chief Complaint   Patient presents with    Abdominal Pain     Subjective     Patient seen and examined.  No acute events overnight.  Remains intubated and sedated.  No pressors.  5 PEEP, 50% FiO2    Objective     Scheduled Medications:   mupirocin, 1 Application, Each Nare, BID  pantoprazole, 40 mg, Intravenous, Q AM        Active Infusions:  fentanyl 10 mcg/mL,  mcg/hr, Last Rate: 150 mcg/hr (25)  niCARdipine, 5-15 mg/hr, Last Rate: Stopped (25)  propofol, 5-50 mcg/kg/min, Last Rate: 35 mcg/kg/min (25)  sodium bicarbonate, 150 mEq, Last Rate: 150 mEq (25 030)        As Needed Medications:    fentaNYL    nitroglycerin    Potassium Replacement - Follow Nurse / BPA Driven Protocol    [COMPLETED] Insert Peripheral IV **AND** sodium chloride    Vital Signs  Vital Signs Patient Vitals for the past 24 hrs:   BP Temp Temp src Pulse Resp SpO2 Weight   25 0800 -- 99.1 °F (37.3 °C) Oral -- -- -- --   25 0635 -- -- -- 79 16 93 % --   25 0600 95/54 -- -- 79 20 92 % --   25 0500 -- -- -- 79 -- 92 % --   25 0400 112/57 -- -- 79 20 94 % --   25 0300 -- -- -- 81 -- 97 % --   25 0242 -- -- -- 80 20 100 % --   25 0200 128/62 -- -- 80 19 100 % --   25 0100 -- -- -- 78 -- 100 % --   25 0000 107/55 -- -- 80 19 98 % --   25 2349 -- 98.5 °F (36.9 °C) Oral -- -- -- --   25 2335 -- -- -- 81 18 99 % --   25 230 -- -- -- 82 -- 99 % --   25 220 110/59 -- -- 80 19 100 % --   25 -- -- -- 84 -- 100 % --   25 104/47 98.1 °F (36.7 °C) Oral 83 19 100 % --   25 191 -- -- -- 83 -- 98 % --   25 190 -- -- -- 83 -- 95 % --   25 184 -- -- -- 84 -- 95 % --   25 183 -- -- -- 87 -- 94 % --    07/06/25 1822 -- -- -- 87 22 93 % --   07/06/25 1815 -- -- -- 86 -- 96 % --   07/06/25 1800 -- -- -- 87 -- 96 % --   07/06/25 1755 -- -- -- 87 -- 96 % --   07/06/25 1750 -- -- -- 87 -- 95 % --   07/06/25 1745 -- -- -- 87 -- 96 % --   07/06/25 1740 -- -- -- 87 -- 95 % --   07/06/25 1735 -- -- -- 89 -- 93 % --   07/06/25 1730 -- -- -- 89 -- 95 % --   07/06/25 1725 -- -- -- 90 -- 95 % --   07/06/25 1720 -- -- -- 90 -- 95 % --   07/06/25 1715 -- -- -- 91 -- 97 % --   07/06/25 1710 147/63 -- -- 92 -- 98 % --   07/06/25 1705 -- -- -- 90 -- 98 % --   07/06/25 1700 -- -- -- 88 -- 100 % --   07/06/25 1649 132/61 97.7 °F (36.5 °C) Oral 88 18 100 % --   07/06/25 1500 -- -- -- -- -- -- (!) 164 kg (360 lb 14.3 oz)   07/06/25 0954 -- -- -- 71 -- 92 % --   07/06/25 0953 -- -- -- (!) 45 -- 94 % --   07/06/25 0952 154/90 -- -- 65 -- 92 % --   07/06/25 0951 -- -- -- 62 -- 94 % --   07/06/25 0950 -- -- -- 69 -- (!) 87 % --   07/06/25 0947 162/78 -- -- 75 -- (!) 88 % --   07/06/25 0925 155/83 -- -- 69 -- 91 % --     I/O:  I/O last 3 completed shifts:  In: 6125 [I.V.:4025; Blood:150; IV Piggyback:1950]  Out: 2950 [Urine:950; Emesis/NG output:1200; Blood:350]    Physical Exam:  Physical Exam  Constitutional:       General: He is not in acute distress.     Comments: Intubated and sedated   Cardiovascular:      Rate and Rhythm: Normal rate.   Pulmonary:      Effort: No respiratory distress.      Comments: Intubated  Abdominal:      General: There is no distension.      Palpations: Abdomen is soft.      Tenderness: There is no guarding.   Neurological:      Comments: Unable to assess secondary to intubation status         Results Review:     CBC    Results from last 7 days   Lab Units 07/07/25  0348 07/06/25  1936 07/06/25  1518 07/06/25  1056 07/06/25  0623   WBC 10*3/mm3 21.27* 22.07*  --  12.19* 13.85*   HEMOGLOBIN g/dL 12.6* 12.8*  --  12.6* 13.7   HEMOGLOBIN, POC g/dL  --   --  12.6  --   --    PLATELETS 10*3/mm3 147 161  --  174 227      BMP   Results from last 7 days   Lab Units 07/07/25  0348 07/06/25  1936 07/06/25  1056 07/06/25  0623   SODIUM mmol/L 134* 142 136 140   POTASSIUM mmol/L 5.9* 4.8 3.7 2.7*   CHLORIDE mmol/L 99 104 103 101   CO2 mmol/L 20.9* 16.5* 18.7* 18.5*   BUN mg/dL 21.8* 15.4 9.3 8.0   CREATININE mg/dL 3.75* 2.79* 1.78* 1.78*   GLUCOSE mg/dL 153* 170* 150* 191*   MAGNESIUM mg/dL 1.7 2.0  --  2.2   PHOSPHORUS mg/dL  --  6.5*  --   --      Radiology(recent) XR Chest 1 View  Result Date: 7/6/2025  Impression: Tip of the right internal jugular central venous catheter terminates in the upper SVC. No pneumothorax visible on this supine image. Tip of the endotracheal tube terminates in the midthoracic trachea approximately 3 to 4 cm above the orion. Tip of the enteric tube terminates in the gastric fundus. Electronically Signed: Kelli Olivera MD  7/6/2025 5:16 PM EDT  Workstation ID: [id number]    CT Angiogram Chest  Result Date: 7/6/2025  Impression: Postsurgical changes of prior aortic dissection repair including proximal aortic arch repair and aortic root reconstruction, now with new/worsened complex aortic dissection spanning from the proximal aortic arch to the level of the aortic bifurcation, notably with appearance of multiple/three lumens, which may be related to reentry intimal tear/secondary dissection. Dissection extends into the superior mesenteric artery with subsequent downstream occlusion at its midportion with reconstitution at its branch point. There is a replaced right hepatic artery off the superior mesenteric artery that demonstrates only thready opacification with subsequent perfusional changes within the right hepatic lobe. Complex dissection flap involves the origin of two codominant left renal arteries with subsequent partial infarction of the left kidney. Celiac artery is narrowed but patent. Right renal artery and inferior mesenteric artery appear patent. Short segment extension to the left subclavian  artery which is otherwise patent. There is overall increased aneurysmal dilatation of the thoracoabdominal aorta, for example measuring 5.2 cm at the descending thoracic aorta and 5.7 cm at the infrarenal abdominal aorta. 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 Cardiothoracic surgery consult is recommended. Please note while the chest CT was performed as a CTA with adequate/arterial contrast bolus timing, the CT abdomen pelvis was performed with routine/portal venous phase and thus suboptimal for assessment of arterial structures. Findings and recommendations discussed with Zaida Kruse NP at 8:28 a.m. on 7/6/2025. Electronically Signed: Alexander Swenson MD  7/6/2025 9:07 AM EDT  Workstation ID: [id number]    CT Abdomen Pelvis With Contrast  Result Date: 7/6/2025  Impression: Postsurgical changes of prior aortic dissection repair including proximal aortic arch repair and aortic root reconstruction, now with new/worsened complex aortic dissection spanning from the proximal aortic arch to the level of the aortic bifurcation, notably with appearance of multiple/three lumens, which may be related to reentry intimal tear/secondary dissection. Dissection extends into the superior mesenteric artery with subsequent downstream occlusion at its midportion with reconstitution at its branch point. There is a replaced right hepatic artery off the superior mesenteric artery that demonstrates only thready opacification with subsequent perfusional changes within the right hepatic lobe. Complex dissection flap involves the origin of two codominant left renal arteries with subsequent partial infarction of the left kidney. Celiac artery is narrowed but patent. Right renal artery and inferior mesenteric artery appear patent. Short segment extension to the left subclavian artery which is otherwise patent. There is overall increased aneurysmal dilatation of the thoracoabdominal aorta, for example measuring  5.2 cm at the descending thoracic aorta and 5.7 cm at the infrarenal abdominal aorta. 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 Cardiothoracic surgery consult is recommended. Please note while the chest CT was performed as a CTA with adequate/arterial contrast bolus timing, the CT abdomen pelvis was performed with routine/portal venous phase and thus suboptimal for assessment of arterial structures. Findings and recommendations discussed with Zaida Kruse NP at 8:28 a.m. on 7/6/2025. Electronically Signed: Alexander Swenson MD  7/6/2025 9:07 AM EDT  Workstation ID: [id number]     I reviewed the patient's new clinical results.    Assessment & Plan       Dissection of mesenteric artery    Aortic dissection    Acute mesenteric ischemia      40 y.o. male POD 1 status post exploratory laparotomy, SMA thromboembolectomy, repair of mesenteric artery dissection, eversion endarterectomy and interposition bovine pericardial graft placement from superior mesenteric artery origin to superior mesenteric artery distal branch point with  and Dr. Crawford    Keep n.p.o., continue IV resuscitation  Plans to return to OR on Tuesday for reexploration  Trend labs  Continue ICU supportive care        This note was created using Dragon Voice Recognition software.    ELEN Marte  07/07/25  09:13 EDT

## 2025-07-08 NOTE — PROGRESS NOTES
"                                                                                                                                      Nephrology  Progress Note                                        [facility]    Patient Identification    Name: Tj Christopher  Age: 40 y.o.  Sex: male  :  1985  MRN: [id number]      DATE OF SERVICE:  2025        Subective     On the vent pressors and IV fluids FiO2 80%   Just came back from OR     Objective   Scheduled Meds:[Transfer Hold] furosemide, 20 mg, Intravenous, Q8H  [Transfer Hold] HYDROmorphone, 0.5 mg, Intravenous, Once  [Transfer Hold] mupirocin, 1 Application, Each Nare, BID  [Transfer Hold] pantoprazole, 40 mg, Intravenous, Q AM          Continuous Infusions:fentanyl 10 mcg/mL,  mcg/hr, Last Rate: 300 mcg/hr (25 0016)  lactated ringers, 125 mL/hr, Last Rate: 125 mL/hr (25 0247)  norepinephrine, 0.02-0.3 mcg/kg/min, Last Rate: 0.3 mcg/kg/min (25 0605)  propofol, 5-50 mcg/kg/min, Last Rate: 40 mcg/kg/min (25 0514)        PRN Meds:  [Transfer Hold] fentaNYL    [Transfer Hold] heparin (porcine)    [Transfer Hold] nitroglycerin    [Transfer Hold] nitroglycerin    Potassium Replacement - Follow Nurse / BPA Driven Protocol    [COMPLETED] Insert Peripheral IV **AND** [Transfer Hold] sodium chloride     Exam:  /64   Pulse 98   Temp 99.5 °F (37.5 °C) (Oral)   Resp 18   Ht 193 cm (76\")   Wt (!) 164 kg (360 lb 14.3 oz)   SpO2 91%   BMI 43.93 kg/m²     Intake/Output last 3 shifts:  I/O last 3 completed shifts:  In: 6697 [I.V.:6697]  Out: 3445 [Urine:595; Emesis/NG output:2150]    Intake/Output this shift:  No intake/output data recorded.    Physical exam:  General Appearance:   on the vent  Head:  Normocephalic, without obvious abnormality, atraumatic  Eyes:  PERRL, conjunctiva/corneas clear     Neck:  Supple,  no adenopathy;      Lungs:  Decreased BS occasion ronchi  Heart:  Regular rate and rhythm, S1 and S2 " normal  Abdomen:  dressing  Extremities: trace edema  Pulses: 2+ and symmetric all extremities  Skin:  No rashes or lesions       Data Review:  All labs (24hrs):   Recent Results (from the past 24 hours)   Lactic Acid, Plasma    Collection Time: 07/07/25  8:31 AM    Specimen: Blood, Arterial Line   Result Value Ref Range    Lactate 5.0 (C) 0.5 - 2.0 mmol/L   Blood Gas, Arterial -    Collection Time: 07/07/25  9:28 AM    Specimen: Arterial Blood   Result Value Ref Range    Site Arterial Line     Henok's Test N/A     pH, Arterial 7.363 7.350 - 7.450 pH units    pCO2, Arterial 47.8 35.0 - 48.0 mm Hg    pO2, Arterial 81.7 (L) 83.0 - 108.0 mm Hg    HCO3, Arterial 27.2 21.0 - 28.0 mmol/L    Base Excess, Arterial 1.2 0.0 - 3.0 mmol/L    O2 Saturation, Arterial 95.4 94.0 - 98.0 %    CO2 Content 28.7 22 - 29 mmol/L    Barometric Pressure for Blood Gas      Modality Adult Vent     FIO2 50 %    Ventilator Mode AC     Set Tidal Volume 600     PEEP 5     Hemodilution No     Respiratory Rate 16     PO2/FIO2 163 0 - 500   STAT Lactic Acid, Reflex    Collection Time: 07/07/25 11:30 AM    Specimen: Blood   Result Value Ref Range    Lactate 4.4 (C) 0.5 - 2.0 mmol/L   Comprehensive Metabolic Panel    Collection Time: 07/07/25 11:30 AM    Specimen: Blood   Result Value Ref Range    Glucose 137 (H) 65 - 99 mg/dL    BUN 28.6 (H) 6.0 - 20.0 mg/dL    Creatinine 5.01 (H) 0.76 - 1.27 mg/dL    Sodium 141 136 - 145 mmol/L    Potassium 5.5 (H) 3.5 - 5.2 mmol/L    Chloride 99 98 - 107 mmol/L    CO2 24.7 22.0 - 29.0 mmol/L    Calcium 7.4 (L) 8.6 - 10.5 mg/dL    Total Protein 5.3 (L) 6.0 - 8.5 g/dL    Albumin 3.3 (L) 3.5 - 5.2 g/dL    ALT (SGPT) 2,421 (H) 1 - 41 U/L    AST (SGOT) 6,142 (H) 1 - 40 U/L    Alkaline Phosphatase 130 (H) 39 - 117 U/L    Total Bilirubin 1.2 0.0 - 1.2 mg/dL    Globulin 2.0 gm/dL    A/G Ratio 1.7 g/dL    BUN/Creatinine Ratio 5.7 (L) 7.0 - 25.0    Anion Gap 17.3 (H) 5.0 - 15.0 mmol/L    eGFR 14.2 (L) >60.0 mL/min/1.73    Magnesium    Collection Time: 07/07/25 11:30 AM    Specimen: Blood   Result Value Ref Range    Magnesium 1.6 1.6 - 2.6 mg/dL   Phosphorus    Collection Time: 07/07/25 11:30 AM    Specimen: Blood   Result Value Ref Range    Phosphorus 4.3 2.5 - 4.5 mg/dL   Calcium, Ionized    Collection Time: 07/07/25 12:16 PM    Specimen: Blood   Result Value Ref Range    Ionized Calcium 0.92 (L) 1.15 - 1.30 mmol/L   CBC Auto Differential    Collection Time: 07/07/25 12:16 PM    Specimen: Blood   Result Value Ref Range    WBC 22.67 (H) 3.40 - 10.80 10*3/mm3    RBC 4.32 4.14 - 5.80 10*6/mm3    Hemoglobin 12.2 (L) 13.0 - 17.7 g/dL    Hematocrit 37.2 (L) 37.5 - 51.0 %    MCV 86.1 79.0 - 97.0 fL    MCH 28.2 26.6 - 33.0 pg    MCHC 32.8 31.5 - 35.7 g/dL    RDW 14.4 12.3 - 15.4 %    RDW-SD 45.1 37.0 - 54.0 fl    MPV 10.3 6.0 - 12.0 fL    Platelets 137 (L) 140 - 450 10*3/mm3    Neutrophil % 85.7 (H) 42.7 - 76.0 %    Lymphocyte % 7.4 (L) 19.6 - 45.3 %    Monocyte % 6.2 5.0 - 12.0 %    Eosinophil % 0.0 (L) 0.3 - 6.2 %    Basophil % 0.1 0.0 - 1.5 %    Immature Grans % 0.6 (H) 0.0 - 0.5 %    Neutrophils, Absolute 19.42 (H) 1.70 - 7.00 10*3/mm3    Lymphocytes, Absolute 1.68 0.70 - 3.10 10*3/mm3    Monocytes, Absolute 1.40 (H) 0.10 - 0.90 10*3/mm3    Eosinophils, Absolute 0.00 0.00 - 0.40 10*3/mm3    Basophils, Absolute 0.03 0.00 - 0.20 10*3/mm3    Immature Grans, Absolute 0.14 (H) 0.00 - 0.05 10*3/mm3    nRBC 0.0 0.0 - 0.2 /100 WBC   STAT Lactic Acid, Reflex    Collection Time: 07/07/25  2:45 PM    Specimen: Blood, Arterial Line   Result Value Ref Range    Lactate 4.2 (C) 0.5 - 2.0 mmol/L   STAT Lactic Acid, Reflex    Collection Time: 07/07/25  5:24 PM    Specimen: Blood, Arterial Line   Result Value Ref Range    Lactate 4.3 (C) 0.5 - 2.0 mmol/L   Basic Metabolic Panel    Collection Time: 07/07/25  5:27 PM    Specimen: Blood   Result Value Ref Range    Glucose 131 (H) 65 - 99 mg/dL    BUN 35.1 (H) 6.0 - 20.0 mg/dL    Creatinine 5.94 (H) 0.76 -  1.27 mg/dL    Sodium 139 136 - 145 mmol/L    Potassium 5.6 (H) 3.5 - 5.2 mmol/L    Chloride 100 98 - 107 mmol/L    CO2 23.7 22.0 - 29.0 mmol/L    Calcium 7.6 (L) 8.6 - 10.5 mg/dL    BUN/Creatinine Ratio 5.9 (L) 7.0 - 25.0    Anion Gap 15.3 (H) 5.0 - 15.0 mmol/L    eGFR 11.6 (L) >60.0 mL/min/1.73   POC Glucose Q1H    Collection Time: 07/07/25  7:39 PM    Specimen: Blood   Result Value Ref Range    Glucose 130 (H) 70 - 105 mg/dL   POC Glucose Once    Collection Time: 07/07/25  8:13 PM    Specimen: Blood   Result Value Ref Range    Glucose 172 (H) 70 - 105 mg/dL   POC Glucose Q1H    Collection Time: 07/07/25  9:09 PM    Specimen: Blood   Result Value Ref Range    Glucose 138 (H) 70 - 105 mg/dL   POC Glucose Q1H    Collection Time: 07/07/25 10:10 PM    Specimen: Blood   Result Value Ref Range    Glucose 133 (H) 70 - 105 mg/dL   Hepatitis B Surface Antigen    Collection Time: 07/07/25 11:01 PM    Specimen: Blood   Result Value Ref Range    Hepatitis B Surface Ag Non-Reactive Non-Reactive   Comprehensive Metabolic Panel    Collection Time: 07/08/25  4:19 AM    Specimen: Blood   Result Value Ref Range    Glucose 102 (H) 65 - 99 mg/dL    BUN 27.7 (H) 6.0 - 20.0 mg/dL    Creatinine 5.17 (H) 0.76 - 1.27 mg/dL    Sodium 136 136 - 145 mmol/L    Potassium 4.9 3.5 - 5.2 mmol/L    Chloride 99 98 - 107 mmol/L    CO2 21.3 (L) 22.0 - 29.0 mmol/L    Calcium 7.8 (L) 8.6 - 10.5 mg/dL    Total Protein 5.4 (L) 6.0 - 8.5 g/dL    Albumin 3.1 (L) 3.5 - 5.2 g/dL    ALT (SGPT) 2,561 (H) 1 - 41 U/L    AST (SGOT) >7,000 (H) 1 - 40 U/L    Alkaline Phosphatase 201 (H) 39 - 117 U/L    Total Bilirubin 2.6 (H) 0.0 - 1.2 mg/dL    Globulin 2.3 gm/dL    A/G Ratio 1.3 g/dL    BUN/Creatinine Ratio 5.4 (L) 7.0 - 25.0    Anion Gap 15.7 (H) 5.0 - 15.0 mmol/L    eGFR 13.7 (L) >60.0 mL/min/1.73   Magnesium    Collection Time: 07/08/25  4:19 AM    Specimen: Blood   Result Value Ref Range    Magnesium 1.7 1.6 - 2.6 mg/dL   Triglycerides    Collection Time:  07/08/25  4:19 AM    Specimen: Blood   Result Value Ref Range    Triglycerides 614 (H) 0 - 150 mg/dL   CBC Auto Differential    Collection Time: 07/08/25  4:19 AM    Specimen: Blood   Result Value Ref Range    WBC 21.68 (H) 3.40 - 10.80 10*3/mm3    RBC 4.57 4.14 - 5.80 10*6/mm3    Hemoglobin 12.9 (L) 13.0 - 17.7 g/dL    Hematocrit 39.2 37.5 - 51.0 %    MCV 85.8 79.0 - 97.0 fL    MCH 28.2 26.6 - 33.0 pg    MCHC 32.9 31.5 - 35.7 g/dL    RDW 14.3 12.3 - 15.4 %    RDW-SD 44.9 37.0 - 54.0 fl    MPV 11.0 6.0 - 12.0 fL    Platelets 156 140 - 450 10*3/mm3   Scan Slide    Collection Time: 07/08/25  4:19 AM    Specimen: Blood   Result Value Ref Range    Scan Slide     Manual Differential    Collection Time: 07/08/25  4:19 AM    Specimen: Blood   Result Value Ref Range    Neutrophil % 61.0 42.7 - 76.0 %    Lymphocyte % 4.0 (L) 19.6 - 45.3 %    Monocyte % 3.0 (L) 5.0 - 12.0 %    Bands %  28.0 (H) 0.0 - 5.0 %    Metamyelocyte % 1.0 (H) 0.0 - 0.0 %    Atypical Lymphocyte % 3.0 0.0 - 5.0 %    Neutrophils Absolute 19.30 (H) 1.70 - 7.00 10*3/mm3    Lymphocytes Absolute 1.52 0.70 - 3.10 10*3/mm3    Monocytes Absolute 0.65 0.10 - 0.90 10*3/mm3    RBC Morphology Normal Normal    WBC Morphology Normal Normal    Platelet Estimate Adequate Normal    Large Platelets Slight/1+ None Seen   Blood Gas, Arterial -    Collection Time: 07/08/25  4:31 AM    Specimen: Arterial Blood   Result Value Ref Range    Site Arterial Line     Henok's Test N/A     pH, Arterial 7.306 (L) 7.350 - 7.450 pH units    pCO2, Arterial 54.9 (H) 35.0 - 48.0 mm Hg    pO2, Arterial 76.8 (L) 83.0 - 108.0 mm Hg    HCO3, Arterial 27.4 21.0 - 28.0 mmol/L    Base Excess, Arterial 0.1 0.0 - 3.0 mmol/L    O2 Saturation, Arterial 93.5 (L) 94.0 - 98.0 %    CO2 Content 29.1 (H) 22 - 29 mmol/L    Barometric Pressure for Blood Gas      Modality Adult Vent     FIO2 60 %    Ventilator Mode AC     Set Tidal Volume 600     PEEP 5     Hemodilution No     Respiratory Rate 16     PO2/FIO2  128 0 - 500          Imaging:  XR Chest 1 View  Result Date: 7/8/2025  Impression: Left internal jugular CVC catheter present with the tip at the upper SVC. No pneumothorax. Otherwise, no significant change. Electronically Signed: Yoly Julian MD  7/8/2025 12:57 AM EDT  Workstation ID: [id number]    XR Chest 1 View  Result Date: 7/7/2025  Impression: 1.Left jugular central venous catheter tip is in the expected location of the left brachiocephalic vein. 2.No pneumothorax is seen. 3.ET tube, NG tube, and right jugular central venous catheter remain in place. 4.Subsegmental atelectasis at the lung bases. Electronically Signed: Chace Ordaz MD  7/7/2025 9:52 PM EDT  Workstation ID: [id number]    XR Chest 1 View  Result Date: 7/6/2025  Impression: Tip of the right internal jugular central venous catheter terminates in the upper SVC. No pneumothorax visible on this supine image. Tip of the endotracheal tube terminates in the midthoracic trachea approximately 3 to 4 cm above the orion. Tip of the enteric tube terminates in the gastric fundus. Electronically Signed: Kelli Olivera MD  7/6/2025 5:16 PM EDT  Workstation ID: [id number]    CT Angiogram Chest  Result Date: 7/6/2025  Impression: Postsurgical changes of prior aortic dissection repair including proximal aortic arch repair and aortic root reconstruction, now with new/worsened complex aortic dissection spanning from the proximal aortic arch to the level of the aortic bifurcation, notably with appearance of multiple/three lumens, which may be related to reentry intimal tear/secondary dissection. Dissection extends into the superior mesenteric artery with subsequent downstream occlusion at its midportion with reconstitution at its branch point. There is a replaced right hepatic artery off the superior mesenteric artery that demonstrates only thready opacification with subsequent perfusional changes within the right hepatic lobe. Complex dissection flap involves the  origin of two codominant left renal arteries with subsequent partial infarction of the left kidney. Celiac artery is narrowed but patent. Right renal artery and inferior mesenteric artery appear patent. Short segment extension to the left subclavian artery which is otherwise patent. There is overall increased aneurysmal dilatation of the thoracoabdominal aorta, for example measuring 5.2 cm at the descending thoracic aorta and 5.7 cm at the infrarenal abdominal aorta. 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 Cardiothoracic surgery consult is recommended. Please note while the chest CT was performed as a CTA with adequate/arterial contrast bolus timing, the CT abdomen pelvis was performed with routine/portal venous phase and thus suboptimal for assessment of arterial structures. Findings and recommendations discussed with Zaida Kruse NP at 8:28 a.m. on 7/6/2025. Electronically Signed: Alexander Swenson MD  7/6/2025 9:07 AM EDT  Workstation ID: [id number]    CT Abdomen Pelvis With Contrast  Result Date: 7/6/2025  Impression: Postsurgical changes of prior aortic dissection repair including proximal aortic arch repair and aortic root reconstruction, now with new/worsened complex aortic dissection spanning from the proximal aortic arch to the level of the aortic bifurcation, notably with appearance of multiple/three lumens, which may be related to reentry intimal tear/secondary dissection. Dissection extends into the superior mesenteric artery with subsequent downstream occlusion at its midportion with reconstitution at its branch point. There is a replaced right hepatic artery off the superior mesenteric artery that demonstrates only thready opacification with subsequent perfusional changes within the right hepatic lobe. Complex dissection flap involves the origin of two codominant left renal arteries with subsequent partial infarction of the left kidney. Celiac artery is narrowed but  patent. Right renal artery and inferior mesenteric artery appear patent. Short segment extension to the left subclavian artery which is otherwise patent. There is overall increased aneurysmal dilatation of the thoracoabdominal aorta, for example measuring 5.2 cm at the descending thoracic aorta and 5.7 cm at the infrarenal abdominal aorta. 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 Cardiothoracic surgery consult is recommended. Please note while the chest CT was performed as a CTA with adequate/arterial contrast bolus timing, the CT abdomen pelvis was performed with routine/portal venous phase and thus suboptimal for assessment of arterial structures. Findings and recommendations discussed with Zaida Kruse NP at 8:28 a.m. on 7/6/2025. 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

## 2025-07-08 NOTE — CONSULTS
Patient Name: Tj Christopher  YOB: 1985  MRN: [id number]  Admission date: 7/6/2025  Reason for Encounter: Tube Feed Consult    [facility] Clinical Nutrition Assessment     Subjective    Subjective Information     7/8: Pt was admitted for dissection of mesenteric artery. Pt is status post exploratory laparotomy, SMA thromboembolectomy, repair of mesenteric artery dissection, eversion endarterectomy and interposition bovine pericardial graft placement from superior mesenteric artery origin to superior mesenteric artery distal branch point with  and Dr. Crawford 7/6. Went back to the OR today for reexploration. Nephrology is following for MONIKA. Per discussion during ICU rounds, keep tube feedings at 10 ml/hr today per surgery. Pt is likely to develop an ileus. Pt is on norepi, teagan, fentanyl, precedex, CRRT. No recent weight loss is indicated per chart review. NFPE completed and not consistent with nutrition diagnosis of malnutrition at this time using AND/ASPEN criteria.       Assessment    H&P and Current Problems      H&P  Past Medical History:   Diagnosis Date    Aneurysm     Hypertension       Past Surgical History:   Procedure Laterality Date    ASCENDING ARCH/HEMIARCH REPLACEMENT N/A 03/27/2024    Procedure: WILLIAM, STERNOTOMY, REPAIR OF A  TYPE A AORTIC DISSECTION, HEMIARCH REPAIR, DEEP HYPOTHERMIC CIRCULATORY ARREST, ANTEGRADE SELECTIVE CEREBRAL PERFUSION, AORTIC VALVE RESUSPENTION/ROOT REPAIR, PRP;  Surgeon: Sukhdeep Parkinson MD;  Location: [facility];  Service: Cardiothoracic;  Laterality: N/A;    CARDIAC SURGERY        Current Problems   Admission Diagnosis:  Lactic acidosis [E87.20]  Kidney infarction [N28.0]  MONIKA (acute kidney injury) [N17.9]  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 Crawford 7/6  -Back to the OR for re-exploration 7/8   -Nephrology is following for MONIKA      Anthropometrics      BMI, Height, Weight Estimated body mass index is 43.93 kg/m² as calculated from the following:    Height as of this encounter: 193 cm (76\").    Weight as of this encounter: 164 kg (360 lb 14.3 oz).    Weight Method: Bed scale       Trending Weight Changes 7/8: 360#  Suspect most recent weight of 360# might be inaccurate        Weight History  Wt Readings from Last 20 Encounters:   07/06/25 1500 (!) 164 kg (360 lb 14.3 oz)   07/06/25 0606 (!) 145 kg (320 lb)   08/06/24 2052 (!) 145 kg (320 lb)   04/22/24 1351 (!) 141 kg (309 lb 12.8 oz)   04/09/24 1630 (!) 142 kg (313 lb)   04/03/24 0333 (!) 146 kg (322 lb 3.2 oz)   04/02/24 0657 (!) 148 kg (326 lb 8 oz)   03/31/24 0500 (!) 149 kg (328 lb 7.8 oz)   03/30/24 0500 (!) 149 kg (327 lb 6.1 oz)   03/29/24 0600 (!) 151 kg (334 lb)   03/27/24 1746 (!) 151 kg (332 lb 7.3 oz)   01/22/24 2313 (!) 156 kg (343 lb 14.7 oz)   03/13/23 1826 (!) 167 kg (369 lb 0.8 oz)   09/25/19 0141 (!) 142 kg (312 lb 13.3 oz)            Labs      Comment: K+ and phos elevated on most recent labs - will use a renal specific formula      Results from last 7 days   Lab Units 07/08/25  0419 07/07/25  1727 07/07/25  1724 07/07/25  1445 07/07/25  1216 07/07/25  1130 07/07/25  0831 07/07/25  0348 07/06/25  2349 07/06/25  1936   SODIUM mmol/L 136 139  --   --   --  141  --  134*  --  142   POTASSIUM mmol/L 4.9 5.6*  --   --   --  5.5*  --  5.9*  --  4.8   GLUCOSE mg/dL 102* 131*  --   --   --  137*  " "--  153*  --  170*   BUN mg/dL 27.7* 35.1*  --   --   --  28.6*  --  21.8*  --  15.4   CREATININE mg/dL 5.17* 5.94*  --   --   --  5.01*  --  3.75*  --  2.79*   CALCIUM mg/dL 7.8* 7.6*  --   --   --  7.4*  --  7.6*  --  8.2*   IONIZED CALCIUM mmol/L  --   --   --   --  0.92*  --   --   --   --  1.05*   PHOSPHORUS mg/dL  --   --   --   --   --  4.3  --   --   --  6.5*   MAGNESIUM mg/dL 1.7  --   --   --   --  1.6  --  1.7  --  2.0   ALBUMIN g/dL 3.1*  --   --   --   --  3.3*  --  3.7  --  3.9   LACTATE mmol/L  --   --  4.3* 4.2*  --  4.4*   < >  --    < > 10.5*   BILIRUBIN mg/dL 2.6*  --   --   --   --  1.2  --  0.9  --  0.8   ALK PHOS U/L 201*  --   --   --   --  130*  --  84  --  84   AST (SGOT) U/L >7,000*  --   --   --   --  6,142*  --  2,198*  --  688*   ALT (SGPT) U/L 2,561*  --   --   --   --  2,421*  --  1,260*  --  585*   TRIGLYCERIDES mg/dL 614*  --   --   --   --   --   --   --   --   --     < > = values in this interval not displayed.     Results from last 7 days   Lab Units 07/08/25  0419 07/07/25  1216 07/07/25  0348   PLATELETS 10*3/mm3 156 137* 147   HEMOGLOBIN g/dL 12.9* 12.2* 12.6*   HEMATOCRIT % 39.2 37.2* 39.0     No results found for: \"HGBA1C\"       Medications       Scheduled Medications [Transfer Hold] furosemide, 20 mg, Intravenous, Q8H  [Transfer Hold] HYDROmorphone, 0.5 mg, Intravenous, Once  lactated ringers, 1,000 mL, Intravenous, Once  [Transfer Hold] mupirocin, 1 Application, Each Nare, BID  [Transfer Hold] pantoprazole, 40 mg, Intravenous, Q AM  piperacillin-tazobactam, 4.5 g, Intravenous, Once  piperacillin-tazobactam, 4.5 g, Intravenous, Q12H        Infusions fentanyl 10 mcg/mL,  mcg/hr, Last Rate: 200 mcg/hr (07/08/25 1358)  lactated ringers, 125 mL/hr, Last Rate: 125 mL/hr (07/08/25 8847)  norepinephrine, 0.02-0.3 mcg/kg/min  propofol, 5-50 mcg/kg/min, Last Rate: 40 mcg/kg/min (07/08/25 5403)        PRN Medications   [Transfer Hold] fentaNYL    [Transfer Hold] heparin " (porcine)    [Transfer Hold] nitroglycerin    [Transfer Hold] nitroglycerin    Potassium Replacement - Follow Nurse / BPA Driven Protocol    [COMPLETED] Insert Peripheral IV **AND** [Transfer Hold] sodium chloride     Physical Findings      Chewing/Swallowing  Teeth Status: Mouth/Teeth WDL: WDL    Chewing/Swallowing Issues: Intubated   Edema                         No edema    Bowel Function  No BM yet       I/Os  Intake & Output (last 3 days)         07/05 0701 07/06 0700 07/06 0701 07/07 0700 07/07 0701 07/08 0700 07/08 0701 07/09 0700    I.V. (mL/kg)  4025 (24.5) 4672 (28.5) 900 (5.5)    Blood  150      IV Piggyback  1950  500    Total Intake(mL/kg)  6125 (37.3) 4672 (28.5) 1400 (8.5)    Urine (mL/kg/hr)  950 (0.2) 245 (0.1)     Emesis/NG output  1200 950     Blood  350      Wound  450 250     Total Output  2950 1445     Net  +3175 +3227 +1400                   Lines, Drains, Airways, & Wounds       Active LDAs       Name Placement date Placement time Site Days Last dressing change    CVC Double Lumen 07/06/25 Right Internal jugular 07/06/25  1035  created via procedure documentation  Internal jugular  2     Peripheral IV 07/06/25 0606 18 G Right Antecubital 07/06/25  0606  Antecubital  2     Peripheral IV 07/06/25 0837 18 G Left Antecubital 07/06/25  0837  Antecubital  2 07/06/25 1649 (42.08 hrs)    Closed/Suction Drain 2 RUQ Bulb 19 Fr. 07/08/25  0900  RUQ  less than 1     Closed/Suction Drain 1 LUQ Bulb 19 Fr. 07/08/25  0900  LUQ  less than 1     NG/OG Tube Center mouth 07/06/25  1700  Center mouth  1     Urethral Catheter Silicone 16 Fr. 07/06/25  1030  -- 2     Hi-Lo Evac ETT 7.5 07/06/25  1841  Oral  1     Arterial Line 07/06/25 Right Radial 07/06/25  1118  created via procedure documentation  Radial  1 07/06/25 1649 (42.08 hrs)    Hemodialysis Cath Double with Pigtail 07/07/25  2020  Internal Jugular  less than 1     Wound 07/06/25 1103 midline abdomen Surgical Open Surgical Incision 07/06/25  1103  --  1                       Nutrition Focused Physical Exam     Trending NFPE 7/8: NFPE completed and not consistent with nutrition diagnosis of malnutrition at this time using AND/ASPEN criteria.       Malnutrition Severity Assessment              Estimated Needs      Energy Requirements    Weight for Calculation IBW 92 kg    Method for Estimation  22-25 kcal/kg    Daily Needs (kcal/day) 2894-8285 kcal/day    Protein Requirements    Weight for Calculation IBW 92 kg    Method for Estimation 1.2-2.0 gm/kg   Daily Needs (g/day) 110-184 g/day - lower end with current renal function    Fluid Requirements     Method for Estimation 500 mL + total output    Daily Needs (mL/day)      Current Nutrition Orders & Evaluation of Intake      Oral Nutrition     Food Allergies  and Intolerances NKFA   Current PO Diet NPO Diet NPO Type: Strict NPO   Oral Nutrition Supplement None     Trending % PO Intake 7/8: NPO      Enteral Nutrition     Current EN Order Patient isn't on Tube Feeding  Patient doesn't have any tube feeding modular orders     EN Route      EN Tolerance     EN Observation/Intake         Parenteral Nutrition     Current TPN Order    TPN Route    Lipids (mL/%/frequency)     Total # Days on TPN    TPN Observation/Intake       Assessment & Plan   Nutrition Diagnosis and Goals       Nutrition Diagnosis 1 Swallowing Difficulty related to intubation as evidenced by need for enteral nutrition       Nutrition Diagnosis 2 None         Goal(s) Initiate EN      Nutrition Intervention and Prescription       Intervention  Start EN and Completed NFPE      Diet Prescription NPO    Supplement Prescription NPO    Education Provided  NPO     Enteral Prescription Propofol d/c'd    Initial Goal:  *initial goal conservative d/t risk of RFS     Novasource Renal at 10 mL/hr + 30 ml FWF q4hrs      End Goal:    Novasource Renal at 50 mL/hr + pending clinical course      Calories  2200 kcals (100%)    Protein  100 g (91%)    Free water  781 mL    Flushes  Pending clinical course      The above end goal rate is for 22 hrs/day to assume interruptions for ADLs. Water flushes adjusted based on clinical picture + Rx flushes/IV fluids     Specialized formula chosen r/t high K+ and phos             TPN Prescription      Monitoring/Evaluation       Monitor/Evaluation Pertinent Labs, EN Delivery/Tolerance, and Hemodynamic Stability      RD Follow-Up Encounter 1-2 days     Electronically signed by:  Chelsea Kaye RD  07/08/25 10:53 EDT

## 2025-07-08 NOTE — PLAN OF CARE
Goal Outcome Evaluation:      Pt opens eyes to pain, vent 60/5. Minimal UO, Dr Arciniega ordered STAT HD. Shiley placed and HD ran until around 0400. Levo, fentanyl, propofol, and LR gtts infusing. Plan for surgery today.

## 2025-07-08 NOTE — NURSING NOTE
Patient's abdomen was left open from surgery on Sunday 7/6/25 and temporary abthera dressing was placed. Patient returned to surgery today, 7/8/25 for exploratory laparotomy and abdominal wall closure. All counts correct and portable xray was obtained before closure of abdominal wall. Dr. Kobe Concepcion spoke directly to Dr. Crawford via phone call and cleared xray.

## 2025-07-08 NOTE — PROGRESS NOTES
Tj Christopher is a 40 y.o. male admitted with SMA occlusion.     Recent Labs     07/06/25  1056 07/06/25  1936 07/07/25  0348 07/07/25  1130 07/07/25  1727 07/08/25  0419 07/08/25  1217   CREATININE 1.78* 2.79* 3.75* 5.01* 5.94* 5.17* 6.52*   BUN 9.3 15.4 21.8* 28.6* 35.1* 27.7* 33.6*    142 134* 141 139 136 135*   K 3.7 4.8 5.9* 5.5* 5.6* 4.9 6.5*    104 99 99 100 99 99   CO2 18.7* 16.5* 20.9* 24.7 23.7 21.3* 16.8*     Estimated Creatinine Clearance: 25.4 mL/min (A) (by C-G formula based on SCr of 6.52 mg/dL (H)).    Assessment/Plan  Zosyn renally adjusted to 4.5 g q8h per the Adult Renal Dosing of Medication policy for CRRT    Huyen Jay, PharmD  7/8/2025

## 2025-07-08 NOTE — SIGNIFICANT NOTE
Unable to run dialysis through Trialysis cath. Catheter changed for longer one 12X24. Repeat Xray obtained.    Electronically signed by MAYRA Crespo, 07/08/25, 12:21 AM EDT.

## 2025-07-08 NOTE — PLAN OF CARE
I took over care around 14:00 and started CRRT at 14:30. He remains in restraints. Levo is off and he's on teagan. Vaso is ordered if needed.

## 2025-07-08 NOTE — ANESTHESIA POSTPROCEDURE EVALUATION
Patient: Tj Christopher    Procedure Summary       Date: 07/08/25 Room / Location: [facility] OR  / [facility] MAIN OR    Anesthesia Start: 0751 Anesthesia Stop: 1051    Procedure: EXPLORATORY LAPAROTOMY, OPEN CHOLECYSTECTOMY, ABDOMINAL WALL CLOSURE (Abdomen) Diagnosis:     Surgeons: Mitchell Crawford MD Provider: Alf Westbrook MD    Anesthesia Type: general ASA Status: 5            Anesthesia Type: general    Vitals  Vitals Value Taken Time   /50 07/08/25 12:32   Temp 99 °F (37.2 °C) 07/08/25 11:43   Pulse 101 07/08/25 12:39   Resp 19 07/08/25 11:20   SpO2 93 % 07/08/25 12:39   Vitals shown include unfiled device data.        Post Anesthesia Care and Evaluation    Patient location during evaluation: PACU  Patient participation: complete - patient participated  Level of consciousness: awake  Pain scale: See nurse's notes for pain score.  Pain management: adequate    Airway patency: patent  Anesthetic complications: No anesthetic complications  PONV Status: none  Cardiovascular status: acceptable  Respiratory status: acceptable and spontaneous ventilation  Hydration status: acceptable    Comments: Patient seen and examined postoperatively; vital signs stable; SpO2 greater than or equal to 90%; cardiopulmonary status stable; nausea/vomiting adequately controlled; pain adequately controlled; no apparent anesthesia complications; patient discharged from anesthesia care when discharge criteria were met

## 2025-07-08 NOTE — PROCEDURES
"Non-Tunneled Catheter    Date/Time: 7/7/2025 9:31 PM    Performed by: Lizzeth Vale APRN  Authorized by: Lizzeth Vale APRN  Consent: Written consent obtained  Risks and benefits: risks, benefits and alternatives were discussed  Consent given by: Next of Kin.  Patient understanding: patient states understanding of the procedure being performed  Patient consent: the patient's understanding of the procedure matches consent given  Procedure consent: procedure consent matches procedure scheduled  Relevant documents: relevant documents present and verified  Test results: test results available and properly labeled  Site marked: the operative site was marked  Imaging studies: imaging studies available  Required items: required blood products, implants, devices, and special equipment available  Patient identity confirmed: arm band, provided demographic data, hospital-assigned identification number and anonymous protocol, patient vented/unresponsive  Time out: Immediately prior to procedure a \"time out\" was called to verify the correct patient, procedure, equipment, support staff and site/side marked as required.  Indications: vascular access  Anesthesia: local infiltration    Anesthesia:  Local Anesthetic: lidocaine 1% without epinephrine  Anesthetic total: 5 mL    Sedation:  Patient sedated: no    Preparation: skin prepped with 2% chlorhexidine  Skin prep agent dried: skin prep agent completely dried prior to procedure  Sterile barriers: all five maximum sterile barriers used - cap, mask, sterile gown, sterile gloves, and large sterile sheet  Hand hygiene: hand hygiene performed prior to central venous catheter insertion  Location details: left internal jugular  Patient position: flat  Catheter type: triple lumen  Catheter size: 12X20.  Pre-procedure: landmarks identified  Ultrasound guidance: yes  Number of attempts: 1  Successful placement: wire noted in central vein on US prior to dilation.  Post-procedure: " line sutured and dressing applied  Assessment: blood return through all ports and free fluid flow (Pending Radiology at time of assessment)  Patient tolerance: patient tolerated the procedure well with no immediate complications      Electronically signed by MAYRA Crespo, 07/07/25, 9:32 PM EDT.

## 2025-07-08 NOTE — PROGRESS NOTES
Critical Care Progress Note     Tj Christopher : 1985 MRN:[id number] LOS:2  Rm: 2305/1     Principal Problem: Dissection of mesenteric artery     Reason for follow up: All the medical problems listed below    Summary     Tj Christopher is a 40 y.o. male with PMH of uncontrolled hypertension and AAA dissection with surgical repair (3/27/2024) who presented to the ED for evaluation of abdominal pain, nausea and vomiting since approximately 0300 which started shortly after he had eaten. The patient has a history of uncontrolled hypertension which contributed to a type A aortic dissection. On 3/27/2024 the patient had repair of a type a aortic dissection, hemiarch repair and aortic valve resuspension with root repair by Dr. Parkinson 3/2025. The patient was reportedly lost to follow-up however he did report compliance with his medications.      Diagnostic imaging in the ED with abdominal angiogram revealed acute mesenteric ischemia with acute abdomen due to mesenteric ischemia and occlusion of the SMA.  Vascular surgery was consulted emergently by the ED NP Zaida John patient was taken emergently to the OR.  OR interventions included superior mesenteric artery thromboembolectomy with passage of ectomy catheters into the aorta and into the distal mesenteric branches as well as repair of mesenteric artery dissection with eversion endarterectomy and interposition bovine pericardial graft placement from superior mesenteric artery origin to superior mesenteric artery distal branch point.  The surgery was conducted by Dr. Saenz and Dr. Crawford who worked in tandem for surgical intervention in an effort to reperfuse the gut.  This was a lengthy surgery and the patient was admitted to the ICU postoperatively where he will remain intubated and sedated pending planned return to the OR Tuesday for reevaluation of the bowel viability.     Information for this H&P was obtained primarily from review of documentation and  collaboration with other providers the patient is intubated and sedated.  He presented to the ED this morning and was taken emergently to the OR prior to being seen by the ICU team.  He arrived to the ICU postoperatively, intubated and sedated.     ACP: No ACP documentation on file    Patient is on Hospital Day: 3.    Significant Events / Subjective     07/08/25 :Received HD overnight for hyperkalemia. Went to OR this AM and had a necrotic GB which was removed. His abdomen was closed and no SBR was needed. Post operatively the patient is more hypoxic with a persistent lactic acidosis and hyperkalemia. Discussed with nephrology and will place patient on CRRT.    Assessment / Plan   SMA occlusion  Mesenteric ischemia  Mesenteric artery dissection  Now s/p SMA thromboembolectomy, repair of mesenteric artery dissection, eversion endarterectomy and interposition bovine pericardial graft placement from superior mesenteric artery origin to superior mesenteric artery distal branch point  OR takeback 7/8 with cholecystectomy, closure and no SBR  Vascular surgery and general surgery following closely  SBP goal <130 mmHg     Acute Hypoxic respiratory failure requiring mechanical ventilation  Keep intubated pending return to surgery Tuesday and due to very critical illness as well as undergoing a very lengthy surgery  Patient with hypoxia post operatively, had to increase PEEP and FiO2  Also with respiratory acidosis on ABG, increased RR  Post operative CXR with atelectasis of LLL.  Hypoxia weaning further extubation efforts  Sedated on fentanyl and precedex     Acute liver injury- Shock liver  Lactic acidemia  Distributive shock  Likely secondary to hypoperfusion due to SMA occlusion and cross-clamp during surgery  Necrotic GB could have caused a sirs response as well  Rule out infectious source  Continue to trend lactic 10.5-->> 6.4-->9  1 L LR given post operatively  High dose thiamine ordered  Transaminitis continues to  worsen.  Continue zosyn, cultures negative  Blood cultures pending and NTD  MRSA PCR negative  Urinalysis with reflex culture NGTD  On levo/vaso--> will substitute for teagan to prevent increase ionotropy      MONIKA  Left renal hypoperfusion  -Seen on CT scan  -Intrarenal MONIKA likely as well with hypotension  -Perdomo in place, minimal UOP  -Hyperkalemia post operatively, temporized  -HD overnight of 7/7  -Nephrology following, will start CRRT now  -Trend labs    Essential Hypertension: not well controlled.   Home medications on hold given shock    Disposition: ICU, ventilator    Code status:   Code Status (Patient has no pulse and is not breathing): CPR (Attempt to Resuscitate)  Medical Interventions (Patient has pulse or is breathing): Full Support       Nutrition:   NPO Diet NPO Type: Strict NPO   Tube Feeding: Formula: Novasource Renal (Nepro); Feeding Type: Continuous; Start at: 10 mL/hr; Then Advance By: Do Not Advance; Water Flush: 30 mL; Every: 4 hours; Water Bolus: None     VTE Prophylaxis:  Pharmacologic & mechanical VTE prophylaxis orders are present.           Objective / Physical Exam     Vital signs:  Temp: 99 °F (37.2 °C)  BP: 109/64  Heart Rate: 103  Resp: 19  SpO2: 90 %  Weight: (!) 164 kg (360 lb 14.3 oz)    Admission Weight: Weight: (!) 145 kg (320 lb)  Current Weight: Weight: (!) 164 kg (360 lb 14.3 oz)    Input/Output in last 24 hours:    Intake/Output Summary (Last 24 hours) at 7/8/2025 1402  Last data filed at 7/8/2025 1014  Gross per 24 hour   Intake 6072 ml   Output 1210 ml   Net 4862 ml      Net IO Since Admission: 7,802 mL [07/08/25 1402]     Physical Exam  Vitals and nursing note reviewed.   Constitutional:       General: He is not in acute distress.     Appearance: He is obese. He is ill-appearing.      Comments: Intubated and sedated   HENT:      Head: Normocephalic and atraumatic.      Right Ear: External ear normal.      Left Ear: External ear normal.      Mouth/Throat:      Comments: Oral ET  tube secured  OG tube secured  Eyes:      General: No scleral icterus.     Comments: Pupils pinpoint  Conjunctival injection   Neck:      Comments: Right IJ cordis  Trachea midline    Cardiovascular:      Rate and Rhythm: Tachycardia present.      Heart sounds: S1 normal and S2 normal. Murmur (Coarse, holosystolic) heard.      Comments: Sinus rhythm  Pulmonary:      Breath sounds: No wheezing or rhonchi.      Comments: Mechanically ventilated  Clear and equal  Equal expansion and excursion of the chest wall  Abdominal:      General: There is no distension.      Palpations: Abdomen is soft.      Comments: Abdomen distended, midline ex lap dressing in place with mild serosang drainage  2 HEATH drains in place with serosanguinous output   Musculoskeletal:         General: No deformity.      Right lower leg: No edema.      Left lower leg: No edema.   Skin:     General: Skin is warm and dry.   Neurological:      Comments: Intubated and sedated          Radiology and Labs     Results from last 7 days   Lab Units 07/08/25  1217 07/08/25  0419 07/07/25  1216 07/07/25  0348 07/06/25  1936   WBC 10*3/mm3 18.06* 21.68* 22.67* 21.27* 22.07*   HEMOGLOBIN g/dL 11.8* 12.9* 12.2* 12.6* 12.8*   HEMATOCRIT % 36.5* 39.2 37.2* 39.0 40.4   PLATELETS 10*3/mm3 148 156 137* 147 161      Results from last 7 days   Lab Units 07/06/25  1127 07/06/25  0623   PROTIME Seconds 17.1* 16.1*   INR  1.40* 1.30*   APTT seconds  --  27.9      Results from last 7 days   Lab Units 07/08/25  1217 07/08/25  0419 07/07/25  1727 07/07/25  1130 07/07/25  0348 07/06/25  1936   SODIUM mmol/L 135* 136 139 141 134* 142   POTASSIUM mmol/L 6.5* 4.9 5.6* 5.5* 5.9* 4.8   CHLORIDE mmol/L 99 99 100 99 99 104   CO2 mmol/L 16.8* 21.3* 23.7 24.7 20.9* 16.5*   ANION GAP mmol/L 19.2* 15.7* 15.3* 17.3* 14.1 21.5*   BUN mg/dL 33.6* 27.7* 35.1* 28.6* 21.8* 15.4   CREATININE mg/dL 6.52* 5.17* 5.94* 5.01* 3.75* 2.79*   GLUCOSE mg/dL 89 102* 131* 137* 153* 170*   PHOSPHORUS mg/dL  7.3*  --   --  4.3  --  6.5*   MAGNESIUM mg/dL 1.8 1.7  --  1.6 1.7 2.0   ALT (SGPT) U/L 1,817* 2,561*  --  2,421* 1,260* 585*   AST (SGOT) U/L >7,000* >7,000*  --  6,142* 2,198* 688*   ALK PHOS U/L 195* 201*  --  130* 84 84      Results from last 7 days   Lab Units 07/08/25  1217 07/08/25  0419 07/07/25  1130 07/07/25  0348 07/06/25  1936   ALT (SGPT) U/L 1,817* 2,561* 2,421* 1,260* 585*   AST (SGOT) U/L >7,000* >7,000* 6,142* 2,198* 688*   ALK PHOS U/L 195* 201* 130* 84 84     Results from last 7 days   Lab Units 07/08/25  1124 07/08/25  0431 07/07/25  0928 07/07/25  0001 07/06/25  1819   PH, ARTERIAL pH units 7.157* 7.306* 7.363 7.277* 7.050*   PCO2, ARTERIAL mm Hg 56.8* 54.9* 47.8 54.7* 66.1*   PO2 ART mm Hg 66.5* 76.8* 81.7* 59.7* 73.7*   O2 SATURATION ART % 86.1* 93.5* 95.4 86.4* 85.8*   FIO2 % 60 60 50 40 40   HCO3 ART mmol/L 20.1* 27.4 27.2 25.6 18.3*   BASE EXCESS ART mmol/L -8.9* 0.1 1.2 -2.0* -12.9*       XR Chest 1 View  Result Date: 7/8/2025  Impression: 1.Postoperative changes of recent sternotomy. 2.Residual atelectasis or airspace disease in the left lung base. Electronically Signed: Phil Blackwood MD  7/8/2025 1:29 PM EDT  Workstation ID: [id number]    XR Surgical Count Protocol  Result Date: 7/8/2025  Impression: No definite retained sponges are identified. Electronically Signed: Kobe Hudson MD  7/8/2025 10:27 AM EDT  Workstation ID: [id number]    XR Chest 1 View  Result Date: 7/8/2025  Impression: Left internal jugular CVC catheter present with the tip at the upper SVC. No pneumothorax. Otherwise, no significant change. Electronically Signed: Yoly Julian MD  7/8/2025 12:57 AM EDT  Workstation ID: [id number]    XR Chest 1 View  Result Date: 7/7/2025  Impression: 1.Left jugular central venous catheter tip is in the expected location of the left brachiocephalic vein. 2.No pneumothorax is seen. 3.ET tube, NG tube, and right jugular central venous catheter remain in place. 4.Subsegmental atelectasis at the  lung bases. Electronically Signed: Chace Ordaz MD  7/7/2025 9:52 PM EDT  Workstation ID: [id number]    XR Chest 1 View  Result Date: 7/6/2025  Impression: Tip of the right internal jugular central venous catheter terminates in the upper SVC. No pneumothorax visible on this supine image. Tip of the endotracheal tube terminates in the midthoracic trachea approximately 3 to 4 cm above the orion. Tip of the enteric tube terminates in the gastric fundus. Electronically Signed: Kelli Olivera MD  7/6/2025 5:16 PM EDT  Workstation ID: [id number]      Current medications     Scheduled Meds:   dextrose, 25 g, Intravenous, Once  furosemide, 20 mg, Intravenous, Q8H  insulin regular, 5 Units, Intravenous, Once  mupirocin, 1 Application, Each Nare, BID  pantoprazole, 40 mg, Intravenous, Q AM  piperacillin-tazobactam, 4.5 g, Intravenous, Q8H  sodium bicarbonate, 50 mEq, Intravenous, Once  thiamine (B-1) IV, 500 mg, Intravenous, Q8H        Continuous Infusions:   dexmedetomidine, 0.2-1.5 mcg/kg/hr, Last Rate: 1.5 mcg/kg/hr (07/08/25 1340)  fentanyl 10 mcg/mL,  mcg/hr, Last Rate: 300 mcg/hr (07/08/25 1110)  norepinephrine, 0.02-0.3 mcg/kg/min, Last Rate: 0.3 mcg/kg/min (07/08/25 1146)  phenylephrine, 0.5-6 mcg/kg/min, Last Rate: 1.5 mcg/kg/min (07/08/25 1359)  PrismaSol BGK 2/3.5, 2,000 mL/hr  PrismaSol BGK 2/3.5, 2,000 mL/hr  PrismaSol BGK 2/3.5, 2,000 mL/hr      Total critical care time:  32 minutes    Due to a high probability of clinically significant, life threatening deterioration, the patient required my highest level of preparedness to intervene emergently and I personally spent this critical care time directly and personally managing the patient. This critical care time included obtaining a history; examining the patient; pulse oximetry; ordering and review of studies; arranging urgent treatment with development of a management plan; evaluation of patient's response to treatment; frequent reassessment; and,  discussions with other providers.    This critical care time was performed to assess and manage the high probability of imminent, life-threatening deterioration that could result in multi-organ failure. It was exclusive of separately billable procedures and treating other patients and teaching time.      Plan discussed with RN. Reviewed all other data in the last 24 hours, including but not limited to vitals, labs, microbiology, imaging and pertinent notes from other providers.        Kyler Morgan MD   Critical Care  07/08/25   14:02 EDT

## 2025-07-08 NOTE — PROCEDURES
Insert Arterial Line    Date/Time: 7/8/2025 1:30 PM    Performed by: Kyler Morgan MD  Authorized by: Kyler Morgan MD    Universal Protocol:     Written consent obtained?: Yes      Risks and benefits: Risks, benefits and alternatives were discussed      Consent given by: Next of kin.    Patient states understanding of procedure being performed: Yes      Patient's understanding of procedure matches consent: Yes      Procedure consent matches procedure scheduled: Yes      Relevant documents present and verified: Yes      Required items: Required blood products, implants, devices and special equipment available      Patient identity confirmed:  Arm band, hospital-assigned identification number and anonymous protocol, patient vented/unresponsive    Time out: Immediately prior to the procedure a time out was called    A time out verifies correct patient, procedure, equipment, support staff and site/side marked as required:   Preparation:     Preparation: Patient was prepped and draped in usual sterile fashion    Indications:     Indications: hemodynamic monitoring    Location:     Location:  Left radial  Anesthesia:     Anesthesia:  Local infiltration and see MAR for details    Local anesthetic:  Lidocaine 1% without epinephrine    Anesthetic total (ml):  3    Patient sedated: Previously intubated and sedated, no additional medications given..    Procedure Details:     Ultrasound Guidance: yes  The ultrasound was used for evaluation of possible access sites.  Vessel patency was confirmed with the ultrasound.  Needle entry into vessel was visualized in realtime with the ultrasound.       Henok's test normal?: Yes      Needle gauge:  20    Number of attempts:  2 (One failed attempted by this APRN, Dr. Morgan presented to bedside and took over, successfully obtaining arterial line access.)  Post-procedure:     Post-procedure:  Line sutured and dressing applied    Post-procedure CMS:  Unchanged     patient tolerated the  procedure well with no immediate complications      Electronically signed by MAYRA Guerrero, 07/08/25, 2:38 PM EDT.

## 2025-07-08 NOTE — ANESTHESIA POSTPROCEDURE EVALUATION
Patient: Tj Chirstopher    Procedure Summary       Date: 07/08/25 Room / Location: [facility] OR  / [facility] MAIN OR    Anesthesia Start: 0751 Anesthesia Stop: 1051    Procedure: EXPLORATORY LAPAROTOMY, OPEN CHOLECYSTECTOMY, ABDOMINAL WALL CLOSURE (Abdomen) Diagnosis:     Surgeons: Mitchell Crawford MD Provider: Alf Westbrook MD    Anesthesia Type: general ASA Status: 5            Anesthesia Type: general    Vitals  Vitals Value Taken Time   /50 07/08/25 12:32   Temp 99 °F (37.2 °C) 07/08/25 11:43   Pulse 102 07/08/25 12:40   Resp 19 07/08/25 11:20   SpO2 92 % 07/08/25 12:40   Vitals shown include unfiled device data.        Post Anesthesia Care and Evaluation    Patient location during evaluation: ICU  Patient participation: complete - patient cannot participate  Level of consciousness: obtunded/minimal responses  Pain scale: See nurse's notes for pain score.  Pain management: adequate    Airway patency: patent  Anesthetic complications: No anesthetic complications  PONV Status: none  Cardiovascular status: acceptable  Respiratory status: acceptable, ventilator, ETT and intubated  Hydration status: acceptable    Comments: Patient seen and examined postoperatively; vital signs stable; SpO2 greater than or equal to 90%; cardiopulmonary status stable; nausea/vomiting adequately controlled; pain adequately controlled; no apparent anesthesia complications; patient discharged from anesthesia care when discharge criteria were met

## 2025-07-08 NOTE — OP NOTE
[facility] Surgery    Date of Admission:  7/6/2025  Today's Date:  07/08/25  Gomez Bains II, MD   Clem    Preoperative Diagnosis:   Recurrent aortic dissection  Acute mesenteric ischemia  Status post SMA bypass/endarterectomy  Status post exploratory laparotomy with open appendectomy  Shock/multiple organ system failure    Postoperative Diagnosis:   Same    Procedure Performed:   Second look laparotomy    Surgeon:   Mitchell Crawford MD    Co-surgeon:  Gomez Bains II, MD    Assistant:    None    Anesthesia:   General    Estimated Blood Loss:   Minimal during the portion of the case I was involved in. See Dr. Crawford note for more details    Findings:    Previous SMA bypass with strong palpable pulse and excellent Doppler signal both proximal and distal to the bypass as well as within the bypass.     Bowel erythematous but not ischemic.     Gall bladder necrotic appearing - Cholecystectomy performed by Dr. Crawford    Implants:    Implant Name Type Inv. Item Serial No.  Lot No. LRB No. Used Action   CLIPAPPLR M/ ENDO LIGACLIP 20CLP 11IN MD - [id number] Implant CLIPAPPLR M/ ENDO LIGACLIP 20CLP 11IN MD  [employer]  [employer] J [employer] J [id number] N/A 1 Implanted       Staff:   Circulator: Georges Han RN; Matt Madera RN  Scrub Person: Jacqueline Magallon  Assistant: Lizzeth Hernandez CSA    Specimen:   None from my portion of the case, see Dr. Sullivan noted regarding possible gall bladder specimen    Complications:   None intraop    Dispo:   to PACU    Indication for procedure:   40 y.o. male with  previous aortic dissection requiring hemiarch reparby Dr. Parkinson who had a recurrent aortic dissection extending down the aorta that caused occlusion of the SMA on CTA associated with severe acidosis concerning for acute mesenteric ischemia. 2 days ago he was taken for ex-lap with Dr Crawford and Dr. Saenz with open endarterectomy of SMA with SMA interposition bypass using artegraft and temporary  abdominal closure. He has remained in shock since then and now progressed to renal failure. Plans made for second look laparotomy today with Dr. Crawford. I was cosurgeon for this operation.      Description of procedure:   Patient taken to the OR and placed supine on the table. He was already intubated but anesthesia was initiated by the anesthesia service. Temporary abdominal closure was removed and abdomen was prepped with betadine and he was draped in sterile fashion. Timeout was performed. The operation was begun concurrently with Dr. Crawford and myself working together. On initial inspection the bowel did not look ischemic. The omentum and transverse colon was reflected cephalad and we were able to mobilize the small bowel to the right to expose the base of the mesentery. The SMA bypass was identified and previously placed surgicel was removed, exposing the bypass. It had a strong palpable pulse. Handheld dopler was used to insonate the bypass as well as the SMA proximal and distal to the bypass which all sounded excellent with brisk multiphasic signals. I was able to insonate some mesenteric branches distally in the mesentery. There were minimal signals at the base of the intestines consistent with low flow from vasopressors/shock but none of the bowel was ischemic when running the bowel. There was nothing that I could see to improve surgically. We then examined the right colon which was not ischemic. The gall bladder was gangrenous and Dr. Crawford planned to remove that. The transverse, descending, and sigmoid colon appeared okay. The duodenum was not frankly ischemic. The SMA bypass area was again examined and was nicely hemostatic. Bowel was laid back into the abdomen in appropriate orientation. I then scrubbed out of the procedure as Dr. Crawford proceeded with open cholecystectomy and abdominal closure. Please see his op note for more details of this.       Gomez Bains II, MD  07/08/25     [facility]  Problems    Diagnosis  POA    **Dissection of mesenteric artery [I77.79]  Unknown    Acute mesenteric ischemia [K55.059]  Unknown    Aortic dissection [I71.00]  Yes      Resolved Hospital Problems   No resolved problems to display.

## 2025-07-08 NOTE — CASE MANAGEMENT/SOCIAL WORK
Continued Stay Note  DEANDRE Nj     Patient Name: Tj Christopher  MRN: [id number]  Today's Date: 7/8/2025    Admit Date: 7/6/2025    Plan: From home with aunt, pending clinical course and PT/OT eval.   Discharge Plan       Row Name 07/08/25 1000       Plan    Plan From home with aunt, pending clinical course and PT/OT eval.    Plan Comments DC Barriers: return to surgery 7/8 for closure, HD-RIJ, NPO, NG, vent 50/5, C-line, A-line, FC, Propofol/Fent/Levo/ gtts, Surgery/Vasc Sx/Nephro following.                 Expected Discharge Date and Time       Expected Discharge Date Expected Discharge Time    Jul 11, 2025               THELMA Escobedo RN  ICU/CVU   O: [phone]  C: [phone]  [email or web address]

## 2025-07-09 PROBLEM — J96.01 ACUTE HYPOXIC RESPIRATORY FAILURE: Status: ACTIVE | Noted: 2025-01-01

## 2025-07-09 NOTE — BRIEF OP NOTE
LAPAROTOMY EXPLORATORY  Progress Note    Tj Christopher  7/9/2025    Pre-op Diagnosis:   mesenteric ischemia       Post-Op Diagnosis Codes:   same    Procedure(s):      Procedure(s):  LAPAROTOMY EXPLORATORY              Surgeon(s):  Merrill Henry MD    Anesthesia: General    Staff:   Circulator: Maribel Arevalo RN; Davis Castellano RN  Scrub Person: Daja Fontenot  Assistant: Elana Campos APRN  Assistant: Elana Campos APRN    Estimated Blood Loss: minimal    Urine Voided: * No values recorded between 7/9/2025  1:45 PM and 7/9/2025  3:25 PM *    Specimens:                Specimens       ID Source Type Tests Collected By Collected At Frozen?    A Small Intestine, Jejunum Tissue TISSUE PATHOLOGY EXAM   Merrill Henry MD 7/9/25 1448 No    Description: jejunum, fresh for permanent    This specimen was not marked as sent.    B Small Intestine, Ileum Tissue TISSUE PATHOLOGY EXAM   Merrill Henry MD 7/9/25 1450 No    Description: ileocecectomy, fresh for permanent    This specimen was not marked as sent.    C Omentum Tissue TISSUE PATHOLOGY EXAM   Merrill Henry MD 7/9/25 1516 No    Description: omentum, fresh for permanent    This specimen was not marked as sent.              Drains:   Closed/Suction Drain 2 RUQ Bulb 19 Fr. (Active)   Site Description Healing 07/09/25 1100   Dressing Status Clean;Dry;Intact 07/09/25 1100   Drainage Appearance Serosanguineous 07/09/25 1100   Status To bulb suction 07/09/25 1100   Output (mL) 15 mL 07/09/25 1037       Closed/Suction Drain 1 LUQ Bulb 19 Fr. (Active)   Site Description Healing 07/09/25 1100   Dressing Status Clean;Dry;Intact 07/09/25 1100   Drainage Appearance Brown 07/09/25 1100   Status To bulb suction 07/09/25 1100   Output (mL) 25 mL 07/09/25 1037       NG/OG Tube Center mouth (Active)   Placement Verification Other (Comment) 07/09/25 1100   Site Assessment Clean;Dry;Intact 07/09/25 1100   Securement secured to ETT 07/09/25 1100   Secured at (cm) 70 07/09/25 1100    NG/OG Site Interventions Site assessed 07/09/25 1100   Status Clamped 07/09/25 1100   Drainage Appearance Brown 07/09/25 0030   Surrounding Skin Dry;Intact 07/09/25 1100   Tube Feeding Frequency Continuous 07/09/25 0800   Tube Feeding Product Novasource Renal 07/09/25 0800   Tube Feeding Strength Full strength 07/09/25 0800   Tube Feeding Method Continuous per pump 07/09/25 0800   Tube Feeding Rate (mL/hr) 10 mL/HR 07/09/25 0800   Tube Feeding Bag Changed No 07/09/25 0800   Feeding Tube Flushed With Tap water 07/09/25 0800   Flush/ Irrigation Intake (mL) 30 07/09/25 0900   Tube Feeding Intake (mL) 14 07/09/25 1037   Intake (mL) 11 mL 07/09/25 0803   Output (mL) 400 mL 07/08/25 0400       Urethral Catheter Silicone 16 Fr. (Active)   Daily Indications Hourly Output in Critical Unstable Patient requiring Frequent Intervention (hemodynamics, titration or life supportive therapy) 07/09/25 1100   Site Assessment Clean;Skin intact 07/09/25 1100   Collection Container Standard drainage bag 07/09/25 1100   Securement Method Leg strap 07/09/25 1100   Catheter care complete Yes 07/09/25 0800   IAP Monitoring mmHg 12 mmHg 07/09/25 1020   Output (mL) 0 mL 07/08/25 1800       [REMOVED] Chest Tube 3 Right Mediastinal (Removed)       Findings: as above       Complications: none    Assistant: Elana Campos APRN  was responsible for performing the following activities: Retraction, Suction, Irrigation, Suturing, and Placing Dressing and their skilled assistance was necessary for the success of this case.    Merrill Henry MD     Date: 7/9/2025  Time: 15:25 EDT         ben

## 2025-07-09 NOTE — PROCEDURES
Insert Arterial Line    Date/Time: 7/9/2025 2:17 PM    Performed by: Kyler Morgan MD  Authorized by: Kyler Morgan MD    Universal Protocol:     Emergent situation      Patient identity confirmed:  Arm band  A time out verifies correct patient, procedure, equipment, support staff and site/side marked as required:   Preparation:     Preparation: Patient was prepped and draped in usual sterile fashion    Indications:     Indications: hemodynamic monitoring    Location:     Location:  Right femoral  Anesthesia:     Patient sedated: Yes      Sedation:  Midazolam    Analgesia:  Fentanyl  Procedure Details:     Ultrasound Guidance: yes  The ultrasound was used for evaluation of possible access sites.  Vessel patency was confirmed with the ultrasound.  Needle entry into vessel was visualized in realtime with the ultrasound.       Henok's test normal?: No      Needle gauge:  20    Number of attempts:  1  Post-procedure:     Post-procedure:  Line sutured    Post-procedure CMS:  Normal     patient tolerated the procedure well with no immediate complications

## 2025-07-09 NOTE — PROGRESS NOTES
"General Surgery Progress Note    SUBJECTIVE:   I received an urgent phone call from the intensivist, Dr. Morgan.  Patient has deteriorated since surgery.  Patient is now febrile and requiring 3 pressors with significant lactic acidosis despite CRRT.      VITALS:   Temp:  [99.2 °F (37.3 °C)-100.6 °F (38.1 °C)] 99.8 °F (37.7 °C)  Heart Rate:  [] 109  Resp:  [22-26] 26  BP: ()/(54-78) 150/78  FiO2 (%):  [80 %-100 %] 100 %    I & O:  I/O last 3 completed shifts:  In: 94059 [P.O.:50; I.V.:50115; Other:30; NG/GT:20; IV Piggyback:500]  Out: 4531.5 [Urine:103.5; Emesis/NG output:400; Drains:220]  I/O this shift:  In: 1341 [I.V.:1270; Other:30; NG/GT:41]  Out: 813 [Drains:65]    PHYSICAL EXAM:  General: Sedated/intubated   Abdomen:   obese, firm, NT, ND/dull to percussion  incision - Staples in place, packing in place  drain x2 - seropurulent    LABS:  No results found for: \"CBCDIF\"  CBC:      Lab 07/09/25  0606 07/08/25  1217 07/08/25  0419 07/07/25  1216 07/07/25  0348 07/06/25  1518 07/06/25  1056   WBC 17.90* 18.06* 21.68* 22.67* 21.27*   < > 12.19*   HEMOGLOBIN 11.4* 11.8* 12.9* 12.2* 12.6*   < > 12.6*   HEMOGLOBIN, POC  --   --   --   --   --    < >  --    HEMATOCRIT 35.9* 36.5* 39.2 37.2* 39.0   < > 38.5   HEMATOCRIT POC  --   --   --   --   --    < >  --    PLATELETS 128* 148 156 137* 147   < > 174   NEUTROS ABS 12.17* 16.62* 19.30* 19.42* 18.68*   < > 10.33*   IMMATURE GRANS (ABS)  --   --   --  0.14* 0.07*  --  0.07*   LYMPHS ABS  --   --   --  1.68 1.30  --  0.68*   MONOS ABS  --   --   --  1.40* 1.20*  --  1.08*   EOS ABS  --   --   --  0.00 0.00  --  0.01   MCV 88.9 89.0 85.8 86.1 85.5   < > 85.6    < > = values in this interval not displayed.      Lab Results   Component Value Date    GLUCOSE 138 (H) 07/09/2025    BUN 19.8 07/09/2025    CREATININE 3.99 (H) 07/09/2025     (L) 07/09/2025    K 4.4 07/09/2025    CL 99 07/09/2025    CALCIUM 8.8 07/09/2025    PROTEINTOT 5.2 (L) 07/09/2025    ALBUMIN " 2.9 (L) 07/09/2025    ALT 2,692 (H) 07/09/2025    AST >7,000 (H) 07/09/2025    ALKPHOS 272 (H) 07/09/2025    BILITOT 5.1 (H) 07/09/2025    GLOB 2.1 07/09/2025    AGRATIO 1.5 07/09/2025    BCR 5.0 (L) 07/09/2025    ANIONGAP 19.1 (H) 07/09/2025    EGFR 18.7 (L) 07/09/2025     Lab Results   Component Value Date    INR 2.32 (C) 07/09/2025    INR 1.40 (H) 07/06/2025    INR 1.30 (H) 07/06/2025    PROTIME 25.6 (H) 07/09/2025    PROTIME 17.1 (H) 07/06/2025    PROTIME 16.1 (H) 07/06/2025       RADIOLOGY:  XR Chest 1 View  Result Date: 7/9/2025  Impression: Dense retrocardiac left lower lobe airspace disease with air bronchograms. Correlate for symptoms of pneumonia. Stable right infrahilar/right middle lobe airspace disease which may represent atelectasis or pneumonia. Stable cardiac enlargement. Electronically Signed: Aracely Shukla MD  7/9/2025 10:29 AM EDT  Workstation ID: [id number]    XR Abdomen KUB  Result Date: 7/8/2025  Impression: Enteric tube overlies the stomach. Electronically Signed: Hipolito Alberts MD  7/8/2025 6:08 PM EDT  Workstation ID: [id number]    XR Chest 1 View  Result Date: 7/8/2025  Impression: 1.Postoperative changes of recent sternotomy. 2.Residual atelectasis or airspace disease in the left lung base. Electronically Signed: Phil Blackwood MD  7/8/2025 1:29 PM EDT  Workstation ID: [id number]    XR Surgical Count Protocol  Result Date: 7/8/2025  Impression: No definite retained sponges are identified. Electronically Signed: Kobe Hudson MD  7/8/2025 10:27 AM EDT  Workstation ID: [id number]    XR Chest 1 View  Result Date: 7/8/2025  Impression: Left internal jugular CVC catheter present with the tip at the upper SVC. No pneumothorax. Otherwise, no significant change. Electronically Signed: Yoly Julian MD  7/8/2025 12:57 AM EDT  Workstation ID: [id number]    XR Chest 1 View  Result Date: 7/7/2025  Impression: 1.Left jugular central venous catheter tip is in the expected location of the left brachiocephalic vein.  2.No pneumothorax is seen. 3.ET tube, NG tube, and right jugular central venous catheter remain in place. 4.Subsegmental atelectasis at the lung bases. Electronically Signed: Chace Ordaz MD  7/7/2025 9:52 PM EDT  Workstation ID: [id number]    XR Chest 1 View  Result Date: 7/6/2025  Impression: Tip of the right internal jugular central venous catheter terminates in the upper SVC. No pneumothorax visible on this supine image. Tip of the endotracheal tube terminates in the midthoracic trachea approximately 3 to 4 cm above the orion. Tip of the enteric tube terminates in the gastric fundus. Electronically Signed: Kelli Olivera MD  7/6/2025 5:16 PM EDT  Workstation ID: [id number]    CT Angiogram Chest  Result Date: 7/6/2025  Impression: Postsurgical changes of prior aortic dissection repair including proximal aortic arch repair and aortic root reconstruction, now with new/worsened complex aortic dissection spanning from the proximal aortic arch to the level of the aortic bifurcation, notably with appearance of multiple/three lumens, which may be related to reentry intimal tear/secondary dissection. Dissection extends into the superior mesenteric artery with subsequent downstream occlusion at its midportion with reconstitution at its branch point. There is a replaced right hepatic artery off the superior mesenteric artery that demonstrates only thready opacification with subsequent perfusional changes within the right hepatic lobe. Complex dissection flap involves the origin of two codominant left renal arteries with subsequent partial infarction of the left kidney. Celiac artery is narrowed but patent. Right renal artery and inferior mesenteric artery appear patent. Short segment extension to the left subclavian artery which is otherwise patent. There is overall increased aneurysmal dilatation of the thoracoabdominal aorta, for example measuring 5.2 cm at the descending thoracic aorta and 5.7 cm at the infrarenal  abdominal aorta. 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 Cardiothoracic surgery consult is recommended. Please note while the chest CT was performed as a CTA with adequate/arterial contrast bolus timing, the CT abdomen pelvis was performed with routine/portal venous phase and thus suboptimal for assessment of arterial structures. Findings and recommendations discussed with Zaida Kruse NP at 8:28 a.m. on 7/6/2025. Electronically Signed: Alexander Swenson MD  7/6/2025 9:07 AM EDT  Workstation ID: [id number]    CT Abdomen Pelvis With Contrast  Result Date: 7/6/2025  Impression: Postsurgical changes of prior aortic dissection repair including proximal aortic arch repair and aortic root reconstruction, now with new/worsened complex aortic dissection spanning from the proximal aortic arch to the level of the aortic bifurcation, notably with appearance of multiple/three lumens, which may be related to reentry intimal tear/secondary dissection. Dissection extends into the superior mesenteric artery with subsequent downstream occlusion at its midportion with reconstitution at its branch point. There is a replaced right hepatic artery off the superior mesenteric artery that demonstrates only thready opacification with subsequent perfusional changes within the right hepatic lobe. Complex dissection flap involves the origin of two codominant left renal arteries with subsequent partial infarction of the left kidney. Celiac artery is narrowed but patent. Right renal artery and inferior mesenteric artery appear patent. Short segment extension to the left subclavian artery which is otherwise patent. There is overall increased aneurysmal dilatation of the thoracoabdominal aorta, for example measuring 5.2 cm at the descending thoracic aorta and 5.7 cm at the infrarenal abdominal aorta. 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 Cardiothoracic surgery consult is  recommended. Please note while the chest CT was performed as a CTA with adequate/arterial contrast bolus timing, the CT abdomen pelvis was performed with routine/portal venous phase and thus suboptimal for assessment of arterial structures. Findings and recommendations discussed with Zaida Kruse NP at 8:28 a.m. on 7/6/2025. Electronically Signed: Alexander Swenson MD  7/6/2025 9:07 AM EDT  Workstation ID: [id number]        ASSESSMENT:   40 y.o. 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     POD#2 s/p damage control laparotomy, SMA thromboembolectomy with interposition bypass graft  POD#1 s/p takeback laparotomy, washout, open cholecystectomy, abdominal wall closure    In worsening condition.  Continues to have evidence of multisystem organ failure in the setting of progressive septic shock and lactic acidosis concerning for small bowel ischemia.  Would benefit from emergent surgery.    PLAN:   OR now   [facility] pre-op   Rest of care per primary team     Merrill Henry MD   General Surgery  07/09/25   17:27 EDT

## 2025-07-09 NOTE — PROCEDURES
"Bronchoscopy    Date/Time: 7/9/2025 2:14 PM    Performed by: Kyler Morgan MD  Authorized by: Kyler Morgan MD  Consent: Written consent obtained  Risks and benefits: risks, benefits and alternatives were discussed  Consent given by: power of   Required items: required blood products, implants, devices, and special equipment available  Patient identity confirmed: arm band  Time out: Immediately prior to procedure a \"time out\" was called to verify the correct patient, procedure, equipment, support staff and site/side marked as required.  Preparation: Patient was prepped and draped in the usual sterile fashion.    Sedation:  Patient sedated: yes  Sedatives: midazolam  Analgesia: fentanyl  Vitals: Vital signs were monitored during sedation.    Patient tolerance: patient tolerated the procedure well with no immediate complications      Procedure:    Performed at bedside under sterile conditions.  The patient was in an upright position and mechanically ventilated via ETT  A flexible bronchoscope was inserted through the ETT adaptor.  Airways were inspected systematically: trachea, right mainstem bronchus, right upper lobe, right middle lobe, right lower lobe, left mainstem bronchus, left upper lobe, left lower lobe.    Thick mucous secretions at the orion and in the right and left lower bronchi predominately. Multiple washings needed to clear the secretions      BAL was performed in the LLL using 20 ml of sterile water,  Samples sent for BAL    Findings:  Airway:patent with mucous at the orion  Secretions: thick white  BAL: yes in LLL    "

## 2025-07-09 NOTE — CASE MANAGEMENT/SOCIAL WORK
Continued Stay Note  DEANDRE Nj     Patient Name: Tj Christopher  MRN: [id number]  Today's Date: 7/9/2025    Admit Date: 7/6/2025    Plan: From home with aunt, pending clinical course and PT/OT eval.   Discharge Plan       Row Name 07/09/25 0935       Plan    Plan From home with aunt, pending clinical course and PT/OT eval.    Plan Comments DC Barriers: drain x2, HD-RIJ, NPO, NG TF, vent 100/7, C-line, A-line, FC, IV Albumin/Thiamine, Dex/Fent/Kilo/Vaso/D20/Bicarb gtts, monitor/replace electrolytes, Surgery/Vasc Sx/Nephro following.                   Expected Discharge Date and Time       Expected Discharge Date Expected Discharge Time    Jul 15, 2025               THELMA Escobedo RN  ICU/CVU   O: [phone]  C: [phone]  [email or web address]

## 2025-07-09 NOTE — PLAN OF CARE
Goal Outcome Evaluation:  Plan of Care Reviewed With: patient, significant other           Outcome Evaluation: gcs=3. noh notified and will follow. currently on 100%/+7. drips: fentanyl, precedex, hco3, teagan, vaso, d10. has (R) radial shiva(good wave form and flushes easy, no blood return).

## 2025-07-09 NOTE — PROGRESS NOTES
Nephrology  Progress Note                                        [facility]    Patient Identification    Name: Tj Christopher  Age: 40 y.o.  Sex: male  :  1985  MRN: [id number]      DATE OF SERVICE:  2025        Subective     On the vent pressors and CRRT  Currently on 3 pressors  Objective   Scheduled Meds:albumin human, 25 g, Intravenous, Once  dextrose, , ,   furosemide, 20 mg, Intravenous, Q8H  mupirocin, 1 Application, Each Nare, BID  pantoprazole, 40 mg, Intravenous, Q AM  piperacillin-tazobactam, 4.5 g, Intravenous, Q8H  sodium bicarbonate, 25 mEq, Intravenous, Once  thiamine (B-1) IV, 500 mg, Intravenous, Q8H          Continuous Infusions:dexmedetomidine, 0.2-1.5 mcg/kg/hr, Last Rate: 1.5 mcg/kg/hr (25 2339)  dextrose, 100 mL/hr, Last Rate: 100 mL/hr (25 0357)  fentanyl 10 mcg/mL,  mcg/hr, Last Rate: 100 mcg/hr (25 0520)  phenylephrine, 0.5-6 mcg/kg/min, Last Rate: 6 mcg/kg/min (25 0458)  PrismaSol BGK 2/3.5, 2,000 mL/hr, Last Rate: 2,000 mL/hr (25 05)  PrismaSol BGK 2/3.5, 2,000 mL/hr, Last Rate: 2,000 mL/hr (25)  PrismaSol BGK 2/3.5, 2,000 mL/hr, Last Rate: 2,000 mL/hr (25 05)  sodium bicarbonate, 150 mEq, Last Rate: 150 mEq (25 2327)  vasopressin, 0.03 Units/min, Last Rate: 0.03 Units/min (25 031)        PRN Meds:  Calcium Replacement - Follow Nurse / BPA Driven Protocol    dextrose    dextrose    dextrose    fentaNYL    glucagon (human recombinant)    heparin (porcine)    heparin (porcine)    Magnesium Standard Dose Replacement - Follow Nurse / BPA Driven Protocol    nitroglycerin    Phosphorus Replacement - Follow Nurse / BPA Driven Protocol    Potassium Replacement - Follow Nurse / BPA Driven Protocol    [COMPLETED] Insert Peripheral IV **AND** sodium chloride     Exam:  BP  "127/52   Pulse 86   Temp 99.8 °F (37.7 °C) (Oral)   Resp 26   Ht 193 cm (76\")   Wt (!) 166 kg (366 lb 2.9 oz)   SpO2 100%   BMI 44.57 kg/m²     Intake/Output last 3 shifts:  I/O last 3 completed shifts:  In: 62361 [P.O.:50; I.V.:35041; Other:30; NG/GT:20; IV Piggyback:500]  Out: 4243.5 [Urine:103.5; Emesis/NG output:400; Drains:205]    Intake/Output this shift:  No intake/output data recorded.    Physical exam:  General Appearance:   on the vent and pressors and CRRT  Head:  Normocephalic, without obvious abnormality, atraumatic  Eyes:  PERRL, conjunctiva/corneas clear     Neck:  Supple,  no adenopathy;      Lungs:  Decreased BS occasion ronchi  Heart:  Regular rate and rhythm, S1 and S2 normal  Abdomen:  dressing  Extremities: trace edema  Pulses: 2+ and symmetric all extremities  Skin:  No rashes or lesions       Data Review:  All labs (24hrs):   Recent Results (from the past 24 hours)   Blood Gas, Arterial -    Collection Time: 07/08/25 11:24 AM    Specimen: Arterial Blood   Result Value Ref Range    Site Arterial Line     Henok's Test N/A     pH, Arterial 7.157 (C) 7.350 - 7.450 pH units    pCO2, Arterial 56.8 (H) 35.0 - 48.0 mm Hg    pO2, Arterial 66.5 (L) 83.0 - 108.0 mm Hg    HCO3, Arterial 20.1 (L) 21.0 - 28.0 mmol/L    Base Excess, Arterial -8.9 (L) 0.0 - 3.0 mmol/L    O2 Saturation, Arterial 86.1 (L) 94.0 - 98.0 %    CO2 Content 21.8 (L) 22 - 29 mmol/L    Barometric Pressure for Blood Gas      Modality Aerosol Mask     FIO2 60 %    Ventilator Mode AC     Set Tidal Volume 600     PEEP 5     Notified Who      Read Back      Notified Time      Hemodilution No     Respiratory Rate 18     PO2/FIO2 111 0 - 500   Comprehensive Metabolic Panel    Collection Time: 07/08/25 12:17 PM    Specimen: Blood, Arterial Line   Result Value Ref Range    Glucose 89 65 - 99 mg/dL    BUN 33.6 (H) 6.0 - 20.0 mg/dL    Creatinine 6.52 (H) 0.76 - 1.27 mg/dL    Sodium 135 (L) 136 - 145 mmol/L    Potassium 6.5 (C) 3.5 - 5.2 " mmol/L    Chloride 99 98 - 107 mmol/L    CO2 16.8 (L) 22.0 - 29.0 mmol/L    Calcium 8.0 (L) 8.6 - 10.5 mg/dL    Total Protein 5.1 (L) 6.0 - 8.5 g/dL    Albumin 2.9 (L) 3.5 - 5.2 g/dL    ALT (SGPT) 1,817 (H) 1 - 41 U/L    AST (SGOT) 6,867 (H) 1 - 40 U/L    Alkaline Phosphatase 195 (H) 39 - 117 U/L    Total Bilirubin 2.9 (H) 0.0 - 1.2 mg/dL    Globulin 2.2 gm/dL    A/G Ratio 1.3 g/dL    BUN/Creatinine Ratio 5.2 (L) 7.0 - 25.0    Anion Gap 19.2 (H) 5.0 - 15.0 mmol/L    eGFR 10.4 (L) >60.0 mL/min/1.73   Magnesium    Collection Time: 07/08/25 12:17 PM    Specimen: Blood, Arterial Line   Result Value Ref Range    Magnesium 1.8 1.6 - 2.6 mg/dL   Phosphorus    Collection Time: 07/08/25 12:17 PM    Specimen: Blood, Arterial Line   Result Value Ref Range    Phosphorus 7.3 (H) 2.5 - 4.5 mg/dL   Lactic Acid, Plasma    Collection Time: 07/08/25 12:17 PM    Specimen: Blood, Arterial Line   Result Value Ref Range    Lactate 9.0 (C) 0.5 - 2.0 mmol/L   CBC Auto Differential    Collection Time: 07/08/25 12:17 PM    Specimen: Blood, Arterial Line   Result Value Ref Range    WBC 18.06 (H) 3.40 - 10.80 10*3/mm3    RBC 4.10 (L) 4.14 - 5.80 10*6/mm3    Hemoglobin 11.8 (L) 13.0 - 17.7 g/dL    Hematocrit 36.5 (L) 37.5 - 51.0 %    MCV 89.0 79.0 - 97.0 fL    MCH 28.8 26.6 - 33.0 pg    MCHC 32.3 31.5 - 35.7 g/dL    RDW 14.6 12.3 - 15.4 %    RDW-SD 47.7 37.0 - 54.0 fl    MPV 11.3 6.0 - 12.0 fL    Platelets 148 140 - 450 10*3/mm3   Scan Slide    Collection Time: 07/08/25 12:17 PM    Specimen: Blood, Arterial Line   Result Value Ref Range    Scan Slide     Manual Differential    Collection Time: 07/08/25 12:17 PM    Specimen: Blood, Arterial Line   Result Value Ref Range    Neutrophil % 65.0 42.7 - 76.0 %    Lymphocyte % 5.0 (L) 19.6 - 45.3 %    Monocyte % 1.0 (L) 5.0 - 12.0 %    Bands %  27.0 (H) 0.0 - 5.0 %    Metamyelocyte % 2.0 (H) 0.0 - 0.0 %    Neutrophils Absolute 16.62 (H) 1.70 - 7.00 10*3/mm3    Lymphocytes Absolute 0.90 0.70 - 3.10  10*3/mm3    Monocytes Absolute 0.18 0.10 - 0.90 10*3/mm3    nRBC 1.0 (H) 0.0 - 0.2 /100 WBC    RBC Morphology Normal Normal    Vacuolated Neutrophils Slight/1+ None Seen    Platelet Estimate Adequate Normal   POC Glucose Q1H    Collection Time: 07/08/25  3:32 PM    Specimen: Blood   Result Value Ref Range    Glucose 110 (H) 70 - 105 mg/dL   POC Glucose Q1H    Collection Time: 07/08/25  4:37 PM    Specimen: Blood   Result Value Ref Range    Glucose 115 (H) 70 - 105 mg/dL   Renal Function Panel    Collection Time: 07/08/25  4:39 PM    Specimen: Blood   Result Value Ref Range    Glucose 109 (H) 65 - 99 mg/dL    BUN 30.6 (H) 6.0 - 20.0 mg/dL    Creatinine 6.10 (H) 0.76 - 1.27 mg/dL    Sodium 137 136 - 145 mmol/L    Potassium 5.3 (H) 3.5 - 5.2 mmol/L    Chloride 99 98 - 107 mmol/L    CO2 16.8 (L) 22.0 - 29.0 mmol/L    Calcium 8.2 (L) 8.6 - 10.5 mg/dL    Albumin 3.1 (L) 3.5 - 5.2 g/dL    Phosphorus 6.3 (H) 2.5 - 4.5 mg/dL    Anion Gap 21.2 (H) 5.0 - 15.0 mmol/L    BUN/Creatinine Ratio 5.0 (L) 7.0 - 25.0    eGFR 11.2 (L) >60.0 mL/min/1.73   Magnesium    Collection Time: 07/08/25  4:39 PM    Specimen: Blood   Result Value Ref Range    Magnesium 1.8 1.6 - 2.6 mg/dL   Calcium, Ionized    Collection Time: 07/08/25  4:39 PM    Specimen: Blood   Result Value Ref Range    Ionized Calcium 1.01 (L) 1.15 - 1.30 mmol/L   POC Glucose Q1H    Collection Time: 07/08/25  5:38 PM    Specimen: Blood   Result Value Ref Range    Glucose 91 70 - 105 mg/dL   Blood Gas, Venous -    Collection Time: 07/08/25  7:24 PM    Specimen: Venous Blood   Result Value Ref Range    Site Left Radial     pH, Venous 7.148 (C) 7.320 - 7.430 pH Units    pCO2, Venous 60.3 (H) 42.0 - 51.0 mm Hg    pO2, Venous 25.8 (C) 40.0 - 42.0 mm Hg    HCO3, Venous 20.9 (L) 22.0 - 26.0 mmol/L    Base Excess, Venous -8.6 (L) -2.0 - 2.0 mmol/L    O2 Saturation, Venous 31.5 (L) 45.0 - 75.0 %    Barometric Pressure for Blood Gas      Modality Adult Vent     FIO2 80 %    Ventilator  Mode AC     Set Tidal Volume 600     PEEP 8    POC Lactate    Collection Time: 07/08/25  7:24 PM    Specimen: Venous Blood   Result Value Ref Range    Lactate 8.7 (C) 0.2 - 2.0 mmol/L    Notified Time      Notified Who      Read Back     POC Glucose Once    Collection Time: 07/08/25  7:24 PM    Specimen: Venous Blood   Result Value Ref Range    Glucose 63 (L) 74 - 100 mg/dL   POC Glucose Once    Collection Time: 07/08/25  7:52 PM    Specimen: Blood   Result Value Ref Range    Glucose 117 (H) 70 - 105 mg/dL   POC Glucose Once    Collection Time: 07/08/25 10:34 PM    Specimen: Blood   Result Value Ref Range    Glucose 49 (C) 70 - 105 mg/dL   STAT Lactic Acid, Reflex    Collection Time: 07/08/25 10:36 PM    Specimen: Blood   Result Value Ref Range    Lactate 8.5 (C) 0.5 - 2.0 mmol/L   POC Glucose Once    Collection Time: 07/08/25 10:57 PM    Specimen: Blood   Result Value Ref Range    Glucose 95 70 - 105 mg/dL   POC Glucose Once    Collection Time: 07/09/25 12:10 AM    Specimen: Blood   Result Value Ref Range    Glucose 57 (L) 70 - 105 mg/dL   Renal Function Panel    Collection Time: 07/09/25 12:12 AM    Specimen: Blood   Result Value Ref Range    Glucose 52 (L) 65 - 99 mg/dL    BUN 24.1 (H) 6.0 - 20.0 mg/dL    Creatinine 4.74 (H) 0.76 - 1.27 mg/dL    Sodium 136 136 - 145 mmol/L    Potassium 5.0 3.5 - 5.2 mmol/L    Chloride 98 98 - 107 mmol/L    CO2 16.0 (L) 22.0 - 29.0 mmol/L    Calcium 8.8 8.6 - 10.5 mg/dL    Albumin 3.0 (L) 3.5 - 5.2 g/dL    Phosphorus 5.8 (H) 2.5 - 4.5 mg/dL    Anion Gap 22.0 (H) 5.0 - 15.0 mmol/L    BUN/Creatinine Ratio 5.1 (L) 7.0 - 25.0    eGFR 15.2 (L) >60.0 mL/min/1.73   Magnesium    Collection Time: 07/09/25 12:12 AM    Specimen: Blood   Result Value Ref Range    Magnesium 1.9 1.6 - 2.6 mg/dL   Calcium, Ionized    Collection Time: 07/09/25 12:12 AM    Specimen: Blood   Result Value Ref Range    Ionized Calcium 1.03 (L) 1.15 - 1.30 mmol/L   POC Glucose Once    Collection Time: 07/09/25 12:53  AM    Specimen: Blood   Result Value Ref Range    Glucose 95 70 - 105 mg/dL   POC Glucose Once    Collection Time: 07/09/25  1:52 AM    Specimen: Blood   Result Value Ref Range    Glucose 75 70 - 105 mg/dL   STAT Lactic Acid, Reflex    Collection Time: 07/09/25  1:54 AM    Specimen: Blood   Result Value Ref Range    Lactate 10.0 (C) 0.5 - 2.0 mmol/L   Blood Gas, Venous -    Collection Time: 07/09/25  3:47 AM    Specimen: Venous Blood   Result Value Ref Range    Site Left Radial     pH, Venous 7.177 (C) 7.320 - 7.430 pH Units    pCO2, Venous 49.4 42.0 - 51.0 mm Hg    pO2, Venous 23.6 (C) 40.0 - 42.0 mm Hg    HCO3, Venous 18.3 (L) 22.0 - 26.0 mmol/L    Base Excess, Venous -10.1 (L) -2.0 - 2.0 mmol/L    O2 Saturation, Venous 28.9 (L) 45.0 - 75.0 %    Barometric Pressure for Blood Gas      Modality Adult Vent     FIO2 80 %    Ventilator Mode AC     Set Tidal Volume 700     PEEP 7    POC Glucose Once    Collection Time: 07/09/25  3:47 AM    Specimen: Venous Blood   Result Value Ref Range    Glucose 59 (L) 74 - 100 mg/dL   POC Glucose Once    Collection Time: 07/09/25  4:21 AM    Specimen: Blood   Result Value Ref Range    Glucose 132 (H) 70 - 105 mg/dL   POC Glucose Once    Collection Time: 07/09/25  4:53 AM    Specimen: Blood   Result Value Ref Range    Glucose 113 (H) 70 - 105 mg/dL   STAT Lactic Acid, Reflex    Collection Time: 07/09/25  4:54 AM    Specimen: Blood   Result Value Ref Range    Lactate 10.8 (C) 0.5 - 2.0 mmol/L   POC Glucose Once    Collection Time: 07/09/25  6:05 AM    Specimen: Blood   Result Value Ref Range    Glucose 105 70 - 105 mg/dL   CBC Auto Differential    Collection Time: 07/09/25  6:06 AM    Specimen: Blood   Result Value Ref Range    WBC 17.90 (H) 3.40 - 10.80 10*3/mm3    RBC 4.04 (L) 4.14 - 5.80 10*6/mm3    Hemoglobin 11.4 (L) 13.0 - 17.7 g/dL    Hematocrit 35.9 (L) 37.5 - 51.0 %    MCV 88.9 79.0 - 97.0 fL    MCH 28.2 26.6 - 33.0 pg    MCHC 31.8 31.5 - 35.7 g/dL    RDW 14.7 12.3 - 15.4 %     RDW-SD 47.7 37.0 - 54.0 fl    MPV 11.1 6.0 - 12.0 fL    Platelets 128 (L) 140 - 450 10*3/mm3          Imaging:  XR Abdomen KUB  Result Date: 7/8/2025  Impression: Enteric tube overlies the stomach. Electronically Signed: Hipolito Alberts MD  7/8/2025 6:08 PM EDT  Workstation ID: [id number]    XR Chest 1 View  Result Date: 7/8/2025  Impression: 1.Postoperative changes of recent sternotomy. 2.Residual atelectasis or airspace disease in the left lung base. Electronically Signed: Phil Blackwood MD  7/8/2025 1:29 PM EDT  Workstation ID: [id number]    XR Surgical Count Protocol  Result Date: 7/8/2025  Impression: No definite retained sponges are identified. Electronically Signed: Kobe Hudson MD  7/8/2025 10:27 AM EDT  Workstation ID: [id number]    XR Chest 1 View  Result Date: 7/8/2025  Impression: Left internal jugular CVC catheter present with the tip at the upper SVC. No pneumothorax. Otherwise, no significant change. Electronically Signed: Yoly Julian MD  7/8/2025 12:57 AM EDT  Workstation ID: [id number]    XR Chest 1 View  Result Date: 7/7/2025  Impression: 1.Left jugular central venous catheter tip is in the expected location of the left brachiocephalic vein. 2.No pneumothorax is seen. 3.ET tube, NG tube, and right jugular central venous catheter remain in place. 4.Subsegmental atelectasis at the lung bases. Electronically Signed: Chace Ordaz MD  7/7/2025 9:52 PM EDT  Workstation ID: [id number]    XR Chest 1 View  Result Date: 7/6/2025  Impression: Tip of the right internal jugular central venous catheter terminates in the upper SVC. No pneumothorax visible on this supine image. Tip of the endotracheal tube terminates in the midthoracic trachea approximately 3 to 4 cm above the orion. Tip of the enteric tube terminates in the gastric fundus. Electronically Signed: Kelli Olivera MD  7/6/2025 5:16 PM EDT  Workstation ID: [id number]    CT Angiogram Chest  Result Date: 7/6/2025  Impression: Postsurgical changes of prior aortic  dissection repair including proximal aortic arch repair and aortic root reconstruction, now with new/worsened complex aortic dissection spanning from the proximal aortic arch to the level of the aortic bifurcation, notably with appearance of multiple/three lumens, which may be related to reentry intimal tear/secondary dissection. Dissection extends into the superior mesenteric artery with subsequent downstream occlusion at its midportion with reconstitution at its branch point. There is a replaced right hepatic artery off the superior mesenteric artery that demonstrates only thready opacification with subsequent perfusional changes within the right hepatic lobe. Complex dissection flap involves the origin of two codominant left renal arteries with subsequent partial infarction of the left kidney. Celiac artery is narrowed but patent. Right renal artery and inferior mesenteric artery appear patent. Short segment extension to the left subclavian artery which is otherwise patent. There is overall increased aneurysmal dilatation of the thoracoabdominal aorta, for example measuring 5.2 cm at the descending thoracic aorta and 5.7 cm at the infrarenal abdominal aorta. 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 Cardiothoracic surgery consult is recommended. Please note while the chest CT was performed as a CTA with adequate/arterial contrast bolus timing, the CT abdomen pelvis was performed with routine/portal venous phase and thus suboptimal for assessment of arterial structures. Findings and recommendations discussed with Zaida Kruse NP at 8:28 a.m. on 7/6/2025. Electronically Signed: Alexander Swenson MD  7/6/2025 9:07 AM EDT  Workstation ID: [id number]    CT Abdomen Pelvis With Contrast  Result Date: 7/6/2025  Impression: Postsurgical changes of prior aortic dissection repair including proximal aortic arch repair and aortic root reconstruction, now with new/worsened complex aortic dissection  spanning from the proximal aortic arch to the level of the aortic bifurcation, notably with appearance of multiple/three lumens, which may be related to reentry intimal tear/secondary dissection. Dissection extends into the superior mesenteric artery with subsequent downstream occlusion at its midportion with reconstitution at its branch point. There is a replaced right hepatic artery off the superior mesenteric artery that demonstrates only thready opacification with subsequent perfusional changes within the right hepatic lobe. Complex dissection flap involves the origin of two codominant left renal arteries with subsequent partial infarction of the left kidney. Celiac artery is narrowed but patent. Right renal artery and inferior mesenteric artery appear patent. Short segment extension to the left subclavian artery which is otherwise patent. There is overall increased aneurysmal dilatation of the thoracoabdominal aorta, for example measuring 5.2 cm at the descending thoracic aorta and 5.7 cm at the infrarenal abdominal aorta. 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 Cardiothoracic surgery consult is recommended. Please note while the chest CT was performed as a CTA with adequate/arterial contrast bolus timing, the CT abdomen pelvis was performed with routine/portal venous phase and thus suboptimal for assessment of arterial structures. Findings and recommendations discussed with Zaida Kruse NP at 8:28 a.m. on 7/6/2025. Electronically Signed: Alexander Swenson MD  7/6/2025 9:07 AM EDT  Workstation ID: [id number]      Assessment/Plan:     Dissection of mesenteric artery    Aortic dissection    Acute mesenteric ischemia         Acute kidney injury   hyperkalemia  SMA occlusion Status post thrombectomy with repair of mesenteric artery dissection  Mesenteric ischemia   Mesenteric artery dissection   Acute respiratory failure   Metabolic acidosis   History of hypertension    Acute liver  injury     Recommendations:    Continue CRRT    Agree with DC bicarb drip   Watch electrolytes   Blood pressure is not encouraging   patient is critically ill   CCT 32   Discussed with nursing staff  Will follow closely with you

## 2025-07-09 NOTE — ANESTHESIA POSTPROCEDURE EVALUATION
Patient: Tj Christopher    Procedure Summary       Date: 07/09/25 Room / Location: [facility] OR 07 / [facility] MAIN OR    Anesthesia Start: 1350 Anesthesia Stop:     Procedure: reopening laparotomy, small bowel resection, ileocecectomy, omentectomy, abthera wound vac placement (Abdomen) Diagnosis:     Surgeons: Merrill Henry MD Provider: Alf Westbrook MD    Anesthesia Type: general ASA Status: 5 - Emergent            Anesthesia Type: No value filed.    Vitals  Vitals Value Taken Time   /63 07/09/25 16:30   Temp     Pulse 94 07/09/25 18:45   Resp 26 07/09/25 16:30   SpO2 95 % 07/09/25 18:45   Vitals shown include unfiled device data.        Post Anesthesia Care and Evaluation    Patient location during evaluation: ICU  Patient participation: complete - patient cannot participate  Level of consciousness: obtunded/minimal responses  Pain scale: See nurse's notes for pain score.  Pain management: adequate    Airway patency: patent  Anesthetic complications: No anesthetic complications  PONV Status: none  Cardiovascular status: acceptable  Respiratory status: acceptable, ventilator, ETT and intubated  Hydration status: acceptable    Comments: Patient seen and examined postoperatively; vital signs stable; SpO2 greater than or equal to 90%; cardiopulmonary status stable; nausea/vomiting adequately controlled; pain adequately controlled; no apparent anesthesia complications; patient discharged from anesthesia care when discharge criteria were met

## 2025-07-09 NOTE — PLAN OF CARE
Goal Outcome Evaluation:     Patient currently in OR for exploratory lap. Prior to going to OR patient had new arterial line placed. Bronched at bedside by ICU MD. Levo was added as well as d20 for glucose management, 2x d50 given this shift. At time of leaving floor for OR patient glucose was 130. 1 unit FFP given, k centra, and vit K. Restraints d/c. Patient had been unresponsive until bronch, at time of bronch patient was nodding appropriately, versed given, see MAR. Fent and dex on hold during AM, started at this time. Bicarb gtt d/c in AM during rounds and restarted at this time, currently not infusing due to patient being in OR. CRRT was stopped at 1300 due to going down for surgery, see charting, blood returned.

## 2025-07-09 NOTE — OP NOTE
General Surgery Operative Report  Date: 07/09/25   Patient: Tj Christopher  Surgeon: Merrill Henry MD   Assistant: RYAN White; This surgery was assisted and facilitated by a certified surgical first assistant, who directly resulted in decreased operative time, anesthetic time, exposure of surgical field and possibly of an operative wound infection thereby decreasing patient morbidity and ultimately total expenditures.   Pre-operative Diagnosis: mesenteric ischemia   Post-operative Diagnosis:   mesenteric ischemia   ischemic hepatitis   Procedure:   reopening damage control laparotomy   small bowel resection   ileocecectomy   omentectomy   ABthera wound vac placement   Findings:   extensive necrosis of the majority of the small bowel and cecum necessitating small bowel resection (60 cm proximal-mid jejunum) and ileocecectomy (180 cm) resulting in 120 cm of remaining viable small bowel (15 cm proximal jejunum, 105 cm distal jejunum)  ischemic omentum   viable remainder of colon  congested, thickened stomach but intact perfusion   borderline intact splenic perfusion   justin necrosis of Couinaud segment IVb (approximately 25%) with justin hemorrhagic changes of the rest of the liver, particularly the cystic plate   Wound Class: IV  Estimated blood loss: minimal  Specimen:   omentum   small bowel   ileocecum   Implants: ABthera vac   Complications: none    Indications:   Tj Christopher is a 40-year-old male, history of methamphetamine use and history of type a aortic dissection requiring open repair with aortic arch reconstruction, who was admitted to the ICU on 7/6/2025 for a thoracoabdominal aortic dissection with SMA thrombus.  Patient was emergently taken to the operating room by both general and vascular surgery during which time, patient underwent a damage control laparotomy with SMA thromboembolectomy, eversion endarterectomy, and SMA interposition bypass graft.  He was initially taken back to the operating  room the next day for a washout, open cholecystectomy, and delayed abdominal wall closure.  Unfortunately, patient's condition has deteriorated with further evidence of small bowel necrosis for which patient was emergently taken to the operating room.    Consent: none - emergency    Operative details:   The patient was brought to the operating room from the ICU already intubated and placed in supine position. General anesthesia was induced and the patient was successfully intubated by Anesthesiology.  The abdomen was prepped and draped in the usual sterile fashion, after which a brief time-out was held in which the patient, the procedure, preoperative antibiotics, and fire risk were identified and agreed upon by all available members of the operating room staff.    The previous staples and packing were removed.  The abdominal wall sutures were cut and the abdomen was entered.  The omentum was found to be necrotic, so this was resected a LigaSure device.  This was passed off the field as specimen.  The transverse colon was then reflected cephalad and the SMA was identified at the base of the transverse mesocolon.  There was evidence of recent intervention.  The SMA graft was identified and found to be patent with a palpable pulse.  Just medial to this, the duodenojejunal junction was identified.  The prior ligament of Treitz had been previously divided.  The small bowel was traced from this area to the ileocecal valve.  There was clear evidence of justin small bowel gangrene involving the majority of the distal small bowel with some proximal sparing.      In total, there were 2 discrete areas of devitalized bowel.  The first segment began 15 cm distal to ligament of Treitz and spanned approximately 60 cm.  From there, the next 115 cm appeared viable.  The rest of the small bowel was noted to be ischemic.    Attention was then turned towards the colon, which was run from the ileocecal valve to the anterior peritoneal  reflection.  The cecum was clearly ischemic with noted gangrenous changes to the lateral portion of the cecum.  Surprisingly, the appendix otherwise appeared normal.    The rest of the abdomen was explored.  Starting with the supra mesocolic viscera, the liver was explored.  The entire liver was found to be hemorrhagic with clear evidence of varying degrees of ischemia.  The cephalad 25% segment IVb the was frankly necrotic.  The cystic plate also.  Ischemic, but there is no evidence of bleeding.  Next, the stomach was explored.  This was rather thickened, but had no clear evidence of ischemia.  The spleen did appear to have diffuse patchy areas of ischemia, but did not appear to be fully devitalized.    Attention was then turned towards the lesser sac.  This had previously been entered during his prior surgeries.  The C-loop of the duodenum otherwise appeared viable.  There is limited visualization of the neck and head of the pancreas, but otherwise it appeared normal.    Given the degree of bowel ischemia, the patient was given 2.5 mg of IV indocyanine green.  This was used to assess for subclinical bowel ischemia.  This was not appreciated.  Therefore, decision was made to proceed with damage control surgery.  Mesenteric windows were fashioned on either end of the ischemic proximal and mid jejunum.  Each end of the bowel was divided using a 75 mm blue load FABRIZIO stapler.  The jejunal mesentery was then divided using a LigaSure device, after which the first segment of jejunum was passed off the field as specimen.  A mesenteric window was then fashioned over the proximal aspect of the residual ischemic small bowel and again this small bowel was divided using a 75 mm blue load FABRIZIO stapler.  Attention was turned towards the right colon, which was medialized by dividing the white line of Toldt with electrocautery.  A mesenteric window was made in the ascending colon, after which the colon was divided using a green load  on a contour stapler.  The small bowel and cecal mesentery was carefully divided using a LigaSure device.  Of note, there was audible crunching of the ileocolic pedicle during ligation.  The small bowel and cecum was passed off the field as a specimen.    At this point, the patient had some improvement, but he remained critically ill.  Therefore, decision was made to temporarily close the abdomen.  The prior drains were removed.  An ABThera wound VAC was then applied and secured to the abdominal wall using sterile adherent dressings until adequate suction was achieved.    All instruments, sponge, and needle counts were correct at the end of the case.  The patient tolerated the procedure without any immediate complications. The patient was left intubated and transported to ICU in critical condition.    Merrill Henry MD   General Surgery  07/09/25   15:25 EDT     Much of this encounter note is an electronic transcription/translation of spoken language to printed text.  The electronic translation of spoken language may permit erroneous, or at times, nonsensical words or phrases to be inadvertently transcribed.  Although I have reviewed the note for such errors, some may still exist.

## 2025-07-09 NOTE — PROGRESS NOTES
[facility] Vascular Surgery Progress Note    Name: Tj Christopher ADMIT: 2025   : 1985  PCP: Provider, No Known    MRN: [id number] LOS: 3 days   AGE/SEX: 40 y.o. male  ROOM: 2305/1   [facility]    CC: Post op; s/p exploratory laparotomy with SMA bypass/endarterectomy on ; s/p second look laparotomy on     Subjective     Remains critically ill. On CRRT    Objective     Scheduled Medications:   albumin human, 25 g, Intravenous, Once  furosemide, 20 mg, Intravenous, Q8H  mupirocin, 1 Application, Each Nare, BID  pantoprazole, 40 mg, Intravenous, Q AM  piperacillin-tazobactam, 4.5 g, Intravenous, Q8H  sodium bicarbonate, 25 mEq, Intravenous, Once  thiamine (B-1) IV, 500 mg, Intravenous, Q8H        Active Infusions:  dexmedetomidine, 0.2-1.5 mcg/kg/hr, Last Rate: 1.5 mcg/kg/hr (25)  dextrose, 60 mL/hr, Last Rate: 60 mL/hr (25)  fentanyl 10 mcg/mL,  mcg/hr, Last Rate: 100 mcg/hr (25)  phenylephrine, 0.5-6 mcg/kg/min, Last Rate: 6 mcg/kg/min (25)  PrismaSol BGK 2/3.5, 2,000 mL/hr, Last Rate: 2,000 mL/hr (25)  PrismaSol BGK 2/3.5, 2,000 mL/hr, Last Rate: 2,000 mL/hr (25)  PrismaSol BGK 2/3.5, 2,000 mL/hr, Last Rate: 2,000 mL/hr (25)  sodium bicarbonate, 150 mEq, Last Rate: 150 mEq (25)  vasopressin, 0.03 Units/min, Last Rate: 0.03 Units/min (25)        As Needed Medications:    Calcium Replacement - Follow Nurse / BPA Driven Protocol    dextrose    dextrose    fentaNYL    glucagon (human recombinant)    heparin (porcine)    heparin (porcine)    Magnesium Standard Dose Replacement - Follow Nurse / BPA Driven Protocol    nitroglycerin    Phosphorus Replacement - Follow Nurse / BPA Driven Protocol    Potassium Replacement - Follow Nurse / BPA Driven Protocol    [COMPLETED] Insert Peripheral IV **AND** sodium chloride    Vital Signs  Vitals:    25 0800   BP:    Pulse:    Resp:    Temp: 100.2 °F  "(37.9 °C)   SpO2:       Body mass index is 44.57 kg/m².     Physical Exam:  Intubated, sedated  Mechanically ventilated  On CRRT  Midline abdominal incision with surgical dressings in place, abdominal binder in place  Doppler signals present to bilateral DP/PT  Feet are cool to touch    Results Review:     CBC    Results from last 7 days   Lab Units 07/09/25  0606 07/08/25  1217 07/08/25  0419 07/07/25  1216 07/07/25  0348 07/06/25  1936 07/06/25  1518 07/06/25  1056   WBC 10*3/mm3 17.90* 18.06* 21.68* 22.67* 21.27* 22.07*  --  12.19*   HEMOGLOBIN g/dL 11.4* 11.8* 12.9* 12.2* 12.6* 12.8*  --  12.6*   HEMOGLOBIN, POC g/dL  --   --   --   --   --   --  12.6  --    PLATELETS 10*3/mm3 128* 148 156 137* 147 161  --  174     BMP   Results from last 7 days   Lab Units 07/09/25  0606 07/09/25  0012 07/08/25  1639 07/08/25  1217 07/08/25  0419 07/07/25  1727 07/07/25  1130 07/07/25  0348 07/06/25  1936   SODIUM mmol/L 136 136 137 135* 136 139 141 134* 142   POTASSIUM mmol/L 4.6 5.0 5.3* 6.5* 4.9 5.6* 5.5* 5.9* 4.8   CHLORIDE mmol/L 99 98 99 99 99 100 99 99 104   CO2 mmol/L 16.7* 16.0* 16.8* 16.8* 21.3* 23.7 24.7 20.9* 16.5*   BUN mg/dL 20.9* 24.1* 30.6* 33.6* 27.7* 35.1* 28.6* 21.8* 15.4   CREATININE mg/dL 4.26* 4.74* 6.10* 6.52* 5.17* 5.94* 5.01* 3.75* 2.79*   GLUCOSE mg/dL 99 52* 109* 89 102* 131* 137* 153* 170*   MAGNESIUM mg/dL  --  1.9 1.8 1.8 1.7  --  1.6 1.7 2.0   PHOSPHORUS mg/dL  --  5.8* 6.3* 7.3*  --   --  4.3  --  6.5*     Coag   Results from last 7 days   Lab Units 07/06/25  1127 07/06/25  0623   INR  1.40* 1.30*   APTT seconds  --  27.9     HbA1C No results found for: \"HGBA1C\"  Infection   Results from last 7 days   Lab Units 07/06/25  1014 07/06/25  0925 07/06/25  0821   BLOODCX  No growth at 2 days No growth at 2 days  --    URINECX   --   --  No growth     Radiology(recent) XR Abdomen KUB  Result Date: 7/8/2025  Impression: Enteric tube overlies the stomach. Electronically Signed: Hipolito Alberts MD  7/8/2025 " 6:08 PM EDT  Workstation ID: [id number]    XR Chest 1 View  Result Date: 7/8/2025  Impression: 1.Postoperative changes of recent sternotomy. 2.Residual atelectasis or airspace disease in the left lung base. Electronically Signed: Phil Blackwood MD  7/8/2025 1:29 PM EDT  Workstation ID: [id number]    XR Surgical Count Protocol  Result Date: 7/8/2025  Impression: No definite retained sponges are identified. Electronically Signed: Kobe Hudson MD  7/8/2025 10:27 AM EDT  Workstation ID: [id number]    XR Chest 1 View  Result Date: 7/8/2025  Impression: Left internal jugular CVC catheter present with the tip at the upper SVC. No pneumothorax. Otherwise, no significant change. Electronically Signed: Yoly Julian MD  7/8/2025 12:57 AM EDT  Workstation ID: [id number]    XR Chest 1 View  Result Date: 7/7/2025  Impression: 1.Left jugular central venous catheter tip is in the expected location of the left brachiocephalic vein. 2.No pneumothorax is seen. 3.ET tube, NG tube, and right jugular central venous catheter remain in place. 4.Subsegmental atelectasis at the lung bases. Electronically Signed: Chace Ordaz MD  7/7/2025 9:52 PM EDT  Workstation ID: [id number]      VTE Prophylaxis:  Pharmacologic & mechanical VTE prophylaxis orders are present.         Problems:    Active Hospital Problems:  Active Hospital Problems    Diagnosis  POA    **Dissection of mesenteric artery [I77.79]  Unknown    Acute mesenteric ischemia [K55.059]  Unknown    Aortic dissection [I71.00]  Yes      Resolved Hospital Problems   No resolved problems to display.        Assessment & Plan   Assessment / Plan     Dissection of mesenteric artery    Aortic dissection    Acute mesenteric ischemia      7/6/2025 - exploratory laparotomy with SMA thromboembolectomy and repair of mesenteric artery dissection with their position graft placement by Dr. Saenz    7/8/2025 - Second look laparotomy by Dr. Bains    POD1  Remains critically ill  Intubated, sedated, on pressor  support  Intensivist managing ICU care  Nephrology following for CRRT  Midline abdominal incision with surgical dressings in place, will defer removal and management of this to general surgery  Bilateral feet are cool to touch, Doppler signals present to the bilateral DP/PT        MAYRA Ellington  [facility] Vascular Surgery  07/09/25   O: [phone]  F: [phone]

## 2025-07-09 NOTE — PROGRESS NOTES
"Patient Name: Tj Christopher  YOB: 1985  MRN: [id number]  Admission date: 7/6/2025  Reason for Encounter: Follow-up/Progress Note      [facility] Nutrition Progress Note       Nutrition Intervention Updates: Recommend holding tube feedings until pt is more hemodynamically stable (lactate <8)     When able to re-start feedings recommend Novasource renal at 10 ml/hr       Subjective: Progress note to check on TF's. Lactate is 9.3 today - hold tube feedings per discussion during ICU rounds. Changed order to \"RD to manage.\" Pt is on precedex, D20, norepi, teagan, CRRT, vasopressin.        PO Diet: NPO Diet NPO Type: Strict NPO   PO Supplements: None    PO Intake:  NPO       Current nutrition support: Novasource renal at 10 ml/hr + 30 ml FWF q4hrs    Nutrition support review: Ran overnight        Labs: Low sodium and high phos management per MD         GI Function:  No BM this admission - surgery is following         Brief Weight Review:    Wt Readings from Last 1 Encounters:   07/09/25 0541 (!) 166 kg (366 lb 2.9 oz)   07/06/25 1500 (!) 164 kg (360 lb 14.3 oz)   07/06/25 0606 (!) 145 kg (320 lb)        Results from last 7 days   Lab Units 07/09/25  0803 07/09/25  0606 07/09/25  0012 07/08/25  1639 07/08/25  1217 07/08/25  0419   SODIUM mmol/L 133* 136 136   < > 135* 136   POTASSIUM mmol/L 4.4 4.6 5.0   < > 6.5* 4.9   CHLORIDE mmol/L 99 99 98   < > 99 99   CO2 mmol/L 14.9* 16.7* 16.0*   < > 16.8* 21.3*   BUN mg/dL 19.8 20.9* 24.1*   < > 33.6* 27.7*   CREATININE mg/dL 3.99* 4.26* 4.74*   < > 6.52* 5.17*   CALCIUM mg/dL 8.8 8.9 8.8   < > 8.0* 7.8*   BILIRUBIN mg/dL  --  5.1*  --   --  2.9* 2.6*   ALK PHOS U/L  --  272*  --   --  195* 201*   ALT (SGPT) U/L  --  2,692*  --   --  1,817* 2,561*   AST (SGOT) U/L  --  >7,000*  --   --  6,867* >7,000*   GLUCOSE mg/dL 138* 99 52*   < > 89 102*    < > = values in this interval not displayed.     Results from last 7 days   Lab Units 07/09/25  0803 " "07/09/25  0606 07/09/25  0012 07/08/25  1639 07/08/25  1217 07/08/25  0419   MAGNESIUM mg/dL 1.9  --  1.9 1.8   < > 1.7   PHOSPHORUS mg/dL 5.3*  --  5.8* 6.3*   < >  --    PLATELETS 10*3/mm3  --  128*  --   --    < > 156   HEMOGLOBIN g/dL  --  11.4*  --   --    < > 12.9*   HEMATOCRIT %  --  35.9*  --   --    < > 39.2   TRIGLYCERIDES mg/dL  --   --   --   --   --  614*    < > = values in this interval not displayed.     No results found for: \"HGBA1C\"    Electronically signed by:  Chelsea Kaye RD  07/09/25 12:42 EDT     "

## 2025-07-09 NOTE — PROGRESS NOTES
Critical Care Progress Note     Tj Christopher : 1985 MRN:[id number] LOS:3  Rm: 2305/1     Principal Problem: Dissection of mesenteric artery     Reason for follow up: All the medical problems listed below    Summary     Tj Crhistopher is a 40 y.o. male with PMH of uncontrolled hypertension and AAA dissection with surgical repair (3/27/2024) who presented to the ED for evaluation of abdominal pain, nausea and vomiting since approximately 0300 which started shortly after he had eaten. The patient has a history of uncontrolled hypertension which contributed to a type A aortic dissection. On 3/27/2024 the patient had repair of a type a aortic dissection, hemiarch repair and aortic valve resuspension with root repair by Dr. Parkinson 3/2025. The patient was reportedly lost to follow-up however he did report compliance with his medications.      Diagnostic imaging in the ED with abdominal angiogram revealed acute mesenteric ischemia with acute abdomen due to mesenteric ischemia and occlusion of the SMA.  Vascular surgery was consulted emergently by the ED NP Zaida John patient was taken emergently to the OR.  OR interventions included superior mesenteric artery thromboembolectomy with passage of ectomy catheters into the aorta and into the distal mesenteric branches as well as repair of mesenteric artery dissection with eversion endarterectomy and interposition bovine pericardial graft placement from superior mesenteric artery origin to superior mesenteric artery distal branch point.  The surgery was conducted by Dr. Saenz and Dr. Crawford who worked in tandem for surgical intervention in an effort to reperfuse the gut.  This was a lengthy surgery and the patient was admitted to the ICU postoperatively where he will remain intubated and sedated pending planned return to the OR Tuesday for reevaluation of the bowel viability.     Information for this H&P was obtained primarily from review of documentation and  collaboration with other providers the patient is intubated and sedated.  He presented to the ED this morning and was taken emergently to the OR prior to being seen by the ICU team.  He arrived to the ICU postoperatively, intubated and sedated.     7/8:Received HD overnight for hyperkalemia. Went to OR this AM and had a necrotic GB which was removed. His abdomen was closed and no SBR was needed. Post operatively the patient is more hypoxic with a persistent lactic acidosis and hyperkalemia. Discussed with nephrology and will place patient on CRRT.    Patient is on Hospital Day: 4.    Significant Events / Subjective     07/09/25 :Patient with persistent respiratory acidosis, increased TV. Overnight remained on CRRT with persistent shock. On shandra and vaso, will have to add levo and stress dose steroids. Worsening hypoxia. Appears to have atelectasis with air bronchograms, will plan to bronch and increase PEEP. Concerning for pneumonia.     Sedated held, unresponsive. Check ammonia.     Assessment / Plan   Acute liver injury- Shock liver  Lactic acidemia  Distributive shock  Likely secondary to hypoperfusion due to SMA occlusion and cross-clamp during surgery  Necrotic GB could have caused a sirs response as well  Rule out infectious source, plan to bronch with BAL 7/9  Lactic persistently elevated  Difficult to assess if this is PNA versus evidence of ischemic bowel  Non paralyzed bladder pressure of 12 and abdomen is soft so I do not think this is ACS  High dose thiamine ordered  Transaminitis continues to worsen.  Continue zosyn, cultures negative  Blood cultures pending and NTD  MRSA PCR negative  Urinalysis with reflex culture NGTD  On SHANDRA/VASO, add levo, add stress dose steroids    SMA occlusion  Mesenteric ischemia  Mesenteric artery dissection  Now s/p SMA thromboembolectomy, repair of mesenteric artery dissection, eversion endarterectomy and interposition bovine pericardial graft placement from [place]  mesenteric artery origin to superior mesenteric artery distal branch point  OR takeback 7/8 with cholecystectomy, closure and no SBR  Vascular surgery and general surgery following closely  SBP goal <130 mmHg     Acute Hypoxic/hypercapenic respiratory failure requiring mechanical ventilation  Keep intubated pending return to surgery Tuesday and due to very critical illness as well as undergoing a very lengthy surgery  Patient with worsening hypoxic respiratory failure, on 100% FIO2  CXR with atelectasis and right sided consolidation concerning for pneumonia  Hypoxia weaning further extubation efforts  Sedated on fentanyl and precedex--> Hold and check ammonia        MONIKA  Left renal hypoperfusion  -Seen on CT scan  -Intrarenal MONIKA likely as well with hypotension  -Ahuja in place, minimal UOP  -Hyperkalemia post operatively, temporized  -HD overnight of 7/7  -Nephrology following, on CRRT  -Will bolus 120 of lasix to assess for response  -If no urine will dc ahuja  -Trend labs    Essential Hypertension: not well controlled.   Home medications on hold given shock    Disposition: ICU, ventilator    Code status:   Code Status (Patient has no pulse and is not breathing): CPR (Attempt to Resuscitate)  Medical Interventions (Patient has pulse or is breathing): Full Support       Nutrition:   NPO Diet NPO Type: Strict NPO   Tube Feeding: Formula: RD to Initiate & Manage     VTE Prophylaxis:  Pharmacologic & mechanical VTE prophylaxis orders are present.           Objective / Physical Exam     Vital signs:  Temp: 100.2 °F (37.9 °C)  BP: 102/59  Heart Rate: 102  Resp: 26  SpO2: 94 %  Weight: (!) 166 kg (366 lb 2.9 oz)    Admission Weight: Weight: (!) 145 kg (320 lb)  Current Weight: Weight: (!) 166 kg (366 lb 2.9 oz)    Input/Output in last 24 hours:    Intake/Output Summary (Last 24 hours) at 7/9/2025 1140  Last data filed at 7/9/2025 1100  Gross per 24 hour   Intake 9062 ml   Output 4864.5 ml   Net 4197.5 ml      Net IO  Since Admission: 11,999.5 mL [07/09/25 1140]     Physical Exam  Vitals and nursing note reviewed.   Constitutional:       General: He is not in acute distress.     Appearance: He is obese. He is ill-appearing.      Comments: Intubated and sedated   HENT:      Head: Normocephalic and atraumatic.      Right Ear: External ear normal.      Left Ear: External ear normal.      Mouth/Throat:      Comments: Oral ET tube secured  OG tube secured  Eyes:      General: No scleral icterus.     Comments: Pupils pinpoint  Conjunctival injection   Neck:      Comments: Right IJ cordis  Trachea midline    Cardiovascular:      Rate and Rhythm: Tachycardia present.      Heart sounds: S1 normal and S2 normal. Murmur (Coarse, holosystolic) heard.      Comments: Sinus rhythm  Pulmonary:      Breath sounds: No wheezing or rhonchi.      Comments: Coarse BS in the bilateral bases  Abdominal:      General: There is no distension.      Palpations: Abdomen is soft.      Comments: Abdomen distended, midline ex lap dressing in place with mild serosang drainage  2 HEATH drains in place with serosanguinous output   Musculoskeletal:         General: No deformity.      Right lower leg: No edema.      Left lower leg: No edema.   Skin:     General: Skin is warm and dry.      Capillary Refill: Capillary refill takes 2 to 3 seconds.   Neurological:      Comments: Intubated and sedated          Radiology and Labs     Results from last 7 days   Lab Units 07/09/25  0606 07/08/25  1217 07/08/25  0419 07/07/25  1216 07/07/25  0348   WBC 10*3/mm3 17.90* 18.06* 21.68* 22.67* 21.27*   HEMOGLOBIN g/dL 11.4* 11.8* 12.9* 12.2* 12.6*   HEMATOCRIT % 35.9* 36.5* 39.2 37.2* 39.0   PLATELETS 10*3/mm3 128* 148 156 137* 147      Results from last 7 days   Lab Units 07/09/25  1033 07/06/25  1127 07/06/25  0623   PROTIME Seconds 25.6* 17.1* 16.1*   INR  2.32* 1.40* 1.30*   APTT seconds  --   --  27.9      Results from last 7 days   Lab Units 07/09/25  0803 07/09/25  0606  07/09/25  0012 07/08/25  1639 07/08/25  1217 07/08/25  0419 07/07/25  1727 07/07/25  1130 07/07/25  0348   SODIUM mmol/L 133* 136 136 137 135* 136   < > 141 134*   POTASSIUM mmol/L 4.4 4.6 5.0 5.3* 6.5* 4.9   < > 5.5* 5.9*   CHLORIDE mmol/L 99 99 98 99 99 99   < > 99 99   CO2 mmol/L 14.9* 16.7* 16.0* 16.8* 16.8* 21.3*   < > 24.7 20.9*   ANION GAP mmol/L 19.1* 20.3* 22.0* 21.2* 19.2* 15.7*   < > 17.3* 14.1   BUN mg/dL 19.8 20.9* 24.1* 30.6* 33.6* 27.7*   < > 28.6* 21.8*   CREATININE mg/dL 3.99* 4.26* 4.74* 6.10* 6.52* 5.17*   < > 5.01* 3.75*   GLUCOSE mg/dL 138* 99 52* 109* 89 102*   < > 137* 153*   PHOSPHORUS mg/dL 5.3*  --  5.8* 6.3* 7.3*  --   --  4.3  --    MAGNESIUM mg/dL 1.9  --  1.9 1.8 1.8 1.7  --  1.6 1.7   ALT (SGPT) U/L  --  2,692*  --   --  1,817* 2,561*  --  2,421* 1,260*   AST (SGOT) U/L  --  >7,000*  --   --  6,867* >7,000*  --  6,142* 2,198*   ALK PHOS U/L  --  272*  --   --  195* 201*  --  130* 84    < > = values in this interval not displayed.      Results from last 7 days   Lab Units 07/09/25  0606 07/08/25  1217 07/08/25  0419 07/07/25  1130 07/07/25  0348   ALT (SGPT) U/L 2,692* 1,817* 2,561* 2,421* 1,260*   AST (SGOT) U/L >7,000* 6,867* >7,000* 6,142* 2,198*   ALK PHOS U/L 272* 195* 201* 130* 84     Results from last 7 days   Lab Units 07/09/25  0829 07/09/25  0347 07/08/25  1924 07/08/25  1124 07/08/25  0431 07/07/25  0928 07/07/25  0001   PH, ARTERIAL pH units 7.219*  --   --  7.157* 7.306* 7.363 7.277*   PCO2, ARTERIAL mm Hg 38.2  --   --  56.8* 54.9* 47.8 54.7*   PO2 ART mm Hg 85.8  --   --  66.5* 76.8* 81.7* 59.7*   O2 SATURATION ART % 94.3  --   --  86.1* 93.5* 95.4 86.4*   FIO2 % 100 80 80 60 60 50 40   HCO3 ART mmol/L 15.6*  --   --  20.1* 27.4 27.2 25.6   BASE EXCESS ART mmol/L -11.4*  --   --  -8.9* 0.1 1.2 -2.0*       XR Chest 1 View  Result Date: 7/9/2025  Impression: Dense retrocardiac left lower lobe airspace disease with air bronchograms. Correlate for symptoms of pneumonia.  Stable right infrahilar/right middle lobe airspace disease which may represent atelectasis or pneumonia. Stable cardiac enlargement. Electronically Signed: Aracely Shukla MD  7/9/2025 10:29 AM EDT  Workstation ID: [id number]    XR Abdomen KUB  Result Date: 7/8/2025  Impression: Enteric tube overlies the stomach. Electronically Signed: Hipolito Alberts MD  7/8/2025 6:08 PM EDT  Workstation ID: [id number]    XR Chest 1 View  Result Date: 7/8/2025  Impression: 1.Postoperative changes of recent sternotomy. 2.Residual atelectasis or airspace disease in the left lung base. Electronically Signed: Phil Blackwood MD  7/8/2025 1:29 PM EDT  Workstation ID: [id number]    XR Surgical Count Protocol  Result Date: 7/8/2025  Impression: No definite retained sponges are identified. Electronically Signed: Kobe Hudson MD  7/8/2025 10:27 AM EDT  Workstation ID: [id number]    XR Chest 1 View  Result Date: 7/8/2025  Impression: Left internal jugular CVC catheter present with the tip at the upper SVC. No pneumothorax. Otherwise, no significant change. Electronically Signed: Yoly Julian MD  7/8/2025 12:57 AM EDT  Workstation ID: [id number]    XR Chest 1 View  Result Date: 7/7/2025  Impression: 1.Left jugular central venous catheter tip is in the expected location of the left brachiocephalic vein. 2.No pneumothorax is seen. 3.ET tube, NG tube, and right jugular central venous catheter remain in place. 4.Subsegmental atelectasis at the lung bases. Electronically Signed: Chace Ordaz MD  7/7/2025 9:52 PM EDT  Workstation ID: [id number]      Current medications     Scheduled Meds:   albumin human, 25 g, Intravenous, Once  calcium gluconate, 2,000 mg, Intravenous, Once  hydrocortisone sodium succinate, 100 mg, Intravenous, Q8H  mupirocin, 1 Application, Each Nare, BID  pantoprazole, 40 mg, Intravenous, Q AM  piperacillin-tazobactam, 4.5 g, Intravenous, Q8H  thiamine (B-1) IV, 500 mg, Intravenous, Q8H        Continuous Infusions:   dexmedetomidine, 0.2-1.5  mcg/kg/hr, Last Rate: Stopped (07/09/25 1000)  dextrose, 60 mL/hr, Last Rate: 60 mL/hr (07/09/25 0811)  fentanyl 10 mcg/mL,  mcg/hr, Last Rate: Stopped (07/09/25 1000)  norepinephrine, 0.02-0.5 mcg/kg/min, Last Rate: 0.23 mcg/kg/min (07/09/25 1130)  phenylephrine, 0.5-6 mcg/kg/min, Last Rate: 6 mcg/kg/min (07/09/25 1003)  PrismaSol BGK 2/3.5, 2,000 mL/hr, Last Rate: 2,000 mL/hr (07/09/25 1041)  PrismaSol BGK 2/3.5, 2,000 mL/hr, Last Rate: 2,000 mL/hr (07/09/25 1041)  PrismaSol BGK 2/3.5, 2,000 mL/hr, Last Rate: 2,000 mL/hr (07/09/25 1041)  vasopressin, 0.03 Units/min, Last Rate: 0.03 Units/min (07/09/25 0311)      Total critical care time: 33 minutes    Due to a high probability of clinically significant, life threatening deterioration, the patient required my highest level of preparedness to intervene emergently and I personally spent this critical care time directly and personally managing the patient. This critical care time included obtaining a history; examining the patient; pulse oximetry; ordering and review of studies; arranging urgent treatment with development of a management plan; evaluation of patient's response to treatment; frequent reassessment; and, discussions with other providers.    This critical care time was performed to assess and manage the high probability of imminent, life-threatening deterioration that could result in multi-organ failure. It was exclusive of separately billable procedures and treating other patients and teaching time.      Plan discussed with RN. Reviewed all other data in the last 24 hours, including but not limited to vitals, labs, microbiology, imaging and pertinent notes from other providers.        Kyler Morgan MD   Critical Care  07/09/25   11:40 EDT

## 2025-07-10 NOTE — OP NOTE
Operative Report:    Patient Name:  Tj Christopher  YOB: 1985    Date of Surgery:  7/6/2025     Indications:    40-year-old gentleman with history of type a aortic dissection repair, known chronic aortic dissection who presents with acute onset abdominal pain.  CT with worsening dissection dissection include the renal artery as well as the SMA with SMA occlusion.  Pain out of proportion to exam.  Elevated lactic acidosis.  Suspect he has bowel ischemia, discussed with Dr. Saenz, plan for emergent exploratory laparotomy with attempted revascularization of his SMA.  Discussed risk benefits and alternatives including infection, bleeding, injury track structures with patient and family and they elected to proceed.     Pre-op Diagnosis:   Steffi dissection with SMA occlusion       Post-Op Diagnosis Codes:  Same    Procedure/CPT® Codes:  AK EXPLORATORY LAPAROTOMY CELIOTOMY W/WO BIOPSY SPX [52245]    Procedure(s):  LAPAROTOMY EXPLORATORY, superior mesenteric artery thromboembolectomy, repair of mesenteric artery dissection with interposition graft placement, temporary abdominal closure    Staff:  Surgeon(s):  George Saenz MD Dawson, Timothy, MD Dawson, Timothy, MD    Cell Saver : Jah John  Circulator: Carol Ann Erazo RN; George Bledsoe RN  Scrub Person: Jacqueline Magallon        Anesthesia: General    Estimated Blood Loss: 350 mL    Implants:    Implant Name Type Inv. Item Serial No.  Lot No. LRB No. Used Action   INCISIONLINE PLEDGET TFLN SFT LG - [id number] Implant INCISIONLINE PLEDGET TFLN SFT LG  [employer] [id number] N/A 1 Implanted   CLIPAPPLR M/ ENDO LIGACLIP 9 3/8IN SM - [id number] Implant CLIPAPPLR M/ ENDO LIGACLIP 9 3/8IN SM  [employer]  DIV OF J AND J [id number] N/A 1 Implanted   CLIPAPPLR M/ ENDO LIGACLIP 20CLP 11IN MD - [id number] Implant CLIPAPPLR M/ ENDO LIGACLIP 20CLP 11IN MD  [employer]  DIV OF J AND J [id number] N/A 1 Implanted    CLIPAPPLR M/ ENDO LIGACLIP 13IN LG - [id number] Implant CLIPAPPLR M/ ENDO LIGACLIP 13IN LG  [employer]  DIV OF J AND J [id number] N/A 1 Implanted   HEMOST ABS SURGICEL NUKNIT 3X4IN - [id number] Implant HEMOST ABS SURGICEL NUKNIT 3X4IN  [employer]  DIV OF J AND J [id number] N/A 2 Implanted   CLIPAPPLR M/ ENDO LIGACLIP 20CLP 11IN MD - [id number] Implant CLIPAPPLR M/ ENDO LIGACLIP 20CLP 11IN MD  [employer]  DIV OF J AND J [id number] N/A 2 Implanted   CLIPAPPLR M/ ENDO LIGACLIP 20CLP 11IN MD - [id number] Implant CLIPAPPLR M/ ENDO LIGACLIP 20CLP 11IN MD  [employer]  DIV OF J AND J [id number] N/A 1 Implanted   GRFT VASC COLLGN 6MM 45CM - [id number] - [id number] Implant GRFT VASC COLLGN 6MM 45CM [id number] [id number] N/A 1 Implanted   KT SEAL HEMOS ABS FLOSEAL MATRX 1.5/FAST/PREP 5000/IU 10ML - [id number] Implant KT SEAL HEMOS ABS FLOSEAL MATRX 1.5/FAST/PREP 5000/IU 10ML  [facility] [id number] N/A 1 Implanted   CLIPAPPLR M/ ENDO LIGACLIP 13IN LG - [id number] Implant CLIPAPPLR M/ ENDO LIGACLIP 13IN LG  [employer]  DIV OF J AND J [id number] N/A 1 Implanted       Specimen:          Specimens       ID Source Type Tests Collected By Collected At Frozen?    A Artery Tissue TISSUE PATHOLOGY EXAM   Mitchell Crawford MD 7/6/25 7232 No    Description: thromboembolectomy of SMA                Findings: Small bowel and colon diffusely dusky with out evidence of justin ischemia or necrosis    Complications: None    Description of Procedure:   After risk-benefit and alternatives were discussed with patient informed consent was obtained.  He was transported the operating room placed supine the operating table and underwent general anesthesia.  His abdomen was prepped and draped in sterile fashion and a surgical timeout was completed.  I made a midline incision with a 10 blade scalpel dissected down to the fascia electrocautery.  I performed a finger sweep there were no anterior abdominal wall  adhesions open the peritoneum along the length of my incision with electrocautery.  Vince wound protector and Bookwalter retractor were placed for fixed retraction.  I ran the small bowel from the ligament of Treitz to the ileocecal valve it was slightly dusky diffusely with no evidence of justin ischemia or necrosis or perforation.  I inspected the entirety of the colon and intra-abdominal rectum this appeared the same.  At this point Dr. Saenz proceeded with the vascular portion of the procedure which will be dictated separately.  I served as co-surgeon for his portion of the procedure as well.  After Dr. Saenz has finished his portion of the procedure I ran the small bowel and colon again this appeared unchanged.  I elected to leave the patient's abdomen open given his critical illness with plans for takeback exploratory laparotomy in 48 hours.  ABThera wound VAC was placed.  Patient remained intubated and was transported to the intensive care unit for further resuscitation.      Mitchell Crawford MD     Date: 7/10/2025  Time: 14:18 EDT    This note was created using Dragon Voice Recognition software.

## 2025-07-10 NOTE — PROGRESS NOTES
Patient Name: Tj Christopher  YOB: 1985  MRN: [id number]  Admission date: 7/6/2025  Reason for Encounter: Follow-up/Progress Note      [facility] Clinical Nutrition Progress Note       Nutrition Intervention Updates: Recommend holding tube feedings until pt is more hemodynamically stable (lactate <8) and surgery approval        Subjective: Progress note to check on nutrition plan of care.  Remains intubated.  Lactate worsened, is 12.8 - continue to hold tube feedings per discussion during ICU rounds. Repeat lactate today.  On D10%, fentanyl, teagan, vaso, CRRT.  S/P bronch 7/9.  S/P ex lap with bowel resection, wound vac placement 7/9.  Noted with plans to return to OR 7/11.  Will monitor for nutrition plan of care.         PO Diet: NPO Diet NPO Type: Strict NPO   PO Supplements: None    PO Intake:  NPO       Current nutrition support: RD to manage    Nutrition support review: N/A       Labs: Low sodium - management per MD         GI Function:  Last documented BM 7/9 (yesterday)        Brief Weight Review:    Wt Readings from Last 1 Encounters:   07/10/25 0400 (!) 165 kg (364 lb 13.8 oz)   07/09/25 0541 (!) 166 kg (366 lb 2.9 oz)   07/06/25 1500 (!) 164 kg (360 lb 14.3 oz)   07/06/25 0606 (!) 145 kg (320 lb)        Results from last 7 days   Lab Units 07/10/25  0708 07/10/25  0002 07/09/25  1628 07/09/25  0803 07/09/25  0606 07/08/25  1639 07/08/25  1217 07/08/25  0419   SODIUM mmol/L 135* 134* 136   < > 136   < > 135* 136   POTASSIUM mmol/L 4.7 5.2 5.1   < > 4.6   < > 6.5* 4.9   CHLORIDE mmol/L 100 99 99   < > 99   < > 99 99   CO2 mmol/L 18.4* 17.5* 15.3*   < > 16.7*   < > 16.8* 21.3*   BUN mg/dL 15.9 16.8 20.0   < > 20.9*   < > 33.6* 27.7*   CREATININE mg/dL 3.49* 3.88* 4.62*   < > 4.26*   < > 6.52* 5.17*   CALCIUM mg/dL 8.6 8.5* 8.1*   < > 8.9   < > 8.0* 7.8*   BILIRUBIN mg/dL  --   --   --   --  5.1*  --  2.9* 2.6*   ALK PHOS U/L  --   --   --   --  272*  --  195* 201*   ALT (SGPT) U/L  --   --   --  "  --  2,692*  --  1,817* 2,561*   AST (SGOT) U/L  --   --   --   --  >7,000*  --  6,867* >7,000*   GLUCOSE mg/dL 117* 164* 143*   < > 99   < > 89 102*    < > = values in this interval not displayed.     Results from last 7 days   Lab Units 07/10/25  0708 07/10/25  0002 07/09/25  1628 07/09/25  1402 07/09/25  0803 07/08/25  1217 07/08/25  0419   MAGNESIUM mg/dL  --  1.9 1.9  --  1.9   < > 1.7   PHOSPHORUS mg/dL 4.4 5.0* 7.4*  --  5.3*   < >  --    PLATELETS 10*3/mm3  --   --  99*  --   --    < > 156   HEMOGLOBIN g/dL  --   --  10.2*  --   --    < > 12.9*   HEMOGLOBIN, POC   --   --   --    < >  --   --   --    HEMATOCRIT %  --   --  31.1*  --   --    < > 39.2   HEMATOCRIT POC   --   --   --    < >  --   --   --    TRIGLYCERIDES mg/dL  --   --   --   --   --   --  614*    < > = values in this interval not displayed.     No results found for: \"HGBA1C\"    Electronically signed by:  Avril Mir, JUAN JOSÉ  07/10/25 08:36 EDT     "

## 2025-07-10 NOTE — SIGNIFICANT NOTE
I received a call from the intensivist, Dr. Morgan, in regards to the patient's clinical deterioration.  Since my morning evaluation, patient has become more septic and has actually developed atrial fibrillation with rapid ventricular rate.  He has required cardioversion at least 5 different times due to rates in the 200s.  His lactic acid is now increasing, which is suggestive of further bowel ischemia.  As such, I was asked to reevaluate the patient and speak with the family regarding his prognosis.    Met with the patient's fiancé, who was at bedside.  I explained that his current status is highly concerning for further bowel ischemia.  If this were the case, I recommended against further surgical intervention, as this likely represents complete ischemia of the small bowel, which is not compatible with life.  That said, this could be due to inability to clear lactic acidosis due to severe hepatic dysfunction.  In such case, if the patient is able to survive for the next 24 hours, then definitive surgery may be considered.  However, I counseled the patient's fiancé that this is more than likely not feasible given how critically ill he is.  She voiced understanding, but maintained that she was for everything to be done in accordance with his family's wishes.    Merrill Henry MD   General Surgery  07/11/25   11:31 EDT

## 2025-07-10 NOTE — PROGRESS NOTES
Nephrology  Progress Note                                        [facility]    Patient Identification    Name: Tj Christopher  Age: 40 y.o.  Sex: male  :  1985  MRN: [id number]      DATE OF SERVICE:  7/10/2025        Subective     On the vent pressors and CRRT  Currently on 3 pressors  Objective   Scheduled Meds:hydrocortisone sodium succinate, 100 mg, Intravenous, Q8H  mupirocin, 1 Application, Each Nare, BID  pantoprazole, 40 mg, Intravenous, Q AM  piperacillin-tazobactam, 4.5 g, Intravenous, Q8H          Continuous Infusions:dexmedetomidine, 0.2-1.5 mcg/kg/hr, Last Rate: Stopped (25)  dextrose, 50 mL/hr, Last Rate: 15 mL/hr (07/10/25 0257)  fentanyl 10 mcg/mL,  mcg/hr, Last Rate: 100 mcg/hr (07/10/25 0256)  norepinephrine, 0.02-0.5 mcg/kg/min, Last Rate: Stopped (07/10/25 0629)  phenylephrine, 0.5-6 mcg/kg/min, Last Rate: 3.5 mcg/kg/min (07/10/25 0644)  PrismaSol BGK 2/3.5, 2,000 mL/hr, Last Rate: 2,000 mL/hr (07/10/25 0510)  PrismaSol BGK 2/3.5, 2,000 mL/hr, Last Rate: 2,000 mL/hr (07/10/25 0510)  PrismaSol BGK 2/3.5, 2,000 mL/hr, Last Rate: 2,000 mL/hr (07/10/25 0510)  sodium bicarbonate, 150 mEq, Last Rate: 150 mEq (07/10/25 0540)  vasopressin, 0.03 Units/min, Last Rate: 0.03 Units/min (07/10/25 0243)        PRN Meds:  artificial tears    Calcium Replacement - Follow Nurse / BPA Driven Protocol    dextrose    dextrose    fentaNYL    glucagon (human recombinant)    heparin (porcine)    heparin (porcine)    Magnesium Standard Dose Replacement - Follow Nurse / BPA Driven Protocol    nitroglycerin    Phosphorus Replacement - Follow Nurse / BPA Driven Protocol    Potassium Replacement - Follow Nurse / BPA Driven Protocol    [COMPLETED] Insert Peripheral IV **AND** sodium chloride     Exam:  /68   Pulse 89   Temp 99.7 °F (37.6 °C)  "(Oral)   Resp 26   Ht 193 cm (76\")   Wt (!) 165 kg (364 lb 13.8 oz)   SpO2 100%   BMI 44.41 kg/m²     Intake/Output last 3 shifts:  I/O last 3 completed shifts:  In: 17633 [I.V.:28938; Other:30; NG/GT:41; IV Piggyback:250]  Out: 8071.5 [Drains:80]    Intake/Output this shift:  No intake/output data recorded.    Physical exam:  General Appearance:   on the vent and pressors and CRRT  Head:  Normocephalic, without obvious abnormality, atraumatic  Eyes:  PERRL, conjunctiva/corneas clear     Neck:  Supple,  no adenopathy;      Lungs:  Decreased BS occasion ronchi  Heart:  Regular rate and rhythm, S1 and S2 normal  Abdomen:  dressing  Extremities: trace edema  Pulses: 2+ and symmetric all extremities  Skin:  No rashes or lesions       Data Review:  All labs (24hrs):   Recent Results (from the past 24 hours)   Renal Function Panel    Collection Time: 07/09/25  8:03 AM    Specimen: Blood   Result Value Ref Range    Glucose 138 (H) 65 - 99 mg/dL    BUN 19.8 6.0 - 20.0 mg/dL    Creatinine 3.99 (H) 0.76 - 1.27 mg/dL    Sodium 133 (L) 136 - 145 mmol/L    Potassium 4.4 3.5 - 5.2 mmol/L    Chloride 99 98 - 107 mmol/L    CO2 14.9 (L) 22.0 - 29.0 mmol/L    Calcium 8.8 8.6 - 10.5 mg/dL    Albumin 2.9 (L) 3.5 - 5.2 g/dL    Phosphorus 5.3 (H) 2.5 - 4.5 mg/dL    Anion Gap 19.1 (H) 5.0 - 15.0 mmol/L    BUN/Creatinine Ratio 5.0 (L) 7.0 - 25.0    eGFR 18.7 (L) >60.0 mL/min/1.73   Magnesium    Collection Time: 07/09/25  8:03 AM    Specimen: Blood   Result Value Ref Range    Magnesium 1.9 1.6 - 2.6 mg/dL   Calcium, Ionized    Collection Time: 07/09/25  8:03 AM    Specimen: Blood   Result Value Ref Range    Ionized Calcium 1.08 (L) 1.15 - 1.30 mmol/L   Procalcitonin    Collection Time: 07/09/25  8:03 AM    Specimen: Blood   Result Value Ref Range    Procalcitonin 89.30 (H) 0.00 - 0.25 ng/mL   POC Glucose Once    Collection Time: 07/09/25  8:04 AM    Specimen: Blood   Result Value Ref Range    Glucose 138 (H) 70 - 105 mg/dL   Blood Gas, " Arterial -    Collection Time: 07/09/25  8:29 AM    Specimen: Arterial Blood   Result Value Ref Range    Site Arterial Line     Henok's Test N/A     pH, Arterial 7.219 (L) 7.350 - 7.450 pH units    pCO2, Arterial 38.2 35.0 - 48.0 mm Hg    pO2, Arterial 85.8 83.0 - 108.0 mm Hg    HCO3, Arterial 15.6 (L) 21.0 - 28.0 mmol/L    Base Excess, Arterial -11.4 (L) 0.0 - 3.0 mmol/L    O2 Saturation, Arterial 94.3 94.0 - 98.0 %    CO2 Content 16.7 (L) 22 - 29 mmol/L    Barometric Pressure for Blood Gas      Modality Adult Vent     FIO2 100 %    Ventilator Mode AC     Set Tidal Volume 700     PEEP 7     Hemodilution No     Respiratory Rate 26     PO2/FIO2 86 0 - 500   POC Lactate    Collection Time: 07/09/25  8:29 AM    Specimen: Arterial Blood   Result Value Ref Range    Lactate 9.3 (C) 0.2 - 2.0 mmol/L    Notified Time      Notified Who      Read Back     Protime-INR    Collection Time: 07/09/25 10:33 AM    Specimen: Blood   Result Value Ref Range    Protime 25.6 (H) 11.7 - 14.2 Seconds    INR 2.32 (C) 0.90 - 1.10   POC Glucose Once    Collection Time: 07/09/25 10:33 AM    Specimen: Blood   Result Value Ref Range    Glucose 136 (H) 70 - 105 mg/dL   POC Glucose Once    Collection Time: 07/09/25 12:02 PM    Specimen: Blood   Result Value Ref Range    Glucose 76 70 - 105 mg/dL   Blood Gas, Arterial -    Collection Time: 07/09/25 12:30 PM    Specimen: Arterial Blood   Result Value Ref Range    Site Right Radial     pH, Arterial 7.205 (L) 7.350 - 7.450 pH units    pCO2, Arterial 39.6 35.0 - 48.0 mm Hg    pO2, Arterial 56.4 (L) 83.0 - 108.0 mm Hg    HCO3, Arterial 15.7 (L) 21.0 - 28.0 mmol/L    Base Excess, Arterial -11.6 (L) 0.0 - 3.0 mmol/L    O2 Saturation, Arterial 82.3 (L) 94.0 - 98.0 %    CO2 Content 16.9 (L) 22 - 29 mmol/L    Barometric Pressure for Blood Gas      Modality Adult Vent     FIO2 100 %    Ventilator Mode AC     Set Tidal Volume 700     PEEP 10     Hemodilution No     Respiratory Rate 26     PO2/FIO2 56 0 - 500    Ammonia    Collection Time: 07/09/25 12:31 PM    Specimen: Blood   Result Value Ref Range    Ammonia 76 (H) 16 - 60 umol/L   Type & Screen    Collection Time: 07/09/25  1:23 PM    Specimen: Blood   Result Value Ref Range    ABO Type O     RH type Positive     Antibody Screen Negative     T&S Expiration Date 7/12/2025 11:59:59 PM    POC Glucose Once    Collection Time: 07/09/25  1:35 PM    Specimen: Blood   Result Value Ref Range    Glucose 132 (H) 70 - 105 mg/dL   POC Chem 8, arterial (ISTAT)    Collection Time: 07/09/25  2:02 PM    Specimen: Arterial Blood   Result Value Ref Range    Ionized Calcium 1.05 (L) 1.12 - 1.32 mmol/L    pH, Arterial 7.100 (C) 7.350 - 7.450 pH units    pCO2, Arterial 55.3 (H) 35.0 - 45.0 mm Hg    pO2, Arterial 99.0 80.0 - 105.0 mm Hg    HCO3, Arterial 17.1 (L) 22.0 - 26.0 mmol/L    Base Excess, Arterial <0.0 (L) 0.0 - 3.0 mmol/L    Base Deficit      O2 Saturation, Arterial 94.0 (L) 95.0 - 98.0 %    Glucose 125 (H) 70 - 105 mg/dL    Sodium 134 (L) 138 - 146 mmol/L    POC Potassium 4.7 3.5 - 4.9 mmol/L    Hematocrit 33 (L) 38 - 51 %    Hemoglobin 11.2 (L) 12.0 - 17.0 g/dL    CO2 Content 19 (L) 23 - 27 mmol/L   POC Chem 8, arterial (ISTAT)    Collection Time: 07/09/25  2:21 PM    Specimen: Arterial Blood   Result Value Ref Range    Ionized Calcium 1.04 (L) 1.12 - 1.32 mmol/L    pH, Arterial 7.150 (C) 7.350 - 7.450 pH units    pCO2, Arterial 49.1 (H) 35.0 - 45.0 mm Hg    pO2, Arterial 96.0 80.0 - 105.0 mm Hg    HCO3, Arterial 16.9 (L) 22.0 - 26.0 mmol/L    Base Excess, Arterial <0.0 (L) 0.0 - 3.0 mmol/L    Base Deficit      O2 Saturation, Arterial 95.0 95.0 - 98.0 %    Glucose 122 (H) 70 - 105 mg/dL    Sodium 134 (L) 138 - 146 mmol/L    POC Potassium 4.6 3.5 - 4.9 mmol/L    Hematocrit 30 (L) 38 - 51 %    Hemoglobin 10.2 (L) 12.0 - 17.0 g/dL    CO2 Content 18 (L) 23 - 27 mmol/L   POC Chem 8, arterial (ISTAT)    Collection Time: 07/09/25  2:47 PM    Specimen: Arterial Blood   Result Value Ref  Range    Ionized Calcium 1.02 (L) 1.12 - 1.32 mmol/L    pH, Arterial 7.180 (C) 7.350 - 7.450 pH units    pCO2, Arterial 45.5 (H) 35.0 - 45.0 mm Hg    pO2, Arterial 88.0 80.0 - 105.0 mm Hg    HCO3, Arterial 17.2 (L) 22.0 - 26.0 mmol/L    Base Excess, Arterial <0.0 (L) 0.0 - 3.0 mmol/L    Base Deficit      O2 Saturation, Arterial 94.0 (L) 95.0 - 98.0 %    Glucose 120 (H) 70 - 105 mg/dL    Sodium 133 (L) 138 - 146 mmol/L    POC Potassium 4.8 3.5 - 4.9 mmol/L    Hematocrit 29 (L) 38 - 51 %    Hemoglobin 9.9 (L) 12.0 - 17.0 g/dL    CO2 Content 19 (L) 23 - 27 mmol/L   POC Chem 8, arterial (ISTAT)    Collection Time: 07/09/25  3:27 PM    Specimen: Arterial Blood   Result Value Ref Range    Ionized Calcium 1.00 (L) 1.12 - 1.32 mmol/L    pH, Arterial 7.200 (L) 7.350 - 7.450 pH units    pCO2, Arterial 43.7 35.0 - 45.0 mm Hg    pO2, Arterial 99.0 80.0 - 105.0 mm Hg    HCO3, Arterial 17.2 (L) 22.0 - 26.0 mmol/L    Base Excess, Arterial <0.0 (L) 0.0 - 3.0 mmol/L    Base Deficit      O2 Saturation, Arterial 96.0 95.0 - 98.0 %    Glucose 124 (H) 70 - 105 mg/dL    Sodium 134 (L) 138 - 146 mmol/L    POC Potassium 4.9 3.5 - 4.9 mmol/L    Hematocrit 29 (L) 38 - 51 %    Hemoglobin 9.9 (L) 12.0 - 17.0 g/dL    CO2 Content 18 (L) 23 - 27 mmol/L   Blood Gas, Arterial -    Collection Time: 07/09/25  4:27 PM    Specimen: Arterial Blood   Result Value Ref Range    Site Arterial Line     Henok's Test N/A     pH, Arterial 7.226 (L) 7.350 - 7.450 pH units    pCO2, Arterial 38.8 35.0 - 48.0 mm Hg    pO2, Arterial 85.3 83.0 - 108.0 mm Hg    HCO3, Arterial 16.1 (L) 21.0 - 28.0 mmol/L    Base Excess, Arterial -10.8 (L) 0.0 - 3.0 mmol/L    O2 Saturation, Arterial 94.3 94.0 - 98.0 %    CO2 Content 17.3 (L) 22 - 29 mmol/L    Barometric Pressure for Blood Gas      Modality Adult Vent     FIO2 100 %    Ventilator Mode AC     Set Tidal Volume 700     PEEP 10     Hemodilution No     Respiratory Rate 26     PO2/FIO2 85 0 - 500   POC Glucose Once     Collection Time: 07/09/25  4:27 PM    Specimen: Arterial Blood   Result Value Ref Range    Glucose 138 (H) 74 - 100 mg/dL   Renal Function Panel    Collection Time: 07/09/25  4:28 PM    Specimen: Blood   Result Value Ref Range    Glucose 143 (H) 65 - 99 mg/dL    BUN 20.0 6.0 - 20.0 mg/dL    Creatinine 4.62 (H) 0.76 - 1.27 mg/dL    Sodium 136 136 - 145 mmol/L    Potassium 5.1 3.5 - 5.2 mmol/L    Chloride 99 98 - 107 mmol/L    CO2 15.3 (L) 22.0 - 29.0 mmol/L    Calcium 8.1 (L) 8.6 - 10.5 mg/dL    Albumin 2.7 (L) 3.5 - 5.2 g/dL    Phosphorus 7.4 (H) 2.5 - 4.5 mg/dL    Anion Gap 21.7 (H) 5.0 - 15.0 mmol/L    BUN/Creatinine Ratio 4.3 (L) 7.0 - 25.0    eGFR 15.6 (L) >60.0 mL/min/1.73   Magnesium    Collection Time: 07/09/25  4:28 PM    Specimen: Blood   Result Value Ref Range    Magnesium 1.9 1.6 - 2.6 mg/dL   Calcium, Ionized    Collection Time: 07/09/25  4:28 PM    Specimen: Blood   Result Value Ref Range    Ionized Calcium 1.00 (L) 1.15 - 1.30 mmol/L   Protime-INR    Collection Time: 07/09/25  4:28 PM    Specimen: Blood   Result Value Ref Range    Protime 25.9 19.4 - 28.5 Seconds    INR 2.35 2.00 - 3.00   Lactic Acid, Plasma    Collection Time: 07/09/25  4:28 PM    Specimen: Blood   Result Value Ref Range    Lactate 12.8 (C) 0.5 - 2.0 mmol/L   CBC Auto Differential    Collection Time: 07/09/25  4:28 PM    Specimen: Blood   Result Value Ref Range    WBC 19.40 (H) 3.40 - 10.80 10*3/mm3    RBC 3.60 (L) 4.14 - 5.80 10*6/mm3    Hemoglobin 10.2 (L) 13.0 - 17.7 g/dL    Hematocrit 31.1 (L) 37.5 - 51.0 %    MCV 86.4 79.0 - 97.0 fL    MCH 28.3 26.6 - 33.0 pg    MCHC 32.8 31.5 - 35.7 g/dL    RDW 15.0 12.3 - 15.4 %    RDW-SD 47.7 37.0 - 54.0 fl    MPV 11.0 6.0 - 12.0 fL    Platelets 99 (L) 140 - 450 10*3/mm3   Manual Differential    Collection Time: 07/09/25  4:28 PM    Specimen: Blood   Result Value Ref Range    Neutrophil % 64.0 42.7 - 76.0 %    Lymphocyte % 7.0 (L) 19.6 - 45.3 %    Monocyte % 9.0 5.0 - 12.0 %    Bands %  12.0  (H) 0.0 - 5.0 %    Metamyelocyte % 1.0 (H) 0.0 - 0.0 %    Myelocyte % 5.0 (H) 0.0 - 0.0 %    Atypical Lymphocyte % 2.0 0.0 - 5.0 %    Neutrophils Absolute 14.74 (H) 1.70 - 7.00 10*3/mm3    Lymphocytes Absolute 1.75 0.70 - 3.10 10*3/mm3    Monocytes Absolute 1.75 (H) 0.10 - 0.90 10*3/mm3    nRBC 25.0 (H) 0.0 - 0.2 /100 WBC    RBC Morphology Normal Normal    WBC Morphology Normal Normal    Platelet Estimate Decreased Normal   Duplex Venous Upper Extremity - Left CAR    Collection Time: 07/09/25  6:16 PM   Result Value Ref Range    Left Cephalic Upper Color 1.0     Right Subclavian Spont Y     Right Subclavian Phasic Y     Right Subclavian Compress C     Right Subclavian Augment Y     Left Internal Jugular Spont Y     Left Internal Jugular Phasic Y     Left Internal Jugular Compress C     Left Internal Jugular Augment Y     Left Subclavian Spont Y     Left Subclavian Phasic Y     Left Subclavian Augment Y     Left Axillary Spont Y     Left Axillary Phasic Y     Left Axillary Compress C     Left Axillary Augment Y     Left Brachial Compress C     Left Radial Compress C     Left Ulnar Compress C     Left Basilic Upper Compress C     Left Basilic Forearm Compress C     Left Cephalic Upper Compress N     Left Cephalic Upper Thrombus A     Left Cephalic Forearm Compress C    POC Glucose Once    Collection Time: 07/09/25  7:19 PM    Specimen: Blood   Result Value Ref Range    Glucose 136 (H) 70 - 105 mg/dL   POC Glucose Q2H    Collection Time: 07/09/25 10:07 PM    Specimen: Blood   Result Value Ref Range    Glucose 150 (H) 70 - 105 mg/dL   POC Glucose Once    Collection Time: 07/10/25 12:01 AM    Specimen: Blood   Result Value Ref Range    Glucose 156 (H) 70 - 105 mg/dL   Renal Function Panel    Collection Time: 07/10/25 12:02 AM    Specimen: Blood   Result Value Ref Range    Glucose 164 (H) 65 - 99 mg/dL    BUN 16.8 6.0 - 20.0 mg/dL    Creatinine 3.88 (H) 0.76 - 1.27 mg/dL    Sodium 134 (L) 136 - 145 mmol/L    Potassium 5.2  3.5 - 5.2 mmol/L    Chloride 99 98 - 107 mmol/L    CO2 17.5 (L) 22.0 - 29.0 mmol/L    Calcium 8.5 (L) 8.6 - 10.5 mg/dL    Albumin 2.7 (L) 3.5 - 5.2 g/dL    Phosphorus 5.0 (H) 2.5 - 4.5 mg/dL    Anion Gap 17.5 (H) 5.0 - 15.0 mmol/L    BUN/Creatinine Ratio 4.3 (L) 7.0 - 25.0    eGFR 19.3 (L) >60.0 mL/min/1.73   Magnesium    Collection Time: 07/10/25 12:02 AM    Specimen: Blood   Result Value Ref Range    Magnesium 1.9 1.6 - 2.6 mg/dL   Calcium, Ionized    Collection Time: 07/10/25 12:02 AM    Specimen: Blood   Result Value Ref Range    Ionized Calcium 1.06 (L) 1.15 - 1.30 mmol/L   POC Glucose Q2H    Collection Time: 07/10/25  2:28 AM    Specimen: Blood   Result Value Ref Range    Glucose 149 (H) 70 - 105 mg/dL   Blood Gas, Arterial -    Collection Time: 07/10/25  3:42 AM    Specimen: Arterial Blood   Result Value Ref Range    Site Arterial Line     Henok's Test N/A     pH, Arterial 7.334 (L) 7.350 - 7.450 pH units    pCO2, Arterial 35.5 35.0 - 48.0 mm Hg    pO2, Arterial 319.7 (H) 83.0 - 108.0 mm Hg    HCO3, Arterial 18.9 (L) 21.0 - 28.0 mmol/L    Base Excess, Arterial -6.3 (L) 0.0 - 3.0 mmol/L    O2 Saturation, Arterial 99.9 (H) 94.0 - 98.0 %    CO2 Content 20.0 (L) 22 - 29 mmol/L    Barometric Pressure for Blood Gas      Modality Adult Vent     FIO2 100 %    Ventilator Mode AC     Set Tidal Volume 700     PEEP 10     Hemodilution No     Respiratory Rate 26     PO2/FIO2 320 0 - 500   POC Glucose Once    Collection Time: 07/10/25  4:08 AM    Specimen: Blood   Result Value Ref Range    Glucose 138 (H) 70 - 105 mg/dL   Prepare Fresh Frozen Plasma, 2 Units    Collection Time: 07/10/25  6:25 AM   Result Value Ref Range    Product Code D9804T23     Unit Number [id number]     UNIT  ABO O     UNIT  RH POS     Dispense Status PT     Blood Expiration Date 749386022513     Blood Type Barcode 5100     Product Code S0365M00     Unit Number [id number]     UNIT  ABO O     UNIT  RH POS     Dispense Status RE     Blood  Expiration Date 574058003562     Blood Type Barcode 5100    Calcium, Ionized    Collection Time: 07/10/25  7:08 AM    Specimen: Blood   Result Value Ref Range    Ionized Calcium 1.06 (L) 1.15 - 1.30 mmol/L          Imaging:  XR Chest 1 View  Result Date: 7/10/2025  1.Tubes and lines appear in good position. 2.Persistent bibasilar infiltrates or atelectasis and small left pleural effusion. Electronically Signed: Den Sanders MD  7/10/2025 2:33 AM EDT  Workstation ID: [id number]    XR Chest 1 View  Result Date: 7/9/2025  Impression: Dense retrocardiac left lower lobe airspace disease with air bronchograms. Correlate for symptoms of pneumonia. Stable right infrahilar/right middle lobe airspace disease which may represent atelectasis or pneumonia. Stable cardiac enlargement. Electronically Signed: Aracely Shukla MD  7/9/2025 10:29 AM EDT  Workstation ID: [id number]    XR Abdomen KUB  Result Date: 7/8/2025  Impression: Enteric tube overlies the stomach. Electronically Signed: Hipolito Alberts MD  7/8/2025 6:08 PM EDT  Workstation ID: [id number]    XR Chest 1 View  Result Date: 7/8/2025  Impression: 1.Postoperative changes of recent sternotomy. 2.Residual atelectasis or airspace disease in the left lung base. Electronically Signed: Phil Blackwood MD  7/8/2025 1:29 PM EDT  Workstation ID: [id number]    XR Surgical Count Protocol  Result Date: 7/8/2025  Impression: No definite retained sponges are identified. Electronically Signed: Kobe Hudson MD  7/8/2025 10:27 AM EDT  Workstation ID: [id number]    XR Chest 1 View  Result Date: 7/8/2025  Impression: Left internal jugular CVC catheter present with the tip at the upper SVC. No pneumothorax. Otherwise, no significant change. Electronically Signed: Yoly Julian MD  7/8/2025 12:57 AM EDT  Workstation ID: [id number]    XR Chest 1 View  Result Date: 7/7/2025  Impression: 1.Left jugular central venous catheter tip is in the expected location of the left brachiocephalic vein. 2.No pneumothorax  is seen. 3.ET tube, NG tube, and right jugular central venous catheter remain in place. 4.Subsegmental atelectasis at the lung bases. Electronically Signed: Chace Ordaz MD  7/7/2025 9:52 PM EDT  Workstation ID: [id number]    XR Chest 1 View  Result Date: 7/6/2025  Impression: Tip of the right internal jugular central venous catheter terminates in the upper SVC. No pneumothorax visible on this supine image. Tip of the endotracheal tube terminates in the midthoracic trachea approximately 3 to 4 cm above the orion. Tip of the enteric tube terminates in the gastric fundus. Electronically Signed: Kelli Olivera MD  7/6/2025 5:16 PM EDT  Workstation ID: [id number]    CT Angiogram Chest  Result Date: 7/6/2025  Impression: Postsurgical changes of prior aortic dissection repair including proximal aortic arch repair and aortic root reconstruction, now with new/worsened complex aortic dissection spanning from the proximal aortic arch to the level of the aortic bifurcation, notably with appearance of multiple/three lumens, which may be related to reentry intimal tear/secondary dissection. Dissection extends into the superior mesenteric artery with subsequent downstream occlusion at its midportion with reconstitution at its branch point. There is a replaced right hepatic artery off the superior mesenteric artery that demonstrates only thready opacification with subsequent perfusional changes within the right hepatic lobe. Complex dissection flap involves the origin of two codominant left renal arteries with subsequent partial infarction of the left kidney. Celiac artery is narrowed but patent. Right renal artery and inferior mesenteric artery appear patent. Short segment extension to the left subclavian artery which is otherwise patent. There is overall increased aneurysmal dilatation of the thoracoabdominal aorta, for example measuring 5.2 cm at the descending thoracic aorta and 5.7 cm at the infrarenal abdominal aorta. There  are also enlarged aneurysms of the bilateral common iliac arteries. Cardiothoracic surgery consult is recommended. Please note while the chest CT was performed as a CTA with adequate/arterial contrast bolus timing, the CT abdomen pelvis was performed with routine/portal venous phase and thus suboptimal for assessment of arterial structures. Findings and recommendations discussed with Zaida Kruse NP at 8:28 a.m. on 7/6/2025. Electronically Signed: Alexander Swenson MD  7/6/2025 9:07 AM EDT  Workstation ID: [id number]    CT Abdomen Pelvis With Contrast  Result Date: 7/6/2025  Impression: Postsurgical changes of prior aortic dissection repair including proximal aortic arch repair and aortic root reconstruction, now with new/worsened complex aortic dissection spanning from the proximal aortic arch to the level of the aortic bifurcation, notably with appearance of multiple/three lumens, which may be related to reentry intimal tear/secondary dissection. Dissection extends into the superior mesenteric artery with subsequent downstream occlusion at its midportion with reconstitution at its branch point. There is a replaced right hepatic artery off the superior mesenteric artery that demonstrates only thready opacification with subsequent perfusional changes within the right hepatic lobe. Complex dissection flap involves the origin of two codominant left renal arteries with subsequent partial infarction of the left kidney. Celiac artery is narrowed but patent. Right renal artery and inferior mesenteric artery appear patent. Short segment extension to the left subclavian artery which is otherwise patent. There is overall increased aneurysmal dilatation of the thoracoabdominal aorta, for example measuring 5.2 cm at the descending thoracic aorta and 5.7 cm at the infrarenal abdominal aorta. 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 Cardiothoracic surgery consult is recommended. Please note while  the chest CT was performed as a CTA with adequate/arterial contrast bolus timing, the CT abdomen pelvis was performed with routine/portal venous phase and thus suboptimal for assessment of arterial structures. Findings and recommendations discussed with Zaida Kruse NP at 8:28 a.m. on 7/6/2025. Electronically Signed: Alexander Swenson MD  7/6/2025 9:07 AM EDT  Workstation ID: [id number]      Assessment/Plan:     Dissection of mesenteric artery    Aortic dissection    Acute mesenteric ischemia    Acute hypoxic respiratory failure         Acute kidney injury   hyperkalemia  SMA occlusion Status post thrombectomy with repair of mesenteric artery dissection  Mesenteric ischemia   Mesenteric artery dissection   Acute respiratory failure   Metabolic acidosis   History of hypertension    Acute liver injury     Recommendations:    Continue CRRT    Labs are somewhat better  Noted plans for going back to the OR tomorrow   Trying to pull fluids with CRRT   Currently off bicarb drip   Watch electrolytes   Blood pressure is not encouraging   patient is critically ill   CCT 32   Discussed with nursing staff  Will follow closely with you

## 2025-07-10 NOTE — PLAN OF CARE
Goal Outcome Evaluation:  Plan of Care Reviewed With: patient, significant other           Outcome Evaluation: remains on crrt, vent at 60%/+10. levo, hco3, fentanyl, teagan, vaso, d20. still no response to any stimuli

## 2025-07-10 NOTE — PLAN OF CARE
Goal Outcome Evaluation:     Patient remains on teagan, vaso, fent, precedex, d20. 1 amp d50 given this shift. D20 running at 40 now. Patient had episode of HR in 180-200 see note. Rectal tube placed for lactulose enemas to be given. Methylene blue given, see MAR. Vent settings at this time are 7 peep at 50%

## 2025-07-10 NOTE — PROGRESS NOTES
Critical Care Progress Note     Tj Christopher : 1985 MRN:[id number] LOS:4  Rm: 2305/1     Principal Problem: Dissection of mesenteric artery     Reason for follow up: All the medical problems listed below    Summary     Tj Christopher is a 40 y.o. male with PMH of uncontrolled hypertension and AAA dissection with surgical repair (3/27/2024) who presented to the ED for evaluation of abdominal pain, nausea and vomiting since approximately 0300 which started shortly after he had eaten. The patient has a history of uncontrolled hypertension which contributed to a type A aortic dissection. On 3/27/2024 the patient had repair of a type a aortic dissection, hemiarch repair and aortic valve resuspension with root repair by Dr. Parkinson 3/2025. The patient was reportedly lost to follow-up however he did report compliance with his medications.      Diagnostic imaging in the ED with abdominal angiogram revealed acute mesenteric ischemia with acute abdomen due to mesenteric ischemia and occlusion of the SMA.  Vascular surgery was consulted emergently by the ED NP Zaida John patient was taken emergently to the OR.  OR interventions included superior mesenteric artery thromboembolectomy with passage of ectomy catheters into the aorta and into the distal mesenteric branches as well as repair of mesenteric artery dissection with eversion endarterectomy and interposition bovine pericardial graft placement from superior mesenteric artery origin to superior mesenteric artery distal branch point.  The surgery was conducted by Dr. Saenz and Dr. Crawford who worked in tandem for surgical intervention in an effort to reperfuse the gut.  This was a lengthy surgery and the patient was admitted to the ICU postoperatively where he will remain intubated and sedated pending planned return to the OR Tuesday for reevaluation of the bowel viability.     Information for this H&P was obtained primarily from review of documentation and  collaboration with other providers the patient is intubated and sedated.  He presented to the ED this morning and was taken emergently to the OR prior to being seen by the ICU team.  He arrived to the ICU postoperatively, intubated and sedated.     7/8:Received HD overnight for hyperkalemia. Went to OR this AM and had a necrotic GB which was removed. His abdomen was closed and no SBR was needed. Post operatively the patient is more hypoxic with a persistent lactic acidosis and hyperkalemia. Discussed with nephrology and will place patient on CRRT.    7/9:Patient with persistent respiratory acidosis, increased TV. Overnight remained on CRRT with persistent shock. On teagan and vaso, will have to add levo and stress dose steroids. Worsening hypoxia. Appears to have atelectasis with air bronchograms, will plan to bronch and increase PEEP. Concerning for pneumonia. Sedated held, unresponsive. Check ammonia.     Patient is on Hospital Day: 5.    Significant Events / Subjective     07/10/25 : Patient went back to the OR yesterday with surgery and had 240 cm of small bowel resected for being ischemic. Wound vac placed with plan for takeback. Vasopressor requirements much better post OR.    Was emergently bronched for hypoxic shock with mucous plugging. This AM his hypoxia has greatly improved and he is on minimal vent settings.      Assessment / Plan   Acute liver injury- Shock liver  Lactic acidemia  Distributive shock  Likely secondary to necrotic bowel s/p resection as well as acute liver injury from hypoperfusion  Necrotic GB also reomved  Lactic persistently elevated but better than yesterday  High dose thiamine ordered  Transaminitis from shock liver  Continue zosyn, cultures negative  Blood cultures pending and NTD  MRSA PCR negative  Urinalysis with reflex culture NGTD  On teagan and vaso, stop NE, stop stress dose steroids    SMA occlusion  Mesenteric ischemia  Mesenteric artery dissection  Ischemic bowel  Now s/p SMA  thromboembolectomy, repair of mesenteric artery dissection, eversion endarterectomy and interposition bovine pericardial graft placement from superior mesenteric artery origin to superior mesenteric artery distal branch point  OR takeback 7/8 with cholecystectomy, closure and no SBR  OR takeback on 7/9 with 240 cm of small bowel resection and abthera wound vac placement, planned take back on 7/11  Will need to see how much small bowel is resected, may need TPN  Vascular surgery and general surgery following closely  SBP goal <130 mmHg     Acute Hypoxic/hypercapenic respiratory failure requiring mechanical ventilation  Emergent bronch on 7/9 for hypoxic respiratory failure with thick mucous removal  Was on 100% and 10 of peep, now on 40% and 7 of peep  CXR with improving atelectasis  Planned takeback to OR tomorrow limits our ability to wean  Sedated on fentanyl      Hyperammonemia  Encephalopathy  -Has been sedated and critically ill preventing ability to wean sedation completely  -Ammonia level 125 today, will discuss with surgery about rectal lactulose to help reduce ammonia levels  -Is on CRRT so that should help  -Otherwise minimizing sedation as much as possible as his liver injury is likely complicating medication clearance  -On lower doses of fentanyl for comfort       MONIKA  Left renal hypoperfusion  -Seen on CT scan  -Intrarenal MONIKA likely as well with hypotension  -Perdomo in place, minimal UOP  -Hyperkalemia post operatively, temporized  -HD overnight of 7/7  -Nephrology following, on CRRT  -Goal to pull fluid today as he is very edematous  -Perdomo removed as he is anuric.   -Trend labs  -Stop bicarb gtt    Essential Hypertension: not well controlled.   Home medications on hold given shock    Disposition: ICU, ventilator    Code status:   Code Status (Patient has no pulse and is not breathing): CPR (Attempt to Resuscitate)  Medical Interventions (Patient has pulse or is breathing): Full Support        Nutrition:   NPO Diet NPO Type: Strict NPO   Tube Feeding: Formula: RD to Initiate & Manage     VTE Prophylaxis:  Pharmacologic & mechanical VTE prophylaxis orders are present.           Objective / Physical Exam     Vital signs:  Temp: 99.7 °F (37.6 °C)  BP: 112/64  Heart Rate: 86  Resp: 26  SpO2: 100 %  Weight: (!) 165 kg (364 lb 13.8 oz)    Admission Weight: Weight: (!) 145 kg (320 lb)  Current Weight: Weight: (!) 165 kg (364 lb 13.8 oz)    Input/Output in last 24 hours:    Intake/Output Summary (Last 24 hours) at 7/10/2025 1122  Last data filed at 7/10/2025 1000  Gross per 24 hour   Intake 8133 ml   Output 5195.5 ml   Net 2937.5 ml      Net IO Since Admission: 14,937 mL [07/10/25 1122]     Physical Exam  Vitals and nursing note reviewed.   Constitutional:       General: He is not in acute distress.     Appearance: He is obese. He is ill-appearing.      Comments: Intubated and sedated   HENT:      Head: Normocephalic and atraumatic.      Right Ear: External ear normal.      Left Ear: External ear normal.      Mouth/Throat:      Comments: Oral ET tube secured  OG tube secured  Eyes:      General: Scleral icterus present.      Comments: Pupils pinpoint  Conjunctival injection   Neck:      Comments: Right IJ cordis  Trachea midline    Cardiovascular:      Rate and Rhythm: Normal rate and regular rhythm.      Heart sounds: S1 normal and S2 normal. Murmur (Coarse, holosystolic) heard.      Comments: Sinus rhythm  Pulmonary:      Breath sounds: No wheezing or rhonchi.      Comments: CTAB  Abdominal:      General: There is distension.      Palpations: Abdomen is soft.      Comments: Midline wound vac in place on suction   Musculoskeletal:         General: No deformity.      Right lower leg: Edema present.      Left lower leg: Edema present.   Skin:     General: Skin is warm and dry.      Capillary Refill: Capillary refill takes 2 to 3 seconds.      Comments: Whole body anasarca with weeping skin   Neurological:       Comments: Sedated, minimal responses at this time          Radiology and Labs     Results from last 7 days   Lab Units 07/10/25  0824 07/09/25  1628 07/09/25  1527 07/09/25  1447 07/09/25  1421 07/09/25  1402 07/09/25  0606 07/08/25  1217 07/08/25  0419   WBC 10*3/mm3 22.63* 19.40*  --   --   --   --  17.90* 18.06* 21.68*   HEMOGLOBIN g/dL 11.3* 10.2*  --   --   --   --  11.4* 11.8* 12.9*   HEMOGLOBIN, POC g/dL  --   --  9.9* 9.9* 10.2*   < >  --   --   --    HEMATOCRIT % 33.7* 31.1*  --   --   --   --  35.9* 36.5* 39.2   HEMATOCRIT POC %  --   --  29* 29* 30*   < >  --   --   --    PLATELETS 10*3/mm3 59* 99*  --   --   --   --  128* 148 156    < > = values in this interval not displayed.      Results from last 7 days   Lab Units 07/09/25  1628 07/09/25  1033 07/06/25  1127 07/06/25  0623   PROTIME Seconds 25.9 25.6* 17.1* 16.1*   INR  2.35 2.32* 1.40* 1.30*   APTT seconds  --   --   --  27.9      Results from last 7 days   Lab Units 07/10/25  0708 07/10/25  0002 07/09/25  1628 07/09/25  0803 07/09/25  0606 07/09/25  0012 07/08/25  1639 07/08/25  1217 07/08/25  0419 07/07/25  1727 07/07/25  1130 07/07/25  0348   SODIUM mmol/L 135* 134* 136 133* 136 136   < > 135* 136   < > 141 134*   POTASSIUM mmol/L 4.7 5.2 5.1 4.4 4.6 5.0   < > 6.5* 4.9   < > 5.5* 5.9*   CHLORIDE mmol/L 100 99 99 99 99 98   < > 99 99   < > 99 99   CO2 mmol/L 18.4* 17.5* 15.3* 14.9* 16.7* 16.0*   < > 16.8* 21.3*   < > 24.7 20.9*   ANION GAP mmol/L 16.6* 17.5* 21.7* 19.1* 20.3* 22.0*   < > 19.2* 15.7*   < > 17.3* 14.1   BUN mg/dL 15.9 16.8 20.0 19.8 20.9* 24.1*   < > 33.6* 27.7*   < > 28.6* 21.8*   CREATININE mg/dL 3.49* 3.88* 4.62* 3.99* 4.26* 4.74*   < > 6.52* 5.17*   < > 5.01* 3.75*   GLUCOSE mg/dL 117* 164* 143* 138* 99 52*   < > 89 102*   < > 137* 153*   PHOSPHORUS mg/dL 4.4 5.0* 7.4* 5.3*  --  5.8*   < > 7.3*  --   --  4.3  --    MAGNESIUM mg/dL 1.9 1.9 1.9 1.9  --  1.9   < > 1.8 1.7  --  1.6 1.7   ALT (SGPT) U/L  --   --   --   --  2,692*  --    --  1,817* 2,561*  --  2,421* 1,260*   AST (SGOT) U/L  --   --   --   --  >7,000*  --   --  6,867* >7,000*  --  6,142* 2,198*   ALK PHOS U/L  --   --   --   --  272*  --   --  195* 201*  --  130* 84    < > = values in this interval not displayed.      Results from last 7 days   Lab Units 07/09/25  0606 07/08/25  1217 07/08/25  0419 07/07/25  1130 07/07/25  0348   ALT (SGPT) U/L 2,692* 1,817* 2,561* 2,421* 1,260*   AST (SGOT) U/L >7,000* 6,867* >7,000* 6,142* 2,198*   ALK PHOS U/L 272* 195* 201* 130* 84     Results from last 7 days   Lab Units 07/10/25  0342 07/09/25  1627 07/09/25  1527 07/09/25  1447 07/09/25  1421 07/09/25  1402 07/09/25  1230 07/09/25  0829 07/09/25  0347   PH, ARTERIAL pH units 7.334* 7.226* 7.200* 7.180* 7.150*   < > 7.205* 7.219*  --    PCO2, ARTERIAL mm Hg 35.5 38.8 43.7 45.5* 49.1*   < > 39.6 38.2  --    PO2 ART mm Hg 319.7* 85.3 99.0 88.0 96.0   < > 56.4* 85.8  --    O2 SATURATION ART % 99.9* 94.3 96.0 94.0* 95.0   < > 82.3* 94.3  --    FIO2 % 100 100  --   --   --   --  100 100 80   HCO3 ART mmol/L 18.9* 16.1* 17.2* 17.2* 16.9*   < > 15.7* 15.6*  --    BASE EXCESS ART mmol/L -6.3* -10.8* <0.0* <0.0* <0.0*   < > -11.6* -11.4*  --     < > = values in this interval not displayed.       XR Chest 1 View  Result Date: 7/10/2025  1.Tubes and lines appear in good position. 2.Persistent bibasilar infiltrates or atelectasis and small left pleural effusion. Electronically Signed: Den Sanders MD  7/10/2025 2:33 AM EDT  Workstation ID: [id number]    XR Chest 1 View  Result Date: 7/9/2025  Impression: Dense retrocardiac left lower lobe airspace disease with air bronchograms. Correlate for symptoms of pneumonia. Stable right infrahilar/right middle lobe airspace disease which may represent atelectasis or pneumonia. Stable cardiac enlargement. Electronically Signed: Aracely Shukla MD  7/9/2025 10:29 AM EDT  Workstation ID: [id number]    XR Abdomen KUB  Result Date: 7/8/2025  Impression: Enteric tube  overlies the stomach. Electronically Signed: Hipolito Alberts MD  7/8/2025 6:08 PM EDT  Workstation ID: [id number]    XR Chest 1 View  Result Date: 7/8/2025  Impression: 1.Postoperative changes of recent sternotomy. 2.Residual atelectasis or airspace disease in the left lung base. Electronically Signed: Phil Blackwood MD  7/8/2025 1:29 PM EDT  Workstation ID: [id number]      Current medications     Scheduled Meds:   mupirocin, 1 Application, Each Nare, BID  pantoprazole, 40 mg, Intravenous, Q AM  piperacillin-tazobactam, 4.5 g, Intravenous, Q8H        Continuous Infusions:   dexmedetomidine, 0.2-1.5 mcg/kg/hr, Last Rate: Stopped (07/09/25 2301)  dextrose, 50 mL/hr, Last Rate: 15 mL/hr (07/10/25 0257)  fentanyl 10 mcg/mL,  mcg/hr, Last Rate: 100 mcg/hr (07/10/25 0256)  norepinephrine, 0.02-0.5 mcg/kg/min, Last Rate: Stopped (07/10/25 0629)  phenylephrine, 0.5-6 mcg/kg/min, Last Rate: 4 mcg/kg/min (07/10/25 1005)  PrismaSol BGK 2/3.5, 2,000 mL/hr, Last Rate: 2,000 mL/hr (07/10/25 1004)  PrismaSol BGK 2/3.5, 2,000 mL/hr, Last Rate: 2,000 mL/hr (07/10/25 1003)  PrismaSol BGK 2/3.5, 2,000 mL/hr, Last Rate: 2,000 mL/hr (07/10/25 1003)  vasopressin, 0.03 Units/min, Last Rate: 0.03 Units/min (07/10/25 0243)      Total critical care time:  35 minutes    Due to a high probability of clinically significant, life threatening deterioration, the patient required my highest level of preparedness to intervene emergently and I personally spent this critical care time directly and personally managing the patient. This critical care time included obtaining a history; examining the patient; pulse oximetry; ordering and review of studies; arranging urgent treatment with development of a management plan; evaluation of patient's response to treatment; frequent reassessment; and, discussions with other providers.    This critical care time was performed to assess and manage the high probability of imminent, life-threatening deterioration that  could result in multi-organ failure. It was exclusive of separately billable procedures and treating other patients and teaching time.      Plan discussed with RN. Reviewed all other data in the last 24 hours, including but not limited to vitals, labs, microbiology, imaging and pertinent notes from other providers.        Kyler Morgan MD   Critical Care  07/10/25   11:22 EDT

## 2025-07-10 NOTE — OP NOTE
Operative Report:    Patient Name:  Tj Christopher  YOB: 1985    Date of Surgery:  7/8/2025     Indications:    This was planned takeback for repeat exploratory laparotomy with closure of the abdominal wall after emergency exploratory laparotomy with interposition graft of the SMA 48 hours previously.  I discussed risk benefits and alternatives with patient's family who elected to proceed.    Pre-op Diagnosis:   Aortic dissection with SMA occlusion       Post-Op Diagnosis Codes:  Same, additionally patient had gangrenous cholecystitis    Procedure/CPT® Codes:      Procedure(s):  EXPLORATORY LAPAROTOMY, OPEN CHOLECYSTECTOMY, ABDOMINAL WALL CLOSURE    Staff:  Surgeon(s):  Gomez Bains II, MD Dawson, Timothy, MD    Circulator: Georges Han RN; Matt Madera RN  Radiology Technologist: Tammy Hutchison  Scrub Person: Jacqueline Magallon  Assistant: Lizzeth Hernandez CSA  was responsible for performing the following activities: Retraction, Suction, Irrigation, Suturing, Closing, and Placing Dressing and their skilled assistance was necessary for the success of this case.        Anesthesia: General    Estimated Blood Loss: minimal    Implants:    Implant Name Type Inv. Item Serial No.  Lot No. LRB No. Used Action   CLIPAPPLR M/ ENDO LIGACLIP 20CLP 11IN MD - [id number] Implant CLIPAPPLR M/ ENDO LIGACLIP 20CLP 11IN MD  [employer]  DIV OF J AND J [id number] N/A 1 Implanted   KT SEAL HEMOS ABS FLOSEAL MATRX 1.5/FAST/PREP 5000/IU 10ML - [id number] Implant KT SEAL HEMOS ABS FLOSEAL MATRX 1.5/FAST/PREP 5000/IU 10ML  [facility] [id number] N/A 1 Implanted       Specimen:          Specimens       ID Source Type Tests Collected By Collected At Frozen?    A Gallbladder Tissue TISSUE PATHOLOGY EXAM   Mitchell Crawford MD 7/8/25 0848 No                Findings: Small bowel and colon remained slightly dusky with no evidence of justin necrosis or ischemia.  Right side of the liver with  developing ischemia, gangrenous cholecystitis.    Complications: None    Description of Procedure:   After risk benefits and alternatives were discussed with patient's family informed consent was obtained.  Transported the operating room placed supine the operating table and underwent general anesthesia.  Their wound VAC was removed.  His abdomen was prepped and draped in sterile fashion and surgical timeout was completed.  I ran the small bowel from the ligament of Treitz to ileocecal valve it appeared mildly dusky diffusely with no evidence of justin necrosis or ischemia.  I inspected the colon and intra-abdominal rectum it appeared the same.  Dr. Navarro inspected the SMA graft and dopplered this's portion the procedure will be dictated separately.  It appears as if there was good Doppler signals throughout the SMA.  There were no plans for further vascular intervention at that point.  I inspected the liver the right side of the liver was dusky with changes of ischemia the left side the liver appeared well-perfused.  The gallbladder was frankly necrotic and thinned out so I elected to perform cholecystectomy.    Bookwalter retractor was placed for fixed retraction.  The dome of the gallbladder and using a top-down approach I dissected the gallbladder off the gallbladder fossa electrocautery.  Identified the cystic duct and cystic artery these were ligated separately with 2-0 silk suture.  Floseal was placed in the gallbladder fossa this appeared hemostatic.  19 Maced[place] Sam drain was placed through a stab incision in the right upper quadrant and placed in the gallbladder fossa secured to the skin with a 2-0 nylon suture.  19 [place] Sam drain was pulled through a stab incision the left side of the abdomen and placed near the ligament of Treitz the area of the SMA repair.  This was secured to the skin with 2-0 nylon suture.  Abdominal x-ray was performed and no foreign bodies were found in the abdominal cavity, of  note previous count and count today were correct.      I used a #1 PDS to perform reinforced tension line suture by placing pursestring suture circumferentially around the anterior fascia.  Fascia was closed with running 0 PDS suture, this was tied down and then the reinforced tension line suture was tied down.  Closed loosely with staples and iodoform alex were placed through the incision into the subcutaneous tissue.  Remained intubated as planned and was transported back to the intensive care unit for further resuscitation.      Mitchell Crawford MD     Date: 7/10/2025  Time: 14:22 EDT    This note was created using Dragon Voice Recognition software.

## 2025-07-10 NOTE — PROCEDURES
Electrical Cardioversion    Date/Time: 7/10/2025 3:02 PM    Performed by: Kyler Morgan MD  Authorized by: Kyler Morgan MD  Consent: The procedure was performed in an emergent situation    Sedation:  Patient sedated: yes  Sedation type: anxiolysis  Sedatives: fentanyl    Cardioversion basis: emergent  Pre-procedure rhythm: atrial fibrillation  Chest area: chest area exposed  Electrodes: pads  Electrodes placed: anterior-posterior  Number of attempts: 5  Attempt 1 mode: synchronous  Attempt 1 waveform: biphasic  Attempt 1 shock (in Joules): 150  Attempt 1 outcome: conversion to normal sinus rhythm  Attempt 2 mode: synchronous  Attempt 2 waveform: biphasic  Attempt 2 shock (in Joules): 75  Attempt 2 outcome: conversion to normal sinus rhythm  Attempt 3 mode: synchronous  Attempt 3 waveform: biphasic  Attempt 3 shock (in Joules): 75  Attempt 3 outcome: conversion to normal sinus rhythm  Attempt 4 mode: synchronous  Attempt 4 waveform: biphasic  Attempt 4 shock (in Joules): 75  Attempt 4 outcome: conversion to normal sinus rhythm  Attempt 5 mode: synchronous  Attempt 5 shock (in Joules): 75  Attempt 5 outcome: conversion to normal sinus rhythm  Post-procedure rhythm: normal sinus rhythm

## 2025-07-10 NOTE — NURSING NOTE
Patient briefly started on Levo for BP support, patient HR jumped to 180s. ICU MD notified and at bedside. Patient shocked x6 times. Metoprolol, calcium, magnesium, amio, bicarb, albumin given, see MAR. Patient currently in NSR at 78. Kilo maxed at this time with levo off. Vaso now at 0.05 per ICU MD at bedside

## 2025-07-10 NOTE — PROGRESS NOTES
General Surgery Progress Note    Name: Tj Christopher ADMIT: 2025   : 1985  PCP: Provider, No Known    MRN: [id number] LOS: 4 days   AGE/SEX: 40 y.o. male  ROOM: 2305/1   [facility]    Chief Complaint   Patient presents with    Abdominal Pain     Subjective     Patient seen and examined.  Afebrile this morning. Weaned from Levophed, titrated up on teagan.  Remains on D20.  10 PEEP, 60% FiO2.  On CRRT    Objective     Scheduled Medications:   mupirocin, 1 Application, Each Nare, BID  pantoprazole, 40 mg, Intravenous, Q AM  piperacillin-tazobactam, 4.5 g, Intravenous, Q8H        Active Infusions:  dexmedetomidine, 0.2-1.5 mcg/kg/hr, Last Rate: Stopped (25)  dextrose, 50 mL/hr, Last Rate: 15 mL/hr (07/10/25 0257)  fentanyl 10 mcg/mL,  mcg/hr, Last Rate: 100 mcg/hr (07/10/25 025)  norepinephrine, 0.02-0.5 mcg/kg/min, Last Rate: Stopped (07/10/25 0629)  phenylephrine, 0.5-6 mcg/kg/min, Last Rate: 4 mcg/kg/min (07/10/25 1005)  PrismaSol BGK 2/3.5, 2,000 mL/hr, Last Rate: 2,000 mL/hr (07/10/25 1004)  PrismaSol BGK 2/3.5, 2,000 mL/hr, Last Rate: 2,000 mL/hr (07/10/25 1003)  PrismaSol BGK 2/3.5, 2,000 mL/hr, Last Rate: 2,000 mL/hr (07/10/25 1003)  vasopressin, 0.03 Units/min, Last Rate: 0.03 Units/min (07/10/25 0243)        As Needed Medications:    artificial tears    Calcium Replacement - Follow Nurse / BPA Driven Protocol    dextrose    dextrose    fentaNYL    glucagon (human recombinant)    heparin (porcine)    heparin (porcine)    Magnesium Standard Dose Replacement - Follow Nurse / BPA Driven Protocol    nitroglycerin    Phosphorus Replacement - Follow Nurse / BPA Driven Protocol    Potassium Replacement - Follow Nurse / BPA Driven Protocol    [COMPLETED] Insert Peripheral IV **AND** sodium chloride    Vital Signs  Vital Signs Patient Vitals for the past 24 hrs:   BP Temp Temp src Pulse Resp SpO2 Weight   07/10/25 1059 -- -- -- 86 -- 100 % --   07/10/25 1030 112/64 -- -- 82 -- 94 % --   07/10/25  1000 96/57 -- -- 87 26 100 % --   07/10/25 0930 -- -- -- 87 -- 100 % --   07/10/25 0900 98/57 -- -- 88 26 100 % --   07/10/25 0830 100/57 -- -- 88 -- 100 % --   07/10/25 0800 96/55 99.7 °F (37.6 °C) Oral 92 -- 99 % --   07/10/25 0756 -- -- -- 93 -- 100 % --   07/10/25 0700 123/68 -- -- 89 26 100 % --   07/10/25 0630 112/61 -- -- 88 -- 100 % --   07/10/25 0600 -- -- -- 89 -- 100 % --   07/10/25 0530 -- -- -- 89 -- 100 % --   07/10/25 0500 -- -- -- 87 -- 100 % --   07/10/25 0430 -- -- -- 84 -- 100 % --   07/10/25 0400 -- 99.7 °F (37.6 °C) Oral 84 -- 100 % (!) 165 kg (364 lb 13.8 oz)   07/10/25 0330 -- -- -- 85 -- 100 % --   07/10/25 0301 -- -- -- 59 26 97 % --   07/10/25 0200 -- -- -- 82 -- 100 % --   07/10/25 0130 -- -- -- 84 -- 99 % --   07/10/25 0100 -- -- -- 85 -- 100 % --   07/10/25 0030 -- -- -- 83 -- 100 % --   07/10/25 0000 -- 100 °F (37.8 °C) Oral 86 -- 100 % --   07/09/25 2330 -- -- -- 84 -- 98 % --   07/09/25 2306 -- -- -- 87 26 99 % --   07/09/25 2300 -- -- -- 87 -- 98 % --   07/09/25 2230 -- -- -- 88 -- 98 % --   07/09/25 2200 -- -- -- 90 -- 97 % --   07/09/25 2130 -- -- -- 93 -- 96 % --   07/09/25 2100 -- -- -- 90 -- 96 % --   07/09/25 2030 -- -- -- 92 -- 96 % --   07/09/25 2000 -- -- -- 94 -- 97 % --   07/09/25 1930 -- -- -- 93 -- 96 % --   07/09/25 1900 -- -- -- 93 -- 95 % --   07/09/25 1800 -- -- -- 95 -- 94 % --   07/09/25 1730 -- -- -- 101 -- 94 % --   07/09/25 1700 -- -- -- 101 -- 94 % --   07/09/25 1630 134/63 -- -- 99 26 95 % --   07/09/25 1337 156/73 (!) 101.2 °F (38.4 °C) Oral 109 26 91 % --   07/09/25 1315 112/66 (!) 101.1 °F (38.4 °C) Oral 116 26 91 % --   07/09/25 1300 132/71 -- -- 120 -- 92 % --   07/09/25 1240 150/78 -- -- 109 -- (!) 89 % --   07/09/25 1230 -- -- -- 115 -- (!) 0 % --   07/09/25 1200 -- 99.8 °F (37.7 °C) Oral 110 -- (!) 84 % --   07/09/25 1130 -- -- -- 104 -- (!) 84 % --     I/O:  I/O last 3 completed shifts:  In: 58660 [I.V.:04836; Other:30; NG/GT:41; IV  Piggyback:250]  Out: 8096.5 [Drains:80]    Physical Exam:  Physical Exam  Constitutional:       General: He is not in acute distress.     Comments: Intubated and sedated   Cardiovascular:      Rate and Rhythm: Normal rate.   Pulmonary:      Effort: No respiratory distress.      Comments: Intubated  Abdominal:      General: There is no distension.      Palpations: Abdomen is soft.      Tenderness: There is no guarding.   Neurological:      Mental Status: He is alert.      Comments: Unable to assess secondary intubation status         Results Review:     CBC    Results from last 7 days   Lab Units 07/10/25  0824 07/09/25  1628 07/09/25  1527 07/09/25  1447 07/09/25  1421 07/09/25  1402 07/09/25  0606 07/08/25  1217 07/08/25  0419 07/07/25  1216 07/07/25  0348   WBC 10*3/mm3 22.63* 19.40*  --   --   --   --  17.90* 18.06* 21.68* 22.67* 21.27*   HEMOGLOBIN g/dL 11.3* 10.2*  --   --   --   --  11.4* 11.8* 12.9* 12.2* 12.6*   HEMOGLOBIN, POC g/dL  --   --  9.9* 9.9* 10.2* 11.2*  --   --   --   --   --    PLATELETS 10*3/mm3 59* 99*  --   --   --   --  128* 148 156 137* 147     BMP   Results from last 7 days   Lab Units 07/10/25  0708 07/10/25  0002 07/09/25  1628 07/09/25  0803 07/09/25  0606 07/09/25  0012 07/08/25  1639 07/08/25  1217   SODIUM mmol/L 135* 134* 136 133* 136 136 137 135*   POTASSIUM mmol/L 4.7 5.2 5.1 4.4 4.6 5.0 5.3* 6.5*   CHLORIDE mmol/L 100 99 99 99 99 98 99 99   CO2 mmol/L 18.4* 17.5* 15.3* 14.9* 16.7* 16.0* 16.8* 16.8*   BUN mg/dL 15.9 16.8 20.0 19.8 20.9* 24.1* 30.6* 33.6*   CREATININE mg/dL 3.49* 3.88* 4.62* 3.99* 4.26* 4.74* 6.10* 6.52*   GLUCOSE mg/dL 117* 164* 143* 138* 99 52* 109* 89   MAGNESIUM mg/dL 1.9 1.9 1.9 1.9  --  1.9 1.8 1.8   PHOSPHORUS mg/dL 4.4 5.0* 7.4* 5.3*  --  5.8* 6.3* 7.3*     Radiology(recent) XR Chest 1 View  Result Date: 7/10/2025  1.Tubes and lines appear in good position. 2.Persistent bibasilar infiltrates or atelectasis and small left pleural effusion. Electronically  Signed: Den Sanders MD  7/10/2025 2:33 AM EDT  Workstation ID: [id number]    XR Chest 1 View  Result Date: 7/9/2025  Impression: Dense retrocardiac left lower lobe airspace disease with air bronchograms. Correlate for symptoms of pneumonia. Stable right infrahilar/right middle lobe airspace disease which may represent atelectasis or pneumonia. Stable cardiac enlargement. Electronically Signed: Aracely Shukla MD  7/9/2025 10:29 AM EDT  Workstation ID: [id number]    XR Abdomen KUB  Result Date: 7/8/2025  Impression: Enteric tube overlies the stomach. Electronically Signed: Hipolito Alberts MD  7/8/2025 6:08 PM EDT  Workstation ID: [id number]    XR Chest 1 View  Result Date: 7/8/2025  Impression: 1.Postoperative changes of recent sternotomy. 2.Residual atelectasis or airspace disease in the left lung base. Electronically Signed: Phil Blackwood MD  7/8/2025 1:29 PM EDT  Workstation ID: [id number]     I reviewed the patient's new clinical results.    Assessment & Plan       Dissection of mesenteric artery    Aortic dissection    Acute mesenteric ischemia    Acute hypoxic respiratory failure      40 y.o. male now status post: (1) exploratory laparotomy with SMA bypass/endarterectomy on 7/6, (2) reexploration, open cholecystectomy, and abdominal wall closure on 7/8, (3) reopening laparotomy, small bowel resection, ileocecectomy, omentectomy with temporary closure on 7/9    Keep NPO  Continue CRRT  Continue IV Zosyn  Trend labs  Wean vent settings and pressors as tolerated  Planning for return to the OR tomorrow      This note was created using Dragon Voice Recognition software.    ELEN Marte  07/10/25  11:14 EDT

## 2025-07-10 NOTE — SIGNIFICANT NOTE
Patient had worsening hypotension and was maxed on teagan and vaso.  Levo was attempted to be added however the patient sustained atrial fibrillation with rapid ventricular response and hypotension.  Norepinephrine was stopped and the patient was cardioverted successfully.  Shortly after he went back into A-fib with RVR and each time his blood pressure dropped.  CRRT fluid removal halted.  The patient was given 2 amps of bicarb.  Patient was given 10 mg of IV metoprolol (two successive 5 mg pushes)  And patient was also given 150 mg amiodarone throughout this periarrest.  Where the patient would go in and out of atrial fibrillation followed by cardioversion.  Overall the patient was cardioverted 5 times and after the above medication was given he converted back to sinus and has remained in sinus.  Will hold off on giving any additional norepinephrine as he has an unstable cardiac membrane.  We increased the patient's vasopressin.  Will add back stress dose steroids and one-time dose of methylene blue.  There is no reported history of G6PD. Repeat abg was notable for hypokalemia and he was given 2 mg of calcium gluconate as well as 2 mg of magnesium. I spoke to Dr. Henry with general surgery about the case and he agreed that bring the patient back to the operating room right now would likely not change anything and he we will plan to still bring him back tomorrow.  I discussed this with the patient's girlfriend as well as with the patient's aunt who is his primary decision-maker.  I explained how concerned I am that he is not going to survive the night however they would like us to continue full care.

## 2025-07-10 NOTE — CASE MANAGEMENT/SOCIAL WORK
Continued Stay Note  DEANDRE Nj     Patient Name: Tj Christopher  MRN: [id number]  Today's Date: 7/10/2025    Admit Date: 7/6/2025    Plan: From home with aunt, pending clinical course and PT/OT eval.   Discharge Plan       Row Name 07/10/25 1604       Plan    Plan Comments Pt went into Afib w/ RVR after starting Levo. Pt cardioverted 5 times and is now in NSR. DC barriers: Vent 80/7, Nubia, NG-TF, FMS, Precedex/Fentanyl/D20/Kilo/Vaso gtt's, Lactate trending up - most recent at 13.3, IV steroid               Expected Discharge Date and Time       Expected Discharge Date Expected Discharge Time    Jul 15, 2025      Valeria Sweet RN     [phone]  [email or web address]

## 2025-07-11 NOTE — PLAN OF CARE
Goal Outcome Evaluation:       Levo added back on. Kilo, vaso, d20, fent, precedex,  Crrt on until surgery and then paused. HR to 160-180s, calcium given, and amio bolus push. 6 units of plasma, 1 unit of platelets, vit K. Patient has had fever of 100.9 off and on all day, ice packs applied.

## 2025-07-11 NOTE — SIGNIFICANT NOTE
Patient was with persistent 3 vasopressor shock throughout the day with worsening hypoxia and acidosis.  Surgery took down his wound VAC at bedside today and noted that most of what was left of his small bowel was necrotic as well as his colon, his spleen, his liver.  This was deemed an unsurvivable injury due to total abdominal catastrophe.  Dr. Henry and I along with the patient's nurse sat down with Matilde, the patient's aunt and power of .  We explained that the patient has unsurvivable injury to his abdominal organs that is not compatible with life.  I discussed with her that the patient will continue to get more sick as his organs continue to become ischemic releasing toxins into his bloodstream.  I discussed how further treatment is medically inappropriate and will only prolong suffering without survivability.  The patient's aunt understands and wants to talk to the patient's sister as well as other family members before deciding on further options.  I explained that we would only keep current medication going to allow family to see him however I strongly recommended that we not prolong his suffering.    I spoke with Dr. Arciniega with nephrology and we both agreed that we will not restart CRRT since it was stopped and he has profound hypotension and has a nonsurvivable injury. This intervention has been deemed by 2 clinicians to be medically inappropriate for this patient.

## 2025-07-11 NOTE — PROGRESS NOTES
Nephrology  Progress Note                                        [facility]    Patient Identification    Name: Tj Christopher  Age: 40 y.o.  Sex: male  :  1985  MRN: [id number]      DATE OF SERVICE:  2025        Subective     On the vent pressors and CRRT  Currently on 3 pressors   Overall picture looks worse he is on 100% oxygen he is supposed to be going to the OR sometime today  Objective   Scheduled Meds:albumin human, 25 g, Intravenous, Q6H  hydrocortisone sodium succinate, 100 mg, Intravenous, Q6H  lactulose, 300 mL, Rectal, Q6H  methylene blue 100 mg in dextrose (D5W) 5 % 70 mL IVPB, 100 mg, Intravenous, Q6H  mupirocin, 1 Application, Each Nare, BID  pantoprazole, 40 mg, Intravenous, Q AM  piperacillin-tazobactam, 4.5 g, Intravenous, Q8H  sodium bicarbonate, , ,           Continuous Infusions:dexmedetomidine, 0.2-1.5 mcg/kg/hr, Last Rate: 0.5 mcg/kg/hr (07/10/25 2024)  dextrose, 75 mL/hr, Last Rate: 75 mL/hr (25)  fentanyl 10 mcg/mL,  mcg/hr, Last Rate: 100 mcg/hr (25)  norepinephrine, 0.02-0.5 mcg/kg/min, Last Rate: Stopped (07/10/25 0629)  phenylephrine, 0.5-6 mcg/kg/min, Last Rate: 6 mcg/kg/min (25)  PrismaSol BGK 2/3.5, 2,000 mL/hr, Last Rate: 2,000 mL/hr (25)  PrismaSol BGK 2/3.5, 2,000 mL/hr, Last Rate: 2,000 mL/hr (25)  PrismaSol BGK 2/3.5, 2,000 mL/hr, Last Rate: 2,000 mL/hr (25)  sodium bicarbonate 8.4 % 150 mEq in dextrose (D5W) 5 % 1,000 mL infusion (greater than 100 mEq), 150 mEq, Last Rate: 150 mEq (07/11/25 0243)  vasopressin, 0.05 Units/min, Last Rate: 0.05 Units/min (25 0312)        PRN Meds:  artificial tears    Calcium Replacement - Follow Nurse / BPA Driven Protocol    dextrose    dextrose    fentaNYL    glucagon (human recombinant)    heparin  "(porcine)    heparin (porcine)    Magnesium Standard Dose Replacement - Follow Nurse / BPA Driven Protocol    nitroglycerin    Phosphorus Replacement - Follow Nurse / BPA Driven Protocol    Potassium Replacement - Follow Nurse / BPA Driven Protocol    sodium bicarbonate    [COMPLETED] Insert Peripheral IV **AND** sodium chloride     Exam:  BP (!) 88/37   Pulse 76   Temp 97.7 °F (36.5 °C) (Oral)   Resp 26   Ht 193 cm (76\")   Wt (!) 165 kg (364 lb 13.8 oz)   SpO2 (!) 89%   BMI 44.41 kg/m²     Intake/Output last 3 shifts:  I/O last 3 completed shifts:  In: 44654 [I.V.:37491; Other:1000]  Out: 6381.5 [Stool:800]    Intake/Output this shift:  No intake/output data recorded.    Physical exam:  General Appearance:   on the vent and pressors and CRRT  Head:  Normocephalic, without obvious abnormality, atraumatic  Eyes:  PERRL, conjunctiva/corneas clear     Neck:  Supple,  no adenopathy;      Lungs:  Decreased BS occasion ronchi  Heart:  Regular rate and rhythm, S1 and S2 normal  Abdomen:  dressing  Extremities: trace edema  Pulses: 2+ and symmetric all extremities  Skin:  No rashes or lesions       Data Review:  All labs (24hrs):   Recent Results (from the past 24 hours)   CBC Auto Differential    Collection Time: 07/10/25  8:24 AM    Specimen: Blood   Result Value Ref Range    WBC 22.63 (H) 3.40 - 10.80 10*3/mm3    RBC 4.06 (L) 4.14 - 5.80 10*6/mm3    Hemoglobin 11.3 (L) 13.0 - 17.7 g/dL    Hematocrit 33.7 (L) 37.5 - 51.0 %    MCV 83.0 79.0 - 97.0 fL    MCH 27.8 26.6 - 33.0 pg    MCHC 33.5 31.5 - 35.7 g/dL    RDW 14.6 12.3 - 15.4 %    RDW-SD 43.7 37.0 - 54.0 fl    MPV 10.7 6.0 - 12.0 fL    Platelets 59 (L) 140 - 450 10*3/mm3   Manual Differential    Collection Time: 07/10/25  8:24 AM    Specimen: Blood   Result Value Ref Range    Neutrophil % 38.0 (L) 42.7 - 76.0 %    Lymphocyte % 8.0 (L) 19.6 - 45.3 %    Monocyte % 7.0 5.0 - 12.0 %    Basophil % 1.0 0.0 - 1.5 %    Bands %  35.0 (H) 0.0 - 5.0 %    Metamyelocyte % " 3.0 (H) 0.0 - 0.0 %    Myelocyte % 6.0 (H) 0.0 - 0.0 %    Atypical Lymphocyte % 2.0 0.0 - 5.0 %    Neutrophils Absolute 16.52 (H) 1.70 - 7.00 10*3/mm3    Lymphocytes Absolute 2.26 0.70 - 3.10 10*3/mm3    Monocytes Absolute 1.58 (H) 0.10 - 0.90 10*3/mm3    Basophils Absolute 0.23 (H) 0.00 - 0.20 10*3/mm3    nRBC 26.0 (H) 0.0 - 0.2 /100 WBC    RBC Morphology Normal Normal    Dohle Bodies Present None Seen    Toxic Granulation Slight/1+ None Seen    Vacuolated Neutrophils Slight/1+ None Seen    Platelet Estimate Decreased Normal   Ammonia    Collection Time: 07/10/25  9:48 AM    Specimen: Blood   Result Value Ref Range    Ammonia 125 (H) 16 - 60 umol/L   Lactic Acid, Plasma    Collection Time: 07/10/25  9:48 AM    Specimen: Blood   Result Value Ref Range    Lactate 9.8 (C) 0.5 - 2.0 mmol/L   POC Glucose Once    Collection Time: 07/10/25  9:48 AM    Specimen: Blood   Result Value Ref Range    Glucose 111 (H) 70 - 105 mg/dL   POC Glucose Once    Collection Time: 07/10/25 12:38 PM    Specimen: Blood   Result Value Ref Range    Glucose 61 (L) 70 - 105 mg/dL   Protime-INR    Collection Time: 07/10/25 12:39 PM    Specimen: Blood   Result Value Ref Range    Protime 26.8 19.4 - 28.5 Seconds    INR 2.45 2.00 - 3.00   STAT Lactic Acid, Reflex    Collection Time: 07/10/25 12:39 PM    Specimen: Blood   Result Value Ref Range    Lactate 11.5 (C) 0.5 - 2.0 mmol/L   POC Glucose Once    Collection Time: 07/10/25  1:01 PM    Specimen: Blood   Result Value Ref Range    Glucose 133 (H) 70 - 105 mg/dL   POC Glucose Once    Collection Time: 07/10/25  1:48 PM    Specimen: Blood   Result Value Ref Range    Glucose 111 (H) 70 - 105 mg/dL   POC Creatinine    Collection Time: 07/10/25  1:53 PM    Specimen: Arterial Blood   Result Value Ref Range    Creatinine 3.59 (H) 0.60 - 1.30 mg/dL    eGFR 21.2 (L) >60.0 mL/min/1.73   Blood Gas, Arterial -    Collection Time: 07/10/25  1:53 PM    Specimen: Arterial Blood   Result Value Ref Range    Site  Arterial Line     Henok's Test N/A     pH, Arterial 7.315 (L) 7.350 - 7.450 pH units    pCO2, Arterial 36.8 35.0 - 48.0 mm Hg    pO2, Arterial 85.4 83.0 - 108.0 mm Hg    HCO3, Arterial 18.8 (L) 21.0 - 28.0 mmol/L    Base Excess, Arterial -6.8 (L) 0.0 - 3.0 mmol/L    O2 Saturation, Arterial 95.6 94.0 - 98.0 %    Barometric Pressure for Blood Gas      Modality Adult Vent     FIO2 50 %    Ventilator Mode AC     Set Tidal Volume 700     PEEP 7     Hemodilution No     Respiratory Rate 26     PO2/FIO2 171 0 - 500   POCT Electrolytes +HGB +HCT    Collection Time: 07/10/25  1:53 PM    Specimen: Arterial Blood   Result Value Ref Range    Sodium 139 138 - 146 mmol/L    POC Potassium 4.2 3.5 - 4.5 mmol/L    Ionized Calcium 1.04 (L) 1.15 - 1.33 mmol/L    Glucose 100 74 - 100 mg/dL    Hematocrit 28 (L) 38 - 51 %    Hemoglobin 9.6 (L) 12.0 - 17.0 g/dL   POC Lactate    Collection Time: 07/10/25  1:53 PM    Specimen: Arterial Blood   Result Value Ref Range    Lactate 13.3 (C) 0.2 - 2.0 mmol/L    Notified Time      Notified Who      Read Back     POC Glucose Once    Collection Time: 07/10/25  1:53 PM    Specimen: Arterial Blood   Result Value Ref Range    Glucose 100 74 - 100 mg/dL   Renal Function Panel    Collection Time: 07/10/25  3:23 PM    Specimen: Blood   Result Value Ref Range    Glucose 92 65 - 99 mg/dL    BUN 15.5 6.0 - 20.0 mg/dL    Creatinine 3.71 (H) 0.76 - 1.27 mg/dL    Sodium 135 (L) 136 - 145 mmol/L    Potassium 4.8 3.5 - 5.2 mmol/L    Chloride 99 98 - 107 mmol/L    CO2 15.3 (L) 22.0 - 29.0 mmol/L    Calcium 8.7 8.6 - 10.5 mg/dL    Albumin 2.5 (L) 3.5 - 5.2 g/dL    Phosphorus 6.7 (H) 2.5 - 4.5 mg/dL    Anion Gap 20.7 (H) 5.0 - 15.0 mmol/L    BUN/Creatinine Ratio 4.2 (L) 7.0 - 25.0    eGFR 20.4 (L) >60.0 mL/min/1.73   Magnesium    Collection Time: 07/10/25  3:23 PM    Specimen: Blood   Result Value Ref Range    Magnesium 2.3 1.6 - 2.6 mg/dL   Calcium, Ionized    Collection Time: 07/10/25  3:23 PM    Specimen: Blood    Result Value Ref Range    Ionized Calcium 1.12 (L) 1.15 - 1.30 mmol/L   STAT Lactic Acid, Reflex    Collection Time: 07/10/25  3:23 PM    Specimen: Blood   Result Value Ref Range    Lactate 12.2 (C) 0.5 - 2.0 mmol/L   POC Glucose Once    Collection Time: 07/10/25  5:58 PM    Specimen: Blood   Result Value Ref Range    Glucose 82 70 - 105 mg/dL   STAT Lactic Acid, Reflex    Collection Time: 07/10/25  5:59 PM    Specimen: Blood   Result Value Ref Range    Lactate 12.7 (C) 0.5 - 2.0 mmol/L   POC Glucose Q2H    Collection Time: 07/10/25  8:13 PM    Specimen: Blood   Result Value Ref Range    Glucose 108 (H) 70 - 105 mg/dL   POC Glucose Q2H    Collection Time: 07/10/25 10:13 PM    Specimen: Blood   Result Value Ref Range    Glucose 114 (H) 70 - 105 mg/dL   Magnesium    Collection Time: 07/10/25 11:34 PM    Specimen: Blood   Result Value Ref Range    Magnesium 2.2 1.6 - 2.6 mg/dL   Calcium, Ionized    Collection Time: 07/10/25 11:34 PM    Specimen: Blood   Result Value Ref Range    Ionized Calcium 1.09 (L) 1.15 - 1.30 mmol/L   Comprehensive Metabolic Panel    Collection Time: 07/10/25 11:34 PM    Specimen: Blood   Result Value Ref Range    Glucose 130 (H) 65 - 99 mg/dL    BUN 14.6 6.0 - 20.0 mg/dL    Creatinine 3.78 (H) 0.76 - 1.27 mg/dL    Sodium 134 (L) 136 - 145 mmol/L    Potassium 4.8 3.5 - 5.2 mmol/L    Chloride 98 98 - 107 mmol/L    CO2 12.4 (L) 22.0 - 29.0 mmol/L    Calcium 8.6 8.6 - 10.5 mg/dL    Total Protein 4.0 (L) 6.0 - 8.5 g/dL    Albumin 3.0 (L) 3.5 - 5.2 g/dL    ALT (SGPT) 2,175 (H) 1 - 41 U/L    AST (SGOT) >7,000 (H) 1 - 40 U/L    Alkaline Phosphatase 389 (H) 39 - 117 U/L    Total Bilirubin 9.2 (H) 0.0 - 1.2 mg/dL    Globulin 1.0 gm/dL    A/G Ratio 3.0 g/dL    BUN/Creatinine Ratio 3.9 (L) 7.0 - 25.0    Anion Gap 23.6 (H) 5.0 - 15.0 mmol/L    eGFR 19.9 (L) >60.0 mL/min/1.73   Phosphorus    Collection Time: 07/10/25 11:34 PM    Specimen: Blood   Result Value Ref Range    Phosphorus 6.9 (H) 2.5 - 4.5 mg/dL    POC Glucose Once    Collection Time: 07/10/25 11:36 PM    Specimen: Blood   Result Value Ref Range    Glucose 118 (H) 70 - 105 mg/dL   POC Glucose Q2H    Collection Time: 07/11/25  3:52 AM    Specimen: Blood   Result Value Ref Range    Glucose 160 (H) 74 - 100 mg/dL   POC Creatinine    Collection Time: 07/11/25  3:52 AM    Specimen: Arterial Blood   Result Value Ref Range    Creatinine 3.42 (H) 0.60 - 1.30 mg/dL    eGFR 22.4 (L) >60.0 mL/min/1.73   Blood Gas, Arterial -    Collection Time: 07/11/25  3:52 AM    Specimen: Arterial Blood   Result Value Ref Range    Site Arterial Line     Henok's Test N/A     pH, Arterial 7.168 (C) 7.350 - 7.450 pH units    pCO2, Arterial 40.3 35.0 - 48.0 mm Hg    pO2, Arterial 78.8 (L) 83.0 - 108.0 mm Hg    HCO3, Arterial 14.6 (L) 21.0 - 28.0 mmol/L    Base Excess, Arterial -12.9 (L) 0.0 - 3.0 mmol/L    O2 Saturation, Arterial 91.7 (L) 94.0 - 98.0 %    Barometric Pressure for Blood Gas      Modality Adult Vent     FIO2 100 %    Ventilator Mode AC     Set Tidal Volume 700     PEEP 7     Notified Who      Read Back      Notified Time      Hemodilution No     Respiratory Rate 26     PO2/FIO2 79 0 - 500   POCT Electrolytes +HGB +HCT    Collection Time: 07/11/25  3:52 AM    Specimen: Arterial Blood   Result Value Ref Range    Sodium 135 (L) 138 - 146 mmol/L    POC Potassium 4.8 (H) 3.5 - 4.5 mmol/L    Ionized Calcium 1.19 1.15 - 1.33 mmol/L    Glucose 160 (H) 74 - 100 mg/dL    Hematocrit 23 (L) 38 - 51 %    Hemoglobin 7.9 (C) 12.0 - 17.0 g/dL    Notified Time      Notified Who      Read Back     POC Lactate    Collection Time: 07/11/25  3:52 AM    Specimen: Arterial Blood   Result Value Ref Range    Lactate 15.1 (C) 0.2 - 2.0 mmol/L    Notified Time      Notified Who      Read Back     Magnesium    Collection Time: 07/11/25  4:52 AM    Specimen: Blood   Result Value Ref Range    Magnesium 2.1 1.6 - 2.6 mg/dL   Protime-INR    Collection Time: 07/11/25  4:52 AM    Specimen: Blood   Result  Value Ref Range    Protime 46.5 (H) 11.7 - 14.2 Seconds    INR 4.88 (C) 0.90 - 1.10   Comprehensive Metabolic Panel    Collection Time: 07/11/25  4:52 AM    Specimen: Blood   Result Value Ref Range    Glucose 158 (H) 65 - 99 mg/dL    BUN 13.5 6.0 - 20.0 mg/dL    Creatinine 3.47 (H) 0.76 - 1.27 mg/dL    Sodium 134 (L) 136 - 145 mmol/L    Potassium 4.9 3.5 - 5.2 mmol/L    Chloride 98 98 - 107 mmol/L    CO2 15.5 (L) 22.0 - 29.0 mmol/L    Calcium 8.6 8.6 - 10.5 mg/dL    Total Protein 3.5 (L) 6.0 - 8.5 g/dL    Albumin 2.6 (L) 3.5 - 5.2 g/dL    ALT (SGPT) 1,793 (H) 1 - 41 U/L    AST (SGOT) >7,000 (H) 1 - 40 U/L    Alkaline Phosphatase 376 (H) 39 - 117 U/L    Total Bilirubin 9.2 (H) 0.0 - 1.2 mg/dL    Globulin 0.9 gm/dL    A/G Ratio 2.9 g/dL    BUN/Creatinine Ratio 3.9 (L) 7.0 - 25.0    Anion Gap 20.5 (H) 5.0 - 15.0 mmol/L    eGFR 22.1 (L) >60.0 mL/min/1.73   Phosphorus    Collection Time: 07/11/25  4:52 AM    Specimen: Blood   Result Value Ref Range    Phosphorus 6.3 (H) 2.5 - 4.5 mg/dL   CBC Auto Differential    Collection Time: 07/11/25  4:53 AM    Specimen: Blood   Result Value Ref Range    WBC 31.52 (C) 3.40 - 10.80 10*3/mm3    RBC 2.77 (L) 4.14 - 5.80 10*6/mm3    Hemoglobin 7.7 (L) 13.0 - 17.7 g/dL    Hematocrit 24.4 (L) 37.5 - 51.0 %    MCV 88.1 79.0 - 97.0 fL    MCH 27.8 26.6 - 33.0 pg    MCHC 31.6 31.5 - 35.7 g/dL    RDW 16.1 (H) 12.3 - 15.4 %    RDW-SD 51.4 37.0 - 54.0 fl    MPV 9.7 6.0 - 12.0 fL    Platelets 31 (C) 140 - 450 10*3/mm3   Manual Differential    Collection Time: 07/11/25  4:53 AM    Specimen: Blood   Result Value Ref Range    Neutrophil % 48.0 42.7 - 76.0 %    Lymphocyte % 18.0 (L) 19.6 - 45.3 %    Monocyte % 7.0 5.0 - 12.0 %    Bands %  7.0 (H) 0.0 - 5.0 %    Metamyelocyte % 4.0 (H) 0.0 - 0.0 %    Myelocyte % 13.0 (H) 0.0 - 0.0 %    Promyelocyte % 3.0 (H) 0.0 - 0.0 %    Neutrophils Absolute 17.34 (H) 1.70 - 7.00 10*3/mm3    Lymphocytes Absolute 5.67 (H) 0.70 - 3.10 10*3/mm3    Monocytes Absolute  2.21 (H) 0.10 - 0.90 10*3/mm3    nRBC 13.0 (H) 0.0 - 0.2 /100 WBC    Anisocytosis Slight/1+ None Seen    Toxic Granulation Slight/1+ None Seen    Platelet Estimate Decreased Normal   Path Consult Reflex    Collection Time: 07/11/25  4:53 AM    Specimen: Blood   Result Value Ref Range    Pathology Review Yes    Prepare Fresh Frozen Plasma, 2 Units    Collection Time: 07/11/25  7:25 AM   Result Value Ref Range    Product Code R5535S96     Unit Number [id number]     UNIT  ABO O     UNIT  RH POS     Dispense Status XM     Blood Expiration Date 398877970536     Blood Type Barcode 5100     Product Code P2995O11     Unit Number [id number]     UNIT  ABO A     UNIT  RH POS     Dispense Status XM     Blood Expiration Date 202507122359     Blood Type Barcode 6200           Imaging:  XR Chest 1 View  Result Date: 7/10/2025  1.Tubes and lines appear in good position. 2.Persistent bibasilar infiltrates or atelectasis and small left pleural effusion. Electronically Signed: Den Sanders MD  7/10/2025 2:33 AM EDT  Workstation ID: [id number]    XR Chest 1 View  Result Date: 7/9/2025  Impression: Dense retrocardiac left lower lobe airspace disease with air bronchograms. Correlate for symptoms of pneumonia. Stable right infrahilar/right middle lobe airspace disease which may represent atelectasis or pneumonia. Stable cardiac enlargement. Electronically Signed: Aracely Shukla MD  7/9/2025 10:29 AM EDT  Workstation ID: [id number]    XR Abdomen KUB  Result Date: 7/8/2025  Impression: Enteric tube overlies the stomach. Electronically Signed: Hipolito Alberts MD  7/8/2025 6:08 PM EDT  Workstation ID: [id number]    XR Chest 1 View  Result Date: 7/8/2025  Impression: 1.Postoperative changes of recent sternotomy. 2.Residual atelectasis or airspace disease in the left lung base. Electronically Signed: Phil Blackwood MD  7/8/2025 1:29 PM EDT  Workstation ID: [id number]    XR Surgical Count Protocol  Result Date: 7/8/2025  Impression: No definite  retained sponges are identified. Electronically Signed: Kobe Hudson MD  7/8/2025 10:27 AM EDT  Workstation ID: [id number]    XR Chest 1 View  Result Date: 7/8/2025  Impression: Left internal jugular CVC catheter present with the tip at the upper SVC. No pneumothorax. Otherwise, no significant change. Electronically Signed: Yoly Julian MD  7/8/2025 12:57 AM EDT  Workstation ID: [id number]    XR Chest 1 View  Result Date: 7/7/2025  Impression: 1.Left jugular central venous catheter tip is in the expected location of the left brachiocephalic vein. 2.No pneumothorax is seen. 3.ET tube, NG tube, and right jugular central venous catheter remain in place. 4.Subsegmental atelectasis at the lung bases. Electronically Signed: Chace Ordaz MD  7/7/2025 9:52 PM EDT  Workstation ID: [id number]    XR Chest 1 View  Result Date: 7/6/2025  Impression: Tip of the right internal jugular central venous catheter terminates in the upper SVC. No pneumothorax visible on this supine image. Tip of the endotracheal tube terminates in the midthoracic trachea approximately 3 to 4 cm above the orion. Tip of the enteric tube terminates in the gastric fundus. Electronically Signed: Kelli Olivera MD  7/6/2025 5:16 PM EDT  Workstation ID: [id number]    CT Angiogram Chest  Result Date: 7/6/2025  Impression: Postsurgical changes of prior aortic dissection repair including proximal aortic arch repair and aortic root reconstruction, now with new/worsened complex aortic dissection spanning from the proximal aortic arch to the level of the aortic bifurcation, notably with appearance of multiple/three lumens, which may be related to reentry intimal tear/secondary dissection. Dissection extends into the superior mesenteric artery with subsequent downstream occlusion at its midportion with reconstitution at its branch point. There is a replaced right hepatic artery off the superior mesenteric artery that demonstrates only thready opacification with subsequent  perfusional changes within the right hepatic lobe. Complex dissection flap involves the origin of two codominant left renal arteries with subsequent partial infarction of the left kidney. Celiac artery is narrowed but patent. Right renal artery and inferior mesenteric artery appear patent. Short segment extension to the left subclavian artery which is otherwise patent. There is overall increased aneurysmal dilatation of the thoracoabdominal aorta, for example measuring 5.2 cm at the descending thoracic aorta and 5.7 cm at the infrarenal abdominal aorta. 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 Cardiothoracic surgery consult is recommended. Please note while the chest CT was performed as a CTA with adequate/arterial contrast bolus timing, the CT abdomen pelvis was performed with routine/portal venous phase and thus suboptimal for assessment of arterial structures. Findings and recommendations discussed with Zaida Kruse NP at 8:28 a.m. on 7/6/2025. Electronically Signed: Alexander Swenson MD  7/6/2025 9:07 AM EDT  Workstation ID: [id number]    CT Abdomen Pelvis With Contrast  Result Date: 7/6/2025  Impression: Postsurgical changes of prior aortic dissection repair including proximal aortic arch repair and aortic root reconstruction, now with new/worsened complex aortic dissection spanning from the proximal aortic arch to the level of the aortic bifurcation, notably with appearance of multiple/three lumens, which may be related to reentry intimal tear/secondary dissection. Dissection extends into the superior mesenteric artery with subsequent downstream occlusion at its midportion with reconstitution at its branch point. There is a replaced right hepatic artery off the superior mesenteric artery that demonstrates only thready opacification with subsequent perfusional changes within the right hepatic lobe. Complex dissection flap involves the origin of two codominant left renal arteries  with subsequent partial infarction of the left kidney. Celiac artery is narrowed but patent. Right renal artery and inferior mesenteric artery appear patent. Short segment extension to the left subclavian artery which is otherwise patent. There is overall increased aneurysmal dilatation of the thoracoabdominal aorta, for example measuring 5.2 cm at the descending thoracic aorta and 5.7 cm at the infrarenal abdominal aorta. 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 Cardiothoracic surgery consult is recommended. Please note while the chest CT was performed as a CTA with adequate/arterial contrast bolus timing, the CT abdomen pelvis was performed with routine/portal venous phase and thus suboptimal for assessment of arterial structures. Findings and recommendations discussed with Zaida Kruse NP at 8:28 a.m. on 7/6/2025. Electronically Signed: Alexander Swenson MD  7/6/2025 9:07 AM EDT  Workstation ID: [id number]      Assessment/Plan:     Dissection of mesenteric artery    Aortic dissection    Acute mesenteric ischemia    Acute hypoxic respiratory failure         Acute kidney injury   hyperkalemia  SMA occlusion Status post thrombectomy with repair of mesenteric artery dissection  Mesenteric ischemia   Mesenteric artery dissection   Acute respiratory failure   Metabolic acidosis   History of hypertension    Acute liver injury     Recommendations:    Continue CRRT  requiring more pressors   Continue CRRT  Try to pull fluids as blood pressure tolerates  Noted plans for going back to the OR  today   May need to add bicarb however he is on 100% off oxygen     Watch electrolytes   Blood pressure is not encouraging   patient is critically ill   CCT 32   Discussed with the family at bedside described poor prognosis  Discussed with nursing staff  Will follow closely with you

## 2025-07-11 NOTE — PROGRESS NOTES
Spoke to aunt which is patient's NOK decision maker by phone. Aunt reports that she is hoping for an miracle and patient to recover. Aunt shared extensive about her personal spiritual practice. Aunt reported to me that she has been told patient has improved by needing 100% oxygen to 40% oxygen. She also reports being told he can still continue to live even with the amount of bowel that has been removed. Discussion over trusting in one's spirituality when it comes to health and living in a world that people get sick and may not recover. Aunt acknowledged patient is very sick, but wants to continue to be aggressive with care.    Will continue to follow.    dylan Stroud

## 2025-07-11 NOTE — PROGRESS NOTES
Patient Name: Tj Christopher  YOB: 1985  MRN: [id number]  Admission date: 7/6/2025  Reason for Encounter: Follow-up/Progress Note      [facility] Clinical Nutrition Progress Note       Nutrition Intervention Updates: Consult RD if pt becomes stable enough to feed or if TPN becomes needed         Subjective: Progress note to check on nutrition plan of care.  Remains intubated. Pt remains too unstable to feed. He is on precedex, D20, fentanyl, norepi, teagan, CRRT, vasopressin. Discussed during ICU rounds.        PO Diet: NPO Diet NPO Type: Strict NPO   PO Supplements: None    PO Intake:  NPO       Current nutrition support: RD to manage    Nutrition support review: N/A       Labs: Low sodium - management per MD    High phos - management per MD        GI Function:  Rectal tube: 800 ml        Brief Weight Review:    Wt Readings from Last 1 Encounters:   07/10/25 0400 (!) 165 kg (364 lb 13.8 oz)   07/09/25 0541 (!) 166 kg (366 lb 2.9 oz)   07/06/25 1500 (!) 164 kg (360 lb 14.3 oz)   07/06/25 0606 (!) 145 kg (320 lb)        Results from last 7 days   Lab Units 07/11/25  0452 07/11/25  0352 07/10/25  2334 07/10/25  1523 07/10/25  1353 07/10/25  0708   SODIUM mmol/L 134*  --  134* 135*  --  135*  135*   POTASSIUM mmol/L 4.9  --  4.8 4.8  --  4.7  4.7   CHLORIDE mmol/L 98  --  98 99  --  100  100   CO2 mmol/L 15.5*  --  12.4* 15.3*  --  18.4*  18.4*   BUN mg/dL 13.5  --  14.6 15.5  --  15.9  15.9   CREATININE mg/dL 3.47* 3.42* 3.78* 3.71*   < > 3.49*  3.49*   CALCIUM mg/dL 8.6  --  8.6 8.7  --  8.6  8.6   BILIRUBIN mg/dL 9.2*  --  9.2*  --   --  8.3*   ALK PHOS U/L 376*  --  389*  --   --  472*   ALT (SGPT) U/L 1,793*  --  2,175*  --   --  2,428*   AST (SGOT) U/L >7,000*  --  >7,000*  --   --  >7,000*   GLUCOSE mg/dL 158*  --  130* 92  --  117*  117*    < > = values in this interval not displayed.     Results from last 7 days   Lab Units 07/11/25  0453 07/11/25  0452 07/11/25  0352 07/10/25  2334  "07/10/25  1523 07/08/25  1217 07/08/25  0419   MAGNESIUM mg/dL  --  2.1  --  2.2 2.3   < > 1.7   PHOSPHORUS mg/dL  --  6.3*  --  6.9* 6.7*   < >  --    PLATELETS 10*3/mm3 31*  --   --   --   --    < > 156   HEMOGLOBIN g/dL 7.7*  --   --   --   --    < > 12.9*   HEMOGLOBIN, POC   --   --    < >  --   --    < >  --    HEMATOCRIT % 24.4*  --   --   --   --    < > 39.2   HEMATOCRIT POC   --   --    < >  --   --    < >  --    TRIGLYCERIDES mg/dL  --   --   --   --   --   --  614*    < > = values in this interval not displayed.     No results found for: \"HGBA1C\"    Electronically signed by:  Chelsea Kaye RD  07/11/25 10:28 EDT     "

## 2025-07-11 NOTE — CASE MANAGEMENT/SOCIAL WORK
Continued Stay Note  DEANDRE Nj     Patient Name: Tj Christopher  MRN: [id number]  Today's Date: 7/11/2025    Admit Date: 7/6/2025    Plan: From home with aunt, pending clinical course and PT/OT eval.   Discharge Plan       Row Name 07/11/25 1531       Plan    Plan Comments DC Barriers: Neph/Sx following, Plans to go to OR today 7/11 4pm, plts 31, WBC 31.52, IV steroid, vent 100/10, R IJ, Nubia, NG-TF, Fent/Precedex/Vaso/Kilo/Levo/D20 gtt's, CRRT               Expected Discharge Date and Time       Expected Discharge Date Expected Discharge Time    Jul 16, 2025        Valeria Sweet RN    [phone]  [email or web address]

## 2025-07-11 NOTE — PROGRESS NOTES
Critical Care Progress Note     Tj Christopher : 1985 MRN:[id number] LOS:5  Rm: 2305/1     Principal Problem: Dissection of mesenteric artery     Reason for follow up: All the medical problems listed below    Summary     Tj Christopher is a 40 y.o. male with PMH of uncontrolled hypertension and AAA dissection with surgical repair (3/27/2024) who presented to the ED for evaluation of abdominal pain, nausea and vomiting since approximately 0300 which started shortly after he had eaten. The patient has a history of uncontrolled hypertension which contributed to a type A aortic dissection. On 3/27/2024 the patient had repair of a type a aortic dissection, hemiarch repair and aortic valve resuspension with root repair by Dr. Parkinson 3/2025. The patient was reportedly lost to follow-up however he did report compliance with his medications.      Diagnostic imaging in the ED with abdominal angiogram revealed acute mesenteric ischemia with acute abdomen due to mesenteric ischemia and occlusion of the SMA.  Vascular surgery was consulted emergently by the ED NP Zaida John patient was taken emergently to the OR.  OR interventions included superior mesenteric artery thromboembolectomy with passage of ectomy catheters into the aorta and into the distal mesenteric branches as well as repair of mesenteric artery dissection with eversion endarterectomy and interposition bovine pericardial graft placement from superior mesenteric artery origin to superior mesenteric artery distal branch point.  The surgery was conducted by Dr. Saenz and Dr. Crawford who worked in tandem for surgical intervention in an effort to reperfuse the gut.  This was a lengthy surgery and the patient was admitted to the ICU postoperatively where he will remain intubated and sedated pending planned return to the OR Tuesday for reevaluation of the bowel viability.     Information for this H&P was obtained primarily from review of documentation and  collaboration with other providers the patient is intubated and sedated.  He presented to the ED this morning and was taken emergently to the OR prior to being seen by the ICU team.  He arrived to the ICU postoperatively, intubated and sedated.     7/8:Received HD overnight for hyperkalemia. Went to OR this AM and had a necrotic GB which was removed. His abdomen was closed and no SBR was needed. Post operatively the patient is more hypoxic with a persistent lactic acidosis and hyperkalemia. Discussed with nephrology and will place patient on CRRT.    7/9:Patient with persistent respiratory acidosis, increased TV. Overnight remained on CRRT with persistent shock. On teagan and vaso, will have to add levo and stress dose steroids. Worsening hypoxia. Appears to have atelectasis with air bronchograms, bronch with thick mucoid sputum.  Sedated held, unresponsive. Ammonia elevated, went back to OR and had ischemic bowel, left in discontinuity. 240 cm removed. Planned takeback on 7/11    7/10: hypoxia improved however lactic acidosis and shock did not. Had unstable AF when starting vaso. Cardioverted x5 and given amio, calcium, and metoprolol with improvement.  Methylene blue for vasoplegic shock. Remains on CRRT.     Patient is on Hospital Day: 6.    Significant Events / Subjective     07/11/25 : Worsening hypoxic respiratory failure again. Overnight had runs of AF but no hypotension. Currently in SR. Trying to resuscitate enough to get patient to OR. May need to restart levo.      Assessment / Plan   Acute liver injury- Shock liver  Lactic acidemia  Distributive shock  Likely secondary to necrotic bowel s/p resection as well as acute liver injury from hypoperfusion  Necrotic GB also reomved  Lactic persistently elevated and I suspect he will have more ischemic bowel  High dose thiamine ordered  Transaminitis from shock liver  Continue zosyn, cultures negative  Blood cultures pending and NTD  MRSA PCR negative  Urinalysis  with reflex culture NGTD  On teagan and vaso, stress dose steroids, and methylene blue  Will need to restart levo    SMA occlusion  Mesenteric ischemia  Mesenteric artery dissection  Ischemic bowel  Now s/p SMA thromboembolectomy, repair of mesenteric artery dissection, eversion endarterectomy and interposition bovine pericardial graft placement from superior mesenteric artery origin to superior mesenteric artery distal branch point  OR takeback 7/8 with cholecystectomy, closure and no SBR  OR takeback on 7/9 with 240 cm of small bowel resection and abthera wound vac placement, planned take back on 7/11  Need INR to improve to be taken back today per surgery, will give FFP and PLTs  Will need to see how much small bowel is resected, may need TPN versus having an unsurvivable injury to his abdomen. We will see.   Vascular surgery and general surgery following closely  SBP goal <130 mmHg     Acute Hypoxic/hypercapenic respiratory failure requiring mechanical ventilation  Emergent bronch on 7/9 for hypoxic respiratory failure with thick mucous removal  Back on 100%  FiO2 and 10 of PEEP  CXR pending this AM  Planned takeback to OR tomorrow limits our ability to wean  Sedated on fentanyl      Hyperammonemia  Encephalopathy  -Has been sedated and critically ill preventing ability to wean sedation completely  -Started on rectal lactulose  -Is on CRRT so that should help  -Otherwise minimizing sedation as much as possible as his liver injury is likely complicating medication clearance  -On lower doses of fentanyl for comfort       MONIKA  Left renal hypoperfusion  -Seen on CT scan  -Intrarenal MONIKA likely as well with hypotension  -Perdomo in place, minimal UOP  -Hyperkalemia post operatively, temporized  -HD overnight of 7/7  -Nephrology following, on CRRT  -Unable to pull fluid yesterday  -Perdomo removed as he is anuric.   -Trend labs  -Has been on and off bicarb gtt. Trying to limit fluid in but his severe hypotension is making  that difficult     Essential Hypertension: not well controlled.   Home medications on hold given shock    Disposition: ICU, ventilator, pressors    Code status:   Code Status (Patient has no pulse and is not breathing): CPR (Attempt to Resuscitate)  Medical Interventions (Patient has pulse or is breathing): Full Support       Nutrition:   NPO Diet NPO Type: Strict NPO   Tube Feeding: Formula: RD to Initiate & Manage     VTE Prophylaxis:  Pharmacologic & mechanical VTE prophylaxis orders are present.           Objective / Physical Exam     Vital signs:  Temp: (!) (P) 101.7 °F (38.7 °C)  BP: (!) (P) 90/37  Heart Rate: 76  Resp: (P) 26  SpO2: 91 %  Weight: (!) 165 kg (364 lb 13.8 oz)    Admission Weight: Weight: (!) 145 kg (320 lb)  Current Weight: Weight: (!) 165 kg (364 lb 13.8 oz)    Input/Output in last 24 hours:    Intake/Output Summary (Last 24 hours) at 7/11/2025 0742  Last data filed at 7/11/2025 0700  Gross per 24 hour   Intake 8210 ml   Output 3286 ml   Net 4924 ml      Net IO Since Admission: 20,033 mL [07/11/25 0742]     Physical Exam  Vitals and nursing note reviewed.   Constitutional:       General: He is not in acute distress.     Appearance: He is obese. He is ill-appearing.      Comments: Intubated and sedated   HENT:      Head: Normocephalic and atraumatic.      Right Ear: External ear normal.      Left Ear: External ear normal.      Mouth/Throat:      Comments: Oral ET tube secured  OG tube secured  Eyes:      General: Scleral icterus present.      Pupils: Pupils are equal, round, and reactive to light.      Comments: Conjunctival injection   Neck:      Comments: Right IJ cordis  Trachea midline    Cardiovascular:      Rate and Rhythm: Normal rate and regular rhythm.      Heart sounds: S1 normal and S2 normal. Murmur (Coarse, holosystolic) heard.      Comments: Sinus rhythm  Pulmonary:      Breath sounds: No wheezing or rhonchi.      Comments: Coarse BS bilaterally  Abdominal:      General: There is  distension.      Palpations: Abdomen is soft.      Comments: Midline wound vac in place on suction   Musculoskeletal:         General: No deformity.      Right lower leg: Edema present.      Left lower leg: Edema present.      Comments: Cold bilateral feet and ankles with dopplerable signals   Skin:     General: Skin is dry.      Capillary Refill: Capillary refill takes 2 to 3 seconds.      Comments: Whole body anasarca with weeping skin   Neurological:      Comments: Sedated, minimal responses at this time          Radiology and Labs     Results from last 7 days   Lab Units 07/11/25  0453 07/11/25  0352 07/10/25  1353 07/10/25  0824 07/09/25  1628 07/09/25  1402 07/09/25  0606 07/08/25  1217   WBC 10*3/mm3 31.52*  --   --  22.63* 19.40*  --  17.90* 18.06*   HEMOGLOBIN g/dL 7.7*  --   --  11.3* 10.2*  --  11.4* 11.8*   HEMOGLOBIN, POC g/dL  --  7.9* 9.6*  --   --    < >  --   --    HEMATOCRIT % 24.4*  --   --  33.7* 31.1*  --  35.9* 36.5*   HEMATOCRIT POC %  --  23* 28*  --   --    < >  --   --    PLATELETS 10*3/mm3 31*  --   --  59* 99*  --  128* 148    < > = values in this interval not displayed.      Results from last 7 days   Lab Units 07/11/25  0452 07/10/25  1239 07/09/25  1628 07/09/25  1033 07/06/25  1127 07/06/25  0623   PROTIME Seconds 46.5* 26.8 25.9 25.6* 17.1* 16.1*   INR  4.88* 2.45 2.35 2.32* 1.40* 1.30*   APTT seconds  --   --   --   --   --  27.9      Results from last 7 days   Lab Units 07/11/25  0452 07/11/25  0352 07/10/25  2334 07/10/25  1523 07/10/25  1353 07/10/25  0708 07/10/25  0002 07/09/25  0803 07/09/25  0606 07/08/25  1639 07/08/25  1217   SODIUM mmol/L 134*  --  134* 135*  --  135*  135* 134*   < > 136   < > 135*   POTASSIUM mmol/L 4.9  --  4.8 4.8  --  4.7  4.7 5.2   < > 4.6   < > 6.5*   CHLORIDE mmol/L 98  --  98 99  --  100  100 99   < > 99   < > 99   CO2 mmol/L 15.5*  --  12.4* 15.3*  --  18.4*  18.4* 17.5*   < > 16.7*   < > 16.8*   ANION GAP mmol/L 20.5*  --  23.6* 20.7*  --   16.6*  16.6* 17.5*   < > 20.3*   < > 19.2*   BUN mg/dL 13.5  --  14.6 15.5  --  15.9  15.9 16.8   < > 20.9*   < > 33.6*   CREATININE mg/dL 3.47* 3.42* 3.78* 3.71* 3.59* 3.49*  3.49* 3.88*   < > 4.26*   < > 6.52*   GLUCOSE mg/dL 158*  --  130* 92  --  117*  117* 164*   < > 99   < > 89   PHOSPHORUS mg/dL 6.3*  --  6.9* 6.7*  --  4.4 5.0*   < >  --    < > 7.3*   MAGNESIUM mg/dL 2.1  --  2.2 2.3  --  1.9 1.9   < >  --    < > 1.8   ALT (SGPT) U/L 1,793*  --  2,175*  --   --  2,428*  --   --  2,692*  --  1,817*   AST (SGOT) U/L >7,000*  --  >7,000*  --   --  >7,000*  --   --  >7,000*  --  6,867*   ALK PHOS U/L 376*  --  389*  --   --  472*  --   --  272*  --  195*    < > = values in this interval not displayed.      Results from last 7 days   Lab Units 07/11/25  0452 07/10/25  2334 07/10/25  0708 07/09/25  0606 07/08/25  1217   ALT (SGPT) U/L 1,793* 2,175* 2,428* 2,692* 1,817*   AST (SGOT) U/L >7,000* >7,000* >7,000* >7,000* 6,867*   ALK PHOS U/L 376* 389* 472* 272* 195*     Results from last 7 days   Lab Units 07/11/25  0352 07/10/25  1353 07/10/25  0342 07/09/25  1627 07/09/25  1527 07/09/25  1402 07/09/25  1230   PH, ARTERIAL pH units 7.168* 7.315* 7.334* 7.226* 7.200*   < > 7.205*   PCO2, ARTERIAL mm Hg 40.3 36.8 35.5 38.8 43.7   < > 39.6   PO2 ART mm Hg 78.8* 85.4 319.7* 85.3 99.0   < > 56.4*   O2 SATURATION ART % 91.7* 95.6 99.9* 94.3 96.0   < > 82.3*   FIO2 % 100 50 100 100  --   --  100   HCO3 ART mmol/L 14.6* 18.8* 18.9* 16.1* 17.2*   < > 15.7*   BASE EXCESS ART mmol/L -12.9* -6.8* -6.3* -10.8* <0.0*   < > -11.6*    < > = values in this interval not displayed.       XR Chest 1 View  Result Date: 7/10/2025  1.Tubes and lines appear in good position. 2.Persistent bibasilar infiltrates or atelectasis and small left pleural effusion. Electronically Signed: Den Sanders MD  7/10/2025 2:33 AM EDT  Workstation ID: [id number]    XR Chest 1 View  Result Date: 7/9/2025  Impression: Dense retrocardiac left lower lobe  airspace disease with air bronchograms. Correlate for symptoms of pneumonia. Stable right infrahilar/right middle lobe airspace disease which may represent atelectasis or pneumonia. Stable cardiac enlargement. Electronically Signed: Aracely Shukla MD  7/9/2025 10:29 AM EDT  Workstation ID: [id number]      Current medications     Scheduled Meds:   albumin human, 25 g, Intravenous, Q6H  hydrocortisone sodium succinate, 100 mg, Intravenous, Q6H  lactulose, 300 mL, Rectal, Q6H  methylene blue 100 mg in dextrose (D5W) 5 % 70 mL IVPB, 100 mg, Intravenous, Q6H  mupirocin, 1 Application, Each Nare, BID  pantoprazole, 40 mg, Intravenous, Q AM  piperacillin-tazobactam, 4.5 g, Intravenous, Q8H  sodium bicarbonate, , ,         Continuous Infusions:   dexmedetomidine, 0.2-1.5 mcg/kg/hr, Last Rate: 0.5 mcg/kg/hr (07/10/25 2024)  dextrose, 75 mL/hr, Last Rate: 75 mL/hr (07/11/25 0327)  fentanyl 10 mcg/mL,  mcg/hr, Last Rate: 100 mcg/hr (07/11/25 0326)  norepinephrine, 0.02-0.5 mcg/kg/min, Last Rate: Stopped (07/10/25 0629)  phenylephrine, 0.5-6 mcg/kg/min, Last Rate: 6 mcg/kg/min (07/11/25 0721)  PrismaSol BGK 2/3.5, 2,000 mL/hr, Last Rate: 2,000 mL/hr (07/11/25 0643)  PrismaSol BGK 2/3.5, 2,000 mL/hr, Last Rate: 2,000 mL/hr (07/11/25 0643)  PrismaSol BGK 2/3.5, 2,000 mL/hr, Last Rate: 2,000 mL/hr (07/11/25 0642)  sodium bicarbonate 8.4 % 150 mEq in dextrose (D5W) 5 % 1,000 mL infusion (greater than 100 mEq), 150 mEq, Last Rate: 150 mEq (07/11/25 0243)  vasopressin, 0.05 Units/min, Last Rate: 0.05 Units/min (07/11/25 0312)      Total critical care time:  38 minutes    Due to a high probability of clinically significant, life threatening deterioration, the patient required my highest level of preparedness to intervene emergently and I personally spent this critical care time directly and personally managing the patient. This critical care time included obtaining a history; examining the patient; pulse oximetry; ordering and  review of studies; arranging urgent treatment with development of a management plan; evaluation of patient's response to treatment; frequent reassessment; and, discussions with other providers.    This critical care time was performed to assess and manage the high probability of imminent, life-threatening deterioration that could result in multi-organ failure. It was exclusive of separately billable procedures and treating other patients and teaching time.      Plan discussed with RN. Reviewed all other data in the last 24 hours, including but not limited to vitals, labs, microbiology, imaging and pertinent notes from other providers.        Kyler Morgan MD   Critical Care  07/11/25   07:42 EDT

## 2025-07-11 NOTE — ANESTHESIA PREPROCEDURE EVALUATION
Anesthesia Evaluation     Patient summary reviewed and Nursing notes reviewed   NPO Solid Status: > 8 hours  NPO Liquid Status: > 8 hours           Airway   Mallampati: II  TM distance: >3 FB  Neck ROM: full  No difficulty expected  Dental - normal exam     Pulmonary - normal exam    breath sounds clear to auscultation  (+) ,sleep apnea  Cardiovascular - normal exam  Exercise tolerance: unable to assess    ECG reviewed  Rhythm: regular  Rate: normal    (+) hypertension      Neuro/Psych- negative ROS  GI/Hepatic/Renal/Endo    (+) morbid obesity, liver disease history of elevated LFT, renal disease- ARF and CRI    Musculoskeletal (-) negative ROS    Abdominal  - normal exam   Substance History - negative use     OB/GYN negative ob/gyn ROS         Other - negative ROS       ROS/Med Hx Other: alexandra Christopher is a 40 y.o. male with PMH of uncontrolled hypertension and AAA dissection with surgical repair (3/27/2024) who presented to the ED for evaluation of abdominal pain, nausea and vomiting since approximately 0300 which started shortly after he had eaten. The patient has a history of uncontrolled hypertension which contributed to a type A aortic dissection. On 3/27/2024 the patient had repair of a type a aortic dissection, hemiarch repair and aortic valve resuspension with root repair by Dr. Parkinson 3/2025. The patient was reportedly lost to follow-up however he did report compliance with his medications.      Diagnostic imaging in the ED with abdominal angiogram revealed acute mesenteric ischemia with acute abdomen due to mesenteric ischemia and occlusion of the SMA.  Vascular surgery was consulted emergently by the ED NP Zaida John patient was taken emergently to the OR.  OR interventions included superior mesenteric artery thromboembolectomy with passage of ectomy catheters into the aorta and into the distal mesenteric branches as well as repair of mesenteric artery dissection with eversion endarterectomy and  interposition bovine pericardial graft placement from superior mesenteric artery origin to superior mesenteric artery distal branch point.  The surgery was conducted by Dr. Saenz and Dr. Crawford who worked in tandem for surgical intervention in an effort to reperfuse the gut.  This was a lengthy surgery and the patient was admitted to the ICU postoperatively where he will remain intubated and sedated pending planned return to the OR Tuesday for reevaluation of the bowel viability.     Information for this H&P was obtained primarily from review of documentation and collaboration with other providers the patient is intubated and sedated.  He presented to the ED this morning and was taken emergently to the OR prior to being seen by the ICU team.  He arrived to the ICU postoperatively, intubated and sedated.     7/8:Received HD overnight for hyperkalemia. Went to OR this AM and had a necrotic GB which was removed. His abdomen was closed and no SBR was needed. Post operatively the patient is more hypoxic with a persistent lactic acidosis and hyperkalemia. Discussed with nephrology and will place patient on CRRT.     7/9:Patient with persistent respiratory acidosis, increased TV. Overnight remained on CRRT with persistent shock. On teagan and vaso, will have to add levo and stress dose steroids. Worsening hypoxia. Appears to have atelectasis with air bronchograms, bronch with thick mucoid sputum.  Sedated held, unresponsive. Ammonia elevated, went back to OR and had ischemic bowel, left in discontinuity. 240 cm removed. Planned takeback on 7/11     7/10: hypoxia improved however lactic acidosis and shock did not. Had unstable AF when starting vaso. Cardioverted x5 and given amio, calcium, and metoprolol with improvement.  Methylene blue for vasoplegic shock. Remains on CRRT.      Patient is on Hospital Day: 6.     Significant Events / Subjective     07/11/25 : Worsening hypoxic respiratory failure again. Overnight had runs of  AF but no hypotension. Currently in SR. Trying to resuscitate enough to get patient to OR. May need to restart levo.       Assessment / Plan  Acute liver injury- Shock liver  Lactic acidemia  Distributive shock  · Likely secondary to necrotic bowel s/p resection as well as acute liver injury from hypoperfusion  · Necrotic GB also reomved  · Lactic persistently elevated and I suspect he will have more ischemic bowel  · High dose thiamine ordered  · Transaminitis from shock liver  · Continue zosyn, cultures negative  · Blood cultures pending and NTD  · MRSA PCR negative  · Urinalysis with reflex culture NGTD  · On teagan and vaso, stress dose steroids, and methylene blue  · Will need to restart levo     SMA occlusion  Mesenteric ischemia  Mesenteric artery dissection  Ischemic bowel  · Now s/p SMA thromboembolectomy, repair of mesenteric artery dissection, eversion endarterectomy and interposition bovine pericardial graft placement from superior mesenteric artery origin to superior mesenteric artery distal branch point  · OR takeback 7/8 with cholecystectomy, closure and no SBR  · OR takeback on 7/9 with 240 cm of small bowel resection and abthera wound vac placement, planned take back on 7/11  · Need INR to improve to be taken back today per surgery, will give FFP and PLTs  · Will need to see how much small bowel is resected, may need TPN versus having an unsurvivable injury to his abdomen. We will see.   · Vascular surgery and general surgery following closely  · SBP goal <130 mmHg     Acute Hypoxic/hypercapenic respiratory failure requiring mechanical ventilation  · Emergent bronch on 7/9 for hypoxic respiratory failure with thick mucous removal  · Back on 100%  FiO2 and 10 of PEEP  · CXR pending this AM  · Planned takeback to OR tomorrow limits our ability to wean  · Sedated on fentanyl      Hyperammonemia  Encephalopathy  -Has been sedated and critically ill preventing ability to wean sedation completely  -Started  on rectal lactulose  -Is on CRRT so that should help  -Otherwise minimizing sedation as much as possible as his liver injury is likely complicating medication clearance  -On lower doses of fentanyl for comfort        MONIKA  Left renal hypoperfusion  -Seen on CT scan  -Intrarenal MONIKA likely as well with hypotension  -Perdomo in place, minimal UOP  -Hyperkalemia post operatively, temporized  -HD overnight of 7/7  -Nephrology following, on CRRT  -Unable to pull fluid yesterday  -Perdomo removed as he is anuric.   -Trend labs  -Has been on and off bicarb gtt. Trying to limit fluid in but his severe hypotension is making that difficult      Essential Hypertension: not well controlled.   · Home medications on hold given shock     Disposition: ICU, ventilator, pressors     Code status:   Code Status (Patient has no pulse and is not breathing): CPR (Attempt to Resuscitate)  Medical Interventions (Patient has pulse or is breathing): Full Support                   Anesthesia Plan    ASA 5     general     (Full Code. Multiple pressers + Methylene blue. Intubated. Hgb 7. Needs blood , BP 90's    VENT: FIO2 100% + PEEP 7.      Transport carefully with peep valve.  Transfer may be risky.  )  intravenous induction     Anesthetic plan, risks, benefits, and alternatives have been provided, discussed and informed consent has been obtained with: patient.    CODE STATUS:    Code Status (Patient has no pulse and is not breathing): CPR (Attempt to Resuscitate)  Medical Interventions (Patient has pulse or is breathing): Full Support

## 2025-07-11 NOTE — PROGRESS NOTES
"General Surgery Progress Note    SUBJECTIVE:   Patient seen at bedside in ICU.  24-hour events reviewed - required several cardioversions due to RVR.  Remains on pressors with notable worsening metabolic acidosis on this morning's ABG.    VITALS:   Temp:  [97.7 °F (36.5 °C)-101.7 °F (38.7 °C)] 99.8 °F (37.7 °C)  Heart Rate:  [] 79  Resp:  [26] 26  BP: ()/(36-59) 120/41  FiO2 (%):  [80 %-100 %] 100 %    I & O:  I/O last 3 completed shifts:  In: 19518 [I.V.:59210; Other:1000]  Out: 6381.5 [Stool:800]  I/O this shift:  In: 4321 [I.V.:2833; Blood:1488]  Out: 498     PHYSICAL EXAM:  General: Sedated/intubated   Abdomen:   obese, firm, NT, ND/dull to percussion  ABthera in place - dark output     LABS:  No results found for: \"CBCDIF\"  CBC:      Lab 07/11/25  0453 07/10/25  1353 07/10/25  0824 07/09/25  1628 07/09/25  1402 07/09/25  0606 07/08/25  1217 07/08/25  0419 07/07/25  1216 07/07/25  0348 07/06/25  1518 07/06/25  1056   WBC 31.52*  --  22.63* 19.40*  --  17.90* 18.06*   < > 22.67* 21.27*   < > 12.19*   HEMOGLOBIN 7.7*  --  11.3* 10.2*  --  11.4* 11.8*   < > 12.2* 12.6*   < > 12.6*   HEMOGLOBIN, POC  --    < >  --   --    < >  --   --   --   --   --    < >  --    HEMATOCRIT 24.4*  --  33.7* 31.1*  --  35.9* 36.5*   < > 37.2* 39.0   < > 38.5   HEMATOCRIT POC  --    < >  --   --    < >  --   --   --   --   --    < >  --    PLATELETS 31*  --  59* 99*  --  128* 148   < > 137* 147   < > 174   NEUTROS ABS 17.34*  --  16.52* 14.74*  --  12.17* 16.62*   < > 19.42* 18.68*   < > 10.33*   IMMATURE GRANS (ABS)  --   --   --   --   --   --   --   --  0.14* 0.07*  --  0.07*   LYMPHS ABS  --   --   --   --   --   --   --   --  1.68 1.30  --  0.68*   MONOS ABS  --   --   --   --   --   --   --   --  1.40* 1.20*  --  1.08*   EOS ABS  --   --   --   --   --   --   --   --  0.00 0.00  --  0.01   MCV 88.1  --  83.0 86.4  --  88.9 89.0   < > 86.1 85.5   < > 85.6    < > = values in this interval not displayed.      Lab " Results   Component Value Date    GLUCOSE 158 (H) 07/11/2025    BUN 13.5 07/11/2025    CREATININE 3.47 (H) 07/11/2025     (L) 07/11/2025    K 4.9 07/11/2025    CL 98 07/11/2025    CALCIUM 8.6 07/11/2025    PROTEINTOT 3.5 (L) 07/11/2025    ALBUMIN 2.6 (L) 07/11/2025    ALT 1,793 (H) 07/11/2025    AST >7,000 (H) 07/11/2025    ALKPHOS 376 (H) 07/11/2025    BILITOT 9.2 (H) 07/11/2025    GLOB 0.9 07/11/2025    AGRATIO 2.9 07/11/2025    BCR 3.9 (L) 07/11/2025    ANIONGAP 20.5 (H) 07/11/2025    EGFR 22.1 (L) 07/11/2025     Lab Results   Component Value Date    INR 3.74 (C) 07/11/2025    INR 4.88 (C) 07/11/2025    INR 2.45 07/10/2025    PROTIME 37.7 (H) 07/11/2025    PROTIME 46.5 (H) 07/11/2025    PROTIME 26.8 07/10/2025       RADIOLOGY:  XR Chest 1 View  Result Date: 7/11/2025  Impression: 1.Lines and tubes as noted. 2.Slight prominence of markings right perihilar region that could relate to atelectasis. 3.Haziness left base that may relate to atelectasis and/or effusion. Electronically Signed: Tristan Tenorio MD  7/11/2025 9:11 AM EDT  Workstation ID: [id number]    XR Chest 1 View  Result Date: 7/10/2025  1.Tubes and lines appear in good position. 2.Persistent bibasilar infiltrates or atelectasis and small left pleural effusion. Electronically Signed: Den Sanders MD  7/10/2025 2:33 AM EDT  Workstation ID: [id number]    XR Chest 1 View  Result Date: 7/9/2025  Impression: Dense retrocardiac left lower lobe airspace disease with air bronchograms. Correlate for symptoms of pneumonia. Stable right infrahilar/right middle lobe airspace disease which may represent atelectasis or pneumonia. Stable cardiac enlargement. Electronically Signed: Aracely Shukla MD  7/9/2025 10:29 AM EDT  Workstation ID: [id number]    XR Abdomen KUB  Result Date: 7/8/2025  Impression: Enteric tube overlies the stomach. Electronically Signed: Hipolito Alberts MD  7/8/2025 6:08 PM EDT  Workstation ID: [id number]    XR Chest 1 View  Result Date:  7/8/2025  Impression: 1.Postoperative changes of recent sternotomy. 2.Residual atelectasis or airspace disease in the left lung base. Electronically Signed: Phil Blackwood MD  7/8/2025 1:29 PM EDT  Workstation ID: [id number]    XR Surgical Count Protocol  Result Date: 7/8/2025  Impression: No definite retained sponges are identified. Electronically Signed: Kobe Hudson MD  7/8/2025 10:27 AM EDT  Workstation ID: [id number]    XR Chest 1 View  Result Date: 7/8/2025  Impression: Left internal jugular CVC catheter present with the tip at the upper SVC. No pneumothorax. Otherwise, no significant change. Electronically Signed: Yoly Julian MD  7/8/2025 12:57 AM EDT  Workstation ID: [id number]    XR Chest 1 View  Result Date: 7/7/2025  Impression: 1.Left jugular central venous catheter tip is in the expected location of the left brachiocephalic vein. 2.No pneumothorax is seen. 3.ET tube, NG tube, and right jugular central venous catheter remain in place. 4.Subsegmental atelectasis at the lung bases. Electronically Signed: Chace Ordaz MD  7/7/2025 9:52 PM EDT  Workstation ID: [id number]    XR Chest 1 View  Result Date: 7/6/2025  Impression: Tip of the right internal jugular central venous catheter terminates in the upper SVC. No pneumothorax visible on this supine image. Tip of the endotracheal tube terminates in the midthoracic trachea approximately 3 to 4 cm above the orion. Tip of the enteric tube terminates in the gastric fundus. Electronically Signed: Kelli Olivera MD  7/6/2025 5:16 PM EDT  Workstation ID: [id number]    CT Angiogram Chest  Result Date: 7/6/2025  Impression: Postsurgical changes of prior aortic dissection repair including proximal aortic arch repair and aortic root reconstruction, now with new/worsened complex aortic dissection spanning from the proximal aortic arch to the level of the aortic bifurcation, notably with appearance of multiple/three lumens, which may be related to reentry intimal tear/secondary  dissection. Dissection extends into the superior mesenteric artery with subsequent downstream occlusion at its midportion with reconstitution at its branch point. There is a replaced right hepatic artery off the superior mesenteric artery that demonstrates only thready opacification with subsequent perfusional changes within the right hepatic lobe. Complex dissection flap involves the origin of two codominant left renal arteries with subsequent partial infarction of the left kidney. Celiac artery is narrowed but patent. Right renal artery and inferior mesenteric artery appear patent. Short segment extension to the left subclavian artery which is otherwise patent. There is overall increased aneurysmal dilatation of the thoracoabdominal aorta, for example measuring 5.2 cm at the descending thoracic aorta and 5.7 cm at the infrarenal abdominal aorta. 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 Cardiothoracic surgery consult is recommended. Please note while the chest CT was performed as a CTA with adequate/arterial contrast bolus timing, the CT abdomen pelvis was performed with routine/portal venous phase and thus suboptimal for assessment of arterial structures. Findings and recommendations discussed with Zaida Kruse NP at 8:28 a.m. on 7/6/2025. Electronically Signed: Alexander Swenson MD  7/6/2025 9:07 AM EDT  Workstation ID: [id number]    CT Abdomen Pelvis With Contrast  Result Date: 7/6/2025  Impression: Postsurgical changes of prior aortic dissection repair including proximal aortic arch repair and aortic root reconstruction, now with new/worsened complex aortic dissection spanning from the proximal aortic arch to the level of the aortic bifurcation, notably with appearance of multiple/three lumens, which may be related to reentry intimal tear/secondary dissection. Dissection extends into the superior mesenteric artery with subsequent downstream occlusion at its midportion with  reconstitution at its branch point. There is a replaced right hepatic artery off the superior mesenteric artery that demonstrates only thready opacification with subsequent perfusional changes within the right hepatic lobe. Complex dissection flap involves the origin of two codominant left renal arteries with subsequent partial infarction of the left kidney. Celiac artery is narrowed but patent. Right renal artery and inferior mesenteric artery appear patent. Short segment extension to the left subclavian artery which is otherwise patent. There is overall increased aneurysmal dilatation of the thoracoabdominal aorta, for example measuring 5.2 cm at the descending thoracic aorta and 5.7 cm at the infrarenal abdominal aorta. 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 Cardiothoracic surgery consult is recommended. Please note while the chest CT was performed as a CTA with adequate/arterial contrast bolus timing, the CT abdomen pelvis was performed with routine/portal venous phase and thus suboptimal for assessment of arterial structures. Findings and recommendations discussed with Zaida Kruse NP at 8:28 a.m. on 7/6/2025. Electronically Signed: Alexander Swenson MD  7/6/2025 9:07 AM EDT  Workstation ID: [id number]        ASSESSMENT:   40 y.o. 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     POD#4 s/p damage control laparotomy, SMA thromboembolectomy with interposition bypass graft  POD#3s/p takeback laparotomy, washout, open cholecystectomy, abdominal wall closure  POD#2 s/p reopening damage control laparotomy, small bowel resection, ileocecectomy, omentectomy, ABThera VAC placement    Patient remains in critical condition.  He continues to have further evidence of ongoing bowel ischemia.  His prognosis is grave.  I strongly suspect  that he will not survive this admission.  However, since he has not succumb to his illness in 24 hours, it would be reasonable to consider takeback laparotomy in order to rule out bowel ischemia.  He has substantial coagulopathy due to hepatic dysfunction, which will require reversal prior to surgery.    PLAN:   OR today; goal INR < 1.7   Rest of care per primary team     Merrill Henry MD   General Surgery  07/11/25   11:33 EDT

## 2025-07-12 NOTE — PLAN OF CARE
Goal Outcome Evaluation:      Levo, Vassopresin, Kilo, Fentanyl, Dex, D20 all ongoing. Hypotensive despite titration of pressors. NP advised to maintain levo at 0.08 because of a previous arrythmia, however BP remains low, informed NP and titrated levo up to 0.25. Pt had couple runs of Vtach less that 6 seconds and SVT. Bicarb and calcium gluc was given. Maintaining all pressors as of the moment. Family members at bedside.

## 2025-07-12 NOTE — DISCHARGE SUMMARY
CRITICAL CARE DEATH SUMMARY    PATIENT NAME:     Tj Christopher  :     1985  MRN:     [id number]    ADMISSION DATE:  2025      DATE/TIME OF DEATH:    2025 at 4:50 AM       CAUSE OF DEATH  Multisystem organ failure, ischemic bowel, MONIKA, shock liver      FINAL DIAGNOSES  Acute liver injury- Shock liver  Lactic acidemia  Distributive shock  SMA occlusion  Mesenteric ischemia  Mesenteric artery dissection  Ischemic bowel  Acute Hypoxic/hypercapenic respiratory failure requiring mechanical ventilation   Hyperammonemia  Encephalopathy  MONIKA  Left renal hypoperfusion  Essential hypertension     HOSPITAL COURSE  Tj Christopher is a 40 y.o. male with PMH of uncontrolled hypertension and AAA dissection with surgical repair (3/27/2024) who presented to the ED for evaluation of abdominal pain, nausea and vomiting since approximately 0300 which started shortly after he had eaten. The patient has a history of uncontrolled hypertension which contributed to a type A aortic dissection. On 3/27/2024 the patient had repair of a type a aortic dissection, hemiarch repair and aortic valve resuspension with root repair by Dr. Parkinson 3/2025. The patient was reportedly lost to follow-up however he did report compliance with his medications.   Diagnostic imaging in the ED with abdominal angiogram revealed acute mesenteric ischemia with acute abdomen due to mesenteric ischemia and occlusion of the SMA.  Vascular surgery was consulted emergently by the ED NP Zaida John patient was taken emergently to the OR.  OR interventions included superior mesenteric artery thromboembolectomy with passage of ectomy catheters into the aorta and into the distal mesenteric branches as well as repair of mesenteric artery dissection with eversion endarterectomy and interposition bovine pericardial graft placement from superior mesenteric artery origin to superior mesenteric artery distal branch point.  The surgery was conducted by Dr. Saenz and   Ty who worked in tandem for surgical intervention in an effort to reperfuse the gut.  This was a lengthy surgery and the patient was admitted to the ICU postoperatively where he will remain intubated and sedated pending planned return to the OR Tuesday for reevaluation of the bowel viability.  Information for this H&P was obtained primarily from review of documentation and collaboration with other providers the patient is intubated and sedated.  He presented to the ED this morning and was taken emergently to the OR prior to being seen by the ICU team.  He arrived to the ICU postoperatively, intubated and sedated.  07/07/25 : The patient had no acute events overnight.  Afebrile and hemodynamically stable.  Intubated and sedated with fentanyl and propofol.  Currently on a bicarb drip, change to LR. Cardene drip off overnight. Abdominal wound VAC with serosanguineous drainage. Worsening MONIKA anticipated from hypoperfusion, continue to monitor closely. Repeat BMP pending. Continue to monitor off antibiotics, no indication of infection.   07/08/25 :Received HD overnight for hyperkalemia. Went to OR this AM and had a necrotic GB which was removed. His abdomen was closed and no SBR was needed. Post operatively the patient is more hypoxic with a persistent lactic acidosis and hyperkalemia. Discussed with nephrology and will place patient on CRRT.   07/09/25 :Patient with persistent respiratory acidosis, increased TV. Overnight remained on CRRT with persistent shock. On teagan and vaso, will have to add levo and stress dose steroids. Worsening hypoxia. Appears to have atelectasis with air bronchograms, will plan to bronch and increase PEEP. Concerning for pneumonia. Sedation held, unresponsive. Ammonia 125, scheduled lactulose.  07/10/25 : Patient went back to the OR yesterday with surgery and had 240 cm of small bowel resected for being ischemic. Wound vac placed with plan for takeback. Vasopressor requirements much better  post OR. Was emergently bronched for hypoxic shock with mucous plugging. Hypoxia greatly improved and he was on minimal vent settings.   Patient had worsening hypotension and was maxed on teagan and vaso.  Levo was attempted to be added however the patient sustained atrial fibrillation with rapid ventricular response and hypotension.  Norepinephrine was stopped and the patient was cardioverted successfully.  Shortly after [facility] with [facility] and each time his blood pressure dropped.  CRRT fluid removal halted.  The patient was given 2 amps of bicarb.  Patient was given 10 mg of IV metoprolol (two successive 5 mg pushes)  And patient was also given 150 mg amiodarone throughout this periarrest.  Where the patient would go in and out of atrial fibrillation followed by cardioversion.  Overall the patient was cardioverted 5 times and after the above medication was given he converted back to sinus and has remained in sinus.  Will hold off on giving any additional norepinephrine as he has an unstable cardiac membrane.  Vasopressin dosing was increased.  Added back stress dose steroids and one-time dose of methylene blue.  There was no reported history of G6PD. Repeat abg was notable for hypokalemia and he was given 2 mg of calcium gluconate as well as 2 mg of magnesium. Dr. Morgan spoke to Dr. Henry with general surgery about the case and he agreed that bring the patient back to the operating room at that time would likely not change anything and he planned to take him back to OR.  Dr. Morgan discussed this with the patient's girlfriend as well as with the patient's aunt who is his primary decision-maker and explained how concerned he was that he was not going to survive the night however they wanted to continue full care.   07/11/25 : Worsening hypoxic respiratory failure again. Overnight had runs of AF but no hypotension. Currently in SR. Resuscitation with FFP and platelets to get patient to OR. Restarted  levophed.  Patient was with persistent 3 vasopressor shock throughout the day with worsening hypoxia and acidosis.  Surgery took down his wound VAC at bedside and noted that most of what was left of his small bowel was necrotic as well as his colon, his spleen, his liver.  This was deemed an unsurvivable injury due to total abdominal catastrophe.  Dr. Morgan and Dr. Henry spoke with the patient and it is, DPOA, and explains this was an unsurvivable injury to his abdominal organs that was not compatible with life.  It was discussed that further treatment was medically inappropriate and would only prolong his suffering without survivability.  His aunt stated understanding and wanted to talk to other family members before deciding on further options.  The case was discussed with Dr. Arciniega with nephrology and the decision was made not to restart CRRT due to profound hypotension and a nonsurvivable injury.  This intervention was deemed by two clinicians to be medically inappropriate.  The patient continued to have hypotension overnight despite vasopressor support.  His aunt was updated at the bedside by Celena NUNEZ.  He remained a full code.  The patient eventually suffered a PEA arrest and ACLS was performed.  The code blue was called by Celena NUNEZ, and discussed with Dr. Subramanian, due to medical futility and the patient's time of death was 0450.  Family was present.    Time of death noted to be on 7/12/2025 at 4:50 AM .  Emotional support provided to family.       PROCEDURES PERFORMED  Procedure(s):  reopening laparotomy, small bowel resection, ileocecectomy, omentectomy, abthera wound vac placement  07/10 1502 Electrical Cardioversion  07/09 1416 Insert Arterial Line  07/09 1414 Bronchoscopy    CONSULTING PHYSICIANS  Consults       Date and Time Order Name Status Description    7/7/2025  6:07 PM Inpatient Nephrology Consult      7/6/2025  9:24 AM Intensivist (on-call MD unless specified)      7/6/2025  9:24 AM Surgery  (on-call MD unless specified) Completed     7/6/2025  8:49 AM Vascular Surgery (on-call MD unless specified) Completed             RESULTS REVIEW  LABS  Lab Results (last 24 hours)       Procedure Component Value Units Date/Time    Calcium, Ionized [[id number]]  (Abnormal) Collected: 07/11/25 0909    Specimen: Blood Updated: 07/11/25 0923     Ionized Calcium 1.05 mmol/L     POC Glucose Once [[id number]]  (Abnormal) Collected: 07/11/25 0909    Specimen: Blood Updated: 07/11/25 0912     Glucose 187 mg/dL      Comment: Serial Number: [id number]:  [id number]       Protime-INR [[id number]]  (Abnormal) Collected: 07/11/25 0929    Specimen: Blood Updated: 07/11/25 0949     Protime 37.7 Seconds      INR 3.74    Fibrinogen [[id number]]  (Abnormal) Collected: 07/11/25 0929    Specimen: Blood Updated: 07/11/25 1004     Fibrinogen 140 mg/dL     CBC (No Diff) [[id number]]  (Abnormal) Collected: 07/11/25 1126    Specimen: Blood Updated: 07/11/25 1144     WBC 32.73 10*3/mm3      RBC 2.58 10*6/mm3      Hemoglobin 7.3 g/dL      Hematocrit 22.6 %      MCV 87.6 fL      MCH 28.3 pg      MCHC 32.3 g/dL      RDW 16.1 %      RDW-SD 50.7 fl      MPV 10.7 fL      Platelets 55 10*3/mm3     Protime-INR [[id number]]  (Abnormal) Collected: 07/11/25 1218    Specimen: Blood Updated: 07/11/25 1244     Protime 25.9 Seconds      INR 2.34    POC Glucose Once [[id number]]  (Abnormal) Collected: 07/11/25 1218    Specimen: Blood Updated: 07/11/25 1220     Glucose 135 mg/dL      Comment: Serial Number: [id number]:  [id number]       POC Glucose Once [[id number]]  (Normal) Collected: 07/11/25 1442    Specimen: Blood Updated: 07/11/25 1444     Glucose 104 mg/dL      Comment: Serial Number: [id number]:  [id number]       Protime-INR [[id number]]  (Abnormal) Collected: 07/11/25 1443    Specimen: Blood Updated: 07/11/25 1500     Protime 25.7 Seconds      INR 2.32    Renal Function Panel [[id number]]  (Abnormal) Collected: 07/11/25 1522    Specimen:  Blood Updated: 07/11/25 1601     Glucose 104 mg/dL      BUN 11.8 mg/dL      Creatinine 3.10 mg/dL      Sodium 138 mmol/L      Potassium 4.8 mmol/L      Chloride 98 mmol/L      CO2 17.3 mmol/L      Calcium 9.1 mg/dL      Albumin 2.9 g/dL      Phosphorus 6.4 mg/dL      Anion Gap 22.7 mmol/L      BUN/Creatinine Ratio 3.8     eGFR 25.3 mL/min/1.73     Narrative:      GFR Categories in Chronic Kidney Disease (CKD)              GFR Category          GFR (mL/min/1.73)    Interpretation  G1                    90 or greater        Normal or high (1)  G2                    60-89                Mild decrease (1)  G3a                   45-59                Mild to moderate decrease  G3b                   30-44                Moderate to severe decrease  G4                    15-29                Severe decrease  G5                    14 or less           Kidney failure    (1)In the absence of evidence of kidney disease, neither GFR category G1 or G2 fulfill the criteria for CKD.    eGFR calculation 2021 CKD-EPI creatinine equation, which does not include race as a factor    Magnesium [[id number]]  (Normal) Collected: 07/11/25 1522    Specimen: Blood Updated: 07/11/25 1550     Magnesium 2.2 mg/dL     Calcium, Ionized [[id number]]  (Abnormal) Collected: 07/11/25 1522    Specimen: Blood Updated: 07/11/25 1532     Ionized Calcium 1.11 mmol/L     POC Glucose Once [[id number]]  (Abnormal) Collected: 07/12/25 0130    Specimen: Blood Updated: 07/12/25 0132     Glucose 46 mg/dL      Comment: Serial Number: [id number]:  [id number]        Device Comment Follow unit protocol    POC Glucose Once [[id number]]  (Abnormal) Collected: 07/12/25 0202    Specimen: Blood Updated: 07/12/25 0204     Glucose 122 mg/dL      Comment: Serial Number: [id number]:  [id number]               MICRO:  All cultures reviewed and negative, or pending with no growth unless otherwise designated in chart below.  Microbiology Results Abnormal       None                RADIOLOGY:  XR Chest 1 View  Result Date: 7/11/2025  Impression: 1.Lines and tubes as noted. 2.Slight prominence of markings right perihilar region that could relate to atelectasis. 3.Haziness left base that may relate to atelectasis and/or effusion. Electronically Signed: Tristan Tenorio MD  7/11/2025 9:11 AM EDT  Workstation ID: [id number]    XR Chest 1 View  Result Date: 7/10/2025  1.Tubes and lines appear in good position. 2.Persistent bibasilar infiltrates or atelectasis and small left pleural effusion. Electronically Signed: Den Sanders MD  7/10/2025 2:33 AM EDT  Workstation ID: [id number]    XR Chest 1 View  Result Date: 7/9/2025  Impression: Dense retrocardiac left lower lobe airspace disease with air bronchograms. Correlate for symptoms of pneumonia. Stable right infrahilar/right middle lobe airspace disease which may represent atelectasis or pneumonia. Stable cardiac enlargement. Electronically Signed: Aracely Shukla MD  7/9/2025 10:29 AM EDT  Workstation ID: [id number]    XR Abdomen KUB  Result Date: 7/8/2025  Impression: Enteric tube overlies the stomach. Electronically Signed: Hipolito Alberts MD  7/8/2025 6:08 PM EDT  Workstation ID: [id number]    XR Chest 1 View  Result Date: 7/8/2025  Impression: 1.Postoperative changes of recent sternotomy. 2.Residual atelectasis or airspace disease in the left lung base. Electronically Signed: Phil Blackwood MD  7/8/2025 1:29 PM EDT  Workstation ID: [id number]    XR Surgical Count Protocol  Result Date: 7/8/2025  Impression: No definite retained sponges are identified. Electronically Signed: Kobe Hudson MD  7/8/2025 10:27 AM EDT  Workstation ID: [id number]    XR Chest 1 View  Result Date: 7/8/2025  Impression: Left internal jugular CVC catheter present with the tip at the upper SVC. No pneumothorax. Otherwise, no significant change. Electronically Signed: Yoly Julian MD  7/8/2025 12:57 AM EDT  Workstation ID: [id number]    XR Chest 1 View  Result Date:  7/7/2025  Impression: 1.Left jugular central venous catheter tip is in the expected location of the left brachiocephalic vein. 2.No pneumothorax is seen. 3.ET tube, NG tube, and right jugular central venous catheter remain in place. 4.Subsegmental atelectasis at the lung bases. Electronically Signed: Chace Ordaz MD  7/7/2025 9:52 PM EDT  Workstation ID: [id number]    XR Chest 1 View  Result Date: 7/6/2025  Impression: Tip of the right internal jugular central venous catheter terminates in the upper SVC. No pneumothorax visible on this supine image. Tip of the endotracheal tube terminates in the midthoracic trachea approximately 3 to 4 cm above the orion. Tip of the enteric tube terminates in the gastric fundus. Electronically Signed: Kelli Olivera MD  7/6/2025 5:16 PM EDT  Workstation ID: [id number]    CT Angiogram Chest  Result Date: 7/6/2025  Impression: Postsurgical changes of prior aortic dissection repair including proximal aortic arch repair and aortic root reconstruction, now with new/worsened complex aortic dissection spanning from the proximal aortic arch to the level of the aortic bifurcation, notably with appearance of multiple/three lumens, which may be related to reentry intimal tear/secondary dissection. Dissection extends into the superior mesenteric artery with subsequent downstream occlusion at its midportion with reconstitution at its branch point. There is a replaced right hepatic artery off the superior mesenteric artery that demonstrates only thready opacification with subsequent perfusional changes within the right hepatic lobe. Complex dissection flap involves the origin of two codominant left renal arteries with subsequent partial infarction of the left kidney. Celiac artery is narrowed but patent. Right renal artery and inferior mesenteric artery appear patent. Short segment extension to the left subclavian artery which is otherwise patent. There is overall increased aneurysmal dilatation  of the thoracoabdominal aorta, for example measuring 5.2 cm at the descending thoracic aorta and 5.7 cm at the infrarenal abdominal aorta. 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 Cardiothoracic surgery consult is recommended. Please note while the chest CT was performed as a CTA with adequate/arterial contrast bolus timing, the CT abdomen pelvis was performed with routine/portal venous phase and thus suboptimal for assessment of arterial structures. Findings and recommendations discussed with Zaida Kruse NP at 8:28 a.m. on 7/6/2025. Electronically Signed: Alexander Swenson MD  7/6/2025 9:07 AM EDT  Workstation ID: [id number]    CT Abdomen Pelvis With Contrast  Result Date: 7/6/2025  Impression: Postsurgical changes of prior aortic dissection repair including proximal aortic arch repair and aortic root reconstruction, now with new/worsened complex aortic dissection spanning from the proximal aortic arch to the level of the aortic bifurcation, notably with appearance of multiple/three lumens, which may be related to reentry intimal tear/secondary dissection. Dissection extends into the superior mesenteric artery with subsequent downstream occlusion at its midportion with reconstitution at its branch point. There is a replaced right hepatic artery off the superior mesenteric artery that demonstrates only thready opacification with subsequent perfusional changes within the right hepatic lobe. Complex dissection flap involves the origin of two codominant left renal arteries with subsequent partial infarction of the left kidney. Celiac artery is narrowed but patent. Right renal artery and inferior mesenteric artery appear patent. Short segment extension to the left subclavian artery which is otherwise patent. There is overall increased aneurysmal dilatation of the thoracoabdominal aorta, for example measuring 5.2 cm at the descending thoracic aorta and 5.7 cm at the infrarenal abdominal  aorta. 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 Cardiothoracic surgery consult is recommended. Please note while the chest CT was performed as a CTA with adequate/arterial contrast bolus timing, the CT abdomen pelvis was performed with routine/portal venous phase and thus suboptimal for assessment of arterial structures. Findings and recommendations discussed with Zaida Kruse NP at 8:28 a.m. on 7/6/2025. Electronically Signed: Alexander Swenson MD  7/6/2025 9:07 AM EDT  Workstation ID: [id number]      For more specific information regarding this patient’s hospital stay at [facility], please refer to patient’s full medical record.  This summary has been culminated by this nurse practitioner on behalf of the critical care team at the request of Dr. Subramanian.    Time: Discharge 31 min    Electronically signed by MAYRA Wilcox, 07/12/25 at 06:52 EDT.

## 2025-07-12 NOTE — PROGRESS NOTES
"                                                                                                                                      Nephrology  Progress Note                                        [facility]    Patient Identification    Name: Tj Christopher  Age: 40 y.o.  Sex: male  :  1985  MRN: [id number]      DATE OF SERVICE:  2025        Subective     Taken off CRRT because unable to tolerate  Objective   Scheduled Meds:hydrocortisone sodium succinate, 100 mg, Intravenous, Q6H  lactulose, 300 mL, Rectal, Q6H  meropenem, 1,000 mg, Intravenous, Q12H  mupirocin, 1 Application, Each Nare, BID  pantoprazole, 40 mg, Intravenous, Q AM          Continuous Infusions:dexmedetomidine, 0.2-1.5 mcg/kg/hr, Last Rate: 0.5 mcg/kg/hr (25 0151)  dextrose, 75 mL/hr, Last Rate: 75 mL/hr (25 0150)  fentanyl 10 mcg/mL,  mcg/hr, Last Rate: 100 mcg/hr (25 0326)  norepinephrine, 0.02-0.5 mcg/kg/min, Last Rate: 0.25 mcg/kg/min (25 0237)  phenylephrine, 0.5-6 mcg/kg/min, Last Rate: 6 mcg/kg/min (25 0333)  vasopressin, 0.05 Units/min, Last Rate: 0.05 Units/min (25 0303)        PRN Meds:  artificial tears    dextrose    dextrose    glucagon (human recombinant)    heparin (porcine)    nitroglycerin    Potassium Replacement - Follow Nurse / BPA Driven Protocol    [COMPLETED] Insert Peripheral IV **AND** sodium chloride     Exam:  /43   Pulse (!) 138   Temp (!) 102.8 °F (39.3 °C) (Oral)   Resp 26   Ht 193 cm (76\")   Wt (!) 165 kg (364 lb 13.8 oz)   SpO2 98%   BMI 44.41 kg/m²     Intake/Output last 3 shifts:  I/O last 3 completed shifts:  In: 54752 [I.V.:8668; Blood:; Other:]  Out: 3118 [Stool:1700]    Intake/Output this shift:  No intake/output data recorded.    Physical exam:  General Appearance:   on the vent and pressors and CRRT  Head:  Normocephalic, without obvious abnormality, atraumatic  Eyes:  PERRL, conjunctiva/corneas clear     Neck:  " Supple,  no adenopathy;      Lungs:  Decreased BS occasion ronchi  Heart:  Regular rate and rhythm, S1 and S2 normal  Abdomen:  dressing  Extremities: trace edema  Pulses: 2+ and symmetric all extremities  Skin:  No rashes or lesions       Data Review:  All labs (24hrs):   Recent Results (from the past 24 hours)   Calcium, Ionized    Collection Time: 07/11/25  9:09 AM    Specimen: Blood   Result Value Ref Range    Ionized Calcium 1.05 (L) 1.15 - 1.30 mmol/L   POC Glucose Once    Collection Time: 07/11/25  9:09 AM    Specimen: Blood   Result Value Ref Range    Glucose 187 (H) 70 - 105 mg/dL   Protime-INR    Collection Time: 07/11/25  9:29 AM    Specimen: Blood   Result Value Ref Range    Protime 37.7 (H) 11.7 - 14.2 Seconds    INR 3.74 (C) 0.90 - 1.10   Fibrinogen    Collection Time: 07/11/25  9:29 AM    Specimen: Blood   Result Value Ref Range    Fibrinogen 140 (L) 219 - 464 mg/dL   CBC (No Diff)    Collection Time: 07/11/25 11:26 AM    Specimen: Blood   Result Value Ref Range    WBC 32.73 (C) 3.40 - 10.80 10*3/mm3    RBC 2.58 (L) 4.14 - 5.80 10*6/mm3    Hemoglobin 7.3 (L) 13.0 - 17.7 g/dL    Hematocrit 22.6 (L) 37.5 - 51.0 %    MCV 87.6 79.0 - 97.0 fL    MCH 28.3 26.6 - 33.0 pg    MCHC 32.3 31.5 - 35.7 g/dL    RDW 16.1 (H) 12.3 - 15.4 %    RDW-SD 50.7 37.0 - 54.0 fl    MPV 10.7 6.0 - 12.0 fL    Platelets 55 (L) 140 - 450 10*3/mm3   Protime-INR    Collection Time: 07/11/25 12:18 PM    Specimen: Blood   Result Value Ref Range    Protime 25.9 (H) 11.7 - 14.2 Seconds    INR 2.34 (C) 0.90 - 1.10   POC Glucose Once    Collection Time: 07/11/25 12:18 PM    Specimen: Blood   Result Value Ref Range    Glucose 135 (H) 70 - 105 mg/dL   POC Glucose Once    Collection Time: 07/11/25  2:42 PM    Specimen: Blood   Result Value Ref Range    Glucose 104 70 - 105 mg/dL   Protime-INR    Collection Time: 07/11/25  2:43 PM    Specimen: Blood   Result Value Ref Range    Protime 25.7 (H) 11.7 - 14.2 Seconds    INR 2.32 (C) 0.90 - 1.10    Renal Function Panel    Collection Time: 07/11/25  3:22 PM    Specimen: Blood   Result Value Ref Range    Glucose 104 (H) 65 - 99 mg/dL    BUN 11.8 6.0 - 20.0 mg/dL    Creatinine 3.10 (H) 0.76 - 1.27 mg/dL    Sodium 138 136 - 145 mmol/L    Potassium 4.8 3.5 - 5.2 mmol/L    Chloride 98 98 - 107 mmol/L    CO2 17.3 (L) 22.0 - 29.0 mmol/L    Calcium 9.1 8.6 - 10.5 mg/dL    Albumin 2.9 (L) 3.5 - 5.2 g/dL    Phosphorus 6.4 (H) 2.5 - 4.5 mg/dL    Anion Gap 22.7 (H) 5.0 - 15.0 mmol/L    BUN/Creatinine Ratio 3.8 (L) 7.0 - 25.0    eGFR 25.3 (L) >60.0 mL/min/1.73   Magnesium    Collection Time: 07/11/25  3:22 PM    Specimen: Blood   Result Value Ref Range    Magnesium 2.2 1.6 - 2.6 mg/dL   Calcium, Ionized    Collection Time: 07/11/25  3:22 PM    Specimen: Blood   Result Value Ref Range    Ionized Calcium 1.11 (L) 1.15 - 1.30 mmol/L   POC Glucose Once    Collection Time: 07/12/25  1:30 AM    Specimen: Blood   Result Value Ref Range    Glucose 46 (C) 70 - 105 mg/dL    Device Comment Follow unit protocol    Prepare Fresh Frozen Plasma, 2 Units    Collection Time: 07/12/25  2:00 AM   Result Value Ref Range    Product Code M9201D90     Unit Number [id number]     UNIT  ABO O     UNIT  RH POS     Dispense Status PT     Blood Expiration Date 202507122359     Blood Type Barcode 5100     Product Code K4921X23     Unit Number [id number]     UNIT  ABO A     UNIT  RH POS     Dispense Status PT     Blood Expiration Date 202507122359     Blood Type Barcode 6200    Prepare Platelet Pheresis, 1 Units    Collection Time: 07/12/25  2:00 AM   Result Value Ref Range    Product Code O8459Q98     Unit Number [id number]     UNIT  ABO O     UNIT  RH POS     Dispense Status PT     Blood Expiration Date 202507122359     Blood Type Barcode 5100    Prepare Fresh Frozen Plasma, 2 Units    Collection Time: 07/12/25  2:00 AM   Result Value Ref Range    Product Code G3043P27     Unit Number [id number]     UNIT  ABO A     UNIT  RH NEG      Dispense Status PT     Blood Expiration Date 166859062521     Blood Type Barcode 0600     Product Code W3547I51     Unit Number [id number]     UNIT  ABO B     UNIT  RH POS     Dispense Status PT     Blood Expiration Date 761933547946     Blood Type Barcode 7300    Prepare Fresh Frozen Plasma, 2 Units    Collection Time: 07/12/25  2:00 AM   Result Value Ref Range    Product Code Y8513R97     Unit Number [id number]     UNIT  ABO B     UNIT  RH POS     Dispense Status PT     Blood Expiration Date 384834124776     Blood Type Barcode 7300     Product Code O6549Q62     Unit Number [id number]     UNIT  ABO B     UNIT  RH NEG     Dispense Status PT     Blood Expiration Date 352343094195     Blood Type Barcode 1700    POC Glucose Once    Collection Time: 07/12/25  2:02 AM    Specimen: Blood   Result Value Ref Range    Glucose 122 (H) 70 - 105 mg/dL          Imaging:  XR Chest 1 View  Result Date: 7/11/2025  Impression: 1.Lines and tubes as noted. 2.Slight prominence of markings right perihilar region that could relate to atelectasis. 3.Haziness left base that may relate to atelectasis and/or effusion. Electronically Signed: Tristan Tenorio MD  7/11/2025 9:11 AM EDT  Workstation ID: [id number]    XR Chest 1 View  Result Date: 7/10/2025  1.Tubes and lines appear in good position. 2.Persistent bibasilar infiltrates or atelectasis and small left pleural effusion. Electronically Signed: Den Sanders MD  7/10/2025 2:33 AM EDT  Workstation ID: [id number]    XR Chest 1 View  Result Date: 7/9/2025  Impression: Dense retrocardiac left lower lobe airspace disease with air bronchograms. Correlate for symptoms of pneumonia. Stable right infrahilar/right middle lobe airspace disease which may represent atelectasis or pneumonia. Stable cardiac enlargement. Electronically Signed: Aracely Shukla MD  7/9/2025 10:29 AM EDT  Workstation ID: [id number]    XR Abdomen KUB  Result Date: 7/8/2025  Impression: Enteric tube overlies the  stomach. Electronically Signed: Hipolito Alberts MD  7/8/2025 6:08 PM EDT  Workstation ID: [id number]    XR Chest 1 View  Result Date: 7/8/2025  Impression: 1.Postoperative changes of recent sternotomy. 2.Residual atelectasis or airspace disease in the left lung base. Electronically Signed: Phil Blackwood MD  7/8/2025 1:29 PM EDT  Workstation ID: [id number]    XR Surgical Count Protocol  Result Date: 7/8/2025  Impression: No definite retained sponges are identified. Electronically Signed: Kobe Hudson MD  7/8/2025 10:27 AM EDT  Workstation ID: [id number]    XR Chest 1 View  Result Date: 7/8/2025  Impression: Left internal jugular CVC catheter present with the tip at the upper SVC. No pneumothorax. Otherwise, no significant change. Electronically Signed: Yoly Julian MD  7/8/2025 12:57 AM EDT  Workstation ID: [id number]    XR Chest 1 View  Result Date: 7/7/2025  Impression: 1.Left jugular central venous catheter tip is in the expected location of the left brachiocephalic vein. 2.No pneumothorax is seen. 3.ET tube, NG tube, and right jugular central venous catheter remain in place. 4.Subsegmental atelectasis at the lung bases. Electronically Signed: Chace Ordaz MD  7/7/2025 9:52 PM EDT  Workstation ID: [id number]    XR Chest 1 View  Result Date: 7/6/2025  Impression: Tip of the right internal jugular central venous catheter terminates in the upper SVC. No pneumothorax visible on this supine image. Tip of the endotracheal tube terminates in the midthoracic trachea approximately 3 to 4 cm above the orion. Tip of the enteric tube terminates in the gastric fundus. Electronically Signed: Kelli Olivera MD  7/6/2025 5:16 PM EDT  Workstation ID: [id number]    CT Angiogram Chest  Result Date: 7/6/2025  Impression: Postsurgical changes of prior aortic dissection repair including proximal aortic arch repair and aortic root reconstruction, now with new/worsened complex aortic dissection spanning from the proximal aortic arch to the level of  the aortic bifurcation, notably with appearance of multiple/three lumens, which may be related to reentry intimal tear/secondary dissection. Dissection extends into the superior mesenteric artery with subsequent downstream occlusion at its midportion with reconstitution at its branch point. There is a replaced right hepatic artery off the superior mesenteric artery that demonstrates only thready opacification with subsequent perfusional changes within the right hepatic lobe. Complex dissection flap involves the origin of two codominant left renal arteries with subsequent partial infarction of the left kidney. Celiac artery is narrowed but patent. Right renal artery and inferior mesenteric artery appear patent. Short segment extension to the left subclavian artery which is otherwise patent. There is overall increased aneurysmal dilatation of the thoracoabdominal aorta, for example measuring 5.2 cm at the descending thoracic aorta and 5.7 cm at the infrarenal abdominal aorta. 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 Cardiothoracic surgery consult is recommended. Please note while the chest CT was performed as a CTA with adequate/arterial contrast bolus timing, the CT abdomen pelvis was performed with routine/portal venous phase and thus suboptimal for assessment of arterial structures. Findings and recommendations discussed with Zaida Kruse NP at 8:28 a.m. on 7/6/2025. Electronically Signed: Alexander Swenson MD  7/6/2025 9:07 AM EDT  Workstation ID: [id number]    CT Abdomen Pelvis With Contrast  Result Date: 7/6/2025  Impression: Postsurgical changes of prior aortic dissection repair including proximal aortic arch repair and aortic root reconstruction, now with new/worsened complex aortic dissection spanning from the proximal aortic arch to the level of the aortic bifurcation, notably with appearance of multiple/three lumens, which may be related to reentry intimal tear/secondary  dissection. Dissection extends into the superior mesenteric artery with subsequent downstream occlusion at its midportion with reconstitution at its branch point. There is a replaced right hepatic artery off the superior mesenteric artery that demonstrates only thready opacification with subsequent perfusional changes within the right hepatic lobe. Complex dissection flap involves the origin of two codominant left renal arteries with subsequent partial infarction of the left kidney. Celiac artery is narrowed but patent. Right renal artery and inferior mesenteric artery appear patent. Short segment extension to the left subclavian artery which is otherwise patent. There is overall increased aneurysmal dilatation of the thoracoabdominal aorta, for example measuring 5.2 cm at the descending thoracic aorta and 5.7 cm at the infrarenal abdominal aorta. 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 Cardiothoracic surgery consult is recommended. Please note while the chest CT was performed as a CTA with adequate/arterial contrast bolus timing, the CT abdomen pelvis was performed with routine/portal venous phase and thus suboptimal for assessment of arterial structures. Findings and recommendations discussed with Zaida Kruse NP at 8:28 a.m. on 7/6/2025. Electronically Signed: Alexander Swenson MD  7/6/2025 9:07 AM EDT  Workstation ID: [id number]      Assessment/Plan:     Dissection of mesenteric artery    Aortic dissection    Acute mesenteric ischemia    Acute hypoxic respiratory failure         Acute kidney injury   hyperkalemia  SMA occlusion Status post thrombectomy with repair of mesenteric artery dissection  Mesenteric ischemia   Mesenteric artery dissection   Acute respiratory failure   Metabolic acidosis   History of hypertension    Acute liver injury     Recommendations:    Taken off CRRT   Patient hypotensive in spite of all diuretics   Poor prognosis

## 2025-07-12 NOTE — OP NOTE
General Surgery Operative Report  Date: 07/11/25   Patient: Tj Christopher  Surgeon: Merrill Henry MD   Co-Surgeon: Tavares Rasmussen MD; This surgery was assisted and facilitated by my partner surgeon, who directly resulted in decreased operative time, anesthetic time, exposure of surgical field and possibly of an operative wound infection thereby decreasing patient morbidity and ultimately total expenditures.   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  Despite having undergone an emergent revascularization by way of SMA thromboembolectomy with eversion endarterectomy and interposition SMA bypass graft, patient developed mesenteric ischemia resulting in loss of a large amount of small intestine in the cecum.  Unfortunately, patient's condition has continued to worsen and he has demonstrated further evidence of small bowel ischemia for which family is advised to undergo takeback laparotomy.  Due to his justin hepatic dysfunction, patient has developed coagulopathy that was unable to be reversed despite aggressive measurements.  Therefore, decision was made to proceed with bedside laparotomy.    Consent:   The risks (postoperative pain, nausea/vomiting, bleeding, hematoma/seroma, incisional hernia, injury to any underlying viscera, need for bowel  resection, need for conversion to open surgery, and complications associated with monitored anesthesia care including respiratory failure requiring intubation), benefits, and alternative therapies of takeback laparotomy were discussed in great detail with the patient's DPOA.  She voices understanding wishes to proceed with surgery.    Operative details:   The procedure was performed at bedside in the ICU.  Patient was left in supine position.  He was already on sedating medications, which were continued throughout the entirety of the procedure.The abdomen was prepped and draped in the usual sterile fashion, after which a brief time-out was held in which the patient, the procedure, preoperative antibiotics, and fire risk were identified and agreed upon by all available members of the care team.    Given the strong possibility that we would encounter ischemic bowel, I had my partner, Dr. Rasmussen, join me for the case in order to have two-physician verification of futile efforts.      The ABThera wound VAC was removed and the abdomen was explored.  The small bowel was run from the duodenal jejunal junction to the distal staple line.  As feared, the entirety of the small bowel had become ischemic.  In fact, the patient had clear gangrene with impending perforation of the distal staple line of the remaining distal jejunum.  The colon was then explored from the proximal staple line to the anterior peritoneal reflection.  The entire colon and rectum were additionally found to be ischemic.    Attention was then turned towards the supracolic viscera.  The stomach and liver were found to have stable ischemic changes, but the spleen was found to be frankly ischemic.  Take into consideration the patient's disease burden, decision was made to forego any further exploration of the duodenum or pancreas.    Given the operative findings, decision was made to forego any attempted small bowel resection, as the patient's disease process  is nonsurvivable.  Dr. Morgan was also present during the case.  Both performing surgeons and ICU attending are all in agreement that any further medical or surgical interventions would be futile.  Therefore, the ABThera wound VAC was replaced.  This was secured to the anterior abdominal wall using sterile adherent dressings until adequate suction was achieved.    All instruments, sponge, and needle counts were correct at the end of the case.  The patient tolerated the procedure without any immediate complications. The patient was left intubated in the ICU.  He remains in critical condition with very grave prognosis.  Death is imminent.    Disposition:   Rest of care per ICU team    Following this case, Dr. Morgan and I both had a joint meeting with the patient's DPOA/aunt, Matilde Liang.  We both explained the operative findings to the DPOA and explained that unfortunately the patient's physiologic insult is nonsurvivable and that any further efforts would be futile.     The DPOA's almost immediate reaction was to question about the possibility of a small bowel transplant.  I discussed that this is not an option for many reasons principally due to the underlying infection that is taking place due to abdominally bacterial translocation, but also due to bowel gangrene.  As such, the patient is not a candidate for immunosuppressive measures.  I explained that he would require more than just a small bowel transplant, but rather transplantation of his liver and more than likely his kidneys and pancreas.  Only a few select centers in the country are capable of this, which is normally reserved for patients that are not actively dying.  Furthermore, due to what is now more than likely thrombus of both the celiac and SMA, the patient would have very limited perfusion, if none at all, options in regards to vascular conduits for transplant.  Finally, patient is far too unstable for transport to another facility.       Eddie then spent a very long time discussing the pathophysiology of the patient's disease burden and explained the futility of any further intervention in great detail.  Despite this conversation and the operative findings, the patient's DPOA explained that she must for speak to her family before making a decision about CODE STATUS.  Prior to the end of the conversation, the DPOA mentioned she would be interested in a second opinion.    In my medical opinion, this patient's disease burden is far too great and no known medical treatment will make any tangible difference in his outcome.  Unfortunately, the patient has a nonsurvivable disease process and death is imminent.  I agree that all future medical or surgical endeavors will be futile and that the most compassionate maneuver at this juncture would be withdrawal of care.    Merrill Henry MD   General Surgery  07/11/25   22:23 EDT     Much of this encounter note is an electronic transcription/translation of spoken language to printed text.  The electronic translation of spoken language may permit erroneous, or at times, nonsensical words or phrases to be inadvertently transcribed.  Although I have reviewed the note for such errors, some may still exist.

## 2025-07-12 NOTE — NURSING NOTE
Family is asking for autopsy of the body. Called 's office. Ame Moon called back and stated that if it's a family's request and patient has been in the hospital since the 6th, it is not a 's case, the family should go to a private physician. Family will be informed.

## 2025-07-29 NOTE — PROGRESS NOTES
Enter Query Response Below      Query Response: Septic shock due to ischemic bowel              If applicable, please update the problem list.

## (undated) DEVICE — SUT PROLN 4/0 BB D/A 36IN 8581H

## (undated) DEVICE — ORGANIZER SUT SHELIGH 3T 213013

## (undated) DEVICE — RESERVOIR,SUCTION,100CC,SILICONE: Brand: MEDLINE

## (undated) DEVICE — SUT SILK 3/0 30IN A304H

## (undated) DEVICE — SUT PROLN 6/0 BV1 D/A 30IN 8709H

## (undated) DEVICE — Device

## (undated) DEVICE — THE STERILE CAMERA HANDLE COVER IS FOR USE WITH THE STERIS SURGICAL LIGHTING AND VISUALIZATION SYSTEMS.

## (undated) DEVICE — SOL IRR NACL 0.9PCT BO 1000ML

## (undated) DEVICE — FOGARTY ARTERIAL EMBOLECTOMY CATHETER 4F 80CM: Brand: FOGARTY

## (undated) DEVICE — DRP SLUSH WARM STRL 44X44IN

## (undated) DEVICE — SUT PROLN 3/0 SH 48IN BLU 8534H

## (undated) DEVICE — VESSEL LOOPS X-RAY DETECTABLE: Brand: DEROYAL

## (undated) DEVICE — VLV REL VAC VRV100 STRL

## (undated) DEVICE — DRSNG WND GZ PAD BORDERED 4X8IN STRL

## (undated) DEVICE — PENCL SMOKE/EVAC MEGADYNE TELESCP 15FT

## (undated) DEVICE — SUT SILK 3/0 SH CR8 30IN C017D

## (undated) DEVICE — OPTIFOAM GENTLE SA, POSTOP, 4X12: Brand: MEDLINE

## (undated) DEVICE — UMBILICAL TAPE: Brand: DEROYAL

## (undated) DEVICE — SUT POLY BR TP 2STRND 1/8X30IN

## (undated) DEVICE — YANKAUER,BULB TIP,W/O VENT,RIGID,STERILE: Brand: MEDLINE

## (undated) DEVICE — PREP SOL POVIDONE/IODINE BT 4OZ

## (undated) DEVICE — KT CANSTR WND INFOVAC W/TBG CLMP CONN 500ML

## (undated) DEVICE — CANN VESL CORNRY STR W/4MM BALN

## (undated) DEVICE — SUT SILK 0 TIES 30IN A306H

## (undated) DEVICE — SUT ETHLN 2/0 PS 18IN 585H

## (undated) DEVICE — SUT PROLN 5/0 RB1 D/A 36IN 8556H

## (undated) DEVICE — SPONGE,DISSECTOR,K,XRAY,9/16"X1/4",STRL: Brand: MEDLINE

## (undated) DEVICE — FOGARTY ARTERIAL EMBOLECTOMY CATHETER 3F 40CM: Brand: FOGARTY

## (undated) DEVICE — SOL ISO/ALC 70PCT 4OZ

## (undated) DEVICE — 28 FR STRAIGHT – SOFT PVC CATHETER: Brand: PVC THORACIC CATHETERS

## (undated) DEVICE — DRSNG SURESITE WNDW 2.38X2.75

## (undated) DEVICE — PAD MAJOR VASCULAR: Brand: MEDLINE INDUSTRIES, INC.

## (undated) DEVICE — UNDERGLV SURG BIOGEL INDICATOR LF PF 7.5

## (undated) DEVICE — SUT PROLN 4/0 V5 36IN 8935H

## (undated) DEVICE — SYS PERFUS SEP PLATLT W TIPS CUST

## (undated) DEVICE — BLAKE SILICONE DRAIN, 19 FR ROUND, HUBLESS WITH 1/4" BENDABLE TROCAR: Brand: BLAKE

## (undated) DEVICE — GAUZE,PACKING STRIP,IODOFORM,1/2"X5YD,ST: Brand: CURAD

## (undated) DEVICE — 8 FOOT DISPOSABLE EXTENSION CABLE WITH SAFE CONNECT / ALLIGATOR CLIP

## (undated) DEVICE — THE STERILE LIGHT HANDLE COVER IS USED WITH STERIS SURGICAL LIGHTING AND VISUALIZATION SYSTEMS.

## (undated) DEVICE — PROVE COVER: Brand: UNBRANDED

## (undated) DEVICE — SUT PDS LP 1 TP1 96IN VIO PDP880GA

## (undated) DEVICE — KT INSRT VENI BIOMEDICUS NXTGEN W/GW .038IN 180CM 18G

## (undated) DEVICE — BLOOD TRANSFUSION FILTER: Brand: HAEMONETICS

## (undated) DEVICE — ST TOURNI COMPL A/ 7IN

## (undated) DEVICE — KT SURG TURNOVER 050

## (undated) DEVICE — SUT SILK 0 CT1 CR8 18IN C021D

## (undated) DEVICE — LP VESL MAXI 2.5X1MM RED 2PK

## (undated) DEVICE — SUT SILK 2/0 TIES 18IN A185H

## (undated) DEVICE — 28 FR RIGHT ANGLE – SOFT PVC CATHETER: Brand: PVC THORACIC CATHETERS

## (undated) DEVICE — BG TRANSF W/COUPLER SPK 600ML

## (undated) DEVICE — CANN RETRGR STYLET RSCP 15F

## (undated) DEVICE — SUPPLIED AS A PAIR FOR USE IN JAWS OF RESUABLE CLAMPS.: Brand: INTRACK® INSERTS

## (undated) DEVICE — SOL IRR H2O BTL 1000ML STRL

## (undated) DEVICE — TBG PRESS/MONITOR FIX M/F LL A/ 24IN STRL

## (undated) DEVICE — SUT PDS 0 CT 36IN VIO PDP358T

## (undated) DEVICE — OPTIFOAM GENTLE AG,POST-OP STRIP,3.5X10: Brand: MEDLINE

## (undated) DEVICE — DRN WND CH RND FUL/FLUT NO/TROC 3/8IN 28F

## (undated) DEVICE — SUT SILK 2/0 SH 75CM 30IN BLK C016D

## (undated) DEVICE — SOL IRR NACL 0.9PCT BT 1000ML

## (undated) DEVICE — SUT PROLN 3/0 SH D/A 36IN 8522H

## (undated) DEVICE — SYR PREFILL SALINE POSIFLUSH 10ML STRL

## (undated) DEVICE — DECANTER BAG 9": Brand: MEDLINE INDUSTRIES, INC.

## (undated) DEVICE — CVR PROB 96IN LF STRL

## (undated) DEVICE — CLAMP INSERT: Brand: STEALTH® CLAMP INSERT

## (undated) DEVICE — ANTIBACTERIAL UNDYED BRAIDED (POLYGLACTIN 910), SYNTHETIC ABSORBABLE SUTURE: Brand: COATED VICRYL

## (undated) DEVICE — MARKER,SKIN,WI/RULER AND LABELS: Brand: MEDLINE

## (undated) DEVICE — SOL IRR H2O BO 1000ML STRL

## (undated) DEVICE — SUT SILK 2 SUTUPAK TIE 60IN SA8H 2STRAND

## (undated) DEVICE — SUT PROLN 5/0 V5 36IN 8934H

## (undated) DEVICE — ELECTRD BLD EZ CLN STD 6.5IN

## (undated) DEVICE — SOL NACL 0.9PCT 1000ML

## (undated) DEVICE — IRRIGATOR BULB ASEPTO 60CC STRL

## (undated) DEVICE — BG BLD SYS

## (undated) DEVICE — ST PERFUS M/

## (undated) DEVICE — PK PERFUS CUST W/CARDIOPLEGIA

## (undated) DEVICE — REDUCING CONN CANN/PUMP 3/8X1/4

## (undated) DEVICE — SPNG GZ WOVN 4X4IN 12PLY 10/BX STRL

## (undated) DEVICE — ABDOMINAL BINDER: Brand: DEROYAL

## (undated) DEVICE — SUT GORE CV5 PT 13IN 5N08A

## (undated) DEVICE — ADAPT ANTEGRADE RETRGR

## (undated) DEVICE — ST. SORBAVIEW ULTIMATE IJ SYSTEM A,C: Brand: CENTURION

## (undated) DEVICE — HEMOCONCENTRATOR PERFUS LPS06

## (undated) DEVICE — CATHETER,URETHRAL,REDRUBBER,STERILE,22FR: Brand: MEDLINE

## (undated) DEVICE — 40580 - THE PINK PAD - ADVANCED TRENDELENBURG POSITIONING KIT: Brand: 40580 - THE PINK PAD - ADVANCED TRENDELENBURG POSITIONING KIT

## (undated) DEVICE — CONN STR 1/2INX3/8IN

## (undated) DEVICE — FOGARTY EMBOLECTOMY CATHETER: Brand: FOGARTY

## (undated) DEVICE — CANN AORT ROOT DLP VNT/8IN 14G 7F

## (undated) DEVICE — PK ATS CUST W CARDIOTOMY RESEVOIR

## (undated) DEVICE — SHET AIR RENEWAL COMFRT GLID BLEND/AIR LAT REPROC 40X80IN

## (undated) DEVICE — CONTAINER,SPECIMEN,OR STERILE,4OZ: Brand: MEDLINE

## (undated) DEVICE — DEV OPN LIGASURE CRV 180D 36MM 13.5CM  1P/U

## (undated) DEVICE — MARKER SKIN W/RULER DUAL: Brand: MEDLINE INDUSTRIES, INC.

## (undated) DEVICE — TUBING, SUCTION, 1/4" X 12', STRAIGHT: Brand: MEDLINE

## (undated) DEVICE — SUT ABS VICRLY/PLS COAT BR 2/0 NO/NDL TIE/12X18IN VIL

## (undated) DEVICE — 1LYRTR 16FR10ML 100%SILI SNAP: Brand: MEDLINE INDUSTRIES, INC.

## (undated) DEVICE — PENCL HND ROCKRSWTCH HOLSTR EZ CLEAN TP CRD 10FT

## (undated) DEVICE — TBG ART PRESS 60 IN

## (undated) DEVICE — PK MAJ LAPAROTOMY 50

## (undated) DEVICE — PK PERFUS W/TB CUST ADLT

## (undated) DEVICE — SENSR CERBRL O2 PK/2

## (undated) DEVICE — GLV SURG BIOGEL LTX PF 7 1/2

## (undated) DEVICE — GLOVE,SURG,SENSICARE SLT,LF,PF,7.5: Brand: MEDLINE

## (undated) DEVICE — DRSNG SURESITE WNDW 4X4.5

## (undated) DEVICE — GAUZE,SPONGE,4"X4",16PLY,XRAY,STRL,LF: Brand: MEDLINE

## (undated) DEVICE — 3M™ IOBAN™ 2 ANTIMICROBIAL INCISE DRAPE 6651EZ: Brand: IOBAN™ 2

## (undated) DEVICE — GAUZE,BORDER,4"X4",2.5"X2.5"PAD,STERILE: Brand: MEDLINE

## (undated) DEVICE — PK HEART OPN 40

## (undated) DEVICE — WOUND RETRACTOR AND PROTECTOR: Brand: ALEXIS O WOUND PROTECTOR-RETRACTOR

## (undated) DEVICE — SPONGE,LAP,18"X18",STD,XR,ST,5/PK,40PK/C: Brand: MEDLINE

## (undated) DEVICE — BG ISOL DRWSTR INVISISHIELD 20X20IN

## (undated) DEVICE — SUT PROLN 6/0 RB2 D/A 30IN 8711H

## (undated) DEVICE — CATH VENT DLP W/CONN MALL NOVNT SILICON 16FR 16IN

## (undated) DEVICE — ABTHERA OPEN ABDOMEN DRESSING WITH SENSATRAC PAD: Brand: ABTHERA™ SENSAT.R.A.C.™

## (undated) DEVICE — DRP SLUSH WARMR MACH RECTG 66X44IN

## (undated) DEVICE — OASIS DRAIN, SINGLE, INLINE & ATS COMPATIBLE: Brand: OASIS

## (undated) DEVICE — LP VESL MINI RED 2PK

## (undated) DEVICE — SUT PROLN 4/0 SH D/A 36IN 8521H

## (undated) DEVICE — LABEL SHEET CUSTOM 2X2 YELLOW: Brand: MEDLINE INDUSTRIES, INC.

## (undated) DEVICE — SUT SILK 2/0 SH CR8 18IN CR8 C012D

## (undated) DEVICE — C-ARM: Brand: UNBRANDED

## (undated) DEVICE — SUT PROLN 3/0 V7 D/A 36IN 8976H

## (undated) DEVICE — GAUZE,BORDER,4"X10",(2X8"PAD),STERILE: Brand: MEDLINE

## (undated) DEVICE — GLV SURG BIOGEL LTX PF 7

## (undated) DEVICE — BIOPATCH™ ANTIMICROBIAL DRESSING WITH CHLORHEXIDINE GLUCONATE IS A HYDROPHILLIC POLYURETHANE ABSORPTIVE FOAM WITH CHLORHEXIDINE GLUCONATE (CHG) WHICH INHIBITS BACTERIAL GROWTH UNDER THE DRESSING. THE DRESSING IS INTENDED TO BE USED TO ABSORB EXUDATE, COVER A WOUND CAUSED BY VASCULAR AND NONVASCULAR PERCUTANEOUS MEDICAL DEVICES DURING SURGERY, AS WELL AS REDUCE LOCAL INFECTION AND COLONIZATION OF MICROORGANISMS.: Brand: BIOPATCH

## (undated) DEVICE — MICRO TIP WIPE: Brand: DEVON

## (undated) DEVICE — ANESTH CIRCUIT 60IN 3LTR-LF: Brand: MEDLINE INDUSTRIES, INC.